# Patient Record
Sex: MALE | Race: OTHER | URBAN - METROPOLITAN AREA
[De-identification: names, ages, dates, MRNs, and addresses within clinical notes are randomized per-mention and may not be internally consistent; named-entity substitution may affect disease eponyms.]

---

## 2017-01-25 ENCOUNTER — OUTPATIENT (OUTPATIENT)
Dept: OUTPATIENT SERVICES | Facility: HOSPITAL | Age: 72
LOS: 1 days | End: 2017-01-25
Payer: MEDICARE

## 2017-01-25 VITALS
DIASTOLIC BLOOD PRESSURE: 63 MMHG | SYSTOLIC BLOOD PRESSURE: 104 MMHG | OXYGEN SATURATION: 98 % | RESPIRATION RATE: 18 BRPM | TEMPERATURE: 98 F | WEIGHT: 209.44 LBS | HEART RATE: 80 BPM

## 2017-01-25 VITALS
SYSTOLIC BLOOD PRESSURE: 104 MMHG | WEIGHT: 209.44 LBS | TEMPERATURE: 98 F | DIASTOLIC BLOOD PRESSURE: 63 MMHG | OXYGEN SATURATION: 97 % | HEIGHT: 65 IN | HEART RATE: 63 BPM | RESPIRATION RATE: 18 BRPM

## 2017-01-25 DIAGNOSIS — Z90.5 ACQUIRED ABSENCE OF KIDNEY: Chronic | ICD-10-CM

## 2017-01-25 DIAGNOSIS — Z01.810 ENCOUNTER FOR PREPROCEDURAL CARDIOVASCULAR EXAMINATION: ICD-10-CM

## 2017-01-25 LAB
ANION GAP SERPL CALC-SCNC: 18 MMOL/L — HIGH (ref 5–17)
APTT BLD: 38 SEC — HIGH (ref 27.5–37.4)
BASOPHILS # BLD AUTO: 0 K/UL — SIGNIFICANT CHANGE UP (ref 0–0.2)
BASOPHILS NFR BLD AUTO: 0.5 % — SIGNIFICANT CHANGE UP (ref 0–2)
BUN SERPL-MCNC: 29 MG/DL — HIGH (ref 8–20)
CALCIUM SERPL-MCNC: 8.1 MG/DL — LOW (ref 8.6–10.2)
CHLORIDE SERPL-SCNC: 97 MMOL/L — LOW (ref 98–107)
CO2 SERPL-SCNC: 28 MMOL/L — SIGNIFICANT CHANGE UP (ref 22–29)
CREAT SERPL-MCNC: 6.36 MG/DL — HIGH (ref 0.5–1.3)
EOSINOPHIL # BLD AUTO: 0.1 K/UL — SIGNIFICANT CHANGE UP (ref 0–0.5)
EOSINOPHIL NFR BLD AUTO: 2.8 % — SIGNIFICANT CHANGE UP (ref 0–5)
GLUCOSE SERPL-MCNC: 110 MG/DL — SIGNIFICANT CHANGE UP (ref 70–115)
HCT VFR BLD CALC: 32.4 % — LOW (ref 42–52)
HGB BLD-MCNC: 10.6 G/DL — LOW (ref 14–18)
INR BLD: 1.24 RATIO — HIGH (ref 0.88–1.16)
LYMPHOCYTES # BLD AUTO: 0.8 K/UL — LOW (ref 1–4.8)
LYMPHOCYTES # BLD AUTO: 19.3 % — LOW (ref 20–55)
MCHC RBC-ENTMCNC: 32.7 G/DL — SIGNIFICANT CHANGE UP (ref 32–36)
MCHC RBC-ENTMCNC: 35.7 PG — HIGH (ref 27–31)
MCV RBC AUTO: 109.1 FL — HIGH (ref 80–94)
MONOCYTES # BLD AUTO: 0.4 K/UL — SIGNIFICANT CHANGE UP (ref 0–0.8)
MONOCYTES NFR BLD AUTO: 9.9 % — SIGNIFICANT CHANGE UP (ref 3–10)
NEUTROPHILS # BLD AUTO: 2.9 K/UL — SIGNIFICANT CHANGE UP (ref 1.8–8)
NEUTROPHILS NFR BLD AUTO: 67.5 % — SIGNIFICANT CHANGE UP (ref 37–73)
PLATELET # BLD AUTO: 190 K/UL — SIGNIFICANT CHANGE UP (ref 150–400)
POTASSIUM SERPL-MCNC: 4.4 MMOL/L — SIGNIFICANT CHANGE UP (ref 3.5–5.3)
POTASSIUM SERPL-SCNC: 4.4 MMOL/L — SIGNIFICANT CHANGE UP (ref 3.5–5.3)
PROTHROM AB SERPL-ACNC: 13.7 SEC — HIGH (ref 10–13.1)
RBC # BLD: 2.97 M/UL — LOW (ref 4.6–6.2)
RBC # FLD: 15.3 % — SIGNIFICANT CHANGE UP (ref 11–15.6)
SODIUM SERPL-SCNC: 143 MMOL/L — SIGNIFICANT CHANGE UP (ref 135–145)
TYPE + AB SCN PNL BLD: SIGNIFICANT CHANGE UP
WBC # BLD: 4.35 K/UL — LOW (ref 4.8–10.8)
WBC # FLD AUTO: 4.35 K/UL — LOW (ref 4.8–10.8)

## 2017-01-25 PROCEDURE — G0463: CPT

## 2017-01-25 PROCEDURE — 86901 BLOOD TYPING SEROLOGIC RH(D): CPT

## 2017-01-25 PROCEDURE — 85730 THROMBOPLASTIN TIME PARTIAL: CPT

## 2017-01-25 PROCEDURE — 80048 BASIC METABOLIC PNL TOTAL CA: CPT

## 2017-01-25 PROCEDURE — 86900 BLOOD TYPING SEROLOGIC ABO: CPT

## 2017-01-25 PROCEDURE — T1013: CPT

## 2017-01-25 PROCEDURE — 93010 ELECTROCARDIOGRAM REPORT: CPT

## 2017-01-25 PROCEDURE — 86850 RBC ANTIBODY SCREEN: CPT

## 2017-01-25 PROCEDURE — 85027 COMPLETE CBC AUTOMATED: CPT

## 2017-01-25 PROCEDURE — 93005 ELECTROCARDIOGRAM TRACING: CPT

## 2017-01-25 PROCEDURE — 85610 PROTHROMBIN TIME: CPT

## 2017-01-25 RX ORDER — WARFARIN SODIUM 2.5 MG/1
1 TABLET ORAL
Qty: 30 | Refills: 1 | OUTPATIENT
Start: 2017-01-25 | End: 2017-03-25

## 2017-01-25 NOTE — H&P CARDIOLOGY - COMMENTS
70 year old male with persistent atrial fibrillation maintained on aspirin 325mg only secondary to spontaneous renal bleed complicated by renal hematoma while on coumadin resulting in left nephrectomy, ESRD on hemodialysis (right UE fistula). CHADS-Vasc =3 (DM, HTN, age). Now for Watchman device 1/30/17.     Plan:   CANDIDA guided YADIRA occlusion via watchman device 1/30/16- 1130AM arrival.   Pre-procedure instructions provided (verbal & written) as follows:   - Coumadin 3mg PO qhs to start this PM.    - INR check with Dr. Beltran 1/27/17 at 1330   - pt advised to stop aspirin 1/27 if INR >=2; otherwise last dose aspirin 1/29   - NPO after midnight prior except meds w/ sips of water.    - Hold glimepiride the morning of the procedure:   Discharge teaching initiated.

## 2017-01-25 NOTE — H&P CARDIOLOGY - HISTORY OF PRESENT ILLNESS
70 year old male history of NIDDM, HLD, persistent atrial fibrillation maintained on aspirin 325mg only secondary to spontaneous renal bleed complicated by renal hematoma while on coumadin resulting in left nephrectomy, ESRD on hemodialysis (right UE fistula). CHADS-Vasc =3 (DM, HTN, age). The patient presents in anticipation of elective YADIRA occlusion via Watchman device scheduled 1/30/17.     TTE 5/2016: normal LV systolic function, LVEF = 55-60%; mild concentric LVH; normal LA size; mild AR, mild MR, normal RV size/function  Regadenoson Stress Test 5/2016: LVEF 62%; no evidence of infarct or inducible ischemia.     PMD: Eddy Beltran MD ([phone])  Cardiologist: Lexie Jerome MD  Nephrologist: Luis Manuel Epstein MD & Dyan Jeffries MD ([phone])  Dialysis: [facility] ([phone])

## 2017-01-25 NOTE — ASU PATIENT PROFILE, ADULT - PMH
AF (atrial fibrillation)    Daytime sleepiness    Dizziness    DM (diabetes mellitus)    Dyslipidemia    ESRD (end stage renal disease)  on  hemodialysis  3  x  weekly.,  H/O  left  nephrectomy after  renal  bleed  Hypercholesteremia    Hypotension  treated  with Midodrine  Kidney problem  spontanious renal bleed while on coumadin  c/b  renal  hematome  with  LT  nephrectomy  Renal hematoma, left    Tiredness

## 2017-01-26 DIAGNOSIS — I25.10 ATHEROSCLEROTIC HEART DISEASE OF NATIVE CORONARY ARTERY WITHOUT ANGINA PECTORIS: ICD-10-CM

## 2017-01-26 DIAGNOSIS — Z01.810 ENCOUNTER FOR PREPROCEDURAL CARDIOVASCULAR EXAMINATION: ICD-10-CM

## 2017-01-30 ENCOUNTER — INPATIENT (INPATIENT)
Facility: HOSPITAL | Age: 72
LOS: 0 days | Discharge: HOME | DRG: 273 | End: 2017-01-31
Attending: STUDENT IN AN ORGANIZED HEALTH CARE EDUCATION/TRAINING PROGRAM | Admitting: STUDENT IN AN ORGANIZED HEALTH CARE EDUCATION/TRAINING PROGRAM
Payer: MEDICARE

## 2017-01-30 ENCOUNTER — TRANSCRIPTION ENCOUNTER (OUTPATIENT)
Age: 72
End: 2017-01-30

## 2017-01-30 VITALS
WEIGHT: 209.44 LBS | HEART RATE: 93 BPM | OXYGEN SATURATION: 99 % | HEIGHT: 65 IN | RESPIRATION RATE: 18 BRPM | DIASTOLIC BLOOD PRESSURE: 71 MMHG | SYSTOLIC BLOOD PRESSURE: 131 MMHG

## 2017-01-30 DIAGNOSIS — Z01.810 ENCOUNTER FOR PREPROCEDURAL CARDIOVASCULAR EXAMINATION: ICD-10-CM

## 2017-01-30 DIAGNOSIS — I48.91 UNSPECIFIED ATRIAL FIBRILLATION: ICD-10-CM

## 2017-01-30 DIAGNOSIS — Z90.5 ACQUIRED ABSENCE OF KIDNEY: Chronic | ICD-10-CM

## 2017-01-30 LAB
BLD GP AB SCN SERPL QL: SIGNIFICANT CHANGE UP
TYPE + AB SCN PNL BLD: SIGNIFICANT CHANGE UP

## 2017-01-30 PROCEDURE — 33340 PERQ CLSR TCAT L ATR APNDGE: CPT

## 2017-01-30 PROCEDURE — 99231 SBSQ HOSP IP/OBS SF/LOW 25: CPT

## 2017-01-30 PROCEDURE — 93355 ECHO TRANSESOPHAGEAL (TEE): CPT

## 2017-01-30 RX ORDER — MIDODRINE HYDROCHLORIDE 2.5 MG/1
10 TABLET ORAL THREE TIMES A DAY
Qty: 0 | Refills: 0 | Status: DISCONTINUED | OUTPATIENT
Start: 2017-01-30 | End: 2017-01-31

## 2017-01-30 RX ORDER — GEMFIBROZIL 600 MG
600 TABLET ORAL DAILY
Qty: 0 | Refills: 0 | Status: DISCONTINUED | OUTPATIENT
Start: 2017-01-30 | End: 2017-01-31

## 2017-01-30 RX ORDER — GLIMEPIRIDE 1 MG
1 TABLET ORAL
Qty: 0 | Refills: 0 | Status: DISCONTINUED | OUTPATIENT
Start: 2017-01-30 | End: 2017-01-31

## 2017-01-30 RX ORDER — URSODIOL 250 MG/1
300 TABLET, FILM COATED ORAL DAILY
Qty: 0 | Refills: 0 | Status: DISCONTINUED | OUTPATIENT
Start: 2017-01-30 | End: 2017-01-31

## 2017-01-30 RX ORDER — LORATADINE 10 MG/1
10 TABLET ORAL DAILY
Qty: 0 | Refills: 0 | Status: DISCONTINUED | OUTPATIENT
Start: 2017-01-30 | End: 2017-01-31

## 2017-01-30 RX ORDER — METOPROLOL TARTRATE 50 MG
12.5 TABLET ORAL
Qty: 0 | Refills: 0 | Status: DISCONTINUED | OUTPATIENT
Start: 2017-01-30 | End: 2017-01-31

## 2017-01-30 RX ORDER — WARFARIN SODIUM 2.5 MG/1
3 TABLET ORAL ONCE
Qty: 0 | Refills: 0 | Status: COMPLETED | OUTPATIENT
Start: 2017-01-30 | End: 2017-01-30

## 2017-01-30 RX ORDER — ASPIRIN/CALCIUM CARB/MAGNESIUM 324 MG
325 TABLET ORAL DAILY
Qty: 0 | Refills: 0 | Status: DISCONTINUED | OUTPATIENT
Start: 2017-01-30 | End: 2017-01-31

## 2017-01-30 RX ORDER — PANTOPRAZOLE SODIUM 20 MG/1
40 TABLET, DELAYED RELEASE ORAL
Qty: 0 | Refills: 0 | Status: DISCONTINUED | OUTPATIENT
Start: 2017-01-30 | End: 2017-01-31

## 2017-01-30 RX ORDER — ACETAMINOPHEN 500 MG
650 TABLET ORAL EVERY 6 HOURS
Qty: 0 | Refills: 0 | Status: DISCONTINUED | OUTPATIENT
Start: 2017-01-30 | End: 2017-01-31

## 2017-01-30 RX ORDER — ATORVASTATIN CALCIUM 80 MG/1
10 TABLET, FILM COATED ORAL AT BEDTIME
Qty: 0 | Refills: 0 | Status: DISCONTINUED | OUTPATIENT
Start: 2017-01-30 | End: 2017-01-31

## 2017-01-30 RX ORDER — CINACALCET 30 MG/1
30 TABLET, FILM COATED ORAL
Qty: 0 | Refills: 0 | Status: DISCONTINUED | OUTPATIENT
Start: 2017-01-30 | End: 2017-01-31

## 2017-01-30 RX ORDER — MAGNESIUM OXIDE 400 MG ORAL TABLET 241.3 MG
400 TABLET ORAL DAILY
Qty: 0 | Refills: 0 | Status: DISCONTINUED | OUTPATIENT
Start: 2017-01-30 | End: 2017-01-31

## 2017-01-30 RX ORDER — DOCUSATE SODIUM 100 MG
100 CAPSULE ORAL DAILY
Qty: 0 | Refills: 0 | Status: DISCONTINUED | OUTPATIENT
Start: 2017-01-30 | End: 2017-01-31

## 2017-01-30 RX ORDER — SEVELAMER CARBONATE 2400 MG/1
800 POWDER, FOR SUSPENSION ORAL
Qty: 0 | Refills: 0 | Status: DISCONTINUED | OUTPATIENT
Start: 2017-01-30 | End: 2017-01-31

## 2017-01-30 RX ADMIN — ATORVASTATIN CALCIUM 10 MILLIGRAM(S): 80 TABLET, FILM COATED ORAL at 21:23

## 2017-01-30 RX ADMIN — MIDODRINE HYDROCHLORIDE 10 MILLIGRAM(S): 2.5 TABLET ORAL at 21:25

## 2017-01-30 RX ADMIN — WARFARIN SODIUM 3 MILLIGRAM(S): 2.5 TABLET ORAL at 21:23

## 2017-01-30 RX ADMIN — Medication 12.5 MILLIGRAM(S): at 21:23

## 2017-01-30 RX ADMIN — CINACALCET 30 MILLIGRAM(S): 30 TABLET, FILM COATED ORAL at 21:38

## 2017-01-30 NOTE — DISCHARGE NOTE ADULT - HOSPITAL COURSE
71 year old male history of NIDDM, HLD, persistent atrial fibrillation maintained on aspirin 325mg only secondary to spontaneous renal bleed complicated by renal hematoma while on coumadin resulting in left nephrectomy, ESRD on hemodialysis (right UE fistula). CHADS-Vasc =3 (DM, HTN, age). The patient presents now s/p elective YADIRA occlusion via Watchman device.    TTE 5/2016: normal LV systolic function, LVEF = 55-60%; mild concentric LVH; normal LA size; mild AR, mild MR, normal RV size/function  Regadenoson Stress Test 5/2016: LVEF 62%; no evidence of infarct or inducible ischemia.     - Coumadin 3mg ordered tonight in addition to Aspirin  - Follow up AM labs  - Follow up with Dr. Rao in 2 weeks.   - CANDIDA to be arranged in 45 days 71 year old male history of NIDDM, HLD, persistent atrial fibrillation maintained on aspirin 325mg only secondary to spontaneous renal bleed complicated by renal hematoma while on coumadin resulting in left nephrectomy, ESRD on hemodialysis (right UE fistula). CHADS-Vasc =3 (DM, HTN, age). The patient presents now s/p elective YADIRA occlusion via Watchman device.    TTE 5/2016: normal LV systolic function, LVEF = 55-60%; mild concentric LVH; normal LA size; mild AR, mild MR, normal RV size/function  Regadenoson Stress Test 5/2016: LVEF 62%; no evidence of infarct or inducible ischemia.     - Coumadin 5mg ordered and sent to pharmacy  - Follow up with Dr. Rao in 2 weeks.   - CANDIDA to be arranged in 45 days 71 year old male history of NIDDM, HLD, persistent atrial fibrillation maintained on aspirin 325mg only secondary to spontaneous renal bleed complicated by renal hematoma while on coumadin resulting in left nephrectomy, ESRD on hemodialysis (right UE fistula). CHADS-Vasc =3 (DM, HTN, age). The patient presents now s/p elective YADIRA occlusion via Watchman device.    TTE 5/2016: normal LV systolic function, LVEF = 55-60%; mild concentric LVH; normal LA size; mild AR, mild MR, normal RV size/function  Regadenoson Stress Test 5/2016: LVEF 62%; no evidence of infarct or inducible ischemia.     - Coumadin 5mg ordered and sent to pharmacy  - Follow up with Dr. Vyas in 2 weeks.   - CANDIDA to be arranged in 45 days

## 2017-01-30 NOTE — DISCHARGE NOTE ADULT - CARE PLAN
Principal Discharge DX:	Abnormality of left atrial appendage  Goal:	s/p closure of left Atrial appendage with Watchman device.  Instructions for follow-up, activity and diet:	- Bruising at the groin, sometimes extending down the leg, and/or a small lump under the skin at the groin access site is normal and will resolve within 2 – 3 weeks.   - You make walk and take stairs at a regular pace.   - Do not perform any exercise more strenuous than walking for 1 week.   - Do not strain or lift heavy objects for 1 week.  - You may shower the day after the procedure.  - Do not soak in water (such as tub baths, hot tubs, swimming, etc.) for 1 week.   - You may resume all other activities the day after the procedure.  Call your doctor if:   - you notice bleeding, redness, drainage, swelling, increased tenderness or a hot sensation around the catheter insertion site.   - your temperature is greater than 100 degrees F for more than 24 hours.  - your rapid heart rhythm returns.  - you have any questions or concerns regarding the procedure.  If significant bleeding and/or a large lump (the size of a golf ball or bigger) occurs:  - Lie flat and apply continuous direct pressure just above the puncture site for at least 10 minutes  - If the issue resolves, notify your physician immediately.    - If the bleeding cannot be controlled, please seek immediate medical attention.  If you experience increased difficulty breathing or chest pain, or if you faint or have dizzy spells, please seek immediate medical attention.  - Check your INR by Friday with Dr. Eddy Beltran.  - Continue your dialysis as per you schedule.   - Follow up with Dr. Rao in 2 weeks time. Arrangements for 45 day CANDIDA will be made at that appointment.

## 2017-01-30 NOTE — DISCHARGE NOTE ADULT - PLAN OF CARE
- Bruising at the groin, sometimes extending down the leg, and/or a small lump under the skin at the groin access site is normal and will resolve within 2 – 3 weeks.   - You make walk and take stairs at a regular pace.   - Do not perform any exercise more strenuous than walking for 1 week.   - Do not strain or lift heavy objects for 1 week.  - You may shower the day after the procedure.  - Do not soak in water (such as tub baths, hot tubs, swimming, etc.) for 1 week.   - You may resume all other activities the day after the procedure.  Call your doctor if:   - you notice bleeding, redness, drainage, swelling, increased tenderness or a hot sensation around the catheter insertion site.   - your temperature is greater than 100 degrees F for more than 24 hours.  - your rapid heart rhythm returns.  - you have any questions or concerns regarding the procedure.  If significant bleeding and/or a large lump (the size of a golf ball or bigger) occurs:  - Lie flat and apply continuous direct pressure just above the puncture site for at least 10 minutes  - If the issue resolves, notify your physician immediately.    - If the bleeding cannot be controlled, please seek immediate medical attention.  If you experience increased difficulty breathing or chest pain, or if you faint or have dizzy spells, please seek immediate medical attention.  - Check your INR by Friday with Dr. Eddy Beltran.  - Continue your dialysis as per you schedule.   - Follow up with Dr. Rao in 2 weeks time. Arrangements for 45 day CANDIDA will be made at that appointment. s/p closure of left Atrial appendage with Watchman device.

## 2017-01-30 NOTE — DISCHARGE NOTE ADULT - NS AS DC VTE INSTRUCTION
Coumadin/Warfarin - Compliance.../Coumadin/Warfarin - Dietary Advice.../Coumadin/Warfarin - Follow-up monitoring.../Coumadin/Warfarin - Potential for adverse drug reactions and interactions Coumadin/Warfarin - Potential for adverse drug reactions and interactions/Coumadin/Warfarin - Dietary Advice.../Coumadin/Warfarin - Compliance.../Coumadin/Warfarin - Follow-up monitoring...

## 2017-01-30 NOTE — PROGRESS NOTE ADULT - SUBJECTIVE AND OBJECTIVE BOX
71 year old male history of NIDDM, HLD, persistent atrial fibrillation maintained on aspirin 325mg only secondary to spontaneous renal bleed complicated by renal hematoma while on coumadin resulting in left nephrectomy, ESRD on hemodialysis (right UE fistula). CHADS-Vasc =3 (DM, HTN, age). The patient presents now s/p elective YADIRA occlusion via Watchman device.    TTE 5/2016: normal LV systolic function, LVEF = 55-60%; mild concentric LVH; normal LA size; mild AR, mild MR, normal RV size/function  Regadenoson Stress Test 5/2016: LVEF 62%; no evidence of infarct or inducible ischemia.     EKG: Afib at 89 bpm; QRSD 76msec  TELE: Afib 75-85 bpm    MEDICATIONS  (STANDING):  warfarin 3milliGRAM(s) Oral once  gemfibrozil 600milliGRAM(s) Oral daily  ursodiol Capsule 300milliGRAM(s) Oral daily  atorvastatin 10milliGRAM(s) Oral at bedtime  metoprolol 12.5milliGRAM(s) Oral two times a day  pantoprazole    Tablet 40milliGRAM(s) Oral before breakfast  glimepiride 1milliGRAM(s) Oral with breakfast  sevelamer hydrochloride 800milliGRAM(s) Oral two times a day with meals  cinacalcet 30milliGRAM(s) Oral two times a day  midodrine 10milliGRAM(s) Oral three times a day  magnesium oxide 400milliGRAM(s) Oral daily  docusate sodium 100milliGRAM(s) Oral daily  loratadine 10milliGRAM(s) Oral daily  aspirin 325milliGRAM(s) Oral daily    MEDICATIONS  (PRN):  acetaminophen   Tablet. 650milliGRAM(s) Oral every 6 hours PRN Moderate Pain (4 - 6)      Allergies    No Known Allergies      PAST MEDICAL & SURGICAL HISTORY:  Kidney problem: spontaneous renal bleed while on coumadin  c/b  renal  hematoma  with  LT  nephrectomy  Hypotension: treated  with Midodrine  Tiredness  Renal hematoma, left  Hypercholesteremia  ESRD (end stage renal disease): on  hemodialysis  3  x  weekly.,  H/O  left  nephrectomy after  renal  bleed  Dyslipidemia  Dizziness  DM (diabetes mellitus)  Daytime sleepiness  AF (atrial fibrillation)  History of unilateral nephrectomy: left  nephrectomy    Vital Signs Last 24 Hrs  HR: 84 (78 - 93)  BP: 92/65 (92/65 - 131/71)  RR: 14 (14 - 18)  SpO2: 98% (97% - 99%)    Physical Exam:  Constitutional: NAD, AAOx3  Cardiovascular: +S1S2 IRIR. Afib on monitor  Pulmonary: CTA b/l, unlabored  GI: soft NTND +BS  Extremities:   Right Groin: Dressing intact. No hematoma. Figure 8 stitch in place. No pedal edema, +distal pulses b/l  Neuro: non focal, PANDYA x4    A/P  71 year old male history of NIDDM, HLD, persistent atrial fibrillation maintained on aspirin 325mg only secondary to spontaneous renal bleed complicated by renal hematoma while on coumadin resulting in left nephrectomy, ESRD on hemodialysis now s/p Watchman device implant  - admit overnight  - keep right left straight x 3 hours  - figure 8 stitch to be removed in AM  - Hemodialysis arranged for tomorrow AM   - Coumadin 3mg ordered tonight in addition to Aspirin  - Follow up AM labs  - Follow up with Dr. Rao in 2 weeks.   - CANDIDA to be arranged in 45 days  - discharge planning for tomorrow.               RADIOLOGY & ADDITIONAL TESTS:

## 2017-01-30 NOTE — DISCHARGE NOTE ADULT - PATIENT PORTAL LINK FT
“You can access the Follow[facility] Patient Portal, offered by [facility], by registering with the following website: [email or web address]”

## 2017-01-30 NOTE — PROGRESS NOTE ADULT - SUBJECTIVE AND OBJECTIVE BOX
Renal :    Discussed w, the PA - CTS,    Undergoing Watchman Procedure ,    Will Resume HD in AM ( [facility]  Dialysis unit - Chelsi Jeffries  & Lucian )

## 2017-01-30 NOTE — DISCHARGE NOTE ADULT - ADDITIONAL INSTRUCTIONS
You have a 2 week post WATCHMAN follow up on 2/15/17 at 3:30pm with Dr Vyas at [facility]

## 2017-01-30 NOTE — PROGRESS NOTE ADULT - SUBJECTIVE AND OBJECTIVE BOX
Renal :    Access : AVF - R,    Treatment Time : 4 Hours,    EDW : 96.5 Kg.,    Heparin 2000 units - Bolus, Qb 425 ml.,    On Mircera & Hectorol,

## 2017-01-30 NOTE — DISCHARGE NOTE ADULT - CARE PROVIDERS DIRECT ADDRESSES
,DirectAddress_Unknown,[email or web address] ,DirectAddress_Unknown,DirectAddress_Unknown

## 2017-01-30 NOTE — DISCHARGE NOTE ADULT - MEDICATION SUMMARY - MEDICATIONS TO STOP TAKING
I will STOP taking the medications listed below when I get home from the hospital:    Renvela 800 mg oral tablet  -- 1 tab(s) by mouth 2 times a day    gemfibrozil 600 mg oral tablet  -- 1 tab(s) by mouth once a day    NexIUM 40 mg oral delayed release capsule  -- 1 cap(s) by mouth once a day    Sensipar 30 mg oral tablet  -- 1 tab(s) by mouth 2 times a day    ursodiol 300 mg oral capsule  -- 1 cap(s) by mouth once a day    Colace 100 mg oral capsule  -- 1 cap(s) by mouth once a day    Lipitor 20 mg oral tablet  -- 1 tab(s) by mouth once a day    loratadine 10 mg oral capsule  -- 1 cap(s) by mouth once a day    midodrine 10 mg oral tablet  -- 1 tab(s) by mouth 3 times a day    glimepiride 1 mg oral tablet  -- 1 tab(s) by mouth once a day    metoprolol tartrate 25 mg oral tablet  -- 0.5 tab(s) by mouth 2 times a day    Mag-Ox 400  -- tab(s) by mouth once a day    aspirin 325 mg oral tablet  -- 1 tab(s) by mouth once a day I will STOP taking the medications listed below when I get home from the hospital:  None

## 2017-01-30 NOTE — DISCHARGE NOTE ADULT - MEDICATION SUMMARY - MEDICATIONS TO TAKE
I will START or STAY ON the medications listed below when I get home from the hospital:    aspirin 325 mg oral tablet  -- 1 tab(s) by mouth once a day  -- Indication: For Post Watchman    Coumadin 5 mg oral tablet  -- 1 tab(s) by mouth once a day (at bedtime)  -- Do not take this drug if you are pregnant.  It is very important that you take or use this exactly as directed.  Do not skip doses or discontinue unless directed by your doctor.  Obtain medical advice before taking any non-prescription drugs as some may affect the action of this medication.    -- Indication: For Post Watchman    glimepiride 1 mg oral tablet  -- 1 tab(s) by mouth once a day  -- Indication: For DM    loratadine 10 mg oral capsule  -- 1 cap(s) by mouth once a day  -- Indication: For Prophylaxsis    gemfibrozil 600 mg oral tablet  -- 1 tab(s) by mouth once a day  -- Indication: For HLD    Lipitor 20 mg oral tablet  -- 1 tab(s) by mouth once a day  -- Indication: For HLD    metoprolol tartrate 25 mg oral tablet  -- 0.5 tab(s) by mouth 2 times a day  -- Indication: For Atrial fibrillation    ursodiol 300 mg oral capsule  -- 1 cap(s) by mouth once a day  -- Indication: For Prophylaxsis    Colace 100 mg oral capsule  -- 1 cap(s) by mouth once a day  -- Indication: For Prophylaxsis    Mag-Ox 400  -- tab(s) by mouth once a day  -- Indication: For Hypomagnasemia    midodrine 10 mg oral tablet  -- 1 tab(s) by mouth 3 times a day  -- Indication: For Hypotension    Sensipar 30 mg oral tablet  -- 1 tab(s) by mouth 2 times a day  -- Indication: For Prophylaxsis    Renvela 800 mg oral tablet  -- 1 tab(s) by mouth 2 times a day  -- Indication: For ESRD    NexIUM 40 mg oral delayed release capsule  -- 1 cap(s) by mouth once a day  -- Indication: For GERD

## 2017-01-30 NOTE — DISCHARGE NOTE ADULT - MEDICATION SUMMARY - MEDICATIONS TO CHANGE
I will SWITCH the dose or number of times a day I take the medications listed below when I get home from the hospital:    Coumadin 3 mg oral tablet  -- 1 tab(s) by mouth once a day (at bedtime)  -- Do not take this drug if you are pregnant.  It is very important that you take or use this exactly as directed.  Do not skip doses or discontinue unless directed by your doctor.  Obtain medical advice before taking any non-prescription drugs as some may affect the action of this medication.

## 2017-01-30 NOTE — DISCHARGE NOTE ADULT - CARE PROVIDER_API CALL
Jong Rao  [address] [place] [address]  Phone: [phone]  Fax: (       - devan Vyas  [address] [place] [address]  Phone: [phone]  Fax: (   )    -

## 2017-01-30 NOTE — DISCHARGE NOTE ADULT - NS AS ACTIVITY OBS
Stairs allowed/Walking-Indoors allowed/Showering allowed/Walking-Outdoors allowed/No Heavy lifting/straining/Driving allowed

## 2017-01-30 NOTE — DISCHARGE NOTE ADULT - PROVIDER TOKENS
FREE:[LAST:[Jalen],FIRST:[Jong],PHONE:[phone]],FAX:[(   )    -],ADDRESS:[[address] [place] [address]]] FREE:[LAST:[Asael],FIRST:[devan],PHONE:[phone]],FAX:[(   )    -],ADDRESS:[[address] [place] [address]]]

## 2017-01-31 VITALS
RESPIRATION RATE: 18 BRPM | HEART RATE: 72 BPM | OXYGEN SATURATION: 100 % | DIASTOLIC BLOOD PRESSURE: 74 MMHG | SYSTOLIC BLOOD PRESSURE: 133 MMHG

## 2017-01-31 DIAGNOSIS — I48.91 UNSPECIFIED ATRIAL FIBRILLATION: ICD-10-CM

## 2017-01-31 PROBLEM — R42 DIZZINESS AND GIDDINESS: Chronic | Status: ACTIVE | Noted: 2017-01-25

## 2017-01-31 PROBLEM — N28.9 DISORDER OF KIDNEY AND URETER, UNSPECIFIED: Chronic | Status: ACTIVE | Noted: 2017-01-25

## 2017-01-31 PROBLEM — I95.9 HYPOTENSION, UNSPECIFIED: Chronic | Status: ACTIVE | Noted: 2017-01-25

## 2017-01-31 PROBLEM — R53.83 OTHER FATIGUE: Chronic | Status: ACTIVE | Noted: 2017-01-25

## 2017-01-31 PROBLEM — N18.6 END STAGE RENAL DISEASE: Chronic | Status: ACTIVE | Noted: 2017-01-25

## 2017-01-31 PROBLEM — E11.9 TYPE 2 DIABETES MELLITUS WITHOUT COMPLICATIONS: Chronic | Status: ACTIVE | Noted: 2017-01-25

## 2017-01-31 PROBLEM — E78.00 PURE HYPERCHOLESTEROLEMIA, UNSPECIFIED: Chronic | Status: ACTIVE | Noted: 2017-01-25

## 2017-01-31 PROBLEM — S37.012A: Chronic | Status: ACTIVE | Noted: 2017-01-25

## 2017-01-31 PROBLEM — R40.0 SOMNOLENCE: Chronic | Status: ACTIVE | Noted: 2017-01-25

## 2017-01-31 PROBLEM — E78.5 HYPERLIPIDEMIA, UNSPECIFIED: Chronic | Status: ACTIVE | Noted: 2017-01-25

## 2017-01-31 LAB
ANION GAP SERPL CALC-SCNC: 21 MMOL/L — HIGH (ref 5–17)
ANISOCYTOSIS BLD QL: SLIGHT — SIGNIFICANT CHANGE UP
BUN SERPL-MCNC: 63 MG/DL — HIGH (ref 8–20)
CALCIUM SERPL-MCNC: 7.7 MG/DL — LOW (ref 8.6–10.2)
CHLORIDE SERPL-SCNC: 96 MMOL/L — LOW (ref 98–107)
CO2 SERPL-SCNC: 22 MMOL/L — SIGNIFICANT CHANGE UP (ref 22–29)
CREAT SERPL-MCNC: 9.01 MG/DL — HIGH (ref 0.5–1.3)
ELLIPTOCYTES BLD QL SMEAR: SLIGHT — SIGNIFICANT CHANGE UP
EOSINOPHIL NFR BLD AUTO: 1 % — SIGNIFICANT CHANGE UP (ref 0–6)
GLUCOSE SERPL-MCNC: 110 MG/DL — SIGNIFICANT CHANGE UP (ref 70–115)
HCT VFR BLD CALC: 28.1 % — LOW (ref 42–52)
HGB BLD-MCNC: 9.4 G/DL — LOW (ref 14–18)
INR BLD: 1.6 RATIO — HIGH (ref 0.88–1.16)
LYMPHOCYTES # BLD AUTO: 14 % — LOW (ref 20–55)
MACROCYTES BLD QL: SLIGHT — SIGNIFICANT CHANGE UP
MAGNESIUM SERPL-MCNC: 1.9 MG/DL — SIGNIFICANT CHANGE UP (ref 1.8–2.5)
MCHC RBC-ENTMCNC: 33.5 G/DL — SIGNIFICANT CHANGE UP (ref 32–36)
MCHC RBC-ENTMCNC: 36 PG — HIGH (ref 27–31)
MCV RBC AUTO: 107.7 FL — HIGH (ref 80–94)
MONOCYTES NFR BLD AUTO: 2 % — LOW (ref 3–10)
NEUTROPHILS NFR BLD AUTO: 83 % — HIGH (ref 37–73)
OVALOCYTES BLD QL SMEAR: SLIGHT — SIGNIFICANT CHANGE UP
PLAT MORPH BLD: NORMAL — SIGNIFICANT CHANGE UP
PLATELET # BLD AUTO: 142 K/UL — LOW (ref 150–400)
POIKILOCYTOSIS BLD QL AUTO: SLIGHT — SIGNIFICANT CHANGE UP
POTASSIUM SERPL-MCNC: 5.8 MMOL/L — HIGH (ref 3.5–5.3)
POTASSIUM SERPL-SCNC: 5.8 MMOL/L — HIGH (ref 3.5–5.3)
PROTHROM AB SERPL-ACNC: 17.7 SEC — HIGH (ref 10–13.1)
RBC # BLD: 2.61 M/UL — LOW (ref 4.6–6.2)
RBC # FLD: 15.3 % — SIGNIFICANT CHANGE UP (ref 11–15.6)
RBC BLD AUTO: ABNORMAL
SODIUM SERPL-SCNC: 139 MMOL/L — SIGNIFICANT CHANGE UP (ref 135–145)
WBC # BLD: 5.05 K/UL — SIGNIFICANT CHANGE UP (ref 4.8–10.8)
WBC # FLD AUTO: 5.05 K/UL — SIGNIFICANT CHANGE UP (ref 4.8–10.8)

## 2017-01-31 PROCEDURE — 93454 CORONARY ARTERY ANGIO S&I: CPT

## 2017-01-31 PROCEDURE — C1889: CPT

## 2017-01-31 PROCEDURE — 85610 PROTHROMBIN TIME: CPT

## 2017-01-31 PROCEDURE — 36415 COLL VENOUS BLD VENIPUNCTURE: CPT

## 2017-01-31 PROCEDURE — 85027 COMPLETE CBC AUTOMATED: CPT

## 2017-01-31 PROCEDURE — T1013: CPT

## 2017-01-31 PROCEDURE — 86900 BLOOD TYPING SEROLOGIC ABO: CPT

## 2017-01-31 PROCEDURE — 99261: CPT

## 2017-01-31 PROCEDURE — 86901 BLOOD TYPING SEROLOGIC RH(D): CPT

## 2017-01-31 PROCEDURE — 93320 DOPPLER ECHO COMPLETE: CPT

## 2017-01-31 PROCEDURE — C1893: CPT

## 2017-01-31 PROCEDURE — 93010 ELECTROCARDIOGRAM REPORT: CPT

## 2017-01-31 PROCEDURE — 83735 ASSAY OF MAGNESIUM: CPT

## 2017-01-31 PROCEDURE — 86920 COMPATIBILITY TEST SPIN: CPT

## 2017-01-31 PROCEDURE — 93312 ECHO TRANSESOPHAGEAL: CPT

## 2017-01-31 PROCEDURE — C1894: CPT

## 2017-01-31 PROCEDURE — 93005 ELECTROCARDIOGRAM TRACING: CPT

## 2017-01-31 PROCEDURE — 93325 DOPPLER ECHO COLOR FLOW MAPG: CPT

## 2017-01-31 PROCEDURE — 80048 BASIC METABOLIC PNL TOTAL CA: CPT

## 2017-01-31 PROCEDURE — 90935 HEMODIALYSIS ONE EVALUATION: CPT

## 2017-01-31 PROCEDURE — 86850 RBC ANTIBODY SCREEN: CPT

## 2017-01-31 RX ORDER — SEVELAMER CARBONATE 2400 MG/1
1600 POWDER, FOR SUSPENSION ORAL
Qty: 0 | Refills: 0 | Status: DISCONTINUED | OUTPATIENT
Start: 2017-01-31 | End: 2017-01-31

## 2017-01-31 RX ORDER — WARFARIN SODIUM 2.5 MG/1
1 TABLET ORAL
Qty: 30 | Refills: 1 | OUTPATIENT
Start: 2017-01-31 | End: 2017-03-31

## 2017-01-31 RX ORDER — PARICALCITOL 2 UG/1
10 CAPSULE, GELATIN COATED ORAL ONCE
Qty: 0 | Refills: 0 | Status: COMPLETED | OUTPATIENT
Start: 2017-01-31 | End: 2017-01-31

## 2017-01-31 RX ORDER — CINACALCET 30 MG/1
60 TABLET, FILM COATED ORAL
Qty: 0 | Refills: 0 | Status: DISCONTINUED | OUTPATIENT
Start: 2017-01-31 | End: 2017-01-31

## 2017-01-31 RX ORDER — ERYTHROPOIETIN 10000 [IU]/ML
10000 INJECTION, SOLUTION INTRAVENOUS; SUBCUTANEOUS ONCE
Qty: 0 | Refills: 0 | Status: COMPLETED | OUTPATIENT
Start: 2017-01-31 | End: 2017-01-31

## 2017-01-31 RX ADMIN — URSODIOL 300 MILLIGRAM(S): 250 TABLET, FILM COATED ORAL at 14:31

## 2017-01-31 RX ADMIN — PANTOPRAZOLE SODIUM 40 MILLIGRAM(S): 20 TABLET, DELAYED RELEASE ORAL at 06:16

## 2017-01-31 RX ADMIN — SEVELAMER CARBONATE 800 MILLIGRAM(S): 2400 POWDER, FOR SUSPENSION ORAL at 08:19

## 2017-01-31 RX ADMIN — CINACALCET 30 MILLIGRAM(S): 30 TABLET, FILM COATED ORAL at 06:16

## 2017-01-31 RX ADMIN — PARICALCITOL 10 MICROGRAM(S): 2 CAPSULE, GELATIN COATED ORAL at 11:04

## 2017-01-31 RX ADMIN — ERYTHROPOIETIN 10000 UNIT(S): 10000 INJECTION, SOLUTION INTRAVENOUS; SUBCUTANEOUS at 11:04

## 2017-01-31 RX ADMIN — MIDODRINE HYDROCHLORIDE 10 MILLIGRAM(S): 2.5 TABLET ORAL at 06:16

## 2017-01-31 RX ADMIN — Medication 325 MILLIGRAM(S): at 14:15

## 2017-01-31 RX ADMIN — Medication 1 MILLIGRAM(S): at 08:19

## 2017-01-31 RX ADMIN — SEVELAMER CARBONATE 1600 MILLIGRAM(S): 2400 POWDER, FOR SUSPENSION ORAL at 14:31

## 2017-01-31 RX ADMIN — Medication 12.5 MILLIGRAM(S): at 06:16

## 2017-01-31 RX ADMIN — Medication 100 MILLIGRAM(S): at 14:15

## 2017-01-31 NOTE — PROGRESS NOTE ADULT - SUBJECTIVE AND OBJECTIVE BOX
Renal :    Patient was seen and evaluated on dialysis.     Co Morbidities :  1.DM -2  2.Kidney Transplant Rejection  3.SHPT  4.AF - S/P Watchman Procedure , Needs 6 week course of AC,  5.ESRD - HD ( HX ., GI Bleeding  in the past )  6. Renal Osteodystrophy,      139    |  96 |  63.0  ----------------------------<  110    Ca:7.7   5.8  |  22.0   |  9.01                                  9.4  5.05  )-----------( 142 K                28.1    M.9 mg/dL .,    Patient is tolerating the procedure well.   T(C): 36.8, Max: 36.9 ( @ 06:19)  HR: 66 (66 - 96)  BP: 113/60 (92/65 - 131/71)  Wt(kg): T W 96.5   Continue dialysis:   Dialyzer:210  Exeltra  Q B: 425 ml.,  QD: 500ml.,  Goal UF 2 - 2.5 L over 4 Hours ,    On 2 K+ Dialysate,

## 2017-01-31 NOTE — PROGRESS NOTE ADULT - PROBLEM SELECTOR PLAN 1
figure 8 stitch to be removed in AM  Hemodialysis arranged for tomorrow AM   Coumadin 3mg ordered tonight in addition to Aspirin

## 2017-01-31 NOTE — PROGRESS NOTE ADULT - ASSESSMENT
71y Male PMHx  NIDDM, HLD, persistent atrial fibrillation maintained on aspirin 325mg only secondary to spontaneous renal bleed complicated by renal hematoma while on coumadin resulting in left nephrectomy, ESRD on hemodialysis (right UE fistula). CHADS-Vasc =3 (DM, HTN, age). The patient presents now s/p elective YADIRA occlusion via Watchman device.

## 2017-01-31 NOTE — PROGRESS NOTE ADULT - SUBJECTIVE AND OBJECTIVE BOX
Critical Care PA - LOVE     71y Male PMHx  NIDDM, HLD, persistent atrial fibrillation maintained on aspirin 325mg only secondary to spontaneous renal bleed complicated by renal hematoma while on coumadin resulting in left nephrectomy, ESRD on hemodialysis (right UE fistula). CHADS-Vasc =3 (DM, HTN, age). The patient presents now s/p elective YADIRA occlusion via Watchman device.    --- PMHx.---    Kidney problem: spontanious renal bleed while on coumadin  c/b  renal  hematome  with  LT  nephrectomy  Hypotension: treated  with Midodrine  Tiredness  Renal hematoma, left  Hypercholesteremia  ESRD (end stage renal disease): on  hemodialysis  3  x  weekly.,  H/O  left  nephrectomy after  renal  bleed  Dyslipidemia  Dizziness  DM (diabetes mellitus)  Daytime sleepiness  AF (atrial fibrillation)         Review Of Systems: All reviewed syetems were negative.    Previous Medical Record reviewed and case has been discussed with EICU.    --- Medications ---    acetaminophen   Tablet. 650milliGRAM(s) PRN  gemfibrozil 600milliGRAM(s)  ursodiol Capsule 300milliGRAM(s)  atorvastatin 10milliGRAM(s)  metoprolol 12.5milliGRAM(s)  pantoprazole    Tablet 40milliGRAM(s)  glimepiride 1milliGRAM(s)  sevelamer hydrochloride 800milliGRAM(s)  cinacalcet 30milliGRAM(s)  midodrine 10milliGRAM(s)  magnesium oxide 400milliGRAM(s)  docusate sodium 100milliGRAM(s)  loratadine 10milliGRAM(s)  aspirin 325milliGRAM(s)      DVT Prophylaxis : Coumadin      Advanced Directives : Full Code     T(C): 36.8, Max: 36.8 (01-30 @ 19:05)  HR: 70  BP: 100/60  RR: 12  SpO2: 99%  Wt(kg): 95    --- Physical Exam ---    General: No acute distress.  Alert, oriented, interactive, nonfocal    HEENT: Pupils equal, reactive to light.  Symmetric.    PULM: Clear to auscultation bilaterally, no significant sputum production    CVS: Irr / Irr S1, S2     ABD: Soft, nondistended, nontender, normoactive bowel sounds, no masses    EXT: No edema, nontender,  R groin no edema, hematoma, dressing C/D/I ; L Groin woth No edema, nop hematoma Dressing with signs of blood no active bleeding noted.     SKIN: Warm and well perfused, no rashes noted.          (Reviewing data, imaging, discussing with multidisciplinary team, non inclusive of procedures, discussing goals of care with patient/family)

## 2017-01-31 NOTE — PROGRESS NOTE ADULT - SUBJECTIVE AND OBJECTIVE BOX
No acute complaints overnight. Right groin suture was removed in AM by myself without complication.    TELE: Afib with rates in the 80s. No other events recorded.     MEDICATIONS  (STANDING):  gemfibrozil 600milliGRAM(s) Oral daily  ursodiol Capsule 300milliGRAM(s) Oral daily  atorvastatin 10milliGRAM(s) Oral at bedtime  metoprolol 12.5milliGRAM(s) Oral two times a day  pantoprazole    Tablet 40milliGRAM(s) Oral before breakfast  glimepiride 1milliGRAM(s) Oral with breakfast  sevelamer hydrochloride 800milliGRAM(s) Oral two times a day with meals  cinacalcet 30milliGRAM(s) Oral two times a day  midodrine 10milliGRAM(s) Oral three times a day  magnesium oxide 400milliGRAM(s) Oral daily  docusate sodium 100milliGRAM(s) Oral daily  loratadine 10milliGRAM(s) Oral daily  aspirin 325milliGRAM(s) Oral daily    MEDICATIONS  (PRN):  acetaminophen   Tablet. 650milliGRAM(s) Oral every 6 hours PRN Moderate Pain (4 - 6)    Allergies    No Known Allergies    PAST MEDICAL & SURGICAL HISTORY:  Kidney problem: spontanious renal bleed while on coumadin  c/b  renal  hematome  with  LT  nephrectomy  Hypotension: treated  with Midodrine  Tiredness  Renal hematoma, left  Hypercholesteremia  ESRD (end stage renal disease): on  hemodialysis  3  x  weekly.,  H/O  left  nephrectomy after  renal  bleed  Dyslipidemia  Dizziness  DM (diabetes mellitus)  Daytime sleepiness  AF (atrial fibrillation)  History of unilateral nephrectomy: left  nephrectomy    Vital Signs Last 24 Hrs  T(C): 36.9, Max: 36.9 (01-31 @ 06:19)  T(F): 98.5, Max: 98.5 (01-31 @ 06:19)  HR: 76 (70 - 96)  BP: 116/64 (92/65 - 131/71)  RR: 13 (12 - 18)  SpO2: 100% (97% - 100%)    Physical Exam:  Constitutional: NAD, AAOx3  Cardiovascular: +S1S2 IRIR  Pulmonary: CTA b/l, unlabored  GI: soft NTND +BS  Extremities: no pedal edema, +distal pulses b/l  Neuro: non focal, PANDYA x4    LABS:                        9.4    5.05  )-----------( 142      ( 31 Jan 2017 06:48 )             28.1     31 Jan 2017 06:48    139    |  96     |  63.0   ----------------------------<  110    5.8     |  22.0   |  9.01     Ca    7.7        31 Jan 2017 06:48  Mg     1.9       31 Jan 2017 06:48      PT/INR - ( 31 Jan 2017 06:48 )   PT: 17.7 sec;   INR: 1.60 ratio      A/P  71 year old male history of NIDDM, HLD, persistent atrial fibrillation maintained on aspirin 325mg only secondary to spontaneous renal bleed complicated by renal hematoma while on coumadin resulting in left nephrectomy, ESRD on hemodialysis now s/p elective YADIRA occlusion via Watchman device.  - Coumadin 5mg ordered and sent to pharmacy  - HD today in house prior to discharge.  - Patient to check INR with Dr. Beltran by Friday this week.  - Follow up with Dr. Rao in 2 weeks.   - CANDIDA to be arranged in 45 days at two week follow up.

## 2017-02-15 ENCOUNTER — APPOINTMENT (OUTPATIENT)
Dept: ELECTROPHYSIOLOGY | Facility: CLINIC | Age: 72
End: 2017-02-15

## 2017-02-17 ENCOUNTER — APPOINTMENT (OUTPATIENT)
Dept: CARDIOLOGY | Facility: CLINIC | Age: 72
End: 2017-02-17

## 2017-02-24 ENCOUNTER — APPOINTMENT (OUTPATIENT)
Dept: CARDIOLOGY | Facility: CLINIC | Age: 72
End: 2017-02-24

## 2017-07-10 ENCOUNTER — CHART COPY (OUTPATIENT)
Age: 72
End: 2017-07-10

## 2017-07-17 ENCOUNTER — OUTPATIENT (OUTPATIENT)
Dept: OUTPATIENT SERVICES | Facility: HOSPITAL | Age: 72
LOS: 1 days | End: 2017-07-17
Payer: MEDICARE

## 2017-07-17 VITALS
HEART RATE: 76 BPM | HEIGHT: 65 IN | RESPIRATION RATE: 18 BRPM | SYSTOLIC BLOOD PRESSURE: 99 MMHG | TEMPERATURE: 98 F | WEIGHT: 220.02 LBS | OXYGEN SATURATION: 100 %

## 2017-07-17 DIAGNOSIS — Q20.8 OTHER CONGENITAL MALFORMATIONS OF CARDIAC CHAMBERS AND CONNECTIONS: Chronic | ICD-10-CM

## 2017-07-17 DIAGNOSIS — Z01.810 ENCOUNTER FOR PREPROCEDURAL CARDIOVASCULAR EXAMINATION: ICD-10-CM

## 2017-07-17 DIAGNOSIS — Z90.5 ACQUIRED ABSENCE OF KIDNEY: Chronic | ICD-10-CM

## 2017-07-17 LAB
ANION GAP SERPL CALC-SCNC: 19 MMOL/L — HIGH (ref 5–17)
APTT BLD: 27.3 SEC — LOW (ref 27.5–37.4)
BASOPHILS # BLD AUTO: 0 K/UL — SIGNIFICANT CHANGE UP (ref 0–0.2)
BASOPHILS NFR BLD AUTO: 0.5 % — SIGNIFICANT CHANGE UP (ref 0–2)
BUN SERPL-MCNC: 51 MG/DL — HIGH (ref 8–20)
CALCIUM SERPL-MCNC: 8.2 MG/DL — LOW (ref 8.6–10.2)
CHLORIDE SERPL-SCNC: 94 MMOL/L — LOW (ref 98–107)
CO2 SERPL-SCNC: 26 MMOL/L — SIGNIFICANT CHANGE UP (ref 22–29)
CREAT SERPL-MCNC: 8 MG/DL — HIGH (ref 0.5–1.3)
EOSINOPHIL # BLD AUTO: 0.2 K/UL — SIGNIFICANT CHANGE UP (ref 0–0.5)
EOSINOPHIL NFR BLD AUTO: 4.9 % — SIGNIFICANT CHANGE UP (ref 0–5)
GLUCOSE SERPL-MCNC: 81 MG/DL — SIGNIFICANT CHANGE UP (ref 70–115)
HCT VFR BLD CALC: 30.8 % — LOW (ref 42–52)
HGB BLD-MCNC: 10.2 G/DL — LOW (ref 14–18)
INR BLD: 1.05 RATIO — SIGNIFICANT CHANGE UP (ref 0.88–1.16)
LYMPHOCYTES # BLD AUTO: 1.1 K/UL — SIGNIFICANT CHANGE UP (ref 1–4.8)
LYMPHOCYTES # BLD AUTO: 30.4 % — SIGNIFICANT CHANGE UP (ref 20–55)
MCHC RBC-ENTMCNC: 33.1 G/DL — SIGNIFICANT CHANGE UP (ref 32–36)
MCHC RBC-ENTMCNC: 36.3 PG — HIGH (ref 27–31)
MCV RBC AUTO: 109.6 FL — HIGH (ref 80–94)
MONOCYTES # BLD AUTO: 0.4 K/UL — SIGNIFICANT CHANGE UP (ref 0–0.8)
MONOCYTES NFR BLD AUTO: 11.4 % — HIGH (ref 3–10)
NEUTROPHILS # BLD AUTO: 1.9 K/UL — SIGNIFICANT CHANGE UP (ref 1.8–8)
NEUTROPHILS NFR BLD AUTO: 52.5 % — SIGNIFICANT CHANGE UP (ref 37–73)
PLATELET # BLD AUTO: 199 K/UL — SIGNIFICANT CHANGE UP (ref 150–400)
POTASSIUM SERPL-MCNC: 5.1 MMOL/L — SIGNIFICANT CHANGE UP (ref 3.5–5.3)
POTASSIUM SERPL-SCNC: 5.1 MMOL/L — SIGNIFICANT CHANGE UP (ref 3.5–5.3)
PROTHROM AB SERPL-ACNC: 11.6 SEC — SIGNIFICANT CHANGE UP (ref 9.8–12.7)
RBC # BLD: 2.81 M/UL — LOW (ref 4.6–6.2)
RBC # FLD: 18.3 % — HIGH (ref 11–15.6)
SODIUM SERPL-SCNC: 139 MMOL/L — SIGNIFICANT CHANGE UP (ref 135–145)
WBC # BLD: 3.7 K/UL — LOW (ref 4.8–10.8)
WBC # FLD AUTO: 3.7 K/UL — LOW (ref 4.8–10.8)

## 2017-07-17 PROCEDURE — 85027 COMPLETE CBC AUTOMATED: CPT

## 2017-07-17 PROCEDURE — 85730 THROMBOPLASTIN TIME PARTIAL: CPT

## 2017-07-17 PROCEDURE — 85610 PROTHROMBIN TIME: CPT

## 2017-07-17 PROCEDURE — G0463: CPT

## 2017-07-17 PROCEDURE — T1013: CPT

## 2017-07-17 PROCEDURE — 80048 BASIC METABOLIC PNL TOTAL CA: CPT

## 2017-07-17 PROCEDURE — 36415 COLL VENOUS BLD VENIPUNCTURE: CPT

## 2017-07-17 PROCEDURE — 93005 ELECTROCARDIOGRAM TRACING: CPT

## 2017-07-17 PROCEDURE — 93010 ELECTROCARDIOGRAM REPORT: CPT

## 2017-07-17 NOTE — H&P PST ADULT - HISTORY OF PRESENT ILLNESS
70 year old male history of NIDDM, HLD, persistent atrial fibrillation maintained on aspirin 325mg only secondary to spontaneous renal bleed complicated by renal hematoma while on coumadin resulting in left nephrectomy, ESRD on hemodialysis (right UE fistula). CHADS-Vasc =3 (DM, HTN, age). The patient presents in anticipation of elective YADIRA occlusion via Watchman device scheduled 1/30/17.     TTE 5/2016: normal LV systolic function, LVEF = 55-60%; mild concentric LVH; normal LA size; mild AR, mild MR, normal RV size/function  Regadenoson Stress Test 5/2016: LVEF 62%; no evidence of infarct or inducible ischemia.     PMD: Eddy Beltran MD ([phone])  Cardiologist: Lexie Jerome MD  Nephrologist: Luis Manuel Epstein MD & Dyan Jeffries MD ([phone])  Dialysis: [facility] ([phone]) 71 year old male history of NIDDM, HLD, persistent atrial fibrillation maintained on aspirin 325mg only secondary to spontaneous renal bleed complicated by renal hematoma while on coumadin resulting in left nephrectomy, ESRD on hemodialysis (right UE fistula). CHADS-Vasc =3 (DM, HTN, age). Now S/P Watchman procedure in january who never followed up post procedure. States he stopped his coumadin. Presents for PST for CANDIDA.    TTE 5/2016: normal LV systolic function, LVEF = 55-60%; mild concentric LVH; normal LA size; mild AR, mild MR, normal RV size/function  Regadenoson Stress Test 5/2016: LVEF 62%; no evidence of infarct or inducible ischemia.     PMD: Eddy Beltran MD ([phone])  Cardiologist: Lexie Jerome MD  Nephrologist: Luis Manuel Epstein MD & Dyan Jeffries MD ([phone])  Dialysis: [facility] ([phone])

## 2017-07-17 NOTE — H&P PST ADULT - CARDIOVASCULAR SYMPTOMS
associated w/ AF w/ RVR/palpitations/dyspnea on exertion dyspnea on exertion/associated w/ AF w/ RVR

## 2017-07-17 NOTE — ASU PATIENT PROFILE, ADULT - FALL HARM RISK
bones(Osteoporosis,prev fx,steroid use,metastatic bone ca/educated home safety with good understanding

## 2017-07-17 NOTE — ASU PATIENT PROFILE, ADULT - PSH
Abnormality of left atrial appendage  WATCHMAN  LEFT ATRIAL APPENDAGE CLOSUIRE   Jan. 30 2017  History of unilateral nephrectomy  left  nephrectomy

## 2017-07-21 ENCOUNTER — TRANSCRIPTION ENCOUNTER (OUTPATIENT)
Age: 72
End: 2017-07-21

## 2017-07-21 ENCOUNTER — OUTPATIENT (OUTPATIENT)
Dept: OUTPATIENT SERVICES | Facility: HOSPITAL | Age: 72
LOS: 1 days | End: 2017-07-21
Payer: MEDICARE

## 2017-07-21 DIAGNOSIS — I48.91 UNSPECIFIED ATRIAL FIBRILLATION: ICD-10-CM

## 2017-07-21 DIAGNOSIS — Q20.8 OTHER CONGENITAL MALFORMATIONS OF CARDIAC CHAMBERS AND CONNECTIONS: Chronic | ICD-10-CM

## 2017-07-21 DIAGNOSIS — Z90.5 ACQUIRED ABSENCE OF KIDNEY: Chronic | ICD-10-CM

## 2017-07-21 LAB
ANION GAP SERPL CALC-SCNC: 18 MMOL/L — HIGH (ref 5–17)
BUN SERPL-MCNC: 32 MG/DL — HIGH (ref 8–20)
CALCIUM SERPL-MCNC: 9 MG/DL — SIGNIFICANT CHANGE UP (ref 8.6–10.2)
CHLORIDE SERPL-SCNC: 95 MMOL/L — LOW (ref 98–107)
CO2 SERPL-SCNC: 29 MMOL/L — SIGNIFICANT CHANGE UP (ref 22–29)
CREAT SERPL-MCNC: 5.85 MG/DL — HIGH (ref 0.5–1.3)
GLUCOSE SERPL-MCNC: 86 MG/DL — SIGNIFICANT CHANGE UP (ref 70–115)
POTASSIUM SERPL-MCNC: 5.1 MMOL/L — SIGNIFICANT CHANGE UP (ref 3.5–5.3)
POTASSIUM SERPL-SCNC: 5.1 MMOL/L — SIGNIFICANT CHANGE UP (ref 3.5–5.3)
SODIUM SERPL-SCNC: 142 MMOL/L — SIGNIFICANT CHANGE UP (ref 135–145)

## 2017-07-21 PROCEDURE — 36415 COLL VENOUS BLD VENIPUNCTURE: CPT

## 2017-07-21 PROCEDURE — 93325 DOPPLER ECHO COLOR FLOW MAPG: CPT | Mod: 26

## 2017-07-21 PROCEDURE — 93320 DOPPLER ECHO COMPLETE: CPT

## 2017-07-21 PROCEDURE — 76376 3D RENDER W/INTRP POSTPROCES: CPT | Mod: 26

## 2017-07-21 PROCEDURE — 93320 DOPPLER ECHO COMPLETE: CPT | Mod: 26

## 2017-07-21 PROCEDURE — 93312 ECHO TRANSESOPHAGEAL: CPT | Mod: 26

## 2017-07-21 PROCEDURE — 93312 ECHO TRANSESOPHAGEAL: CPT

## 2017-07-21 PROCEDURE — 93325 DOPPLER ECHO COLOR FLOW MAPG: CPT

## 2017-07-21 PROCEDURE — 80048 BASIC METABOLIC PNL TOTAL CA: CPT

## 2017-07-21 PROCEDURE — T1013: CPT

## 2017-07-21 RX ORDER — ASPIRIN/CALCIUM CARB/MAGNESIUM 324 MG
1 TABLET ORAL
Qty: 0 | Refills: 0 | COMMUNITY

## 2017-07-21 RX ORDER — ASPIRIN/CALCIUM CARB/MAGNESIUM 324 MG
1 TABLET ORAL
Qty: 0 | Refills: 0 | COMMUNITY
Start: 2017-07-21

## 2017-07-21 RX ORDER — CLOPIDOGREL BISULFATE 75 MG/1
1 TABLET, FILM COATED ORAL
Qty: 90 | Refills: 3 | OUTPATIENT
Start: 2017-07-21 | End: 2018-07-15

## 2017-07-21 NOTE — PROGRESS NOTE ADULT - SUBJECTIVE AND OBJECTIVE BOX
TRANSESOPHAGEAL ECHOCARDIOGRAM (Prelim Note)    After risks and benefits of procedures were explained informed consent was obtained and placed in chart.   CANDIDA was performed.  Anesthesia present to administer sedation.  Patient tolerated the procedure well without complication.      Findings:  No cardiac mass, vegetations, thrombus or shunts visualized.   LA =  Dilated. YADIRA: No thrombus.  No PFO. There is a well seated watchman device. Good compression. There is a 3.5 mm leak from the center of the device.   LV= Normal size and function.  LV ejection fraction: 65% Grade I Diastolic dysfunction   RA=Dlated.  RV: Normal size and function.   No significant valvular abnormality.    No pericardial effusion.   Atherosclerosis of aorta:   Mild atherosclerosis of arch and descending aorta.   Full report to follow.  Decrease ASA  to 81 mg , add plavix 75 mg daily .      Lexie Jerome MD, FACC, MLYES  [facility]  [facility]

## 2017-07-21 NOTE — DISCHARGE NOTE ADULT - PLAN OF CARE
No stroke Follow up in 2 weeks with Dr.   Continue with all prescribed medications.  Don't stop your antiplatelet medication (Plavix/Effient/Brilinta) without asking your cardiologist first.  Follow your prescribed diet.

## 2017-07-21 NOTE — DISCHARGE NOTE ADULT - CARE PROVIDER_API CALL
Jong Rao), Cardiology; Internal Medicine  [address] [place] [address]  Phone: [phone]  Fax: [phone]    Lexie Jerome), Cardiology; Internal Medicine  [address] [place] [address]  Phone: [phone]  Fax: [phone]

## 2017-07-21 NOTE — DISCHARGE NOTE ADULT - HOSPITAL COURSE
71 year old male history of NIDDM, HLD, persistent atrial fibrillation maintained on aspirin 325mg only secondary to spontaneous renal bleed complicated by renal hematoma while on coumadin resulting in left nephrectomy, ESRD on hemodialysis (right UE fistula). CHADS-Vasc =3 (DM, HTN, age). Now S/P Watchman procedure in January who never followed up post procedure. States he stopped his coumadin. He had a CANDIDA which showed:   ·	No cardiac mass, vegetations, thrombus or shunts visualized.   ·	LA =  Dilated. YADIRA: No thrombus.  No PFO. There is a well seated watchman device. Good compression. There is a 3.5 mm leak from the center of the device.   ·	LV= Normal size and function.  LV ejection fraction: 65% Grade I Diastolic dysfunction   ·	RA=Dlated.  RV: Normal size and function.   ·	No significant valvular abnormality.    ·	No pericardial effusion.   ·	Atherosclerosis of aorta:   Mild atherosclerosis of arch and descending aorta.     Plan:  ·	Plavix 75mg daily and aspirin 81mg daily  ·	Follow up with Dr. Rao/Justus

## 2017-07-21 NOTE — DISCHARGE NOTE ADULT - CARE PROVIDERS DIRECT ADDRESSES
[email or web address]

## 2017-07-21 NOTE — DISCHARGE NOTE ADULT - CARE PLAN
Principal Discharge DX:	Persistent atrial fibrillation  Goal:	No stroke  Instructions for follow-up, activity and diet:	Follow up in 2 weeks with Dr.   Continue with all prescribed medications.  Don't stop your antiplatelet medication (Plavix/Effient/Brilinta) without asking your cardiologist first.  Follow your prescribed diet.

## 2017-07-21 NOTE — DISCHARGE NOTE ADULT - PATIENT PORTAL LINK FT
“You can access the [facility] Patient Portal, offered by [facility], by registering with the following website: [email or web address]”

## 2017-07-21 NOTE — DISCHARGE NOTE ADULT - MEDICATION SUMMARY - MEDICATIONS TO TAKE
I will START or STAY ON the medications listed below when I get home from the hospital:    calcium carbonate  -- 600 milligram(s) by mouth once a day  -- Indication: For Stage 5 chronic kidney disease    aspirin 81 mg oral tablet  -- 1 tab(s) by mouth once a day  -- Indication: For Persistent atrial fibrillation    glimepiride 1 mg oral tablet  -- 1 tab(s) by mouth once a day  -- Indication: For Diabetes mellitus    loratadine 10 mg oral capsule  -- 1 cap(s) by mouth once a day  -- Indication: For Allergies    gemfibrozil 600 mg oral tablet  -- 1 tab(s) by mouth once a day  -- Indication: For Hyperlipidemia    Lipitor 20 mg oral tablet  -- 1 tab(s) by mouth once a day  -- Indication: For Hyperlipidemia    Lupron  --  intramuscular every 3 mos  -- Indication: For Prostate Cancer    clopidogrel 75 mg oral tablet  -- 1 tab(s) by mouth once a day  -- Do not take aspirin or aspirin containing products without knowledge and consent of your physician.    -- Indication: For Persistent atrial fibrillation    metoprolol tartrate 25 mg oral tablet  -- 0.5 tab(s) by mouth 2 times a day  -- Indication: For Hypertension    ursodiol 300 mg oral capsule  -- 1 cap(s) by mouth once a day  -- Indication: For Cholelithiasis    Colace 100 mg oral capsule  -- 1 cap(s) by mouth once a day  -- Indication: For Constipation    Mag-Ox 400  -- tab(s) by mouth once a day  -- Indication: For Supplement    midodrine 10 mg oral tablet  -- 1 tab(s) by mouth 3 times a day  -- Indication: For Hypotension    Sensipar 30 mg oral tablet  -- 1 tab(s) by mouth 2 times a day  -- Indication: For Stage 5 chronic kidney disease    Renvela 800 mg oral tablet  -- 1 tab(s) by mouth 2 times a day  -- Indication: For Stage 5 chronic kidney disease    calcium acetate 667 mg oral capsule  -- 3 cap(s) by mouth 3 times a day (with meals)  -- Indication: For Stage 5 chronic kidney disease    NexIUM 40 mg oral delayed release capsule  -- 1 cap(s) by mouth once a day  -- Indication: For GERD

## 2017-08-09 ENCOUNTER — OUTPATIENT (OUTPATIENT)
Dept: OUTPATIENT SERVICES | Facility: HOSPITAL | Age: 72
LOS: 1 days | End: 2017-08-09
Payer: MEDICARE

## 2017-08-09 DIAGNOSIS — Q20.8 OTHER CONGENITAL MALFORMATIONS OF CARDIAC CHAMBERS AND CONNECTIONS: Chronic | ICD-10-CM

## 2017-08-09 DIAGNOSIS — M54.16 RADICULOPATHY, LUMBAR REGION: ICD-10-CM

## 2017-08-09 DIAGNOSIS — Z90.5 ACQUIRED ABSENCE OF KIDNEY: Chronic | ICD-10-CM

## 2017-08-09 PROCEDURE — 77003 FLUOROGUIDE FOR SPINE INJECT: CPT

## 2017-08-09 PROCEDURE — T1013: CPT

## 2017-09-15 ENCOUNTER — NON-APPOINTMENT (OUTPATIENT)
Age: 72
End: 2017-09-15

## 2017-09-15 ENCOUNTER — APPOINTMENT (OUTPATIENT)
Dept: CARDIOLOGY | Facility: CLINIC | Age: 72
End: 2017-09-15
Payer: MEDICARE

## 2017-09-15 VITALS — SYSTOLIC BLOOD PRESSURE: 98 MMHG | OXYGEN SATURATION: 97 % | HEART RATE: 102 BPM | DIASTOLIC BLOOD PRESSURE: 74 MMHG

## 2017-09-15 PROCEDURE — 93000 ELECTROCARDIOGRAM COMPLETE: CPT

## 2017-09-15 PROCEDURE — 99215 OFFICE O/P EST HI 40 MIN: CPT

## 2017-09-15 RX ORDER — ASPIRIN 81 MG/1
81 TABLET ORAL DAILY
Qty: 30 | Refills: 3 | Status: ACTIVE | COMMUNITY
Start: 2017-09-15 | End: 1900-01-01

## 2017-10-11 ENCOUNTER — OUTPATIENT (OUTPATIENT)
Dept: OUTPATIENT SERVICES | Facility: HOSPITAL | Age: 72
LOS: 1 days | End: 2017-10-11
Payer: MEDICARE

## 2017-10-11 ENCOUNTER — TRANSCRIPTION ENCOUNTER (OUTPATIENT)
Age: 72
End: 2017-10-11

## 2017-10-11 DIAGNOSIS — M54.16 RADICULOPATHY, LUMBAR REGION: ICD-10-CM

## 2017-10-11 DIAGNOSIS — Q20.8 OTHER CONGENITAL MALFORMATIONS OF CARDIAC CHAMBERS AND CONNECTIONS: Chronic | ICD-10-CM

## 2017-10-11 DIAGNOSIS — Z90.5 ACQUIRED ABSENCE OF KIDNEY: Chronic | ICD-10-CM

## 2017-10-11 LAB — GLUCOSE BLDC GLUCOMTR-MCNC: 84 MG/DL — SIGNIFICANT CHANGE UP (ref 70–99)

## 2017-10-11 PROCEDURE — 82962 GLUCOSE BLOOD TEST: CPT

## 2017-10-11 PROCEDURE — T1013: CPT

## 2017-10-11 PROCEDURE — 62323 NJX INTERLAMINAR LMBR/SAC: CPT

## 2017-10-11 PROCEDURE — 77003 FLUOROGUIDE FOR SPINE INJECT: CPT

## 2017-11-08 ENCOUNTER — EMERGENCY (EMERGENCY)
Facility: HOSPITAL | Age: 72
LOS: 1 days | Discharge: HOME | End: 2017-11-08
Attending: EMERGENCY MEDICINE
Payer: MEDICARE

## 2017-11-08 DIAGNOSIS — Q20.8 OTHER CONGENITAL MALFORMATIONS OF CARDIAC CHAMBERS AND CONNECTIONS: Chronic | ICD-10-CM

## 2017-11-08 DIAGNOSIS — Z90.5 ACQUIRED ABSENCE OF KIDNEY: Chronic | ICD-10-CM

## 2017-11-08 DIAGNOSIS — Z94.0 KIDNEY TRANSPLANT STATUS: Chronic | ICD-10-CM

## 2017-11-08 LAB
ALBUMIN SERPL ELPH-MCNC: 4.6 G/DL — SIGNIFICANT CHANGE UP (ref 3.3–5.2)
ALP SERPL-CCNC: 134 U/L — HIGH (ref 40–120)
ALT FLD-CCNC: 12 U/L — SIGNIFICANT CHANGE UP
ANION GAP SERPL CALC-SCNC: 18 MMOL/L — HIGH (ref 5–17)
ANISOCYTOSIS BLD QL: SLIGHT — SIGNIFICANT CHANGE UP
APTT BLD: 35.1 SEC — SIGNIFICANT CHANGE UP (ref 27.5–37.4)
AST SERPL-CCNC: 24 U/L — SIGNIFICANT CHANGE UP
BASOPHILS # BLD AUTO: 0 K/UL — SIGNIFICANT CHANGE UP (ref 0–0.2)
BASOPHILS NFR BLD AUTO: 0.5 % — SIGNIFICANT CHANGE UP (ref 0–2)
BILIRUB SERPL-MCNC: 0.4 MG/DL — SIGNIFICANT CHANGE UP (ref 0.4–2)
BLD GP AB SCN SERPL QL: SIGNIFICANT CHANGE UP
BUN SERPL-MCNC: 49 MG/DL — HIGH (ref 8–20)
CALCIUM SERPL-MCNC: 9.4 MG/DL — SIGNIFICANT CHANGE UP (ref 8.6–10.2)
CHLORIDE SERPL-SCNC: 95 MMOL/L — LOW (ref 98–107)
CO2 SERPL-SCNC: 29 MMOL/L — SIGNIFICANT CHANGE UP (ref 22–29)
CREAT SERPL-MCNC: 6.58 MG/DL — HIGH (ref 0.5–1.3)
EOSINOPHIL # BLD AUTO: 0.2 K/UL — SIGNIFICANT CHANGE UP (ref 0–0.5)
EOSINOPHIL NFR BLD AUTO: 5.5 % — HIGH (ref 0–5)
GLUCOSE SERPL-MCNC: 140 MG/DL — HIGH (ref 70–115)
HCT VFR BLD CALC: 35.6 % — LOW (ref 42–52)
HGB BLD-MCNC: 11.7 G/DL — LOW (ref 14–18)
INR BLD: 1.11 RATIO — SIGNIFICANT CHANGE UP (ref 0.88–1.16)
LACTATE BLDV-MCNC: 1.1 MMOL/L — SIGNIFICANT CHANGE UP (ref 0.5–2)
LYMPHOCYTES # BLD AUTO: 0.7 K/UL — LOW (ref 1–4.8)
LYMPHOCYTES # BLD AUTO: 17.8 % — LOW (ref 20–55)
MACROCYTES BLD QL: SLIGHT — SIGNIFICANT CHANGE UP
MCHC RBC-ENTMCNC: 32.9 G/DL — SIGNIFICANT CHANGE UP (ref 32–36)
MCHC RBC-ENTMCNC: 36.2 PG — HIGH (ref 27–31)
MCV RBC AUTO: 110.2 FL — HIGH (ref 80–94)
MICROCYTES BLD QL: SLIGHT — SIGNIFICANT CHANGE UP
MONOCYTES # BLD AUTO: 0.4 K/UL — SIGNIFICANT CHANGE UP (ref 0–0.8)
MONOCYTES NFR BLD AUTO: 9.4 % — SIGNIFICANT CHANGE UP (ref 3–10)
NEUTROPHILS # BLD AUTO: 2.8 K/UL — SIGNIFICANT CHANGE UP (ref 1.8–8)
NEUTROPHILS NFR BLD AUTO: 66.6 % — SIGNIFICANT CHANGE UP (ref 37–73)
OVALOCYTES BLD QL SMEAR: SLIGHT — SIGNIFICANT CHANGE UP
PLAT MORPH BLD: NORMAL — SIGNIFICANT CHANGE UP
PLATELET # BLD AUTO: 147 K/UL — LOW (ref 150–400)
POIKILOCYTOSIS BLD QL AUTO: SLIGHT — SIGNIFICANT CHANGE UP
POTASSIUM SERPL-MCNC: 5 MMOL/L — SIGNIFICANT CHANGE UP (ref 3.5–5.3)
POTASSIUM SERPL-SCNC: 5 MMOL/L — SIGNIFICANT CHANGE UP (ref 3.5–5.3)
PROT SERPL-MCNC: 8.3 G/DL — SIGNIFICANT CHANGE UP (ref 6.6–8.7)
PROTHROM AB SERPL-ACNC: 12.2 SEC — SIGNIFICANT CHANGE UP (ref 9.8–12.7)
RBC # BLD: 3.23 M/UL — LOW (ref 4.6–6.2)
RBC # FLD: 15.9 % — HIGH (ref 11–15.6)
RBC BLD AUTO: ABNORMAL
SODIUM SERPL-SCNC: 142 MMOL/L — SIGNIFICANT CHANGE UP (ref 135–145)
TYPE + AB SCN PNL BLD: SIGNIFICANT CHANGE UP
WBC # BLD: 4.2 K/UL — LOW (ref 4.8–10.8)
WBC # FLD AUTO: 4.2 K/UL — LOW (ref 4.8–10.8)

## 2017-11-08 PROCEDURE — 85610 PROTHROMBIN TIME: CPT

## 2017-11-08 PROCEDURE — 83605 ASSAY OF LACTIC ACID: CPT

## 2017-11-08 PROCEDURE — 71010: CPT | Mod: 26

## 2017-11-08 PROCEDURE — 86900 BLOOD TYPING SEROLOGIC ABO: CPT

## 2017-11-08 PROCEDURE — 80053 COMPREHEN METABOLIC PANEL: CPT

## 2017-11-08 PROCEDURE — 72125 CT NECK SPINE W/O DYE: CPT

## 2017-11-08 PROCEDURE — 70450 CT HEAD/BRAIN W/O DYE: CPT

## 2017-11-08 PROCEDURE — 73562 X-RAY EXAM OF KNEE 3: CPT | Mod: 26,LT

## 2017-11-08 PROCEDURE — 70450 CT HEAD/BRAIN W/O DYE: CPT | Mod: 26

## 2017-11-08 PROCEDURE — 85027 COMPLETE CBC AUTOMATED: CPT

## 2017-11-08 PROCEDURE — 72125 CT NECK SPINE W/O DYE: CPT | Mod: 26

## 2017-11-08 PROCEDURE — 85730 THROMBOPLASTIN TIME PARTIAL: CPT

## 2017-11-08 PROCEDURE — 71250 CT THORAX DX C-: CPT | Mod: 26

## 2017-11-08 PROCEDURE — 71045 X-RAY EXAM CHEST 1 VIEW: CPT

## 2017-11-08 PROCEDURE — 86850 RBC ANTIBODY SCREEN: CPT

## 2017-11-08 PROCEDURE — 73562 X-RAY EXAM OF KNEE 3: CPT

## 2017-11-08 PROCEDURE — 36415 COLL VENOUS BLD VENIPUNCTURE: CPT

## 2017-11-08 PROCEDURE — 99284 EMERGENCY DEPT VISIT MOD MDM: CPT

## 2017-11-08 PROCEDURE — 86901 BLOOD TYPING SEROLOGIC RH(D): CPT

## 2017-11-08 PROCEDURE — 71250 CT THORAX DX C-: CPT

## 2017-11-08 PROCEDURE — 99284 EMERGENCY DEPT VISIT MOD MDM: CPT | Mod: 25

## 2017-11-08 RX ORDER — CALCIUM CARBONATE 500(1250)
600 TABLET ORAL
Qty: 0 | Refills: 0 | COMMUNITY

## 2017-11-08 NOTE — H&P ADULT - HISTORY OF PRESENT ILLNESS
70yo M BIBEMS  s/p fall with crutches. (-)LOC. Complaining of pain in his L knee.       PMHX: ESRD TTHS, DM, HLD, Afib  PSHX 70yo M BIBEMS  s/p fall with crutches. (-)LOC. Complaining of pain in his L knee. 72yo M BIBEMS  s/p fall with crutches. (-)LOC. Complaining of pain in his L knee. Patient refers that he fell on his R knee and [facility] head [facility] He [facility] HD TTHS. Denies f/c/n/v/, SOB, CP, lightheadedness. Trauma primary and secondary survey were performed.

## 2017-11-08 NOTE — H&P ADULT - NSHPPHYSICALEXAM_GEN_ALL_CORE
Constitutional: Well-developed well nourished [male] [female] in no acute distress  HEENT: Head is normocephalic and atraumatic, maxillofacial structures stable, no blood or discharge from nares or oral cavity, no alcantara sign / racoon eyes, EOMI b/l, pupils 3 mm round and reactive to light b/l, no active drainage or redness  Neck: cervical collar in place, trachea midline  Respiratory: Breath sounds CTA b/l respirations are unlabored, no accessory muscle use, no conversational dyspnea  Cardiovascular: Regular rate & rhythm, +S1, S2  Chest: Chest wall is non-tender to palpation, no subQ emphysema or crepitus palpated  Gastrointestinal: Abdomen soft, non-tender, non-distended, no rebound tenderness / guarding, no ecchymosis or external signs of abdominal trauma  Extremities: moving all extremities spontaneously, no point tenderness or deformity noted to upper or lower extremities b/l  Pelvis: stable  Vascular: 2+ radial, femoral, and DP pulses b/l  Neurological: GCS: 15 (4/5/6). A&O x 3; no gross sensory / motor / coordination deficits  Musculoskeletal: 5/5 strength of upper and lower extremities b/l  Back: no C/T/LS spine tenderness to palpation, no step-offs or signs of external trauma to the back Constitutional: Well-developed well nourished male in no acute distress  HEENT: Head is normocephalic and atraumatic, maxillofacial structures stable, no blood or discharge from nares or oral cavity, no alcantara sign / racoon eyes, EOMI b/l, pupils 3 mm round and reactive to light b/l, no active drainage or redness  Neck: cervical collar in place, trachea midline  Respiratory: Breath sounds CTA b/l respirations are unlabored, no accessory muscle use, no conversational dyspnea  Cardiovascular: Regular rate & rhythm, +S1, S2  Chest: Chest wall is non-tender to palpation, no subQ emphysema or crepitus palpated  Gastrointestinal: Abdomen soft, non-tender, non-distended, no rebound tenderness / guarding, no ecchymosis or external signs of abdominal trauma  Extremities: moving all extremities spontaneously, no point tenderness or deformity noted to upper or lower extremities b/l. Skin abrasion on LLE. AV fistula on RUE with palpable thrill/pulse  Pelvis: stable  Rectal: Normal rectal tone  Vascular: 2+ radial, femoral, and DP pulses b/l  Neurological: GCS: 15 (4/5/6). A&O x 3; no gross sensory / motor / coordination deficits  Musculoskeletal: 5/5 strength of upper and lower extremities b/l  Back: no C/T/LS spine tenderness to palpation, no step-offs or signs of external trauma to the back

## 2017-11-08 NOTE — H&P ADULT - NSHPLABSRESULTS_GEN_ALL_CORE
LABS:                        11.7   4.2   )-----------( 147      ( 08 Nov 2017 16:32 )             35.6     11-08    142  |  95<L>  |  49.0<H>  ----------------------------<  140<H>  5.0   |  29.0  |  6.58<H>    Ca    9.4      08 Nov 2017 16:32    TPro  8.3  /  Alb  4.6  /  TBili  0.4  /  DBili  x   /  AST  24  /  ALT  12  /  AlkPhos  134<H>  11-08    PT/INR - ( 08 Nov 2017 16:32 )   PT: 12.2 sec;   INR: 1.11 ratio         PTT - ( 08 Nov 2017 16:32 )  PTT:35.1 sec LABS:                        11.7   4.2   )-----------( 147      ( 08 Nov 2017 16:32 )             35.6     11-08    142  |  95<L>  |  49.0<H>  ----------------------------<  140<H>  5.0   |  29.0  |  6.58<H>    Ca    9.4      08 Nov 2017 16:32    TPro  8.3  /  Alb  4.6  /  TBili  0.4  /  DBili  x   /  AST  24  /  ALT  12  /  AlkPhos  134<H>  11-08    PT/INR - ( 08 Nov 2017 16:32 )   PT: 12.2 sec;   INR: 1.11 ratio         PTT - ( 08 Nov 2017 16:32 )  PTT:35.1 sec    CT head: Atrophy consistent with age.    CT spine: No fracture. Cervical spondylosis noted at C5-6 and C6-7. Disc narrowing notable at C6-7 with vacuum disc phenomena and secondary   bone changes in the endplates.    CT Chest: Cholelithiasis is noted. The left lung is clear. Evidence of prior surgery involving the right lung in the region of the posterior segment of the right upper lobe. No evidence of pulmonary contusion or pneumothorax. No evidence of acute rib fracture. Left lateral abdominal wall hernia.

## 2017-11-08 NOTE — ED PROVIDER NOTE - OBJECTIVE STATEMENT
pt c/o b/l knee pain s/p mechanical fall while in "[employer] parking lot achy non radiating no loc on blood thinners. denies fever. denies HA or neck pain. no chest pain or sob. no abd pain. no n/v/d. no urinary f/u/d. no back pain. no motor or sensory deficits. denies illicit drug use. . no rash. no other acute issues symptoms or concerns

## 2017-11-08 NOTE — H&P ADULT - ASSESSMENT
A/P 72 yo M s/p  fall with no acute injury. C-collar cleared prior to discharge. Hemodynamically stable  -Cleared by trauma service to be d/c home  -f/u with PMD  -return if any acute symptoms develop

## 2017-11-08 NOTE — ED PROVIDER NOTE - MEDICAL DECISION MAKING DETAILS
ambulating in nad reviewed ct chest radiology no rib fx return to ed for intractable HA, persistent vomiting, or new onset motor/sensory deficits

## 2017-11-08 NOTE — ED ADULT NURSE REASSESSMENT NOTE - NS ED NURSE REASSESS COMMENT FT1
as MD Daniels and trauma team, cleared medically to be discharged, pending final results and paperwork, pt hemodynamically stable, PIV removed, refer to flowsheet and chart, pt to be discharged, pt understood discharge instructions and plan of care as MD Daniels and trauma team, cleared medically to be discharged, pending final results and paperwork, pt hemodynamically stable, PIV removed, refer to flowsheet and chart, pt to be discharged, pt understood discharge instructions and plan of care, refer to trauma flowsheet for vitals

## 2017-11-08 NOTE — H&P ADULT - PSH
Abnormality of left atrial appendage  WATCHMAN  LEFT ATRIAL APPENDAGE CLOSUIRE   Jan. 30 2017  History of unilateral nephrectomy  left  nephrectomy Abnormality of left atrial appendage  WATCHMAN  LEFT ATRIAL APPENDAGE CLOSUIRE   Jan. 30 2017  H/O kidney transplant    History of unilateral nephrectomy  left  nephrectomy

## 2017-11-08 NOTE — ED PROVIDER NOTE - CONSTITUTIONAL, MLM
normal... awake, alert, oriented to person, place, time/situation and in no apparent distress. gcs 15

## 2017-11-08 NOTE — ED ADULT TRIAGE NOTE - CHIEF COMPLAINT QUOTE
pt biba from [employer] s/p fall, hitting left knee, left elbow, and elft side of head, pt on plavix and aspirin, code trauma b initiated

## 2017-11-29 ENCOUNTER — APPOINTMENT (OUTPATIENT)
Dept: CARDIOLOGY | Facility: CLINIC | Age: 72
End: 2017-11-29
Payer: MEDICARE

## 2017-11-29 ENCOUNTER — NON-APPOINTMENT (OUTPATIENT)
Age: 72
End: 2017-11-29

## 2017-11-29 VITALS
OXYGEN SATURATION: 98 % | SYSTOLIC BLOOD PRESSURE: 90 MMHG | HEIGHT: 66 IN | HEART RATE: 91 BPM | DIASTOLIC BLOOD PRESSURE: 61 MMHG

## 2017-11-29 DIAGNOSIS — Z00.00 ENCOUNTER FOR GENERAL ADULT MEDICAL EXAMINATION W/OUT ABNORMAL FINDINGS: ICD-10-CM

## 2017-11-29 DIAGNOSIS — N28.9 DISORDER OF KIDNEY AND URETER, UNSPECIFIED: ICD-10-CM

## 2017-11-29 PROCEDURE — 93000 ELECTROCARDIOGRAM COMPLETE: CPT

## 2017-11-29 PROCEDURE — 99215 OFFICE O/P EST HI 40 MIN: CPT

## 2017-12-13 ENCOUNTER — OUTPATIENT (OUTPATIENT)
Dept: OUTPATIENT SERVICES | Facility: HOSPITAL | Age: 72
LOS: 1 days | End: 2017-12-13
Payer: MEDICARE

## 2017-12-13 ENCOUNTER — TRANSCRIPTION ENCOUNTER (OUTPATIENT)
Age: 72
End: 2017-12-13

## 2017-12-13 ENCOUNTER — EMERGENCY (EMERGENCY)
Facility: HOSPITAL | Age: 72
LOS: 1 days | Discharge: HOME | End: 2017-12-13
Attending: EMERGENCY MEDICINE
Payer: MEDICARE

## 2017-12-13 VITALS
SYSTOLIC BLOOD PRESSURE: 120 MMHG | RESPIRATION RATE: 18 BRPM | OXYGEN SATURATION: 100 % | HEART RATE: 84 BPM | DIASTOLIC BLOOD PRESSURE: 82 MMHG | TEMPERATURE: 98 F

## 2017-12-13 VITALS
TEMPERATURE: 98 F | WEIGHT: 210.1 LBS | RESPIRATION RATE: 18 BRPM | HEART RATE: 94 BPM | OXYGEN SATURATION: 99 % | SYSTOLIC BLOOD PRESSURE: 129 MMHG | DIASTOLIC BLOOD PRESSURE: 84 MMHG | HEIGHT: 67 IN

## 2017-12-13 DIAGNOSIS — Q20.8 OTHER CONGENITAL MALFORMATIONS OF CARDIAC CHAMBERS AND CONNECTIONS: Chronic | ICD-10-CM

## 2017-12-13 DIAGNOSIS — Z94.0 KIDNEY TRANSPLANT STATUS: Chronic | ICD-10-CM

## 2017-12-13 DIAGNOSIS — Z90.5 ACQUIRED ABSENCE OF KIDNEY: Chronic | ICD-10-CM

## 2017-12-13 DIAGNOSIS — M54.16 RADICULOPATHY, LUMBAR REGION: ICD-10-CM

## 2017-12-13 LAB
ANION GAP SERPL CALC-SCNC: 17 MMOL/L — SIGNIFICANT CHANGE UP (ref 5–17)
BASOPHILS # BLD AUTO: 0 K/UL — SIGNIFICANT CHANGE UP (ref 0–0.2)
BASOPHILS NFR BLD AUTO: 0.2 % — SIGNIFICANT CHANGE UP (ref 0–2)
BUN SERPL-MCNC: 29 MG/DL — HIGH (ref 8–20)
CALCIUM SERPL-MCNC: 8.9 MG/DL — SIGNIFICANT CHANGE UP (ref 8.6–10.2)
CHLORIDE SERPL-SCNC: 93 MMOL/L — LOW (ref 98–107)
CO2 SERPL-SCNC: 31 MMOL/L — HIGH (ref 22–29)
CREAT SERPL-MCNC: 6.07 MG/DL — HIGH (ref 0.5–1.3)
EOSINOPHIL # BLD AUTO: 0 K/UL — SIGNIFICANT CHANGE UP (ref 0–0.5)
EOSINOPHIL NFR BLD AUTO: 1.1 % — SIGNIFICANT CHANGE UP (ref 0–5)
GLUCOSE BLDC GLUCOMTR-MCNC: 39 MG/DL — CRITICAL LOW (ref 70–99)
GLUCOSE BLDC GLUCOMTR-MCNC: 43 MG/DL — CRITICAL LOW (ref 70–99)
GLUCOSE BLDC GLUCOMTR-MCNC: 48 MG/DL — LOW (ref 70–99)
GLUCOSE BLDC GLUCOMTR-MCNC: 56 MG/DL — LOW (ref 70–99)
GLUCOSE BLDC GLUCOMTR-MCNC: 74 MG/DL — SIGNIFICANT CHANGE UP (ref 70–99)
GLUCOSE SERPL-MCNC: 55 MG/DL — LOW (ref 70–115)
HCT VFR BLD CALC: 31.8 % — LOW (ref 42–52)
HGB BLD-MCNC: 10.6 G/DL — LOW (ref 14–18)
LYMPHOCYTES # BLD AUTO: 0.8 K/UL — LOW (ref 1–4.8)
LYMPHOCYTES # BLD AUTO: 18.9 % — LOW (ref 20–55)
MAGNESIUM SERPL-MCNC: 1.8 MG/DL — SIGNIFICANT CHANGE UP (ref 1.6–2.6)
MCHC RBC-ENTMCNC: 33.3 G/DL — SIGNIFICANT CHANGE UP (ref 32–36)
MCHC RBC-ENTMCNC: 36.4 PG — HIGH (ref 27–31)
MCV RBC AUTO: 109.3 FL — HIGH (ref 80–94)
MONOCYTES # BLD AUTO: 0.4 K/UL — SIGNIFICANT CHANGE UP (ref 0–0.8)
MONOCYTES NFR BLD AUTO: 8 % — SIGNIFICANT CHANGE UP (ref 3–10)
NEUTROPHILS # BLD AUTO: 3.2 K/UL — SIGNIFICANT CHANGE UP (ref 1.8–8)
NEUTROPHILS NFR BLD AUTO: 71.4 % — SIGNIFICANT CHANGE UP (ref 37–73)
PHOSPHATE SERPL-MCNC: 2.7 MG/DL — SIGNIFICANT CHANGE UP (ref 2.4–4.7)
PLATELET # BLD AUTO: 161 K/UL — SIGNIFICANT CHANGE UP (ref 150–400)
POTASSIUM SERPL-MCNC: 4.4 MMOL/L — SIGNIFICANT CHANGE UP (ref 3.5–5.3)
POTASSIUM SERPL-SCNC: 4.4 MMOL/L — SIGNIFICANT CHANGE UP (ref 3.5–5.3)
RBC # BLD: 2.91 M/UL — LOW (ref 4.6–6.2)
RBC # FLD: 16.1 % — HIGH (ref 11–15.6)
SODIUM SERPL-SCNC: 141 MMOL/L — SIGNIFICANT CHANGE UP (ref 135–145)
WBC # BLD: 4.5 K/UL — LOW (ref 4.8–10.8)
WBC # FLD AUTO: 4.5 K/UL — LOW (ref 4.8–10.8)

## 2017-12-13 PROCEDURE — 85027 COMPLETE CBC AUTOMATED: CPT

## 2017-12-13 PROCEDURE — 83735 ASSAY OF MAGNESIUM: CPT

## 2017-12-13 PROCEDURE — 64484 NJX AA&/STRD TFRM EPI L/S EA: CPT | Mod: RT

## 2017-12-13 PROCEDURE — T1013: CPT

## 2017-12-13 PROCEDURE — 80048 BASIC METABOLIC PNL TOTAL CA: CPT

## 2017-12-13 PROCEDURE — 93010 ELECTROCARDIOGRAM REPORT: CPT

## 2017-12-13 PROCEDURE — 82962 GLUCOSE BLOOD TEST: CPT

## 2017-12-13 PROCEDURE — 93005 ELECTROCARDIOGRAM TRACING: CPT

## 2017-12-13 PROCEDURE — 36415 COLL VENOUS BLD VENIPUNCTURE: CPT

## 2017-12-13 PROCEDURE — 99284 EMERGENCY DEPT VISIT MOD MDM: CPT | Mod: 25

## 2017-12-13 PROCEDURE — 82947 ASSAY GLUCOSE BLOOD QUANT: CPT

## 2017-12-13 PROCEDURE — 64483 NJX AA&/STRD TFRM EPI L/S 1: CPT | Mod: RT

## 2017-12-13 PROCEDURE — 76000 FLUOROSCOPY <1 HR PHYS/QHP: CPT

## 2017-12-13 PROCEDURE — 96374 THER/PROPH/DIAG INJ IV PUSH: CPT

## 2017-12-13 PROCEDURE — 84100 ASSAY OF PHOSPHORUS: CPT

## 2017-12-13 PROCEDURE — 99284 EMERGENCY DEPT VISIT MOD MDM: CPT

## 2017-12-13 RX ORDER — DEXTROSE 50 % IN WATER 50 %
50 SYRINGE (ML) INTRAVENOUS ONCE
Qty: 0 | Refills: 0 | Status: COMPLETED | OUTPATIENT
Start: 2017-12-13 | End: 2017-12-13

## 2017-12-13 RX ADMIN — Medication 50 MILLILITER(S): at 11:30

## 2017-12-13 NOTE — ED ADULT NURSE NOTE - OBJECTIVE STATEMENT
Via , Assumed patient care at 1145.  Pt sent from pain management after lower back injection.  Pt was hypoglycemic, Pt reports given coffee and cookie in pain management.  Given sandwich and water. Pt has no complaints.  Cardiac monitor showing NSR and stable blood pressure. Via , Assumed patient care at 1145.  Pt sent from pain management after lower back injection.  Pt was hypoglycemic, Pt reports given coffee and cookie in pain management.  Given sandwich and water. Pt has no complaints.  Cardiac monitor showing AF and stable blood pressure.

## 2017-12-13 NOTE — ED ADULT NURSE NOTE - PSH
Abnormality of left atrial appendage  WATCHMAN  LEFT ATRIAL APPENDAGE CLOSUIRE   Jan. 30 2017  H/O kidney transplant    History of unilateral nephrectomy  left  nephrectomy

## 2017-12-13 NOTE — ED ADULT TRIAGE NOTE - CHIEF COMPLAINT QUOTE
pt alert and awake x3, BIBA from pain management with a couple episodes of low blood glucose, from pain management he was administered 1 amp of D 50, Dextrose fluid and food, blood glucose in 40's, denies any complaints.

## 2017-12-13 NOTE — ED PROVIDER NOTE - MEDICAL DECISION MAKING DETAILS
asymotatic pt familiar to me understand dispo blood sugar he is smiling laughing has family to watch him going to bakery to eat did nto eat this am before procedure suspect cause return for intractbale pain new onset motor or sensory deficits or any overall worsening

## 2017-12-13 NOTE — ED PROVIDER NOTE - PHYSICAL EXAMINATION
neuro: CN II - XII intact, EOMI, PERRL, no papilledema, 5/5 muscle strength x 4 extremities, no sensory deficits, 2+ dtr globally, negative babinski, no ataxic gait, normal JULIA and FNT, normal romberg

## 2017-12-13 NOTE — ED ADULT NURSE REASSESSMENT NOTE - NS ED NURSE REASSESS COMMENT FT1
Pt alert and oriented x4 with even and unlabored respirations. Dr. Daniels made aware of repeat FS and patient provided with sandwich and snack. As per Dr. Daniels okay to continue with discharge.

## 2017-12-13 NOTE — ED PROVIDER NOTE - OBJECTIVE STATEMENT
pt presents with hypoglycemia he denies any symptomd srtates did not take extra glyburide denies fever. denies HA or neck pain. no chest pain or sob. no abd pain. no n/v/d. no urinary f/u/d. no back pain. no motor or sensory deficits. denies illicit drug use. no recent travel. no rash. no other acute issues symptoms or concerns

## 2017-12-15 ENCOUNTER — APPOINTMENT (OUTPATIENT)
Dept: CARDIOLOGY | Facility: CLINIC | Age: 72
End: 2017-12-15

## 2018-01-31 ENCOUNTER — OUTPATIENT (OUTPATIENT)
Dept: OUTPATIENT SERVICES | Facility: HOSPITAL | Age: 73
LOS: 1 days | End: 2018-01-31
Payer: MEDICARE

## 2018-01-31 VITALS
SYSTOLIC BLOOD PRESSURE: 92 MMHG | HEART RATE: 76 BPM | HEIGHT: 65 IN | RESPIRATION RATE: 18 BRPM | WEIGHT: 216.05 LBS | OXYGEN SATURATION: 100 % | DIASTOLIC BLOOD PRESSURE: 51 MMHG | TEMPERATURE: 97 F

## 2018-01-31 DIAGNOSIS — Z01.810 ENCOUNTER FOR PREPROCEDURAL CARDIOVASCULAR EXAMINATION: ICD-10-CM

## 2018-01-31 DIAGNOSIS — Z94.0 KIDNEY TRANSPLANT STATUS: Chronic | ICD-10-CM

## 2018-01-31 DIAGNOSIS — I48.91 UNSPECIFIED ATRIAL FIBRILLATION: ICD-10-CM

## 2018-01-31 DIAGNOSIS — Q20.8 OTHER CONGENITAL MALFORMATIONS OF CARDIAC CHAMBERS AND CONNECTIONS: Chronic | ICD-10-CM

## 2018-01-31 DIAGNOSIS — Z90.5 ACQUIRED ABSENCE OF KIDNEY: Chronic | ICD-10-CM

## 2018-01-31 DIAGNOSIS — I77.0 ARTERIOVENOUS FISTULA, ACQUIRED: Chronic | ICD-10-CM

## 2018-01-31 LAB
ANION GAP SERPL CALC-SCNC: 16 MMOL/L — SIGNIFICANT CHANGE UP (ref 5–17)
ANISOCYTOSIS BLD QL: SLIGHT — SIGNIFICANT CHANGE UP
APTT BLD: 29.8 SEC — SIGNIFICANT CHANGE UP (ref 27.5–37.4)
BUN SERPL-MCNC: 28 MG/DL — HIGH (ref 8–20)
CALCIUM SERPL-MCNC: 8.3 MG/DL — LOW (ref 8.6–10.2)
CHLORIDE SERPL-SCNC: 97 MMOL/L — LOW (ref 98–107)
CO2 SERPL-SCNC: 26 MMOL/L — SIGNIFICANT CHANGE UP (ref 22–29)
CREAT SERPL-MCNC: 6.33 MG/DL — HIGH (ref 0.5–1.3)
EOSINOPHIL # BLD AUTO: 0.1 K/UL — SIGNIFICANT CHANGE UP (ref 0–0.5)
EOSINOPHIL NFR BLD AUTO: 4 % — SIGNIFICANT CHANGE UP (ref 0–6)
GLUCOSE SERPL-MCNC: 79 MG/DL — SIGNIFICANT CHANGE UP (ref 70–115)
HCT VFR BLD CALC: 28.8 % — LOW (ref 42–52)
HGB BLD-MCNC: 9.6 G/DL — LOW (ref 14–18)
INR BLD: 1.15 RATIO — SIGNIFICANT CHANGE UP (ref 0.88–1.16)
LYMPHOCYTES # BLD AUTO: 0.6 K/UL — LOW (ref 1–4.8)
LYMPHOCYTES # BLD AUTO: 19 % — LOW (ref 20–55)
MACROCYTES BLD QL: SLIGHT — SIGNIFICANT CHANGE UP
MCHC RBC-ENTMCNC: 33.3 G/DL — SIGNIFICANT CHANGE UP (ref 32–36)
MCHC RBC-ENTMCNC: 36.8 PG — HIGH (ref 27–31)
MCV RBC AUTO: 110.3 FL — HIGH (ref 80–94)
MONOCYTES # BLD AUTO: 0.4 K/UL — SIGNIFICANT CHANGE UP (ref 0–0.8)
MONOCYTES NFR BLD AUTO: 11 % — HIGH (ref 3–10)
NEUTROPHILS # BLD AUTO: 2.2 K/UL — SIGNIFICANT CHANGE UP (ref 1.8–8)
NEUTROPHILS NFR BLD AUTO: 66 % — SIGNIFICANT CHANGE UP (ref 37–73)
OVALOCYTES BLD QL SMEAR: SLIGHT — SIGNIFICANT CHANGE UP
PLAT MORPH BLD: NORMAL — SIGNIFICANT CHANGE UP
PLATELET # BLD AUTO: 167 K/UL — SIGNIFICANT CHANGE UP (ref 150–400)
POIKILOCYTOSIS BLD QL AUTO: SLIGHT — SIGNIFICANT CHANGE UP
POTASSIUM SERPL-MCNC: 4 MMOL/L — SIGNIFICANT CHANGE UP (ref 3.5–5.3)
POTASSIUM SERPL-SCNC: 4 MMOL/L — SIGNIFICANT CHANGE UP (ref 3.5–5.3)
PROTHROM AB SERPL-ACNC: 12.7 SEC — SIGNIFICANT CHANGE UP (ref 9.8–12.7)
RBC # BLD: 2.61 M/UL — LOW (ref 4.6–6.2)
RBC # FLD: 15.3 % — SIGNIFICANT CHANGE UP (ref 11–15.6)
RBC BLD AUTO: ABNORMAL
SODIUM SERPL-SCNC: 139 MMOL/L — SIGNIFICANT CHANGE UP (ref 135–145)
WBC # BLD: 3.4 K/UL — LOW (ref 4.8–10.8)
WBC # FLD AUTO: 3.4 K/UL — LOW (ref 4.8–10.8)

## 2018-01-31 PROCEDURE — 85730 THROMBOPLASTIN TIME PARTIAL: CPT

## 2018-01-31 PROCEDURE — 85027 COMPLETE CBC AUTOMATED: CPT

## 2018-01-31 PROCEDURE — 80048 BASIC METABOLIC PNL TOTAL CA: CPT

## 2018-01-31 PROCEDURE — 93010 ELECTROCARDIOGRAM REPORT: CPT

## 2018-01-31 PROCEDURE — 36415 COLL VENOUS BLD VENIPUNCTURE: CPT

## 2018-01-31 PROCEDURE — G0463: CPT

## 2018-01-31 PROCEDURE — 93005 ELECTROCARDIOGRAM TRACING: CPT

## 2018-01-31 PROCEDURE — 85610 PROTHROMBIN TIME: CPT

## 2018-01-31 RX ORDER — CALCIUM ACETATE 667 MG
3 TABLET ORAL
Qty: 0 | Refills: 0 | COMMUNITY

## 2018-01-31 NOTE — ASU PATIENT PROFILE, ADULT - PSH
Abnormality of left atrial appendage  WATCHMAN  LEFT ATRIAL APPENDAGE CLOSUIRE   Jan. 30 2017  History of unilateral nephrectomy  left  nephrectomy Abnormality of left atrial appendage  WATCHMAN  LEFT ATRIAL APPENDAGE CLOSUIRE   Jan. 30 2017  AV fistula  right lower arm  H/O kidney transplant    History of unilateral nephrectomy  left  nephrectomy

## 2018-01-31 NOTE — H&P PST ADULT - HISTORY OF PRESENT ILLNESS
71 yo Palestinian speaking male with history of DM, CAD, renal disease, arthritis, afib s/p watchman 1 year ago.  Patient has no complaints at this time.  Patient is here for PST for CANDIDA to assess atrial appendage 1 year post watchman.

## 2018-02-04 ENCOUNTER — FORM ENCOUNTER (OUTPATIENT)
Age: 73
End: 2018-02-04

## 2018-02-05 ENCOUNTER — TRANSCRIPTION ENCOUNTER (OUTPATIENT)
Age: 73
End: 2018-02-05

## 2018-02-05 ENCOUNTER — OUTPATIENT (OUTPATIENT)
Dept: OUTPATIENT SERVICES | Facility: HOSPITAL | Age: 73
LOS: 1 days | End: 2018-02-05
Payer: MEDICARE

## 2018-02-05 VITALS
HEART RATE: 75 BPM | RESPIRATION RATE: 18 BRPM | OXYGEN SATURATION: 96 % | DIASTOLIC BLOOD PRESSURE: 62 MMHG | SYSTOLIC BLOOD PRESSURE: 118 MMHG | TEMPERATURE: 98 F

## 2018-02-05 DIAGNOSIS — Z94.0 KIDNEY TRANSPLANT STATUS: Chronic | ICD-10-CM

## 2018-02-05 DIAGNOSIS — I48.91 UNSPECIFIED ATRIAL FIBRILLATION: ICD-10-CM

## 2018-02-05 DIAGNOSIS — Q20.8 OTHER CONGENITAL MALFORMATIONS OF CARDIAC CHAMBERS AND CONNECTIONS: Chronic | ICD-10-CM

## 2018-02-05 DIAGNOSIS — I77.0 ARTERIOVENOUS FISTULA, ACQUIRED: Chronic | ICD-10-CM

## 2018-02-05 DIAGNOSIS — Z90.5 ACQUIRED ABSENCE OF KIDNEY: Chronic | ICD-10-CM

## 2018-02-05 LAB
BUN SERPL-MCNC: 42 MG/DL — HIGH (ref 8–20)
CK SERPL-CCNC: 136 U/L — SIGNIFICANT CHANGE UP (ref 30–200)
CREAT SERPL-MCNC: 6.93 MG/DL — HIGH (ref 0.5–1.3)
GLUCOSE BLDC GLUCOMTR-MCNC: 78 MG/DL — SIGNIFICANT CHANGE UP (ref 70–99)
POTASSIUM SERPL-MCNC: 4.4 MMOL/L — SIGNIFICANT CHANGE UP (ref 3.5–5.3)
POTASSIUM SERPL-SCNC: 4.4 MMOL/L — SIGNIFICANT CHANGE UP (ref 3.5–5.3)

## 2018-02-05 PROCEDURE — 93312 ECHO TRANSESOPHAGEAL: CPT

## 2018-02-05 PROCEDURE — 84132 ASSAY OF SERUM POTASSIUM: CPT

## 2018-02-05 PROCEDURE — 84520 ASSAY OF UREA NITROGEN: CPT

## 2018-02-05 PROCEDURE — 76376 3D RENDER W/INTRP POSTPROCES: CPT | Mod: 26

## 2018-02-05 PROCEDURE — 36415 COLL VENOUS BLD VENIPUNCTURE: CPT

## 2018-02-05 PROCEDURE — 93325 DOPPLER ECHO COLOR FLOW MAPG: CPT

## 2018-02-05 PROCEDURE — 93325 DOPPLER ECHO COLOR FLOW MAPG: CPT | Mod: 26

## 2018-02-05 PROCEDURE — 82962 GLUCOSE BLOOD TEST: CPT

## 2018-02-05 PROCEDURE — 82565 ASSAY OF CREATININE: CPT

## 2018-02-05 PROCEDURE — 93320 DOPPLER ECHO COMPLETE: CPT | Mod: 26

## 2018-02-05 PROCEDURE — 82550 ASSAY OF CK (CPK): CPT

## 2018-02-05 PROCEDURE — 93312 ECHO TRANSESOPHAGEAL: CPT | Mod: 26

## 2018-02-05 PROCEDURE — 93320 DOPPLER ECHO COMPLETE: CPT

## 2018-02-05 RX ORDER — ASPIRIN/CALCIUM CARB/MAGNESIUM 324 MG
81 TABLET ORAL DAILY
Qty: 0 | Refills: 0 | Status: DISCONTINUED | OUTPATIENT
Start: 2018-02-05 | End: 2018-02-20

## 2018-02-05 NOTE — DISCHARGE NOTE ADULT - PLAN OF CARE
THI clemente 1 year ago now to reduce risk for emboli Patient to continue aspirin 81 mg daily.  No Plavix. Follow up with your Cardiologist as instructed.  Take all medications as instructed.  Speak to your doctor before stopping any medications.  Follow the specified diet. Maintain bun/creat Follow up with your Cardiologist as instructed.  Take all medications as instructed.  Speak to your doctor before stopping any medications.  Follow the specified diet.

## 2018-02-05 NOTE — DISCHARGE NOTE ADULT - CARE PLAN
Principal Discharge DX:	AF (atrial fibrillation)  Goal:	SP watchman 1 year ago now to reduce risk for emboli  Assessment and plan of treatment:	Patient to continue aspirin 81 mg daily.  No Plavix. Follow up with your Cardiologist as instructed.  Take all medications as instructed.  Speak to your doctor before stopping any medications.  Follow the specified diet.  Secondary Diagnosis:	ESRD (end stage renal disease)  Goal:	Maintain bun/creat  Assessment and plan of treatment:	Follow up with your Cardiologist as instructed.  Take all medications as instructed.  Speak to your doctor before stopping any medications.  Follow the specified diet.

## 2018-02-05 NOTE — DISCHARGE NOTE ADULT - NS AS ACTIVITY OBS
No Heavy lifting/straining/Walking-Indoors allowed/Do not make important decisions/Do not drive or operate machinery/Showering allowed

## 2018-02-05 NOTE — DISCHARGE NOTE ADULT - HOSPITAL COURSE
HPI: 73 yo Haitian speaking male with history of DM, CAD, renal disease, arthritis, afib s/p watchman 1 year ago.  Patient has no complaints at this time.  Patient is here for PST for CANDIDA to assess atrial appendage 1 year post watchman.      Patient now post CANDIDA showing good placement of watchman.

## 2018-02-05 NOTE — PROGRESS NOTE ADULT - SUBJECTIVE AND OBJECTIVE BOX
Patient is a 72y old  Male who presents for CANDIDA, one year post Watchman    HPI: 71 yo Upper sorbian speaking male with history of DM, CAD, renal disease, arthritis, afib s/p watchman 1 year ago.  Patient has no complaints at this time.  Patient is here for PST for CANDIDA to assess atrial appendage 1 year post watchman.          PAST MEDICAL & SURGICAL HISTORY:  Kidney problem: spontanious renal bleed while on coumadin  c/b  renal  hematome  with  LT  nephrectomy  Hypotension: treated  with Midodrine  Tiredness  Renal hematoma, left  Hypercholesteremia  ESRD (end stage renal disease): on  hemodialysis  3  x  weekly.,  H/O  left  nephrectomy after  renal  bleed  Dyslipidemia  Dizziness  DM (diabetes mellitus)  Daytime sleepiness  AF (atrial fibrillation)  AV fistula: right lower arm  H/O kidney transplant  Abnormality of left atrial appendage: WATCHMAN  LEFT ATRIAL APPENDAGE CLOSUIRE   Jan. 30 2017  History of unilateral nephrectomy: left  nephrectomy      PREVIOUS DIAGNOSTIC TESTING:        Allergies    No Known Allergies    I&O's Detail      PHYSICAL EXAM:  Appearance: Normal, well nourished	  HEENT:   Normal oral mucosa, PERRL	  Cardiovascular: Normal S1 S2, No JVD, No cardiac murmurs, No carotid bruits, No peripheral edema  Respiratory: Lungs clear to auscultation	  Psychiatry: A & O x 3, Mood & affect appropriate  Gastrointestinal:  Soft, Non-tender, + BS, no bruits	  Skin: No rashes, No ecchymoses, No cyanosis  Neurologic: Grossly non-focal with full strength in all four extremities  Extremities: Normal range of motion, No clubbing, cyanosis or edema        INTERPRETATION OF TELEMETRY:    ECG:    LABS: Patient had HD on 2/3/2018, BUN/Creat repeated      ASSESSMENT and PLAN    CAD with Afib  S/p watchman 1 year  Plan:  Repeat CANDIDA for watchman placement

## 2018-02-05 NOTE — DISCHARGE NOTE ADULT - PATIENT PORTAL LINK FT
You can access the [facility] Patient Portal, offered by [facility], by registering with the following website: [email or web address]

## 2018-02-05 NOTE — DISCHARGE NOTE ADULT - CARE PROVIDER_API CALL
Lexie Jerome), Cardiology; Internal Medicine  [address] [id number]  Phone: [phone]  Fax: [phone]

## 2018-02-05 NOTE — DISCHARGE NOTE ADULT - MEDICATION SUMMARY - MEDICATIONS TO TAKE
I will START or STAY ON the medications listed below when I get home from the hospital:    nephlex rx  -- 1 tab(s) by mouth once a day  -- Indication: For dialysis    aspirin 81 mg oral tablet  -- 1 tab(s) by mouth once a day  -- Indication: For cad/afib    glimepiride 1 mg oral tablet  -- 1 tab(s) by mouth once a day  -- Indication: For diabetes    loratadine 10 mg oral capsule  -- 1 cap(s) by mouth once a day  -- Indication: For Antihistamine    Lipitor 20 mg oral tablet  -- 1 tab(s) by mouth once a day  -- Indication: For cholesterol    gemfibrozil 600 mg oral tablet  -- 1 tab(s) by mouth once a day  -- Indication: For cholesterol    Lupron  --  intramuscular every 3 mos  -- Indication: For Antineoplastic    metoprolol tartrate 25 mg oral tablet  -- 0.5 tab(s) by mouth 2 times a day  -- Indication: For cad    Sensipar 30 mg oral tablet  -- 1 tab(s) by mouth 2 times a day  -- Indication: For dialysis    ursodiol 300 mg oral capsule  -- 1 cap(s) by mouth once a day  -- Indication: For gall stones    Colace 100 mg oral capsule  -- 1 cap(s) by mouth once a day  -- Indication: For stool softener    Mag-Ox 400  -- tab(s) by mouth once a day  -- Indication: For GERD    midodrine 10 mg oral tablet  -- 1 tab(s) by mouth 3 times a day  -- Indication: For blood pressure    Renvela 800 mg oral tablet  -- 1 tab(s) by mouth 2 times a day  -- Indication: For dialysis    NexIUM 40 mg oral delayed release capsule  -- 1 cap(s) by mouth once a day  -- Indication: For GERD

## 2018-03-02 ENCOUNTER — RESULT REVIEW (OUTPATIENT)
Age: 73
End: 2018-03-02

## 2018-03-07 ENCOUNTER — APPOINTMENT (OUTPATIENT)
Dept: CARDIOLOGY | Facility: CLINIC | Age: 73
End: 2018-03-07
Payer: MEDICARE

## 2018-03-07 ENCOUNTER — APPOINTMENT (OUTPATIENT)
Dept: CARDIOLOGY | Facility: CLINIC | Age: 73
End: 2018-03-07

## 2018-03-07 ENCOUNTER — NON-APPOINTMENT (OUTPATIENT)
Age: 73
End: 2018-03-07

## 2018-03-07 VITALS — SYSTOLIC BLOOD PRESSURE: 100 MMHG | DIASTOLIC BLOOD PRESSURE: 70 MMHG | WEIGHT: 220 LBS | BODY MASS INDEX: 35.51 KG/M2

## 2018-03-07 VITALS — OXYGEN SATURATION: 97 % | HEART RATE: 88 BPM

## 2018-03-07 PROCEDURE — 93000 ELECTROCARDIOGRAM COMPLETE: CPT

## 2018-03-07 PROCEDURE — 99215 OFFICE O/P EST HI 40 MIN: CPT

## 2018-03-15 NOTE — ED PROVIDER NOTE - CPE EDP CARDIAC NORM
Your Child's Health  Nine-Month-Old Visit      Ghassan Maxisch  March 15, 2018             There were no vitals taken for this visit.  Weight:     NUTRITION: Phase out the bottle.  Children should get off the bottle as soon as possible after reaching one year of age.  Bottle use past one year contributes to higher rates of tooth decay.  If you have a hard time stopping the bottle, you can either fill the bottle with water (which will not harm the teeth) or remove the bottle as soon as it is empty.  Encourage the use of a cup.  Introduce finger foods:  Table food can comprise a full diet as long as it is of a type that the baby can manage without choking.  For that reason, avoid nuts, popcorn, raw vegetables, and foods like grapes and large hot dog pieces that, if swallowed whole, could block the airway.    Because we have less sunlight in our part of the country, infants whose only source of milk is mother's milk should have Vitamin D supplements (available without prescription at the drug store) each day.        We no longer consider juice a health food.  When fruits were not available, it was a nutritional help, but whole fruits are much better nutritionally than juice. Amounts of juice over 4 oz per day are associated with an increased risk of obesity.    Avoid using food, especially sweets, to keep Ghassan from crying.        DEVELOPMENT/BEHAVIOR:  Most babies are becoming more mobile at 9 months.  They may roll, scoot, or crawl.  Some will try to pull up to a standing position.  Single consonant sounds (such as \"da\" or \"ba\") will lead to repetitive consonants (\"baba\" or \"anthony\").  You can encourage language development by pointing to objects, pictures, and body parts and saying the name.  At this age babies grab everything and it all goes into the mouth.  These movements become smoother and faster, so be careful.  Show your approval for desirable actions with smiles and kind words.  The more we use the  word \"no\" to children the more they will use it back to us a hundred times more.  Using the phrase please don't, let's not or let's do this instead is a better choice.  Using \"no\" in a tian voice is appropriate if the child's action may harm them.    Most babies at this age can sleep all night, often ten to twelve hours.  Most babies take at least one nap a day, but a few do not nap at all.    ACCIDENT PREVENTION:  1. Kitchen:  Hot liquids, hot foods, and electrical cords must be kept out of reach. Move cleaning products up from under sinks.  Use outlet protectors and hide extension cords.  2. Stairs:  Use rocha or block stairs off with furniture.  3. Windows:  Use locks or guards, especially on upstairs windows. Babies have fallen through screens by leaning on them.  4. Water Safety:  Empty any buckets of water.  Children have drowned in buckets, especially the five-gallon construction-material type.  Fence off permanent pools and drain wading pools when not in use.  Never leave Ghassan alone in the tub.  Even babies who have had swimming classes are not safe alone in the tub.  Keep toilet seat lids down.  5. Medications:  If prescribed medication needs to be refrigerated, please store it at the back of the refrigerator to prevent Kylouh from reaching it or spilling it.  6. Poisoning:  Remove the following items from reach or place them in a locked cabinet:     Cleaning products     Kerosene (lamp oil)     Paint     Pesticides     Purses (which sometimes contain medicines)     Plants      Medicines, including vitamins and birth control pills       Use safety caps on medicines. Household  and polishes must be kept out of reach. Store medicines and  out of sight. Don't transfer medicines to non-child-proofed or unlabeled containers. Dispose of unused medications. Be especially careful when visiting older persons or families without children in the house.  They may be in the habit of keeping their  medicines out on tables.    Syrup of Ipecac is no longer recommended to be kept in the home. Please dispose of any remaining supplies.    Call the Poison Center at 1-258.804.1203 for any known or suspected poisoning.    CAR SAFETY:  An approved rear facing car seat in the back seat of the car is required by [place] law until two years. A rear-facing car seat in the back seat is the safest place for him. This is especially true if your car is equipped with passenger-side air bags.      SMOKING:  Children exposed to tobacco smoke have more ear infections and pneumonia. Cold symptoms last longer. If you smoke, please quit.  If you cannot quit, smoke outside. Do not smoke near Ghassan, and do not let others smoke near him. Do not smoke in the car.     SHOES: Kennas feet will develop fine whether they have shoes or not.  He can run barefoot where safe. Remember there are many things that can hurt your child's feet (ex. small shoes, sharp objects or hot surfaces). Shoes provide safety, warmth, protection and, of course, decoration. Choose shoes that have a roomy fit and flat, flexible soles with nonskid bottoms.    SUN EXPOSURE:  If you plan to have Ghassan outside for more than 30 minutes, please use sunscreen.    TEETHING:  Teething children may have no symptoms at all, or they may experience drooling, rashes, crabbiness, or changes in diet. Do not assume that a fever is due to teething. Temperatures over 101 degrees are usually from some other cause. Once the teeth erupt, you should begin cleaning Cali teeth with a washcloth, gauze, or soft toothbrush. Toothpaste is not necessary yet.      LEAD EXPOSURE:  Ghassan will be more mobile in the next few months. If there is lead in the house, he may be vulnerable. Let us know if any of the following apply:  1. Your home was built before 1978 and has peeling or chipping or chalking paint. Homes built in older cities at the turn of the last century may have lead pipes.  Check to see if any of the above applies to Ghassan's sitter's home, , , or any other house where he spends time.  2. You have another child or housemate who is being followed or treated for an elevated lead level.  3. Ghassan lives with an adult whose job or hobby involves lead exposure (soldering, battery manufacture, recycling, casting, stained glass, etc.).  4. You live near an active lead smelter, a battery recycling plant, or a plant that processes hot metal.    TUBERCULOSIS:  There is such a low rate of tuberculosis in our area that frequent skin tests are no longer recommended.  However, certain situations do increase Ghassan's risk, including contact with AIDS patients, nursing home residents, prisoners, immigrants, or homeless persons.  Please let us know if Ghassan has contacts with such persons, or if he has contact with anyone known to have or suspected of having tuberculosis.    MEDICATION FOR FEVER OR PAIN:   Acetaminophen liquid (e.g., Tylenol or Tempra) may be given every four hours as needed for pain or fever.  Be sure to check which product CONCENTRATION you are using.    INFANT/CHILDREN’S Tylenol/Acetaminophen  (160 MG/5 mL)  Child’s Weight: Dose:  12 - 17 pounds:   80 mg (2.5 mL  (1/2 Teaspoon))  18 - 23 pounds:   120 mg (3.75 mL (3/4 Teaspoon))    INFANT Ibuprofen (50 mg/1.25 ml)  liquid (for example Advil or Motrin) may be given every 6 hours as needed for pain or fever.    Child’s Weight: Dose:  13 - 17 pounds:   60 - 80 mg (1.5 - 2.0 mL ( 1/3 - 2/5 Teaspoon))  18 - 23 pounds:   80 - 100 mg (2.0 - 2.5 mL (2/5 - 1/2 Teaspoon))      CHILDREN'S Ibuprofen (100 mg/5 mL) liquid (for example Advil or Motrin) may be given every 6 hours as needed for pain or fever.    Child’s Weight: Dose:  13 - 17 pounds:   60 - 80 mg (3.0 - 4.0 mL (3/5 - 4/5 Teaspoon))  18 - 23 pounds:   80 - 100 mg (4.0 - 5.0 mL (4/5 - 1 Teaspoon))    If Kylouh is outside these weight ranges, call your pediatrician's  office for advice.    Most Recent Immunizations   Administered Date(s) Administered   • DTaP/Hep B/IPV 2017   • HIB, Unspecified Formulation 2017   • Hep B, adolescent or pediatric 2017   • Pneumococcal Conjugate 13 valent 2017   • Rotavirus - monovalent 2017       If Ghassan develops any of the following reactions within 72 hours after an immunization, notify your pediatrician by calling the pediatric phone nurse:  1. A temperature of 105 degrees or above.  2. More than 3 hours of continuous crying.  3. A shrill, high-pitched cry.  4. A pale, limp spell.  5. A seizure or fainting spell.  In this case, you should call 911 or go immediately to the emergency room.    NEXT VISIT: ONE YEAR OF AGE    NOTE:  Ghassan should have the One-Year-Old Exam after his first birthday.  The immunizations given at that visit must take place after Ghassan’s birthday in order to meet [facility] requirements.    Thank you for entrusting your care to [facility].   normal...

## 2018-03-16 ENCOUNTER — APPOINTMENT (OUTPATIENT)
Dept: CARDIOLOGY | Facility: CLINIC | Age: 73
End: 2018-03-16

## 2018-04-24 ENCOUNTER — TRANSCRIPTION ENCOUNTER (OUTPATIENT)
Age: 73
End: 2018-04-24

## 2018-04-25 ENCOUNTER — OUTPATIENT (OUTPATIENT)
Dept: OUTPATIENT SERVICES | Facility: HOSPITAL | Age: 73
LOS: 1 days | End: 2018-04-25
Payer: MEDICARE

## 2018-04-25 DIAGNOSIS — Z94.0 KIDNEY TRANSPLANT STATUS: Chronic | ICD-10-CM

## 2018-04-25 DIAGNOSIS — I77.0 ARTERIOVENOUS FISTULA, ACQUIRED: Chronic | ICD-10-CM

## 2018-04-25 DIAGNOSIS — Z90.5 ACQUIRED ABSENCE OF KIDNEY: Chronic | ICD-10-CM

## 2018-04-25 DIAGNOSIS — M54.16 RADICULOPATHY, LUMBAR REGION: ICD-10-CM

## 2018-04-25 DIAGNOSIS — Q20.8 OTHER CONGENITAL MALFORMATIONS OF CARDIAC CHAMBERS AND CONNECTIONS: Chronic | ICD-10-CM

## 2018-04-25 LAB — GLUCOSE BLDC GLUCOMTR-MCNC: 81 MG/DL — SIGNIFICANT CHANGE UP (ref 70–99)

## 2018-04-25 PROCEDURE — 76000 FLUOROSCOPY <1 HR PHYS/QHP: CPT

## 2018-04-25 PROCEDURE — T1013: CPT

## 2018-04-25 PROCEDURE — 64484 NJX AA&/STRD TFRM EPI L/S EA: CPT | Mod: RT

## 2018-04-25 PROCEDURE — 82962 GLUCOSE BLOOD TEST: CPT

## 2018-04-25 PROCEDURE — 64483 NJX AA&/STRD TFRM EPI L/S 1: CPT | Mod: RT

## 2018-06-13 ENCOUNTER — NON-APPOINTMENT (OUTPATIENT)
Age: 73
End: 2018-06-13

## 2018-06-13 ENCOUNTER — APPOINTMENT (OUTPATIENT)
Dept: CARDIOLOGY | Facility: CLINIC | Age: 73
End: 2018-06-13
Payer: MEDICARE

## 2018-06-13 VITALS
OXYGEN SATURATION: 98 % | DIASTOLIC BLOOD PRESSURE: 68 MMHG | HEART RATE: 91 BPM | WEIGHT: 217 LBS | SYSTOLIC BLOOD PRESSURE: 90 MMHG | BODY MASS INDEX: 35.02 KG/M2

## 2018-06-13 PROCEDURE — 99215 OFFICE O/P EST HI 40 MIN: CPT

## 2018-06-13 PROCEDURE — 93000 ELECTROCARDIOGRAM COMPLETE: CPT

## 2018-06-15 ENCOUNTER — APPOINTMENT (OUTPATIENT)
Dept: CARDIOLOGY | Facility: CLINIC | Age: 73
End: 2018-06-15
Payer: MEDICARE

## 2018-06-29 ENCOUNTER — APPOINTMENT (OUTPATIENT)
Dept: CARDIOLOGY | Facility: CLINIC | Age: 73
End: 2018-06-29
Payer: MEDICARE

## 2018-06-29 ENCOUNTER — OTHER (OUTPATIENT)
Age: 73
End: 2018-06-29

## 2018-06-29 DIAGNOSIS — R93.8 ABNORMAL FINDINGS ON DIAGNOSTIC IMAGING OF OTHER SPECIFIED BODY STRUCTURES: ICD-10-CM

## 2018-06-29 PROCEDURE — 93922 UPR/L XTREMITY ART 2 LEVELS: CPT

## 2018-06-29 PROCEDURE — 93925 LOWER EXTREMITY STUDY: CPT

## 2018-06-29 RX ORDER — CLOPIDOGREL BISULFATE 75 MG/1
75 TABLET, FILM COATED ORAL DAILY
Qty: 90 | Refills: 1 | Status: ACTIVE | COMMUNITY
Start: 2018-06-29 | End: 1900-01-01

## 2018-07-05 ENCOUNTER — APPOINTMENT (OUTPATIENT)
Dept: CARDIOLOGY | Facility: CLINIC | Age: 73
End: 2018-07-05
Payer: MEDICARE

## 2018-07-05 PROCEDURE — 93015 CV STRESS TEST SUPVJ I&R: CPT

## 2018-07-05 PROCEDURE — A9500: CPT

## 2018-07-05 PROCEDURE — 78452 HT MUSCLE IMAGE SPECT MULT: CPT

## 2018-07-06 ENCOUNTER — OUTPATIENT (OUTPATIENT)
Dept: OUTPATIENT SERVICES | Facility: HOSPITAL | Age: 73
LOS: 1 days | End: 2018-07-06
Payer: MEDICARE

## 2018-07-06 VITALS
DIASTOLIC BLOOD PRESSURE: 61 MMHG | WEIGHT: 214.95 LBS | TEMPERATURE: 97 F | RESPIRATION RATE: 18 BRPM | HEART RATE: 87 BPM | SYSTOLIC BLOOD PRESSURE: 98 MMHG | HEIGHT: 64.96 IN | OXYGEN SATURATION: 98 %

## 2018-07-06 VITALS — WEIGHT: 216.93 LBS | HEIGHT: 64.96 IN

## 2018-07-06 DIAGNOSIS — Z94.0 KIDNEY TRANSPLANT STATUS: Chronic | ICD-10-CM

## 2018-07-06 DIAGNOSIS — I77.0 ARTERIOVENOUS FISTULA, ACQUIRED: Chronic | ICD-10-CM

## 2018-07-06 DIAGNOSIS — Q20.8 OTHER CONGENITAL MALFORMATIONS OF CARDIAC CHAMBERS AND CONNECTIONS: Chronic | ICD-10-CM

## 2018-07-06 DIAGNOSIS — Z01.810 ENCOUNTER FOR PREPROCEDURAL CARDIOVASCULAR EXAMINATION: ICD-10-CM

## 2018-07-06 DIAGNOSIS — R94.39 ABNORMAL RESULT OF OTHER CARDIOVASCULAR FUNCTION STUDY: ICD-10-CM

## 2018-07-06 DIAGNOSIS — Z90.5 ACQUIRED ABSENCE OF KIDNEY: Chronic | ICD-10-CM

## 2018-07-06 LAB
ANION GAP SERPL CALC-SCNC: 19 MMOL/L — HIGH (ref 5–17)
ANISOCYTOSIS BLD QL: SLIGHT — SIGNIFICANT CHANGE UP
APTT BLD: 37.8 SEC — HIGH (ref 27.5–37.4)
BLD GP AB SCN SERPL QL: SIGNIFICANT CHANGE UP
BUN SERPL-MCNC: 34 MG/DL — HIGH (ref 8–20)
CALCIUM SERPL-MCNC: 9.3 MG/DL — SIGNIFICANT CHANGE UP (ref 8.6–10.2)
CHLORIDE SERPL-SCNC: 96 MMOL/L — LOW (ref 98–107)
CO2 SERPL-SCNC: 27 MMOL/L — SIGNIFICANT CHANGE UP (ref 22–29)
CREAT SERPL-MCNC: 5.44 MG/DL — HIGH (ref 0.5–1.3)
ELLIPTOCYTES BLD QL SMEAR: SLIGHT — SIGNIFICANT CHANGE UP
EOSINOPHIL NFR BLD AUTO: 3 % — SIGNIFICANT CHANGE UP (ref 0–6)
GLUCOSE SERPL-MCNC: 90 MG/DL — SIGNIFICANT CHANGE UP (ref 70–115)
HCT VFR BLD CALC: 36.2 % — LOW (ref 42–52)
HGB BLD-MCNC: 11.5 G/DL — LOW (ref 14–18)
HYPOCHROMIA BLD QL: SLIGHT — SIGNIFICANT CHANGE UP
INR BLD: 1.14 RATIO — SIGNIFICANT CHANGE UP (ref 0.88–1.16)
LYMPHOCYTES # BLD AUTO: 14 % — LOW (ref 20–55)
MACROCYTES BLD QL: SLIGHT — SIGNIFICANT CHANGE UP
MAGNESIUM SERPL-MCNC: 2 MG/DL — SIGNIFICANT CHANGE UP (ref 1.6–2.6)
MCHC RBC-ENTMCNC: 31.8 G/DL — LOW (ref 32–36)
MCHC RBC-ENTMCNC: 34.7 PG — HIGH (ref 27–31)
MCV RBC AUTO: 109.4 FL — HIGH (ref 80–94)
MICROCYTES BLD QL: SLIGHT — SIGNIFICANT CHANGE UP
MONOCYTES NFR BLD AUTO: 3 % — SIGNIFICANT CHANGE UP (ref 3–10)
NEUTROPHILS NFR BLD AUTO: 80 % — HIGH (ref 37–73)
PLAT MORPH BLD: NORMAL — SIGNIFICANT CHANGE UP
PLATELET # BLD AUTO: 154 K/UL — SIGNIFICANT CHANGE UP (ref 150–400)
POIKILOCYTOSIS BLD QL AUTO: SLIGHT — SIGNIFICANT CHANGE UP
POTASSIUM SERPL-MCNC: 4.7 MMOL/L — SIGNIFICANT CHANGE UP (ref 3.5–5.3)
POTASSIUM SERPL-SCNC: 4.7 MMOL/L — SIGNIFICANT CHANGE UP (ref 3.5–5.3)
PROTHROM AB SERPL-ACNC: 12.6 SEC — SIGNIFICANT CHANGE UP (ref 9.8–12.7)
RBC # BLD: 3.31 M/UL — LOW (ref 4.6–6.2)
RBC # FLD: 16 % — HIGH (ref 11–15.6)
RBC BLD AUTO: ABNORMAL
SODIUM SERPL-SCNC: 142 MMOL/L — SIGNIFICANT CHANGE UP (ref 135–145)
WBC # BLD: 3.9 K/UL — LOW (ref 4.8–10.8)
WBC # FLD AUTO: 3.9 K/UL — LOW (ref 4.8–10.8)

## 2018-07-06 PROCEDURE — 93005 ELECTROCARDIOGRAM TRACING: CPT

## 2018-07-06 PROCEDURE — 86901 BLOOD TYPING SEROLOGIC RH(D): CPT

## 2018-07-06 PROCEDURE — 85730 THROMBOPLASTIN TIME PARTIAL: CPT

## 2018-07-06 PROCEDURE — 93010 ELECTROCARDIOGRAM REPORT: CPT

## 2018-07-06 PROCEDURE — 86850 RBC ANTIBODY SCREEN: CPT

## 2018-07-06 PROCEDURE — 86900 BLOOD TYPING SEROLOGIC ABO: CPT

## 2018-07-06 PROCEDURE — 83735 ASSAY OF MAGNESIUM: CPT

## 2018-07-06 PROCEDURE — G0463: CPT

## 2018-07-06 PROCEDURE — 36415 COLL VENOUS BLD VENIPUNCTURE: CPT

## 2018-07-06 PROCEDURE — 85027 COMPLETE CBC AUTOMATED: CPT

## 2018-07-06 PROCEDURE — 80048 BASIC METABOLIC PNL TOTAL CA: CPT

## 2018-07-06 PROCEDURE — 85610 PROTHROMBIN TIME: CPT

## 2018-07-06 PROCEDURE — T1013: CPT

## 2018-07-06 RX ORDER — METOPROLOL TARTRATE 50 MG
0.5 TABLET ORAL
Qty: 0 | Refills: 0 | COMMUNITY

## 2018-07-06 RX ORDER — CLOPIDOGREL BISULFATE 75 MG/1
600 TABLET, FILM COATED ORAL ONCE
Qty: 0 | Refills: 0 | Status: DISCONTINUED | OUTPATIENT
Start: 2018-07-10 | End: 2018-07-21

## 2018-07-06 NOTE — ASU PATIENT PROFILE, ADULT - FALL HARM RISK
educated home safety with good understanding/bones(Osteoporosis,prev fx,steroid use,metastatic bone ca

## 2018-07-06 NOTE — H&P PST ADULT - NEGATIVE NEUROLOGICAL SYMPTOMS
no focal seizures/no syncope/no tremors/no transient paralysis/no weakness/no vertigo/no loss of sensation

## 2018-07-06 NOTE — H&P PST ADULT - NEGATIVE OPHTHALMOLOGIC SYMPTOMS
no photophobia/no blurred vision R/no lacrimation L/no lacrimation R/no blurred vision L/no diplopia

## 2018-07-06 NOTE — H&P PST ADULT - REASON FOR ADMISSION
Peripheral angiogram for bilateral claudication with right leg>left leg pain Peripheral angiogram for bilateral claudication with right leg>left leg pain  D

## 2018-07-06 NOTE — H&P PST ADULT - NEUROLOGICAL DETAILS
cranial nerves intact/responds to pain/alert and oriented x 3/sensation intact/deep reflexes intact/responds to verbal commands

## 2018-07-06 NOTE — H&P PST ADULT - MUSCULOSKELETAL
details… detailed exam decreased ROM due to pain/no joint erythema/no joint warmth/no calf tenderness/decreased ROM/normal/ROM intact/no joint swelling

## 2018-07-06 NOTE — H&P PST ADULT - NEGATIVE CARDIOVASCULAR SYMPTOMS
no palpitations/no dyspnea on exertion/no orthopnea/no peripheral edema/no chest pain/no paroxysmal nocturnal dyspnea

## 2018-07-06 NOTE — H&P PST ADULT - HISTORY OF PRESENT ILLNESS
73 y/o morbidly obese  male, Mauritian speaking presents to PST to undergo PPA with possible PPI for bilateral claudication with c/o right leg > left leg with short distance walking and at rest.  Requires cane for balance   utilized for PST 71 y/o morbidly obese  male, Burkinan speaking presents to PST to undergo PPA with possible PPI for bilateral claudication with c/o right leg > left leg with short distance walking and at rest.  Requires cane for balance   utilized for PST  Patient states he does know about taking plavix and will recheck when he goes home and let us know day of procedure. 73 y/o morbidly obese  male, Georgian speaking presents to PST to undergo PPA with possible PPI for bilateral claudication with c/o right leg > left leg with short distance walking and at rest.  Requires cane for balance   utilized for PST. Denies CP/SOB  Right leg:  US arterial suggestive of proximal stenosis probably is common iliac or ostial external iliac  Left leg:  Left popliteal artery stenosis 50-75% and EDUIN stenosis  Patient states he did know about taking plavix and will recheck when he goes home and let us know day of procedure. 73 y/o morbidly obese  male, Mauritanian speaking presents to PST to undergo PPA with possible PPI for bilateral claudication with c/o right leg > left leg with short distance walking and at rest.  Requires cane for balance   utilized for PST. Denies CP/SOB  Right leg:  US arterial suggestive of proximal stenosis probably is common iliac or ostial external iliac SUSANA 0.8  Left leg:  Left popliteal artery stenosis 50-75% and EDUIN stenosis  Patient states he did know about taking plavix and will recheck when he goes home and let us know day of procedure.

## 2018-07-06 NOTE — H&P PST ADULT - PMH
AF (atrial fibrillation)  s/p Watchman procedure  Carotid artery disease  mild  Claudication in peripheral vascular disease  R>L  Right common iliac or ostial external iliac per June 2018 U/S  Left popliteal 50-75 and EDUIN stenosis  Daytime sleepiness    Dizziness    DM (diabetes mellitus)    Dyslipidemia    ESRD (end stage renal disease)  on  hemodialysis  3  x  weekly.,  H/O  left  nephrectomy after  renal  bleed  GERD (gastroesophageal reflux disease)    Hypercholesteremia    Hypotension  treated  with Midodrine  Imbalance  secondary to PVD Uses cane  Kidney problem  spontanious renal bleed while on coumadin  c/b  renal  hematome  with  LT  nephrectomy  Leg pain, bilateral  R>L at rest and with short distant ambulation  Low glucose level  Patient has h/o low blood sugars at times  Obesity (BMI 30.0-34.9)    Palpitations    Renal hematoma, left    Tiredness AF (atrial fibrillation)  s/p Watchman procedure  Carotid artery disease  mild  Claudication in peripheral vascular disease  R>L  Right common iliac or ostial external iliac per June 2018 U/S  Left popliteal 50-75 and EDUIN stenosis  SUSANA 0.8  Daytime sleepiness    Dizziness    DM (diabetes mellitus)    Dyslipidemia    ESRD (end stage renal disease)  on  hemodialysis  3  x  weekly.,  H/O  left  nephrectomy after  renal  bleed  GERD (gastroesophageal reflux disease)    Hypercholesteremia    Hypotension  treated  with Midodrine  Imbalance  secondary to PVD Uses cane  Kidney problem  spontanious renal bleed while on coumadin  c/b  renal  hematome  with  LT  nephrectomy  Leg pain, bilateral  R>L at rest and with short distant ambulation  Low glucose level  Patient has h/o low blood sugars at times  Obesity (BMI 30.0-34.9)    Palpitations    Renal hematoma, left    Tiredness AF (atrial fibrillation)  s/p Watchman procedure  CHADSVASc 3  Carotid artery disease  mild  Claudication in peripheral vascular disease  R>L  Right common iliac or ostial external iliac per June 2018 U/S  Left popliteal 50-75 and EDUIN stenosis  SUSANA 0.8  Daytime sleepiness    Dizziness    DM (diabetes mellitus)    Dyslipidemia    ESRD (end stage renal disease)  on  hemodialysis  3  x  weekly.,  H/O  left  nephrectomy after  renal  bleed  GERD (gastroesophageal reflux disease)    Hypercholesteremia    Hypotension  treated  with Midodrine  Imbalance  secondary to PVD Uses cane  Kidney problem  spontanious renal bleed while on coumadin  c/b  renal  hematome  with  LT  nephrectomy  Leg pain, bilateral  R>L at rest and with short distant ambulation  Low glucose level  Patient has h/o low blood sugars at times  Obesity (BMI 30.0-34.9)    Palpitations    Renal hematoma, left    Tiredness

## 2018-07-06 NOTE — H&P PST ADULT - NEGATIVE ENMT SYMPTOMS
no sinus symptoms/no nasal congestion/no tinnitus/no nasal discharge/no vertigo/no ear pain/no hearing difficulty

## 2018-07-06 NOTE — H&P PST ADULT - ATTENDING COMMENTS
71 y/o male with bilateral claudication right > left , CAD with abnormal stress test here for LHC and peripheral angiogram.

## 2018-07-06 NOTE — ASU PATIENT PROFILE, ADULT - PSH
Abnormality of left atrial appendage  WATCHMAN  LEFT ATRIAL APPENDAGE CLOSUIRE   Jan. 30 2017  AV fistula  right lower arm  H/O kidney transplant    History of unilateral nephrectomy  left  nephrectomy

## 2018-07-06 NOTE — H&P PST ADULT - PSH
Abnormality of left atrial appendage  WATCHMAN  LEFT ATRIAL APPENDAGE CLOSUIRE   Jan. 30 2017  AV fistula  right lower arm  History of unilateral nephrectomy  left  nephrectomy  Renal transplant recipient

## 2018-07-06 NOTE — H&P PST ADULT - ASSESSMENT
A- PPA w/possible PPI  Patient given native language instructions to hold glimepiride, escort for discharge and to bring updated plavix information   HD consult with nephrology to be obtained day of procedure Order pending.   Will check FBS on arrival to cath lab

## 2018-07-06 NOTE — H&P PST ADULT - RS GEN PE MLT RESP DETAILS PC
respirations non-labored/clear to auscultation bilaterally/normal/breath sounds equal/good air movement/airway patent

## 2018-07-10 ENCOUNTER — OUTPATIENT (OUTPATIENT)
Dept: OUTPATIENT SERVICES | Facility: HOSPITAL | Age: 73
LOS: 1 days | End: 2018-07-10
Payer: MEDICARE

## 2018-07-10 ENCOUNTER — TRANSCRIPTION ENCOUNTER (OUTPATIENT)
Age: 73
End: 2018-07-10

## 2018-07-10 VITALS
SYSTOLIC BLOOD PRESSURE: 139 MMHG | DIASTOLIC BLOOD PRESSURE: 89 MMHG | HEART RATE: 71 BPM | OXYGEN SATURATION: 98 % | RESPIRATION RATE: 14 BRPM

## 2018-07-10 VITALS
HEART RATE: 62 BPM | TEMPERATURE: 98 F | RESPIRATION RATE: 20 BRPM | DIASTOLIC BLOOD PRESSURE: 59 MMHG | SYSTOLIC BLOOD PRESSURE: 118 MMHG | OXYGEN SATURATION: 99 %

## 2018-07-10 DIAGNOSIS — Z01.810 ENCOUNTER FOR PREPROCEDURAL CARDIOVASCULAR EXAMINATION: ICD-10-CM

## 2018-07-10 DIAGNOSIS — I77.0 ARTERIOVENOUS FISTULA, ACQUIRED: Chronic | ICD-10-CM

## 2018-07-10 DIAGNOSIS — Z90.5 ACQUIRED ABSENCE OF KIDNEY: Chronic | ICD-10-CM

## 2018-07-10 DIAGNOSIS — I73.9 PERIPHERAL VASCULAR DISEASE, UNSPECIFIED: ICD-10-CM

## 2018-07-10 DIAGNOSIS — Q20.8 OTHER CONGENITAL MALFORMATIONS OF CARDIAC CHAMBERS AND CONNECTIONS: Chronic | ICD-10-CM

## 2018-07-10 DIAGNOSIS — Z94.0 KIDNEY TRANSPLANT STATUS: Chronic | ICD-10-CM

## 2018-07-10 LAB
ANION GAP SERPL CALC-SCNC: 23 MMOL/L — HIGH (ref 5–17)
BUN SERPL-MCNC: 77 MG/DL — HIGH (ref 8–20)
CALCIUM SERPL-MCNC: 8.6 MG/DL — SIGNIFICANT CHANGE UP (ref 8.6–10.2)
CHLORIDE SERPL-SCNC: 99 MMOL/L — SIGNIFICANT CHANGE UP (ref 98–107)
CO2 SERPL-SCNC: 22 MMOL/L — SIGNIFICANT CHANGE UP (ref 22–29)
CREAT SERPL-MCNC: 8.31 MG/DL — HIGH (ref 0.5–1.3)
GLUCOSE BLDC GLUCOMTR-MCNC: 77 MG/DL — SIGNIFICANT CHANGE UP (ref 70–99)
GLUCOSE BLDC GLUCOMTR-MCNC: 98 MG/DL — SIGNIFICANT CHANGE UP (ref 70–99)
GLUCOSE SERPL-MCNC: 76 MG/DL — SIGNIFICANT CHANGE UP (ref 70–115)
HCT VFR BLD CALC: 34.2 % — LOW (ref 42–52)
HGB BLD-MCNC: 11 G/DL — LOW (ref 14–18)
MCHC RBC-ENTMCNC: 32.2 G/DL — SIGNIFICANT CHANGE UP (ref 32–36)
MCHC RBC-ENTMCNC: 34.8 PG — HIGH (ref 27–31)
MCV RBC AUTO: 108.2 FL — HIGH (ref 80–94)
PLATELET # BLD AUTO: 140 K/UL — LOW (ref 150–400)
POTASSIUM SERPL-MCNC: 6.2 MMOL/L — CRITICAL HIGH (ref 3.5–5.3)
POTASSIUM SERPL-SCNC: 6.2 MMOL/L — CRITICAL HIGH (ref 3.5–5.3)
RBC # BLD: 3.16 M/UL — LOW (ref 4.6–6.2)
RBC # FLD: 15.7 % — HIGH (ref 11–15.6)
SODIUM SERPL-SCNC: 144 MMOL/L — SIGNIFICANT CHANGE UP (ref 135–145)
WBC # BLD: 5.8 K/UL — SIGNIFICANT CHANGE UP (ref 4.8–10.8)
WBC # FLD AUTO: 5.8 K/UL — SIGNIFICANT CHANGE UP (ref 4.8–10.8)

## 2018-07-10 PROCEDURE — 99261: CPT

## 2018-07-10 PROCEDURE — C1894: CPT

## 2018-07-10 PROCEDURE — 80048 BASIC METABOLIC PNL TOTAL CA: CPT

## 2018-07-10 PROCEDURE — 99152 MOD SED SAME PHYS/QHP 5/>YRS: CPT

## 2018-07-10 PROCEDURE — C1760: CPT

## 2018-07-10 PROCEDURE — 85027 COMPLETE CBC AUTOMATED: CPT

## 2018-07-10 PROCEDURE — C1769: CPT

## 2018-07-10 PROCEDURE — G0278: CPT

## 2018-07-10 PROCEDURE — 36415 COLL VENOUS BLD VENIPUNCTURE: CPT

## 2018-07-10 PROCEDURE — C1887: CPT

## 2018-07-10 PROCEDURE — 93458 L HRT ARTERY/VENTRICLE ANGIO: CPT

## 2018-07-10 PROCEDURE — 82962 GLUCOSE BLOOD TEST: CPT

## 2018-07-10 PROCEDURE — 75716 ARTERY X-RAYS ARMS/LEGS: CPT | Mod: 26,XU

## 2018-07-10 PROCEDURE — 99153 MOD SED SAME PHYS/QHP EA: CPT

## 2018-07-10 PROCEDURE — T1013: CPT

## 2018-07-10 PROCEDURE — 75716 ARTERY X-RAYS ARMS/LEGS: CPT

## 2018-07-10 RX ORDER — INSULIN LISPRO 100/ML
VIAL (ML) SUBCUTANEOUS
Qty: 0 | Refills: 0 | Status: DISCONTINUED | OUTPATIENT
Start: 2018-07-10 | End: 2018-07-25

## 2018-07-10 RX ORDER — GLUCAGON INJECTION, SOLUTION 0.5 MG/.1ML
1 INJECTION, SOLUTION SUBCUTANEOUS ONCE
Qty: 0 | Refills: 0 | Status: DISCONTINUED | OUTPATIENT
Start: 2018-07-10 | End: 2018-07-25

## 2018-07-10 RX ORDER — DEXTROSE 50 % IN WATER 50 %
25 SYRINGE (ML) INTRAVENOUS ONCE
Qty: 0 | Refills: 0 | Status: DISCONTINUED | OUTPATIENT
Start: 2018-07-10 | End: 2018-07-25

## 2018-07-10 RX ORDER — SODIUM CHLORIDE 9 MG/ML
1000 INJECTION, SOLUTION INTRAVENOUS
Qty: 0 | Refills: 0 | Status: DISCONTINUED | OUTPATIENT
Start: 2018-07-10 | End: 2018-07-25

## 2018-07-10 RX ORDER — CLOPIDOGREL BISULFATE 75 MG/1
75 TABLET, FILM COATED ORAL DAILY
Qty: 0 | Refills: 0 | Status: DISCONTINUED | OUTPATIENT
Start: 2018-07-10 | End: 2018-07-25

## 2018-07-10 RX ORDER — DEXTROSE 50 % IN WATER 50 %
12.5 SYRINGE (ML) INTRAVENOUS ONCE
Qty: 0 | Refills: 0 | Status: DISCONTINUED | OUTPATIENT
Start: 2018-07-10 | End: 2018-07-25

## 2018-07-10 RX ORDER — DEXTROSE 50 % IN WATER 50 %
15 SYRINGE (ML) INTRAVENOUS ONCE
Qty: 0 | Refills: 0 | Status: DISCONTINUED | OUTPATIENT
Start: 2018-07-10 | End: 2018-07-25

## 2018-07-10 RX ORDER — CLOPIDOGREL BISULFATE 75 MG/1
1 TABLET, FILM COATED ORAL
Qty: 90 | Refills: 3 | OUTPATIENT
Start: 2018-07-10 | End: 2019-07-04

## 2018-07-10 RX ADMIN — CLOPIDOGREL BISULFATE 75 MILLIGRAM(S): 75 TABLET, FILM COATED ORAL at 20:51

## 2018-07-10 NOTE — CONSULT NOTE ADULT - ASSESSMENT
A- PPA w/possible PPI    Patient given native language instructions to hold glimepiride, escort for discharge  after HD and to bring updated Plavix information   HD consent  to be obtained day of procedure Order pending.     Will check FBS,

## 2018-07-10 NOTE — DISCHARGE NOTE ADULT - PATIENT PORTAL LINK FT
You can access the [facility] Patient Portal, offered by [facility], by registering with the following website: [email or web address]

## 2018-07-10 NOTE — DISCHARGE NOTE ADULT - HOSPITAL COURSE
post cardiac cath -- non obstructive CAD medical management    Post peripheral angiogram Total occlusion of his Right Common Illiiac to be fixed at a later date post cardiac cath -- non obstructive CAD medical management    Post peripheral angiogram Total occlusion of his Right Common Illiiac to be fixed at a later date    2000 returned from HD.  Had loose BM earlier non further Ate well Void in BR ?urine          REVIEW OF SYSTEMS:  Denies SOB, CP, NV, HA, dizziness, palpitations, site pain    PHYSICAL EXAM: A&Ox3 NAD Skin warm and dry  NEURO: Speech intact +gag +swallow Tongue midline PANDYA  NECK: No JVD, trachea midline. Eupneic  HEART: Irreg/irreg AFib on tele  PULMONARY:  CTA scott  ABDOMEN: Soft nontender X4 +BS Vdg?/eating  EXTREMITIES: LFA site: No bleed, hematoma, pain, ecchymosis or swelling Rt DP/PT+  Left groin site DSD no hematoma, bleed or swelling DSD in place Left DP/PT+

## 2018-07-10 NOTE — PROGRESS NOTE ADULT - SUBJECTIVE AND OBJECTIVE BOX
Patient was seen and evaluated on dialysis.   No c/o CP SOB NV  no F/C  no swelling    T(C): 36.4 (07-10-18 @ 18:45), Max: 36.6 (07-10-18 @ 08:00)  HR: 71 (07-10-18 @ 20:30) (57 - 101)  BP: 139/89 (07-10-18 @ 20:30) (96/62 - 143/94)  Wt(kg): --  PE ;  NAD  lungs - CTA  CV gr 1 murmer,  No gallop or rub  Abd : soft, NT BS +, No masses  Ext- No edema  Neuro : Grossly intact, moving extremities                         11.0   5.8   )-----------( 140      ( 10 Jul 2018 07:55 )             34.2        07-10    144  |  99  |  77.0<H>  ----------------------------<  76  6.2<HH>   |  22.0  |  8.31<H>    Ca    8.6      10 Jul 2018 07:55        MEDICATIONS  (STANDING):  clopidogrel Tablet  dextrose 40% Gel PRN  dextrose 5%.  dextrose 50% Injectable  dextrose 50% Injectable  dextrose 50% Injectable  glucagon  Injectable PRN  insulin lispro (HumaLOG) corrective regimen sliding scale      Patient stable  Ten HD easily  Continue, Dialysate K + 2.0, UF 2.5 L,    OP HD - [facility] ( [facility] )

## 2018-07-10 NOTE — DISCHARGE NOTE ADULT - CARE PROVIDER_API CALL
Tian Menon), Cardiology; Cardiovascular Disease; Interventional Cardiology  [address] [id number]  Phone: [phone]  Fax: [phone]

## 2018-07-10 NOTE — DISCHARGE NOTE ADULT - PLAN OF CARE
optimize cardiac health No heavy lifting, driving, sex, tub baths, swimming, or any activity that submerges the lower half of the body in water for 48 hours.  Limited walking and stairs for 48 hours.    Change the bandaid after 24 hours and every 24 hours after that.  Keep the puncture site dry and covered with a bandaid until a scab forms.    Observe the site frequently.  If bleeding or a large lump (the size of a golf ball or bigger) occurs lie flat, apply continuous direct pressure just above the puncture site for at least 10 minutes, and notify your physician immediately.  If the bleeding cannot be controlled, call 911 immediately for assistance.  Notify your physician of pain, swelling or any drainage.    Notify your physician immediately if coldness, numbness, discoloration or pain in your foot occurs. No heavy lifting, driving, sex, tub baths, swimming, or any activity that submerges the lower half of the body in water for 48 hours.  Limited walking and stairs for 48 hours.    Change the bandaid after 24 hours and every 24 hours after that.  Keep the puncture site dry and covered with a bandaid until a scab forms.    Observe the site frequently.  If bleeding or a large lump (the size of a golf ball or bigger) occurs lie flat, apply continuous direct pressure just above the puncture site for at least 10 minutes, and notify your physician immediately.  If the bleeding cannot be controlled, call 911 immediately for assistance.  Notify your physician of pain, swelling or any drainage.    Notify your physician immediately if coldness, numbness, discoloration or pain in your foot occurs.  Remove left groin dressing within 24 hours. instructions for discharge via

## 2018-07-10 NOTE — DISCHARGE NOTE ADULT - CARE PLAN
Principal Discharge DX:	Claudication in peripheral vascular disease  Goal:	optimize cardiac health  Assessment and plan of treatment:	No heavy lifting, driving, sex, tub baths, swimming, or any activity that submerges the lower half of the body in water for 48 hours.  Limited walking and stairs for 48 hours.    Change the bandaid after 24 hours and every 24 hours after that.  Keep the puncture site dry and covered with a bandaid until a scab forms.    Observe the site frequently.  If bleeding or a large lump (the size of a golf ball or bigger) occurs lie flat, apply continuous direct pressure just above the puncture site for at least 10 minutes, and notify your physician immediately.  If the bleeding cannot be controlled, call 911 immediately for assistance.  Notify your physician of pain, swelling or any drainage.    Notify your physician immediately if coldness, numbness, discoloration or pain in your foot occurs. Principal Discharge DX:	Claudication in peripheral vascular disease  Goal:	optimize cardiac health  Assessment and plan of treatment:	No heavy lifting, driving, sex, tub baths, swimming, or any activity that submerges the lower half of the body in water for 48 hours.  Limited walking and stairs for 48 hours.    Change the bandaid after 24 hours and every 24 hours after that.  Keep the puncture site dry and covered with a bandaid until a scab forms.    Observe the site frequently.  If bleeding or a large lump (the size of a golf ball or bigger) occurs lie flat, apply continuous direct pressure just above the puncture site for at least 10 minutes, and notify your physician immediately.  If the bleeding cannot be controlled, call 911 immediately for assistance.  Notify your physician of pain, swelling or any drainage.    Notify your physician immediately if coldness, numbness, discoloration or pain in your foot occurs.  Remove left groin dressing within 24 hours.  Assessment and plan of treatment:	instructions for discharge via

## 2018-07-10 NOTE — CONSULT NOTE ADULT - SUBJECTIVE AND OBJECTIVE BOX
[facility] DIVISION OF KIDNEY DISEASES AND HYPERTENSION -- INITIAL CONSULT NOTE  --------------------------------------------------------------------------------  HPI:  S/P LHC,    ESRD - HD , No C/O    PAST HISTORY  --------------------------------------------------------------------------------  PAST MEDICAL & SURGICAL HISTORY:  Carotid artery disease: mild  GERD (gastroesophageal reflux disease)  Low glucose level: Patient has h/o low blood sugars at times  Palpitations  Leg pain, bilateral: R&gt;L at rest and with short distant ambulation  Imbalance: secondary to PVD Uses cane  Obesity (BMI 30.0-34.9)  Claudication in peripheral vascular disease: R&gt;L  Right common iliac or ostial external iliac per June 2018 U/S  Left popliteal 50-75 and EDUIN stenosis  SUSANA 0.8  Kidney problem: spontanious renal bleed while on coumadin  c/b  renal  hematome  with  LT  nephrectomy  Hypotension: treated  with Midodrine  Tiredness  Renal hematoma, left  Hypercholesteremia  ESRD (end stage renal disease): on  hemodialysis  3  x  weekly.,  H/O  left  nephrectomy after  renal  bleed  Dyslipidemia  Dizziness  DM (diabetes mellitus)  Daytime sleepiness  AF (atrial fibrillation): s/p Watchman procedure  CHADSVASc 3  Renal transplant recipient  AV fistula: right lower arm  Abnormality of left atrial appendage: WATCHMAN  LEFT ATRIAL APPENDAGE CLOSUIRE   Jan. 30 2017  History of unilateral nephrectomy: left  nephrectomy    FAMILY HISTORY:  No pertinent family history in first degree relatives    PAST SOCIAL HISTORY:    ALLERGIES & MEDICATIONS  --------------------------------------------------------------------------------  Allergies    No Known Allergies    Standing Inpatient Medications  clopidogrel Tablet 75 milliGRAM(s) Oral daily  dextrose 5%. 1000 milliLiter(s) IV Continuous <Continuous>  dextrose 50% Injectable 12.5 Gram(s) IV Push once  dextrose 50% Injectable 25 Gram(s) IV Push once  dextrose 50% Injectable 25 Gram(s) IV Push once  insulin lispro (HumaLOG) corrective regimen sliding scale   SubCutaneous three times a day before meals    PRN Inpatient Medications  dextrose 40% Gel 15 Gram(s) Oral once PRN  glucagon  Injectable 1 milliGRAM(s) IntraMuscular once PRN      REVIEW OF SYSTEMS  --------------------------------------------------------------------------------  Gen: No weight changes, fatigue, fevers/chills, weakness  Skin: No rashes  Head/Eyes/Ears/Mouth: No headache; Normal hearing; Normal vision w/o blurriness; No sinus pain/discomfort, sore throat  Respiratory: No dyspnea, cough, wheezing, hemoptysis  CV: No chest pain, PND, orthopnea  GI: No abdominal pain, diarrhea, constipation, nausea, vomiting, melena, hematochezia  : No increased frequency, dysuria, hematuria, nocturia  MSK: No joint pain/swelling; no back pain; no edema  Neuro: No dizziness/lightheadedness, weakness, seizures, numbness, tingling  Heme: No easy bruising or bleeding  Endo: No heat/cold intolerance  Psych: No significant nervousness, anxiety, stress, depression    All other systems were reviewed and are negative, except as noted.    VITALS/PHYSICAL EXAM  --------------------------------------------------------------------------------  T(C): 36.4 (07-10-18 @ 18:45), Max: 36.6 (07-10-18 @ 08:00)  HR: 71 (07-10-18 @ 20:30) (57 - 101)  BP: 139/89 (07-10-18 @ 20:30) (96/62 - 143/94)  RR: 14 (07-10-18 @ 20:30) (14 - 20)  SpO2: 98% (07-10-18 @ 20:30) (96% - 100%)  Wt(kg): --    Physical Exam:  	Gen: NAD, well-appearing  	HEENT: PERRL, supple neck, clear oropharynx  	Pulm: CTA B/L  	CV: RRR, S1S2; no rub  	Back: No spinal or CVA tenderness; no sacral edema  	Abd: +BS, soft, nontender/nondistended  	: No suprapubic tenderness  	UE: Warm, FROM, no clubbing, intact strength; no edema; no asterixis  	LE: Warm, FROM, no clubbing, intact strength; no edema  	Neuro: No focal deficits, intact gait  	Psych: Normal affect and mood  	Skin: Warm, without rashes  	Vascular access:    LABS/STUDIES  --------------------------------------------------------------------------------              11.0   5.8   >-----------<  140      [07-10-18 @ 07:55]              34.2     144  |  99  |  77.0  ----------------------------<  76      [07-10-18 @ 07:55]  6.2   |  22.0  |  8.31        Ca     8.6     [07-10-18 @ 07:55]    Creatinine Trend:  SCr 8.31 [07-10 @ 07:55]  SCr 5.44 [07-06 @ 09:26]    S/P LHC, Femoral sites : No Hematoma,  HD tonite, Access : R AVF,

## 2018-07-10 NOTE — DISCHARGE NOTE ADULT - MEDICATION SUMMARY - MEDICATIONS TO TAKE
I will START or STAY ON the medications listed below when I get home from the hospital:    nephlex rx  -- 1 tab(s) by mouth once a day  -- Indication: For renal    aspirin 81 mg oral tablet  -- 1 tab(s) by mouth once a day  -- Indication: For Cad/Pvd    glimepiride 1 mg oral tablet  -- 1 tab(s) by mouth once a day  -- Indication: For dm    loratadine 10 mg oral capsule  -- 1 cap(s) by mouth once a day  -- Indication: For allergies    Lipitor 20 mg oral tablet  -- 1 tab(s) by mouth once a day  -- Indication: For hld    gemfibrozil 600 mg oral tablet  -- 1 tab(s) by mouth once a day  -- Indication: For hld    Lupron  --  intramuscular every 3 mos,  due  for  dose  at  [facility]  oncologist  urology  july 21 2018  -- Indication: For oncology    Plavix 75 mg oral tablet  -- 1 tab(s) by mouth once a day   -- Do not take aspirin or aspirin containing products without knowledge and consent of your physician.    -- Indication: For Cad/pvd    metoprolol tartrate 25 mg oral tablet  -- 1 tab(s) by mouth 2 times a day  -- Indication: For bp    Sensipar 30 mg oral tablet  -- 1 tab(s) by mouth 2 times a day  -- Indication: For hd    ursodiol 300 mg oral capsule  -- 1 cap(s) by mouth once a day  -- Indication: For hd    Colace 100 mg oral capsule  -- 1 cap(s) by mouth once a day  -- Indication: For laxative    Mag-Ox 400  -- tab(s) by mouth once a day  -- Indication: For mineral    midodrine 10 mg oral tablet  -- 1 tab(s) by mouth 3 times a day  -- Indication: For bp support    Renvela 800 mg oral tablet  -- 1 tab(s) by mouth 2 times a day  -- Indication: For hd    NexIUM 40 mg oral delayed release capsule  -- 1 cap(s) by mouth once a day  -- Indication: For gerd

## 2018-07-17 PROBLEM — I73.9 PERIPHERAL VASCULAR DISEASE, UNSPECIFIED: Chronic | Status: ACTIVE | Noted: 2018-07-06

## 2018-07-19 PROBLEM — K21.9 GASTRO-ESOPHAGEAL REFLUX DISEASE WITHOUT ESOPHAGITIS: Chronic | Status: ACTIVE | Noted: 2018-07-06

## 2018-07-19 PROBLEM — I77.9 DISORDER OF ARTERIES AND ARTERIOLES, UNSPECIFIED: Chronic | Status: ACTIVE | Noted: 2018-07-06

## 2018-07-19 PROBLEM — E16.2 HYPOGLYCEMIA, UNSPECIFIED: Chronic | Status: ACTIVE | Noted: 2018-07-06

## 2018-07-19 PROBLEM — M79.604 PAIN IN RIGHT LEG: Chronic | Status: ACTIVE | Noted: 2018-07-06

## 2018-07-19 PROBLEM — R26.89 OTHER ABNORMALITIES OF GAIT AND MOBILITY: Chronic | Status: ACTIVE | Noted: 2018-07-06

## 2018-07-19 PROBLEM — E66.9 OBESITY, UNSPECIFIED: Chronic | Status: ACTIVE | Noted: 2018-07-06

## 2018-07-19 PROBLEM — R00.2 PALPITATIONS: Chronic | Status: ACTIVE | Noted: 2018-07-06

## 2018-07-20 ENCOUNTER — NON-APPOINTMENT (OUTPATIENT)
Age: 73
End: 2018-07-20

## 2018-07-20 ENCOUNTER — APPOINTMENT (OUTPATIENT)
Dept: CARDIOLOGY | Facility: CLINIC | Age: 73
End: 2018-07-20
Payer: MEDICARE

## 2018-07-20 VITALS
HEART RATE: 89 BPM | SYSTOLIC BLOOD PRESSURE: 90 MMHG | HEIGHT: 66 IN | BODY MASS INDEX: 35.36 KG/M2 | DIASTOLIC BLOOD PRESSURE: 63 MMHG | WEIGHT: 220 LBS | OXYGEN SATURATION: 99 %

## 2018-07-20 DIAGNOSIS — R07.0 PAIN IN THROAT: ICD-10-CM

## 2018-07-20 DIAGNOSIS — R05 COUGH: ICD-10-CM

## 2018-07-20 DIAGNOSIS — R13.11 DYSPHAGIA, ORAL PHASE: ICD-10-CM

## 2018-07-20 PROCEDURE — 99215 OFFICE O/P EST HI 40 MIN: CPT

## 2018-07-20 PROCEDURE — 93000 ELECTROCARDIOGRAM COMPLETE: CPT | Mod: 59

## 2018-08-03 ENCOUNTER — OUTPATIENT (OUTPATIENT)
Dept: OUTPATIENT SERVICES | Facility: HOSPITAL | Age: 73
LOS: 1 days | End: 2018-08-03
Payer: MEDICARE

## 2018-08-03 VITALS
HEIGHT: 65 IN | DIASTOLIC BLOOD PRESSURE: 61 MMHG | RESPIRATION RATE: 18 BRPM | OXYGEN SATURATION: 97 % | WEIGHT: 220.9 LBS | HEART RATE: 76 BPM | SYSTOLIC BLOOD PRESSURE: 93 MMHG | TEMPERATURE: 97 F

## 2018-08-03 DIAGNOSIS — E11.9 TYPE 2 DIABETES MELLITUS WITHOUT COMPLICATIONS: ICD-10-CM

## 2018-08-03 DIAGNOSIS — I74.5 EMBOLISM AND THROMBOSIS OF ILIAC ARTERY: ICD-10-CM

## 2018-08-03 DIAGNOSIS — Q20.8 OTHER CONGENITAL MALFORMATIONS OF CARDIAC CHAMBERS AND CONNECTIONS: Chronic | ICD-10-CM

## 2018-08-03 DIAGNOSIS — Z94.0 KIDNEY TRANSPLANT STATUS: Chronic | ICD-10-CM

## 2018-08-03 DIAGNOSIS — Z90.5 ACQUIRED ABSENCE OF KIDNEY: Chronic | ICD-10-CM

## 2018-08-03 DIAGNOSIS — I77.0 ARTERIOVENOUS FISTULA, ACQUIRED: Chronic | ICD-10-CM

## 2018-08-03 DIAGNOSIS — N18.5 CHRONIC KIDNEY DISEASE, STAGE 5: ICD-10-CM

## 2018-08-03 DIAGNOSIS — Z01.810 ENCOUNTER FOR PREPROCEDURAL CARDIOVASCULAR EXAMINATION: ICD-10-CM

## 2018-08-03 LAB
ANION GAP SERPL CALC-SCNC: 19 MMOL/L — HIGH (ref 5–17)
APTT BLD: 37.8 SEC — HIGH (ref 27.5–37.4)
BASOPHILS # BLD AUTO: 0 K/UL — SIGNIFICANT CHANGE UP (ref 0–0.2)
BASOPHILS NFR BLD AUTO: 0.5 % — SIGNIFICANT CHANGE UP (ref 0–2)
BUN SERPL-MCNC: 32 MG/DL — HIGH (ref 8–20)
CALCIUM SERPL-MCNC: 9.6 MG/DL — SIGNIFICANT CHANGE UP (ref 8.6–10.2)
CHLORIDE SERPL-SCNC: 93 MMOL/L — LOW (ref 98–107)
CO2 SERPL-SCNC: 26 MMOL/L — SIGNIFICANT CHANGE UP (ref 22–29)
CREAT SERPL-MCNC: 5.41 MG/DL — HIGH (ref 0.5–1.3)
EOSINOPHIL # BLD AUTO: 0.4 K/UL — SIGNIFICANT CHANGE UP (ref 0–0.5)
EOSINOPHIL NFR BLD AUTO: 9.6 % — HIGH (ref 0–5)
GLUCOSE SERPL-MCNC: 82 MG/DL — SIGNIFICANT CHANGE UP (ref 70–115)
HCT VFR BLD CALC: 35.5 % — LOW (ref 42–52)
HGB BLD-MCNC: 11.5 G/DL — LOW (ref 14–18)
INR BLD: 1.12 RATIO — SIGNIFICANT CHANGE UP (ref 0.88–1.16)
LYMPHOCYTES # BLD AUTO: 0.8 K/UL — LOW (ref 1–4.8)
LYMPHOCYTES # BLD AUTO: 19.4 % — LOW (ref 20–55)
MCHC RBC-ENTMCNC: 32.4 G/DL — SIGNIFICANT CHANGE UP (ref 32–36)
MCHC RBC-ENTMCNC: 35.6 PG — HIGH (ref 27–31)
MCV RBC AUTO: 109.9 FL — HIGH (ref 80–94)
MONOCYTES # BLD AUTO: 0.4 K/UL — SIGNIFICANT CHANGE UP (ref 0–0.8)
MONOCYTES NFR BLD AUTO: 9.6 % — SIGNIFICANT CHANGE UP (ref 3–10)
NEUTROPHILS # BLD AUTO: 2.4 K/UL — SIGNIFICANT CHANGE UP (ref 1.8–8)
NEUTROPHILS NFR BLD AUTO: 60.6 % — SIGNIFICANT CHANGE UP (ref 37–73)
PLATELET # BLD AUTO: 184 K/UL — SIGNIFICANT CHANGE UP (ref 150–400)
POTASSIUM SERPL-MCNC: 5.4 MMOL/L — HIGH (ref 3.5–5.3)
POTASSIUM SERPL-SCNC: 5.4 MMOL/L — HIGH (ref 3.5–5.3)
PROTHROM AB SERPL-ACNC: 12.4 SEC — SIGNIFICANT CHANGE UP (ref 9.8–12.7)
RBC # BLD: 3.23 M/UL — LOW (ref 4.6–6.2)
RBC # FLD: 16.3 % — HIGH (ref 11–15.6)
SODIUM SERPL-SCNC: 138 MMOL/L — SIGNIFICANT CHANGE UP (ref 135–145)
WBC # BLD: 3.9 K/UL — LOW (ref 4.8–10.8)
WBC # FLD AUTO: 3.9 K/UL — LOW (ref 4.8–10.8)

## 2018-08-03 PROCEDURE — 85027 COMPLETE CBC AUTOMATED: CPT

## 2018-08-03 PROCEDURE — 85730 THROMBOPLASTIN TIME PARTIAL: CPT

## 2018-08-03 PROCEDURE — 93010 ELECTROCARDIOGRAM REPORT: CPT

## 2018-08-03 PROCEDURE — 36415 COLL VENOUS BLD VENIPUNCTURE: CPT

## 2018-08-03 PROCEDURE — 93005 ELECTROCARDIOGRAM TRACING: CPT

## 2018-08-03 PROCEDURE — 85610 PROTHROMBIN TIME: CPT

## 2018-08-03 PROCEDURE — 80048 BASIC METABOLIC PNL TOTAL CA: CPT

## 2018-08-03 PROCEDURE — G0463: CPT

## 2018-08-03 NOTE — H&P PST ADULT - PMH
Carotid artery disease  mild  Claudication in peripheral vascular disease  R>L  Right common iliac or ostial external iliac per June 2018 U/S  Left popliteal 50-75 and EDUIN stenosis  SUSANA 0.8  Daytime sleepiness    Dizziness    DM (diabetes mellitus)    Dyslipidemia    ESRD (end stage renal disease)  on  hemodialysis  3  x  weekly.,  H/O  left  nephrectomy after  renal  bleed  GERD (gastroesophageal reflux disease)    Hypercholesteremia    Hypotension  treated  with Midodrine  Imbalance  secondary to PVD Uses cane  Kidney problem  spontanious renal bleed while on coumadin  c/b  renal  hematome  with  LT  nephrectomy  Leg pain, bilateral  R>L at rest and with short distant ambulation  Low glucose level  Patient has h/o low blood sugars at times  Obesity (BMI 30.0-34.9)    Palpitations    Persistent atrial fibrillation    Renal hematoma, left    Tiredness

## 2018-08-03 NOTE — H&P PST ADULT - HISTORY OF PRESENT ILLNESS
71y/o male former smoker with history of nonobstructive CAD, persistent AF, S/P Watchman, PAD, stage 5 CKD on HD TTS, S/P renal transplant X 2, right arm AV fistula, DM who has been c/o BLE claudication and had BLE angiography which showed occluded right external iliac and left posterior tibial arteries. Intervention on these lesions was deferred for another date.    [facility]/BLE Angiography: 7/10/2018  CORONARY VESSELS: The coronary circulation is right dominant.  LM:     --  LM: The vessel was large sized. Angiography showed minor luminal irregularities with no flow limiting lesions.  LAD:     --  Mid LAD: There was a diffuse 40 % stenosis.  --  Distal LAD: There was a diffuse 40 % stenosis.  CX:     --  Circumflex: The vessel was medium sized. Angiography showed mild atherosclerosis with no flow limiting lesions.  RI:     --  Ramus intermedius: The vessel was small sized.  RCA:     --  RCA: The vessel was large sized (dominant). Angiography showed mild atherosclerosis with no flow limiting lesions.  --  RPDA: The vessel was medium sized. Angiography showed mild atherosclerosis with no flow limiting lesions.  --  RPLS: There was a tubular 75 % stenosis.  LEFT LOWER EXTREMITY VESSELS:   ·	Left common iliac: Normal. The vessel was patent.   ·	Left internal iliac: The vessel was patent.   ·	Left external iliac: The vessel was patent.   ·	Left common femoral: The vessel was patent.   ·	Left superficial femoral: There was a 40 % stenosis.   ·	Left deep femoral: The vessel was patent.   ·	Left popliteal: There was a tubular 60 % stenosis.   ·	Left anterior tibial: The vessel was patent.   ·	Left tibio-peroneal: The vessel was patent.   ·	Left posterior tibial: The vessel was occluded.   ·	Left peroneal: The vessel was patent.  RIGHT LOWER EXTREMITY VESSELS:   ·	Right common iliac: The vessel was patent.  ·	Right internal iliac: The vessel was patent.   ·	Right external iliac: The vessel was occluded. Reconstitutes distally via collaterals from the distal aorta   ·	Right common femoral: The vessel was patent.   ·	Right superficial femoral: The vessel was patent.   ·	Right deep femoral: The vessel was patent.   ·	Right popliteal: The vessel was patent.   ·	Right anterior tibial: This vessel was poorly visualized.   ·	Right tibio-peroneal: This vessel was poorly visualized.   ·	Right posterior tibial: This vessel was poorly visualized.   ·	Right peroneal: This vessel was poorly visualized.    Echo: 2/5/2018  Left Ventricle: Left ventricular ejection fraction, by visual estimation, is 55 to 60%. Elevated left atrial and left ventricular end-diastolic pressures.  Right Ventricle: Normal right ventricular size and function.  Left Atrium: Severely enlarged left atrium. No left atrial appendage thrombus is seen. S/p WATCHMAN procedure. The device appears well-seated. No color Doppler flow with vena contracta approximately 0.1 cm is seen around the device. The maximum diameter of the compress device post placement is 19.0 mm at 0°, 20.0 mm at 45°, The device was visualized in the left atrial appendage with 3D imaging. Intra-atrial septal aneurysm. Color flow doppler and intravenous injection of agitated saline demonstrates the presence of an intact intra atrial septum.  Right Atrium: The right atrium is mildly dilated.  Pericardium: There is no evidence of pericardial effusion.  Mitral Valve: Mild mitral valve regurgitation is seen.  Tricuspid Valve: Moderate tricuspid regurgitation is visualized.  Aortic Valve: Mild to moderate aortic valve regurgitation is seen.  Aorta: Simple atheroma seen in the aortic arch and descending aorta. There is mild aortic root calcification.  Pulmonary Artery: The main pulmonary artery is normal in size. Normal pulmonary artery pressure.  Venous: A systolic blunting flow pattern is recorded from all four pulmonary veins.  Shunts: Agitated saline contrast was given intravenously to evaluate for intracardiac shunting. There is no evidence of a patent foramen ovale.

## 2018-08-03 NOTE — H&P PST ADULT - ASSESSMENT
71y/o male former smoker with history of nonobstructive CAD, persistent AF, S/P Watchman, PAD, stage 5 CKD on HD TTS, S/P renal transplant X 2, right arm AV fistula, DM who has been c/o BLE claudication and had BLE angiography which showed occluded right external iliac and left posterior tibial arteries. Intervention on these lesions was deferred for another date.

## 2018-08-03 NOTE — H&P PST ADULT - OTHER CARE PROVIDERS
Lexie Jerome ([facility], [address], [place] [address], [phone]), Luis Manuel Epstein ([facility], [address], [place] [address], [phone]), addison Grubbs ([facility], [place] [address], [phone])

## 2018-08-03 NOTE — H&P PST ADULT - ATTENDING COMMENTS
73 y/o male with CKD stage 5 , right external iliac occlusion here for peripheral angioplasty through antegrade and retrograde approach.

## 2018-08-04 PROBLEM — I48.91 UNSPECIFIED ATRIAL FIBRILLATION: Chronic | Status: INACTIVE | Noted: 2017-01-25 | Resolved: 2018-08-03

## 2018-08-07 ENCOUNTER — INPATIENT (INPATIENT)
Facility: HOSPITAL | Age: 73
LOS: 3 days | Discharge: HOME | DRG: 250 | End: 2018-08-11
Attending: HOSPITALIST | Admitting: INTERNAL MEDICINE
Payer: MEDICARE

## 2018-08-07 VITALS
OXYGEN SATURATION: 100 % | WEIGHT: 224.43 LBS | RESPIRATION RATE: 9 BRPM | HEART RATE: 82 BPM | SYSTOLIC BLOOD PRESSURE: 100 MMHG | HEIGHT: 65 IN | TEMPERATURE: 98 F | DIASTOLIC BLOOD PRESSURE: 63 MMHG

## 2018-08-07 DIAGNOSIS — Z90.5 ACQUIRED ABSENCE OF KIDNEY: Chronic | ICD-10-CM

## 2018-08-07 DIAGNOSIS — I77.0 ARTERIOVENOUS FISTULA, ACQUIRED: Chronic | ICD-10-CM

## 2018-08-07 DIAGNOSIS — T81.19XS: ICD-10-CM

## 2018-08-07 DIAGNOSIS — Q20.8 OTHER CONGENITAL MALFORMATIONS OF CARDIAC CHAMBERS AND CONNECTIONS: Chronic | ICD-10-CM

## 2018-08-07 DIAGNOSIS — N18.6 END STAGE RENAL DISEASE: ICD-10-CM

## 2018-08-07 DIAGNOSIS — I73.9 PERIPHERAL VASCULAR DISEASE, UNSPECIFIED: ICD-10-CM

## 2018-08-07 DIAGNOSIS — I10 ESSENTIAL (PRIMARY) HYPERTENSION: ICD-10-CM

## 2018-08-07 DIAGNOSIS — E66.9 OBESITY, UNSPECIFIED: ICD-10-CM

## 2018-08-07 DIAGNOSIS — Z94.0 KIDNEY TRANSPLANT STATUS: Chronic | ICD-10-CM

## 2018-08-07 DIAGNOSIS — E11.9 TYPE 2 DIABETES MELLITUS WITHOUT COMPLICATIONS: ICD-10-CM

## 2018-08-07 DIAGNOSIS — I48.1 PERSISTENT ATRIAL FIBRILLATION: ICD-10-CM

## 2018-08-07 LAB
ALBUMIN SERPL ELPH-MCNC: 3.7 G/DL — SIGNIFICANT CHANGE UP (ref 3.3–5.2)
ANION GAP SERPL CALC-SCNC: 20 MMOL/L — HIGH (ref 5–17)
ANION GAP SERPL CALC-SCNC: 21 MMOL/L — HIGH (ref 5–17)
ANISOCYTOSIS BLD QL: SLIGHT — SIGNIFICANT CHANGE UP
BASOPHILS # BLD AUTO: 0 K/UL — SIGNIFICANT CHANGE UP (ref 0–0.2)
BASOPHILS # BLD AUTO: 0 K/UL — SIGNIFICANT CHANGE UP (ref 0–0.2)
BASOPHILS NFR BLD AUTO: 0.3 % — SIGNIFICANT CHANGE UP (ref 0–2)
BASOPHILS NFR BLD AUTO: 0.5 % — SIGNIFICANT CHANGE UP (ref 0–2)
BLD GP AB SCN SERPL QL: SIGNIFICANT CHANGE UP
BUN SERPL-MCNC: 72 MG/DL — HIGH (ref 8–20)
BUN SERPL-MCNC: 77 MG/DL — HIGH (ref 8–20)
CALCIUM SERPL-MCNC: 7.5 MG/DL — LOW (ref 8.6–10.2)
CALCIUM SERPL-MCNC: 8.3 MG/DL — LOW (ref 8.6–10.2)
CHLORIDE SERPL-SCNC: 103 MMOL/L — SIGNIFICANT CHANGE UP (ref 98–107)
CHLORIDE SERPL-SCNC: 103 MMOL/L — SIGNIFICANT CHANGE UP (ref 98–107)
CO2 SERPL-SCNC: 19 MMOL/L — LOW (ref 22–29)
CO2 SERPL-SCNC: 19 MMOL/L — LOW (ref 22–29)
CREAT SERPL-MCNC: 9.11 MG/DL — HIGH (ref 0.5–1.3)
CREAT SERPL-MCNC: 9.64 MG/DL — HIGH (ref 0.5–1.3)
ELLIPTOCYTES BLD QL SMEAR: SLIGHT — SIGNIFICANT CHANGE UP
EOSINOPHIL # BLD AUTO: 0.2 K/UL — SIGNIFICANT CHANGE UP (ref 0–0.5)
EOSINOPHIL # BLD AUTO: 0.5 K/UL — SIGNIFICANT CHANGE UP (ref 0–0.5)
EOSINOPHIL NFR BLD AUTO: 1.5 % — SIGNIFICANT CHANGE UP (ref 0–5)
EOSINOPHIL NFR BLD AUTO: 8 % — HIGH (ref 0–5)
GLUCOSE BLDC GLUCOMTR-MCNC: 83 MG/DL — SIGNIFICANT CHANGE UP (ref 70–99)
GLUCOSE SERPL-MCNC: 58 MG/DL — LOW (ref 70–115)
GLUCOSE SERPL-MCNC: 87 MG/DL — SIGNIFICANT CHANGE UP (ref 70–115)
HBA1C BLD-MCNC: 5.1 % — SIGNIFICANT CHANGE UP (ref 4–5.6)
HCT VFR BLD CALC: 26.3 % — LOW (ref 42–52)
HCT VFR BLD CALC: 30.2 % — LOW (ref 42–52)
HCT VFR BLD CALC: 34 % — LOW (ref 42–52)
HGB BLD-MCNC: 11 G/DL — LOW (ref 14–18)
HGB BLD-MCNC: 8.8 G/DL — LOW (ref 14–18)
HGB BLD-MCNC: 9.5 G/DL — LOW (ref 14–18)
HYPOCHROMIA BLD QL: SLIGHT — SIGNIFICANT CHANGE UP
LYMPHOCYTES # BLD AUTO: 1.1 K/UL — SIGNIFICANT CHANGE UP (ref 1–4.8)
LYMPHOCYTES # BLD AUTO: 10.2 % — LOW (ref 20–55)
LYMPHOCYTES # BLD AUTO: 2.2 K/UL — SIGNIFICANT CHANGE UP (ref 1–4.8)
LYMPHOCYTES # BLD AUTO: 35.7 % — SIGNIFICANT CHANGE UP (ref 20–55)
MACROCYTES BLD QL: SLIGHT — SIGNIFICANT CHANGE UP
MCHC RBC-ENTMCNC: 31.5 G/DL — LOW (ref 32–36)
MCHC RBC-ENTMCNC: 32.4 G/DL — SIGNIFICANT CHANGE UP (ref 32–36)
MCHC RBC-ENTMCNC: 32.4 PG — HIGH (ref 27–31)
MCHC RBC-ENTMCNC: 33.5 G/DL — SIGNIFICANT CHANGE UP (ref 32–36)
MCHC RBC-ENTMCNC: 33.5 PG — HIGH (ref 27–31)
MCHC RBC-ENTMCNC: 33.6 PG — HIGH (ref 27–31)
MCV RBC AUTO: 100.3 FL — HIGH (ref 80–94)
MCV RBC AUTO: 100.4 FL — HIGH (ref 80–94)
MCV RBC AUTO: 106.3 FL — HIGH (ref 80–94)
MONOCYTES # BLD AUTO: 0.3 K/UL — SIGNIFICANT CHANGE UP (ref 0–0.8)
MONOCYTES # BLD AUTO: 0.9 K/UL — HIGH (ref 0–0.8)
MONOCYTES NFR BLD AUTO: 4.8 % — SIGNIFICANT CHANGE UP (ref 3–10)
MONOCYTES NFR BLD AUTO: 8.7 % — SIGNIFICANT CHANGE UP (ref 3–10)
NEUTROPHILS # BLD AUTO: 3.1 K/UL — SIGNIFICANT CHANGE UP (ref 1.8–8)
NEUTROPHILS # BLD AUTO: 8.5 K/UL — HIGH (ref 1.8–8)
NEUTROPHILS NFR BLD AUTO: 50.7 % — SIGNIFICANT CHANGE UP (ref 37–73)
NEUTROPHILS NFR BLD AUTO: 79 % — HIGH (ref 37–73)
PHOSPHATE SERPL-MCNC: 5.6 MG/DL — HIGH (ref 2.4–4.7)
PLAT MORPH BLD: NORMAL — SIGNIFICANT CHANGE UP
PLATELET # BLD AUTO: 173 K/UL — SIGNIFICANT CHANGE UP (ref 150–400)
PLATELET # BLD AUTO: 220 K/UL — SIGNIFICANT CHANGE UP (ref 150–400)
PLATELET # BLD AUTO: 221 K/UL — SIGNIFICANT CHANGE UP (ref 150–400)
POIKILOCYTOSIS BLD QL AUTO: SLIGHT — SIGNIFICANT CHANGE UP
POTASSIUM SERPL-MCNC: 5.7 MMOL/L — HIGH (ref 3.5–5.3)
POTASSIUM SERPL-MCNC: 6.8 MMOL/L — CRITICAL HIGH (ref 3.5–5.3)
POTASSIUM SERPL-SCNC: 5.7 MMOL/L — HIGH (ref 3.5–5.3)
POTASSIUM SERPL-SCNC: 6.8 MMOL/L — CRITICAL HIGH (ref 3.5–5.3)
RBC # BLD: 2.62 M/UL — LOW (ref 4.6–6.2)
RBC # BLD: 2.84 M/UL — LOW (ref 4.6–6.2)
RBC # BLD: 3.39 M/UL — LOW (ref 4.6–6.2)
RBC # FLD: 18.6 % — HIGH (ref 11–15.6)
RBC # FLD: 21.8 % — HIGH (ref 11–15.6)
RBC # FLD: 22.6 % — HIGH (ref 11–15.6)
RBC BLD AUTO: ABNORMAL
SODIUM SERPL-SCNC: 142 MMOL/L — SIGNIFICANT CHANGE UP (ref 135–145)
SODIUM SERPL-SCNC: 143 MMOL/L — SIGNIFICANT CHANGE UP (ref 135–145)
WBC # BLD: 10.7 K/UL — SIGNIFICANT CHANGE UP (ref 4.8–10.8)
WBC # BLD: 6.1 K/UL — SIGNIFICANT CHANGE UP (ref 4.8–10.8)
WBC # BLD: 6.5 K/UL — SIGNIFICANT CHANGE UP (ref 4.8–10.8)
WBC # FLD AUTO: 10.7 K/UL — SIGNIFICANT CHANGE UP (ref 4.8–10.8)
WBC # FLD AUTO: 6.1 K/UL — SIGNIFICANT CHANGE UP (ref 4.8–10.8)
WBC # FLD AUTO: 6.5 K/UL — SIGNIFICANT CHANGE UP (ref 4.8–10.8)

## 2018-08-07 PROCEDURE — 99222 1ST HOSP IP/OBS MODERATE 55: CPT

## 2018-08-07 PROCEDURE — 99223 1ST HOSP IP/OBS HIGH 75: CPT

## 2018-08-07 RX ORDER — INSULIN HUMAN 100 [IU]/ML
INJECTION, SOLUTION SUBCUTANEOUS EVERY 6 HOURS
Qty: 0 | Refills: 0 | Status: DISCONTINUED | OUTPATIENT
Start: 2018-08-07 | End: 2018-08-08

## 2018-08-07 RX ORDER — ALBUTEROL 90 UG/1
2.5 AEROSOL, METERED ORAL ONCE
Qty: 0 | Refills: 0 | Status: DISCONTINUED | OUTPATIENT
Start: 2018-08-07 | End: 2018-08-07

## 2018-08-07 RX ORDER — CLOPIDOGREL BISULFATE 75 MG/1
75 TABLET, FILM COATED ORAL DAILY
Qty: 0 | Refills: 0 | Status: DISCONTINUED | OUTPATIENT
Start: 2018-08-07 | End: 2018-08-11

## 2018-08-07 RX ORDER — SODIUM CHLORIDE 9 MG/ML
1000 INJECTION, SOLUTION INTRAVENOUS
Qty: 0 | Refills: 0 | Status: DISCONTINUED | OUTPATIENT
Start: 2018-08-07 | End: 2018-08-11

## 2018-08-07 RX ORDER — INSULIN HUMAN 100 [IU]/ML
10 INJECTION, SOLUTION SUBCUTANEOUS ONCE
Qty: 0 | Refills: 0 | Status: COMPLETED | OUTPATIENT
Start: 2018-08-07 | End: 2018-08-07

## 2018-08-07 RX ORDER — DEXTROSE 50 % IN WATER 50 %
25 SYRINGE (ML) INTRAVENOUS ONCE
Qty: 0 | Refills: 0 | Status: DISCONTINUED | OUTPATIENT
Start: 2018-08-07 | End: 2018-08-11

## 2018-08-07 RX ORDER — DEXTROSE 50 % IN WATER 50 %
50 SYRINGE (ML) INTRAVENOUS ONCE
Qty: 0 | Refills: 0 | Status: COMPLETED | OUTPATIENT
Start: 2018-08-07 | End: 2018-08-07

## 2018-08-07 RX ORDER — DEXTROSE 50 % IN WATER 50 %
12.5 SYRINGE (ML) INTRAVENOUS ONCE
Qty: 0 | Refills: 0 | Status: DISCONTINUED | OUTPATIENT
Start: 2018-08-07 | End: 2018-08-11

## 2018-08-07 RX ORDER — ACETAMINOPHEN 500 MG
1000 TABLET ORAL ONCE
Qty: 0 | Refills: 0 | Status: DISCONTINUED | OUTPATIENT
Start: 2018-08-07 | End: 2018-08-07

## 2018-08-07 RX ORDER — ASPIRIN/CALCIUM CARB/MAGNESIUM 324 MG
81 TABLET ORAL ONCE
Qty: 0 | Refills: 0 | Status: COMPLETED | OUTPATIENT
Start: 2018-08-07 | End: 2018-08-07

## 2018-08-07 RX ORDER — MIDODRINE HYDROCHLORIDE 2.5 MG/1
10 TABLET ORAL THREE TIMES A DAY
Qty: 0 | Refills: 0 | Status: DISCONTINUED | OUTPATIENT
Start: 2018-08-07 | End: 2018-08-09

## 2018-08-07 RX ORDER — ALBUTEROL 90 UG/1
2.5 AEROSOL, METERED ORAL ONCE
Qty: 0 | Refills: 0 | Status: COMPLETED | OUTPATIENT
Start: 2018-08-07 | End: 2018-08-07

## 2018-08-07 RX ORDER — PANTOPRAZOLE SODIUM 20 MG/1
40 TABLET, DELAYED RELEASE ORAL
Qty: 0 | Refills: 0 | Status: DISCONTINUED | OUTPATIENT
Start: 2018-08-07 | End: 2018-08-11

## 2018-08-07 RX ORDER — HYDROMORPHONE HYDROCHLORIDE 2 MG/ML
0.5 INJECTION INTRAMUSCULAR; INTRAVENOUS; SUBCUTANEOUS
Qty: 0 | Refills: 0 | Status: DISCONTINUED | OUTPATIENT
Start: 2018-08-07 | End: 2018-08-11

## 2018-08-07 RX ORDER — PHENYLEPHRINE HYDROCHLORIDE 10 MG/ML
0.5 INJECTION INTRAVENOUS
Qty: 40 | Refills: 0 | Status: DISCONTINUED | OUTPATIENT
Start: 2018-08-07 | End: 2018-08-08

## 2018-08-07 RX ORDER — SODIUM BICARBONATE 1 MEQ/ML
50 SYRINGE (ML) INTRAVENOUS ONCE
Qty: 0 | Refills: 0 | Status: COMPLETED | OUTPATIENT
Start: 2018-08-07 | End: 2018-08-07

## 2018-08-07 RX ORDER — SODIUM POLYSTYRENE SULFONATE 4.1 MEQ/G
30 POWDER, FOR SUSPENSION ORAL ONCE
Qty: 0 | Refills: 0 | Status: DISCONTINUED | OUTPATIENT
Start: 2018-08-07 | End: 2018-08-07

## 2018-08-07 RX ORDER — CALCIUM GLUCONATE 100 MG/ML
1 VIAL (ML) INTRAVENOUS ONCE
Qty: 0 | Refills: 0 | Status: COMPLETED | OUTPATIENT
Start: 2018-08-07 | End: 2018-08-07

## 2018-08-07 RX ORDER — GLUCAGON INJECTION, SOLUTION 0.5 MG/.1ML
1 INJECTION, SOLUTION SUBCUTANEOUS ONCE
Qty: 0 | Refills: 0 | Status: DISCONTINUED | OUTPATIENT
Start: 2018-08-07 | End: 2018-08-11

## 2018-08-07 RX ORDER — ASPIRIN/CALCIUM CARB/MAGNESIUM 324 MG
81 TABLET ORAL ONCE
Qty: 0 | Refills: 0 | Status: DISCONTINUED | OUTPATIENT
Start: 2018-08-07 | End: 2018-08-07

## 2018-08-07 RX ORDER — SODIUM CHLORIDE 9 MG/ML
500 INJECTION, SOLUTION INTRAVENOUS ONCE
Qty: 0 | Refills: 0 | Status: COMPLETED | OUTPATIENT
Start: 2018-08-07 | End: 2018-08-07

## 2018-08-07 RX ORDER — DEXTROSE 50 % IN WATER 50 %
15 SYRINGE (ML) INTRAVENOUS ONCE
Qty: 0 | Refills: 0 | Status: DISCONTINUED | OUTPATIENT
Start: 2018-08-07 | End: 2018-08-11

## 2018-08-07 RX ADMIN — MIDODRINE HYDROCHLORIDE 10 MILLIGRAM(S): 2.5 TABLET ORAL at 21:37

## 2018-08-07 RX ADMIN — ALBUTEROL 2.5 MILLIGRAM(S): 90 AEROSOL, METERED ORAL at 21:25

## 2018-08-07 RX ADMIN — PHENYLEPHRINE HYDROCHLORIDE 18.75 MICROGRAM(S)/KG/MIN: 10 INJECTION INTRAVENOUS at 14:49

## 2018-08-07 RX ADMIN — MIDODRINE HYDROCHLORIDE 10 MILLIGRAM(S): 2.5 TABLET ORAL at 15:18

## 2018-08-07 RX ADMIN — PHENYLEPHRINE HYDROCHLORIDE 18.75 MICROGRAM(S)/KG/MIN: 10 INJECTION INTRAVENOUS at 15:19

## 2018-08-07 RX ADMIN — Medication 50 MILLILITER(S): at 21:34

## 2018-08-07 RX ADMIN — HYDROMORPHONE HYDROCHLORIDE 0.5 MILLIGRAM(S): 2 INJECTION INTRAMUSCULAR; INTRAVENOUS; SUBCUTANEOUS at 14:50

## 2018-08-07 RX ADMIN — CLOPIDOGREL BISULFATE 75 MILLIGRAM(S): 75 TABLET, FILM COATED ORAL at 08:10

## 2018-08-07 RX ADMIN — Medication 50 MILLIEQUIVALENT(S): at 21:45

## 2018-08-07 RX ADMIN — HYDROMORPHONE HYDROCHLORIDE 0.5 MILLIGRAM(S): 2 INJECTION INTRAMUSCULAR; INTRAVENOUS; SUBCUTANEOUS at 14:21

## 2018-08-07 RX ADMIN — INSULIN HUMAN 10 UNIT(S): 100 INJECTION, SOLUTION SUBCUTANEOUS at 21:35

## 2018-08-07 RX ADMIN — SODIUM CHLORIDE 1000 MILLILITER(S): 9 INJECTION, SOLUTION INTRAVENOUS at 18:40

## 2018-08-07 RX ADMIN — Medication 200 GRAM(S): at 21:36

## 2018-08-07 RX ADMIN — Medication 81 MILLIGRAM(S): at 08:11

## 2018-08-07 NOTE — H&P ADULT - GASTROINTESTINAL COMMENTS
no rebound, no rigidity, ecchymosis extending over the R groin area, Soft, No consolidated hematoma.

## 2018-08-07 NOTE — H&P ADULT - PROBLEM SELECTOR PLAN 2
At present he clinically does not require HD. There is no evidence of volume overload following transfusion.  Last treatment was yesterday. Will check electrolytes no wand follow clinically. Notified Dr Garay, who will follow from Renal.

## 2018-08-07 NOTE — H&P ADULT - PROBLEM SELECTOR PLAN 4
S/P Watchman, NO anticoagulation required. Will hold on rate controlling agents/Beta blockers until off pressors.

## 2018-08-07 NOTE — CONSULT NOTE ADULT - ATTENDING COMMENTS
Pt seen and examined in MICU;   Gets HD TTS at [place] unit under Dr. Fernandez  Last HD Saturday per patient  Given bleed; low dose pressor; will hold off on HD for now; awaiting labs  Plan for HD in AM  d/w Dr Thompson

## 2018-08-07 NOTE — H&P ADULT - ATTENDING COMMENTS
73 yo male with ESRD on HD, DM, HTN, PVD, was undergoing endovascular revascularization of external iliac artery when he developed an arterial perforation.  Bleeding was controlled with endovascular balloon occlusion without requiring conversion to open procedure.  Patient now being admitted to MICU for post op monitoring.

## 2018-08-07 NOTE — PROGRESS NOTE ADULT - SUBJECTIVE AND OBJECTIVE BOX
Pt seen at bedside due to H/H resulting 8.8/26.3.  BP is on the soft side 90's/60s  Pt examined again at this time by me. I have examined him every few hours since the procedure  Dopplerable pulses 2+ Right and left DP  Both groins are nonswollen; no bleeding. Left sheaths in place, right sheath has been removed.  Pt lying flat  HD begun for hyperkalemia; They will aim to keep net negative    -I Discussed the above with Dr. Samaniego, who agrees with continuing to monitor conservatively overnight; Serial H/Hs, and transfusing PRBCs. Will continue to follow closely

## 2018-08-07 NOTE — PROGRESS NOTE ADULT - SUBJECTIVE AND OBJECTIVE BOX
Post cath note:     Angioplasty of the right external iliac artery attempted through antegrade through 6FR destination sheath using conventional wiring techniques (0.014, 0.035 ) as well as Ocelot (OCT guided atherectomy) catheter but failed  Retrograde attempt through right femoral approach attempted using conventional wiring ( 0.014 and 0.035 ) and attempt of using OCELOT as well which was complicated by right external iliac perforation with retroperitoneal bleeding   Balloon tamponade of antegrade flow by inflating a 10 x 20 mm balloon was performed in the right common iliac artery for 30 minutes  Balloon tamponade of collateral flow to distal right external iliac artery was done using an 8 x 40 mm balloon for 30 minutes  Heparin anticoagulation was reversed using protamine   Blood transfusion 2 Units was given since H/H on sat machine was 8.5 with ongoing bleeding   Hemodynamic support was achieved using fluid and pressures phenylephrine 2 mcg  Vascular surgery was consulted for further endovascular options or surgical intervention if continuing bleeding  Antegrade and retrograde contrast injection after 30-40 min of balloon tamponade proximally and distally showed no extravasation   Blood pressure was stable with same pressor dose   Decision was to leave 6F left destination sheath for need for balloon tamponade if needed emergently  Patient was transported to MICU   Discussed with Dr. Andrade if rebleeding evidence , decision was to take patient to OR for exploration

## 2018-08-07 NOTE — PROGRESS NOTE ADULT - SUBJECTIVE AND OBJECTIVE BOX
This is a 71 yo male with HTN HLD CAD On dialysis for right external illiac occlusive disease. Antegrade and retro grade approach attempted with use of Ocelot crossing device.  Perforation of the the illiac segment at proximal location noted on angiogram.      complaints of abdominal pain , hypotension noted.   no respiratory difficulty.  no chest pain or ekg changes noted.      Vascular surgery at bedside with attempters to emily extravagation of bleed.     Report To Israel Laureano in Icu     /52  70  o2 sat 98%        plan   infuse 2 units of the blood   on Denis at 2mcg   admit to ICU   possible revascularization with surgical intervention by Gloria.

## 2018-08-07 NOTE — PROVIDER CONTACT NOTE (EICU) - RECOMMENDATIONS
ALbuterol  neb x1 stat  D50 x1  10 units IVP insulin x1  [id number] gmPO x1  CAlcium gluconate 1 gm IV x 1 ALbuterol  neb x1 stat  D50 x1  10 units IVP insulin x1  [id number] gmPO x1  Calcium gluconate 1 gm IV x 1  NaHCO3 1 amp

## 2018-08-07 NOTE — CONSULT NOTE ADULT - SUBJECTIVE AND OBJECTIVE BOX
[facility] DIVISION OF KIDNEY DISEASES AND HYPERTENSION -- INITIAL CONSULT NOTE  --------------------------------------------------------------------------------  HPI:  Patient is a 73 yo male with HTN HLD CAD On dialysis for right external illiac occlusive disease. Antegrade and retro grade approach attempted with use of Ocelot crossing device.  Perforation of the the illiac segment at proximal location noted on angiogram. Complaints of abdominal pain, hypotension noted. No respiratory difficulty, chest pain or ekg changes noted.  Vascular surgery attempted to emily extravagation of bleed at bedside. Nephrology consult called for ESRD and HD management. Pt last HD was yesterday, 8/6/18 and is currently under the care of Dr. Epstein. Will require inpatient HD treatment while here.        PAST HISTORY  --------------------------------------------------------------------------------  PAST MEDICAL & SURGICAL HISTORY:  Persistent atrial fibrillation  Carotid artery disease: mild  GERD (gastroesophageal reflux disease)  Low glucose level: Patient has h/o low blood sugars at times  Palpitations  Leg pain, bilateral: R&gt;L at rest and with short distant ambulation  Imbalance: secondary to PVD Uses cane  Obesity (BMI 30.0-34.9)  Claudication in peripheral vascular disease: R&gt;L  Right common iliac or ostial external iliac per June 2018 U/S  Left popliteal 50-75 and EDUIN stenosis  SUSANA 0.8  Kidney problem: spontanious renal bleed while on coumadin  c/b  renal  hematome  with  LT  nephrectomy  Hypotension: treated  with Midodrine  Tiredness  Renal hematoma, left  Hypercholesteremia  ESRD (end stage renal disease): on  hemodialysis  3  x  weekly.,  H/O  left  nephrectomy after  renal  bleed  Dyslipidemia  Dizziness  DM (diabetes mellitus)  Daytime sleepiness  Renal transplant recipient  AV fistula: right lower arm  Abnormality of left atrial appendage: WATCHMAN  LEFT ATRIAL APPENDAGE CLOSUIRE   Jan. 30 2017  History of unilateral nephrectomy: left  nephrectomy    FAMILY HISTORY:  No pertinent family history in first degree relatives    PAST SOCIAL HISTORY:    ALLERGIES & MEDICATIONS  --------------------------------------------------------------------------------  Allergies    No Known Allergies    Intolerances      Standing Inpatient Medications  clopidogrel Tablet 75 milliGRAM(s) Oral daily  dextrose 5%. 1000 milliLiter(s) IV Continuous <Continuous>  dextrose 50% Injectable 12.5 Gram(s) IV Push once  dextrose 50% Injectable 25 Gram(s) IV Push once  dextrose 50% Injectable 25 Gram(s) IV Push once  insulin regular  human corrective regimen sliding scale   SubCutaneous every 6 hours  midodrine 10 milliGRAM(s) Oral three times a day  pantoprazole    Tablet 40 milliGRAM(s) Oral before breakfast  phenylephrine    Infusion 0.5 MICROgram(s)/kG/Min IV Continuous <Continuous>    PRN Inpatient Medications  dextrose 40% Gel 15 Gram(s) Oral once PRN  glucagon  Injectable 1 milliGRAM(s) IntraMuscular once PRN  HYDROmorphone  Injectable 0.5 milliGRAM(s) IV Push every 2 hours PRN      REVIEW OF SYSTEMS  --------------------------------------------------------------------------------  Gen: No weight changes, fatigue, fevers/chills, weakness  Skin: No rashes  Head/Eyes/Ears/Mouth: No headache; Normal hearing; Normal vision w/o blurriness; No sinus pain/discomfort, sore throat  Respiratory: No dyspnea, cough, wheezing, hemoptysis  CV: No chest pain, PND, orthopnea  GI: No abdominal pain, diarrhea, constipation, nausea, vomiting, melena, hematochezia  : No increased frequency, dysuria, hematuria, nocturia  MSK: No joint pain/swelling; no back pain; no edema  Neuro: No dizziness/lightheadedness, weakness, seizures, numbness, tingling  Heme: No easy bruising or bleeding  Endo: No heat/cold intolerance  Psych: No significant nervousness, anxiety, stress, depression    All other systems were reviewed and are negative, except as noted.    VITALS/PHYSICAL EXAM  --------------------------------------------------------------------------------  T(C): 36.5 (08-07-18 @ 13:45), Max: 36.5 (08-07-18 @ 13:45)  HR: 78 (08-07-18 @ 14:30) (77 - 88)  BP: 100/63 (08-07-18 @ 13:55) (100/63 - 100/63)  RR: 19 (08-07-18 @ 14:30) (9 - 20)  SpO2: 100% (08-07-18 @ 14:30) (91% - 100%)  Wt(kg): --        Physical Exam:  	Gen: NAD, obese  	HEENT: PERRL, supple neck, clear oropharynx  	Pulm: CTA B/L  	CV: RRR, S1S2; no rub  	Back: No spinal or CVA tenderness; no sacral edema  	Abd: +BS, soft, nondistended, + RLQ and Right flank tenderness  	: No suprapubic tenderness  	UE: Warm, FROM, no clubbing, intact strength; no edema; no asterixis  	LE: Warm, FROM, no clubbing, intact strength; no edema, ecchymosis to R groin, + Pedal pulses doppler  	Neuro: Loss of sensation reported  	Psych: Normal affect and mood  	Skin: Cool, chronic changes  	Vascular access: RLE AVF +    LABS/STUDIES  --------------------------------------------------------------------------------              9.5    6.1   >-----------<  221      [08-07-18 @ 12:23]              30.2                 Creatinine Trend:  SCr 5.41 [08-03 @ 09:47]  SCr 8.31 [07-10 @ 07:55]

## 2018-08-07 NOTE — PROVIDER CONTACT NOTE (EICU) - SITUATION
Pt with ESRD on HD, hyperkalemia K > 6.4 . Next HD planned for AM. Plan to medically manage over next few hours until HD can be done

## 2018-08-07 NOTE — H&P ADULT - PROBLEM SELECTOR PLAN 1
Related to acute blood loss anemia, following Right femoral Angioplasty.   Wean Neosynephrine as tolerated for MAP > 65  Serial H/H to determine if active bleeding persists.  Serial Vascular checks  Hold anticoagulants for now  Pain control

## 2018-08-07 NOTE — PATIENT PROFILE ADULT. - TEACHING/LEARNING LEARNING PREFERENCES
computer/internet/verbal instruction/video/pictorial/audio/individual instruction/skill demonstration

## 2018-08-07 NOTE — PROGRESS NOTE ADULT - SUBJECTIVE AND OBJECTIVE BOX
6 fr sheath pulled   no events   no hematoma no bleeding     plan for am sheath pull first check with Dr Menon prior to   pulling long sheath.

## 2018-08-07 NOTE — CONSULT NOTE ADULT - ASSESSMENT
1. ESRD on HD  2. HTN  3. DM  4. CAD    S/P 2 units PRBC's  Denis at 2mcg   Admit to MICU   Possible revascularization with surgical intervention by Gloria  HD consent obtained  Plan HD in am 8/8/18  Will follow 1. ESRD on HD  2. HTN  3. DM  4. CAD    S/P 2 units PRBC's  Denis at 2mcg   Admit to MICU   Possible revascularization with surgical intervention by Gloria ARANA consent needed  Plan HD in am 8/8/18  Will follow

## 2018-08-07 NOTE — H&P ADULT - HISTORY OF PRESENT ILLNESS
72M with ESRD on HD, DM severe PVD and HTN. 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 During the procedure , the patient developed a perforation of the external illiac. He bleed into the groin and retroperitoneal spaces. During which time, he dropped his BP an required Vasopressors. He was urgently transfused 2 PRBC. Hemostasis wa achieved with balloon tamponade. Vascular surgery was consulted, with the determination that not further intervention was required at the time and the original procedure was aborted. He was brought to the ICU for further monitoring and treatment.      Upon arrival he was A+O x 3, HIs Hr was in the 70s and BP 130s on Neosynephrine. The Left arterial (long) sheath remained in, as did the Right arterial (short) sheath. He complained of mild Lower Abd and Right flank pain.

## 2018-08-07 NOTE — H&P ADULT - ASSESSMENT
72M with ESRD on HD, DM severe PVD and HTN. 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 During the procedure , the patient developed a perforation of the external illiac.     Now in ICU on low dose Neosynephrine, with BP improving. NO signs of continued Bleeding at the moment

## 2018-08-07 NOTE — CHART NOTE - NSCHARTNOTEFT_GEN_A_CORE
Vascular surgery called emergently to Cath Lab 1 for emergent evaluation during peripheral angio    Pt presented for elective RLE angio/possible intervention  During procedure pt noted with extravasation from prox ext iliac and RP bleed. Procedure aborted after pt became unstable     Dr Toribio in attendance and case reviewed with Dr Cox  Bleeding controlled after 30 mins of prox ext iliac balloon inflation by Dr Cox  Hemodynamically stable following    Was to be transferred to MICU  No vascular surgical intervention was performed. Pt was left under the care of Dr Cox

## 2018-08-08 DIAGNOSIS — I95.9 HYPOTENSION, UNSPECIFIED: ICD-10-CM

## 2018-08-08 DIAGNOSIS — D62 ACUTE POSTHEMORRHAGIC ANEMIA: ICD-10-CM

## 2018-08-08 DIAGNOSIS — E87.5 HYPERKALEMIA: ICD-10-CM

## 2018-08-08 DIAGNOSIS — R57.8 OTHER SHOCK: ICD-10-CM

## 2018-08-08 LAB
ALBUMIN SERPL ELPH-MCNC: 3.9 G/DL — SIGNIFICANT CHANGE UP (ref 3.3–5.2)
ALP SERPL-CCNC: 105 U/L — SIGNIFICANT CHANGE UP (ref 40–120)
ALT FLD-CCNC: 11 U/L — SIGNIFICANT CHANGE UP
ANION GAP SERPL CALC-SCNC: 18 MMOL/L — HIGH (ref 5–17)
ANION GAP SERPL CALC-SCNC: 19 MMOL/L — HIGH (ref 5–17)
APTT BLD: 34.7 SEC — SIGNIFICANT CHANGE UP (ref 27.5–37.4)
AST SERPL-CCNC: 20 U/L — SIGNIFICANT CHANGE UP
BILIRUB SERPL-MCNC: 0.5 MG/DL — SIGNIFICANT CHANGE UP (ref 0.4–2)
BUN SERPL-MCNC: 32 MG/DL — HIGH (ref 8–20)
BUN SERPL-MCNC: 51 MG/DL — HIGH (ref 8–20)
CALCIUM SERPL-MCNC: 8.5 MG/DL — LOW (ref 8.6–10.2)
CALCIUM SERPL-MCNC: 8.6 MG/DL — SIGNIFICANT CHANGE UP (ref 8.6–10.2)
CHLORIDE SERPL-SCNC: 100 MMOL/L — SIGNIFICANT CHANGE UP (ref 98–107)
CHLORIDE SERPL-SCNC: 101 MMOL/L — SIGNIFICANT CHANGE UP (ref 98–107)
CO2 SERPL-SCNC: 23 MMOL/L — SIGNIFICANT CHANGE UP (ref 22–29)
CO2 SERPL-SCNC: 26 MMOL/L — SIGNIFICANT CHANGE UP (ref 22–29)
CREAT SERPL-MCNC: 4.42 MG/DL — HIGH (ref 0.5–1.3)
CREAT SERPL-MCNC: 7.26 MG/DL — HIGH (ref 0.5–1.3)
GLUCOSE BLDC GLUCOMTR-MCNC: 114 MG/DL — HIGH (ref 70–99)
GLUCOSE BLDC GLUCOMTR-MCNC: 124 MG/DL — HIGH (ref 70–99)
GLUCOSE BLDC GLUCOMTR-MCNC: 65 MG/DL — LOW (ref 70–99)
GLUCOSE BLDC GLUCOMTR-MCNC: 69 MG/DL — LOW (ref 70–99)
GLUCOSE BLDC GLUCOMTR-MCNC: 76 MG/DL — SIGNIFICANT CHANGE UP (ref 70–99)
GLUCOSE BLDC GLUCOMTR-MCNC: 77 MG/DL — SIGNIFICANT CHANGE UP (ref 70–99)
GLUCOSE BLDC GLUCOMTR-MCNC: 85 MG/DL — SIGNIFICANT CHANGE UP (ref 70–99)
GLUCOSE SERPL-MCNC: 78 MG/DL — SIGNIFICANT CHANGE UP (ref 70–115)
GLUCOSE SERPL-MCNC: 79 MG/DL — SIGNIFICANT CHANGE UP (ref 70–115)
HCT VFR BLD CALC: 27 % — LOW (ref 42–52)
HCT VFR BLD CALC: 27.8 % — LOW (ref 42–52)
HCT VFR BLD CALC: 30.9 % — LOW (ref 42–52)
HGB BLD-MCNC: 10.2 G/DL — LOW (ref 14–18)
HGB BLD-MCNC: 8.9 G/DL — LOW (ref 14–18)
HGB BLD-MCNC: 9.2 G/DL — LOW (ref 14–18)
INR BLD: 1.2 RATIO — HIGH (ref 0.88–1.16)
MAGNESIUM SERPL-MCNC: 1.8 MG/DL — SIGNIFICANT CHANGE UP (ref 1.8–2.6)
MAGNESIUM SERPL-MCNC: 1.9 MG/DL — SIGNIFICANT CHANGE UP (ref 1.6–2.6)
MCHC RBC-ENTMCNC: 32.1 PG — HIGH (ref 27–31)
MCHC RBC-ENTMCNC: 33 G/DL — SIGNIFICANT CHANGE UP (ref 32–36)
MCHC RBC-ENTMCNC: 33 G/DL — SIGNIFICANT CHANGE UP (ref 32–36)
MCHC RBC-ENTMCNC: 33.1 G/DL — SIGNIFICANT CHANGE UP (ref 32–36)
MCV RBC AUTO: 96.9 FL — HIGH (ref 80–94)
MCV RBC AUTO: 97.2 FL — HIGH (ref 80–94)
MCV RBC AUTO: 97.5 FL — HIGH (ref 80–94)
MRSA PCR RESULT.: SIGNIFICANT CHANGE UP
PHOSPHATE SERPL-MCNC: 2.7 MG/DL — SIGNIFICANT CHANGE UP (ref 2.4–4.7)
PHOSPHATE SERPL-MCNC: 5.4 MG/DL — HIGH (ref 2.4–4.7)
PLATELET # BLD AUTO: 125 K/UL — LOW (ref 150–400)
PLATELET # BLD AUTO: 130 K/UL — LOW (ref 150–400)
PLATELET # BLD AUTO: 169 K/UL — SIGNIFICANT CHANGE UP (ref 150–400)
POTASSIUM SERPL-MCNC: 3.6 MMOL/L — SIGNIFICANT CHANGE UP (ref 3.5–5.3)
POTASSIUM SERPL-MCNC: 5.4 MMOL/L — HIGH (ref 3.5–5.3)
POTASSIUM SERPL-SCNC: 3.6 MMOL/L — SIGNIFICANT CHANGE UP (ref 3.5–5.3)
POTASSIUM SERPL-SCNC: 5.4 MMOL/L — HIGH (ref 3.5–5.3)
PROT SERPL-MCNC: 7 G/DL — SIGNIFICANT CHANGE UP (ref 6.6–8.7)
PROTHROM AB SERPL-ACNC: 13.2 SEC — HIGH (ref 9.8–12.7)
RBC # BLD: 2.77 M/UL — LOW (ref 4.6–6.2)
RBC # BLD: 2.87 M/UL — LOW (ref 4.6–6.2)
RBC # BLD: 3.18 M/UL — LOW (ref 4.6–6.2)
RBC # FLD: 21.5 % — HIGH (ref 11–15.6)
RBC # FLD: 21.5 % — HIGH (ref 11–15.6)
RBC # FLD: 21.9 % — HIGH (ref 11–15.6)
S AUREUS DNA NOSE QL NAA+PROBE: SIGNIFICANT CHANGE UP
SODIUM SERPL-SCNC: 143 MMOL/L — SIGNIFICANT CHANGE UP (ref 135–145)
SODIUM SERPL-SCNC: 144 MMOL/L — SIGNIFICANT CHANGE UP (ref 135–145)
WBC # BLD: 4.1 K/UL — LOW (ref 4.8–10.8)
WBC # BLD: 4.8 K/UL — SIGNIFICANT CHANGE UP (ref 4.8–10.8)
WBC # BLD: 6.1 K/UL — SIGNIFICANT CHANGE UP (ref 4.8–10.8)
WBC # FLD AUTO: 4.1 K/UL — LOW (ref 4.8–10.8)
WBC # FLD AUTO: 4.8 K/UL — SIGNIFICANT CHANGE UP (ref 4.8–10.8)
WBC # FLD AUTO: 6.1 K/UL — SIGNIFICANT CHANGE UP (ref 4.8–10.8)

## 2018-08-08 PROCEDURE — 90937 HEMODIALYSIS REPEATED EVAL: CPT

## 2018-08-08 PROCEDURE — 99232 SBSQ HOSP IP/OBS MODERATE 35: CPT

## 2018-08-08 PROCEDURE — 99233 SBSQ HOSP IP/OBS HIGH 50: CPT

## 2018-08-08 RX ORDER — MIDODRINE HYDROCHLORIDE 2.5 MG/1
10 TABLET ORAL ONCE
Qty: 0 | Refills: 0 | Status: COMPLETED | OUTPATIENT
Start: 2018-08-08 | End: 2018-08-08

## 2018-08-08 RX ORDER — INSULIN LISPRO 100/ML
VIAL (ML) SUBCUTANEOUS
Qty: 0 | Refills: 0 | Status: DISCONTINUED | OUTPATIENT
Start: 2018-08-08 | End: 2018-08-11

## 2018-08-08 RX ORDER — ERYTHROPOIETIN 10000 [IU]/ML
10000 INJECTION, SOLUTION INTRAVENOUS; SUBCUTANEOUS
Qty: 0 | Refills: 0 | Status: DISCONTINUED | OUTPATIENT
Start: 2018-08-08 | End: 2018-08-11

## 2018-08-08 RX ORDER — ONDANSETRON 8 MG/1
4 TABLET, FILM COATED ORAL ONCE
Qty: 0 | Refills: 0 | Status: COMPLETED | OUTPATIENT
Start: 2018-08-08 | End: 2018-08-08

## 2018-08-08 RX ORDER — LOPERAMIDE HCL 2 MG
2 TABLET ORAL ONCE
Qty: 0 | Refills: 0 | Status: COMPLETED | OUTPATIENT
Start: 2018-08-08 | End: 2018-08-08

## 2018-08-08 RX ORDER — SEVELAMER CARBONATE 2400 MG/1
800 POWDER, FOR SUSPENSION ORAL THREE TIMES A DAY
Qty: 0 | Refills: 0 | Status: DISCONTINUED | OUTPATIENT
Start: 2018-08-08 | End: 2018-08-11

## 2018-08-08 RX ADMIN — PHENYLEPHRINE HYDROCHLORIDE 18.75 MICROGRAM(S)/KG/MIN: 10 INJECTION INTRAVENOUS at 02:23

## 2018-08-08 RX ADMIN — MIDODRINE HYDROCHLORIDE 10 MILLIGRAM(S): 2.5 TABLET ORAL at 20:43

## 2018-08-08 RX ADMIN — MIDODRINE HYDROCHLORIDE 10 MILLIGRAM(S): 2.5 TABLET ORAL at 06:04

## 2018-08-08 RX ADMIN — CLOPIDOGREL BISULFATE 75 MILLIGRAM(S): 75 TABLET, FILM COATED ORAL at 11:49

## 2018-08-08 RX ADMIN — PANTOPRAZOLE SODIUM 40 MILLIGRAM(S): 20 TABLET, DELAYED RELEASE ORAL at 06:04

## 2018-08-08 RX ADMIN — MIDODRINE HYDROCHLORIDE 10 MILLIGRAM(S): 2.5 TABLET ORAL at 13:28

## 2018-08-08 RX ADMIN — Medication 2 MILLIGRAM(S): at 11:49

## 2018-08-08 RX ADMIN — MIDODRINE HYDROCHLORIDE 10 MILLIGRAM(S): 2.5 TABLET ORAL at 22:28

## 2018-08-08 RX ADMIN — SEVELAMER CARBONATE 800 MILLIGRAM(S): 2400 POWDER, FOR SUSPENSION ORAL at 22:29

## 2018-08-08 RX ADMIN — SEVELAMER CARBONATE 800 MILLIGRAM(S): 2400 POWDER, FOR SUSPENSION ORAL at 13:28

## 2018-08-08 RX ADMIN — ONDANSETRON 4 MILLIGRAM(S): 8 TABLET, FILM COATED ORAL at 15:20

## 2018-08-08 NOTE — PROGRESS NOTE ADULT - SUBJECTIVE AND OBJECTIVE BOX
72y  Male  No Known Allergies    CC; Patient is a 72y old  Male who presented  with Procedural bleeding     HPI:   72M with ESRD on HD, DM severe PVD and HTN. Pt with chronic Right external iliac artery occlusive disease, admitted for percutaneous antegrade and retro grade angioplasty, with use of Ocelot crossing device. During the procedure  the patient developed a perforation of the external iliac He bleed into the groin and retroperitoneal spaces. During which time, he dropped his BP an required Vasopressors. He was urgently transfused 2 PRBC. Hemostasis was achieved with balloon tamponade. Vascular surgery was consulted, with the determination that not further intervention was required at that time and the original procedure was aborted. He was brought to the ICU for further monitoring and treatment.     Tonight a follow up basic metabolic panel found potassium level of 6.8 which was treated medically with meds initially and it was arranged for emergent dialysis tonight to treat the hyperkalemia which is currently in progress. Repeat CBC found hemoglobin drop to 8.8 from 11.0 with no signs of bleeding, heart rate is normal and blood pressure ( although he has been on Neosynephrine ) has not changed. Considering the perforated iliac artery and acute blood loss anemia today he is currently being transfused 1 unit of PRBC during dialysis.   His blood pressure remains stable with low dose Neosynephrine ( systolic holding in high 90's) no tachycardia he is awake and alert x3 .  The Left arterial sheath remained in and the Right arterial sheath has been discontinued      PAST MEDICAL & SURGICAL HISTORY:  Persistent atrial fibrillation  Carotid artery disease: mild  GERD (gastroesophageal reflux disease)  Low glucose level: Patient has h/o low blood sugars at times  Palpitations  Leg pain, bilateral: R&gt;L at rest and with short distant ambulation  Imbalance: secondary to PVD Uses cane  Obesity (BMI 30.0-34.9)  Claudication in peripheral vascular disease: R&gt;L  Right common iliac or ostial external iliac per June 2018 U/S  Left popliteal 50-75 and EDUIN stenosis  SUSANA 0.8  Kidney problem: spontanious renal bleed while on coumadin  c/b  renal  hematome  with  LT  nephrectomy  Hypotension: treated  with Midodrine  Tiredness  Renal hematoma, left  Hypercholesteremia  ESRD (end stage renal disease): on  hemodialysis  3  x  weekly.,  H/O  left  nephrectomy after  renal  bleed  Dyslipidemia  Dizziness  DM (diabetes mellitus)  Daytime sleepiness  Renal transplant recipient  AV fistula: right lower arm  Abnormality of left atrial appendage: WATCHMAN  LEFT ATRIAL APPENDAGE CLOSUIRE   Jan. 30 2017  History of unilateral nephrectomy: left  nephrectomy    FAMILY HISTORY:  No pertinent family history in first degree relatives      Vitals   Vital Signs Last 24 Hrs  T(C): 36.3 (08 Aug 2018 00:02), Max: 36.5 (07 Aug 2018 13:45)  T(F): 97.4 (08 Aug 2018 00:02), Max: 97.7 (07 Aug 2018 13:45)  HR: 80 (07 Aug 2018 23:00) (72 - 105)  BP: 99/56 (07 Aug 2018 23:00) (81/51 - 116/59)  BP(mean): 72 (07 Aug 2018 23:00) (62 - 85)  RR: 18 (07 Aug 2018 23:00) (9 - 22)  SpO2: 97% (07 Aug 2018 23:00) (89% - 100%)    I&O's Summary    07 Aug 2018 07:01  -  08 Aug 2018 00:11  --------------------------------------------------------  IN: 538 mL / OUT: 0 mL / NET: 538 mL        LABS  08-07    143  |  103  |  77.0<H>  ----------------------------<  58<L>  5.7<H>   |  19.0<L>  |  9.64<H>    Ca    8.3<L>      07 Aug 2018 23:14  Phos  5.6     08-07    TPro  x   /  Alb  3.7  /  TBili  x   /  DBili  x   /  AST  x   /  ALT  x   /  AlkPhos  x   08-07                          8.8    6.5   )-----------( 173      ( 07 Aug 2018 23:14 )             26.3           LIVER FUNCTIONS - ( 07 Aug 2018 18:43 )  Alb: 3.7 g/dL / Pro: x     / ALK PHOS: x     / ALT: x     / AST: x     / GGT: x           CAPILLARY BLOOD GLUCOSE      POCT Blood Glucose.: 83 mg/dL (07 Aug 2018 07:46)        VENT SETTINGS       Meds  MEDICATIONS  (STANDING):  clopidogrel Tablet 75 milliGRAM(s) Oral daily  dextrose 5%. 1000 milliLiter(s) (50 mL/Hr) IV Continuous <Continuous>  dextrose 50% Injectable 12.5 Gram(s) IV Push once  dextrose 50% Injectable 25 Gram(s) IV Push once  dextrose 50% Injectable 25 Gram(s) IV Push once  insulin regular  human corrective regimen sliding scale   SubCutaneous every 6 hours  midodrine 10 milliGRAM(s) Oral three times a day  pantoprazole    Tablet 40 milliGRAM(s) Oral before breakfast  phenylephrine    Infusion 0.5 MICROgram(s)/kG/Min (18.75 mL/Hr) IV Continuous <Continuous>      REVIEW OF SYSTEMS:    CONSTITUTIONAL: No fever, weight loss, or fatigue  EYES: No eye pain, visual disturbances, or discharge  ENMT:  No difficulty hearing, tinnitus, vertigo; No sinus or throat pain  NECK: No pain or stiffness  BREASTS: No pain, masses, or nipple discharge  RESPIRATORY: No cough, wheezing, chills or hemoptysis; No shortness of breath  CARDIOVASCULAR: No chest pain, palpitations, dizziness, or leg swelling  GASTROINTESTINAL: No abdominal or epigastric pain. No nausea, vomiting, or hematemesis; No diarrhea or constipation. No melena or hematochezia.  GENITOURINARY: No dysuria, frequency, hematuria, or incontinence  NEUROLOGICAL: No headaches, memory loss, loss of strength, numbness, or tremors  SKIN: No itching, burning, rashes, or lesions   LYMPH NODES: No enlarged glands  ENDOCRINE: No heat or cold intolerance; No hair loss  MUSCULOSKELETAL: No joint pain or swelling; No muscle, back, or extremity pain  PSYCHIATRIC: No depression, anxiety, mood swings, or difficulty sleeping  HEME/LYMPH: No easy bruising, or bleeding gums  ALLERY AND IMMUNOLOGIC: No hives or eczema      Physicial Exam:     Constitutional: NAD, well-groomed, well-developed  HEENT: PERRLA, EOMI, no drainage or redness  Neck: No bruits; no thyromegaly or nodules,  No JVD  Back: Normal spine flexure, No CVA tenderness, No deformity or limitation of movement  Respiratory: Breath Sounds equal & clear to percussion & auscultation, no accessory muscle use  Cardiovascular: Regular rate & rhythm, normal S1, S2; no murmurs, gallops or rubs; no S3, S4  Gastrointestinal: Soft, non-tender, non distended no hepatosplenomegaly, normal bowel sounds  Extremities: No peripheral edema, No cyanosis, clubbing   Vascular: Equal and normal pulses: 2+ peripheral pulses throughout  Neurological: GCS:    A&O x 3; no sensory, motor  deficits, normal reflexes  Psychiatric: Normal mood, normal affect  Musculoskeletal: No joint pain, swelling or deformity; no limitation of movement  Skin: No rashes 72y  Male  No Known Allergies    CC; Patient is a 72y old  Male who presented  with Procedural bleeding     HPI:   72M with ESRD on HD, DM severe PVD and HTN. Pt with chronic Right external iliac artery occlusive disease, admitted for percutaneous antegrade and retro grade angioplasty, with use of Ocelot crossing device. During the procedure  the patient developed a perforation of the external iliac He bleed into the groin and retroperitoneal spaces. During which time, he dropped his BP an required Vasopressors. He was urgently transfused 2 PRBC. Hemostasis was achieved with balloon tamponade. Vascular surgery was consulted, with the determination that not further intervention was required at that time and the original procedure was aborted. He was brought to the ICU for further monitoring and treatment.     Tonight a follow up basic metabolic panel found potassium level of 6.8 which was treated medically with meds initially and it was arranged for emergent dialysis tonight to treat the hyperkalemia which is currently in progress. Repeat CBC found hemoglobin drop to 8.8 from 11.0 with no signs of bleeding, heart rate is normal and blood pressure ( although he has been on Neosynephrine ) has not changed. Considering the perforated iliac artery and acute blood loss anemia today he is currently being transfused 1 unit of PRBC during dialysis.   His blood pressure remains stable with low dose Neosynephrine ( systolic holding in high 90's) no tachycardia he is awake and alert x3 .  The Left arterial sheath remained in and the Right arterial sheath has been discontinued.   Dopplerable pulses 2+ Right and left DP, Vascular surgery was notified and is seeing the patient.        PAST MEDICAL & SURGICAL HISTORY:  Persistent atrial fibrillation  Carotid artery disease: mild  GERD (gastroesophageal reflux disease)  Low glucose level: Patient has h/o low blood sugars at times  Palpitations  Leg pain, bilateral: R&gt;L at rest and with short distant ambulation  Imbalance: secondary to PVD Uses cane  Obesity (BMI 30.0-34.9)  Claudication in peripheral vascular disease: R&gt;L  Right common iliac or ostial external iliac per June 2018 U/S  Left popliteal 50-75 and EDUIN stenosis  SUSANA 0.8  Kidney problem: spontanious renal bleed while on coumadin  c/b  renal  hematome  with  LT  nephrectomy  Hypotension: treated  with Midodrine  Tiredness  Renal hematoma, left  Hypercholesteremia  ESRD (end stage renal disease): on  hemodialysis  3  x  weekly.,  H/O  left  nephrectomy after  renal  bleed  Dyslipidemia  Dizziness  DM (diabetes mellitus)  Daytime sleepiness  Renal transplant recipient  AV fistula: right lower arm  Abnormality of left atrial appendage: WATCHMAN  LEFT ATRIAL APPENDAGE CLOSUIRE   Jan. 30 2017  History of unilateral nephrectomy: left  nephrectomy    FAMILY HISTORY:  No pertinent family history in first degree relatives    Vitals   Vital Signs Last 24 Hrs  T(C): 36.3 (08 Aug 2018 00:02), Max: 36.5 (07 Aug 2018 13:45)  T(F): 97.4 (08 Aug 2018 00:02), Max: 97.7 (07 Aug 2018 13:45)  HR: 80 (07 Aug 2018 23:00) (72 - 105)  BP: 99/56 (07 Aug 2018 23:00) (81/51 - 116/59)  BP(mean): 72 (07 Aug 2018 23:00) (62 - 85)  RR: 18 (07 Aug 2018 23:00) (9 - 22)  SpO2: 97% (07 Aug 2018 23:00) (89% - 100%)    I&O's Summary    07 Aug 2018 07:01  -  08 Aug 2018 00:11  --------------------------------------------------------  IN: 538 mL / OUT: 0 mL / NET: 538 mL    LABS  08-07    143  |  103  |  77.0<H>  ----------------------------<  58<L>  5.7<H>   |  19.0<L>  |  9.64<H>    Ca    8.3<L>      07 Aug 2018 23:14  Phos  5.6     08-07    TPro  x   /  Alb  3.7  /  TBili  x   /  DBili  x   /  AST  x   /  ALT  x   /  AlkPhos  x   08-07                        8.8    6.5   )-----------( 173      ( 07 Aug 2018 23:14 )             26.3     LIVER FUNCTIONS - ( 07 Aug 2018 18:43 )  Alb: 3.7 g/dL / Pro: x     / ALK PHOS: x     / ALT: x     / AST: x     / GGT: x           CAPILLARY BLOOD GLUCOSE  POCT Blood Glucose.: 83 mg/dL (07 Aug 2018 07:46)        Meds  MEDICATIONS  (STANDING):  clopidogrel Tablet 75 milliGRAM(s) Oral daily  dextrose 5%. 1000 milliLiter(s) (50 mL/Hr) IV Continuous <Continuous>  dextrose 50% Injectable 12.5 Gram(s) IV Push once  dextrose 50% Injectable 25 Gram(s) IV Push once  dextrose 50% Injectable 25 Gram(s) IV Push once  insulin regular  human corrective regimen sliding scale   SubCutaneous every 6 hours  midodrine 10 milliGRAM(s) Oral three times a day  pantoprazole    Tablet 40 milliGRAM(s) Oral before breakfast  phenylephrine    Infusion 0.5 MICROgram(s)/kG/Min (18.75 mL/Hr) IV Continuous <Continuous>      REVIEW OF SYSTEMS:    CONSTITUTIONAL: No fever, weight loss, or fatigue  EYES: No eye pain, visual disturbances, or discharge  ENMT:  No difficulty hearing, tinnitus, vertigo; No sinus or throat pain  NECK: No pain or stiffness  BREASTS: No pain, masses, or nipple discharge  RESPIRATORY: No cough, wheezing, chills or hemoptysis; No shortness of breath  CARDIOVASCULAR: No chest pain, palpitations, dizziness, or leg swelling  GASTROINTESTINAL: No abdominal or epigastric pain. No nausea, vomiting, or hematemesis; No diarrhea or constipation. No melena or hematochezia.  GENITOURINARY: No dysuria, frequency, hematuria, or incontinence  NEUROLOGICAL: No headaches, memory loss, loss of strength, numbness, or tremors  SKIN: No itching, burning, rashes, or lesions   LYMPH NODES: No enlarged glands  ENDOCRINE: No heat or cold intolerance; No hair loss  MUSCULOSKELETAL: No joint pain or swelling; No muscle, back, or extremity pain  PSYCHIATRIC: No depression, anxiety, mood swings, or difficulty sleeping  HEME/LYMPH: No easy bruising, or bleeding gums  ALLERY AND IMMUNOLOGIC: No hives or eczema      Physicial Exam:     Constitutional: NAD, well-groomed, well-developed  HEENT: PERRLA, EOMI, no drainage or redness  Neck: No bruits; no thyromegaly or nodules,  No JVD  Back: Normal spine flexure, No CVA tenderness, No deformity or limitation of movement  Respiratory: Breath Sounds equal & clear to percussion & auscultation, no accessory muscle use  Cardiovascular: Regular rate & rhythm, normal S1, S2; no murmurs, gallops or rubs; no S3, S4  Gastrointestinal: Soft, non-tender, non distended no hepatosplenomegaly, normal bowel sounds  Extremities: No peripheral edema, No cyanosis, clubbing   Vascular: Equal and normal pulses: 2+ peripheral pulses throughout  Neurological: GCS:    A&O x 3; no sensory, motor  deficits, normal reflexes  Psychiatric: Normal mood, normal affect  Musculoskeletal: No joint pain, swelling or deformity; no limitation of movement  Skin: No rashes

## 2018-08-08 NOTE — PROGRESS NOTE ADULT - SUBJECTIVE AND OBJECTIVE BOX
Nephrology Progress Note    Pt dialyzed 2 hours overnight for K+  Becoming increasingly short of breath  Will HD today full treatment 3.5hours  1-1.5kg UF  dw HD unit and MICU RN

## 2018-08-08 NOTE — PROGRESS NOTE ADULT - ASSESSMENT
71 yo male with hypertension, hyperlipidemia, CAD, ESRD on HD, right external iliac disease was admitted for percutaneous antegrade and retro grade angioplasty, with use of Ocelot crossing device. During the procedure , the patient developed a perforation of the external iliac artery and RP bleed. He became hemodynamically unstable and urgent vascular surgery evaluation was requested. Hemostasis achieved with balloon tamponade. He was given 2 PRBC transfusions and procedure was aborted, he was transferred to MICU and started on vasopressors for hypotension and shock. Nephrology consulted. His potassium was 6.8, he underwent HD on 8/7 for hyperkalemia, for 2 hours. On 8/8 he was weaned off pressors but developed shortness of breath and fluid overload and he was hence HD was performed. His BP remained stable and he was transferred to medical floor. He received total 3 PRBC transfusions.

## 2018-08-08 NOTE — PROGRESS NOTE ADULT - ASSESSMENT
1) ESRD on HD  2) MBD of renal dx  3) Anemia of renal dx  4) Vol/HTN    Will start epo 10k units TIW  Binders with meals; please check phosphorus tomorrow AM labs  HD in AM  SBP chronically 90s

## 2018-08-08 NOTE — DIETITIAN INITIAL EVALUATION ADULT. - MD RECOMMEND
Recommend change diet to consistent carbohydrate, renal (no protein restriction) with evening snack. Pt with increased protein needs 2/2 HD. Declined nutritional supplement at this time as he reports he is eating well. Encouraged continued good po at meals. Monitor weights, labs, and other nutrition-related changes.

## 2018-08-08 NOTE — DIETITIAN INITIAL EVALUATION ADULT. - OTHER INFO
BMI 37.2.  used to obtain nutrition hx. Pt reports good appetite/po intake PTA. States he follows a regular diet and does not count CHO. Checks blood sugars 2x/day, once in the morning and once at night. UBW has been relatively stable at 220 lbs, with some fluctuations pre/post dialysis. Pt declined diet education.

## 2018-08-08 NOTE — PROGRESS NOTE ADULT - SUBJECTIVE AND OBJECTIVE BOX
INTERVAL HPI/OVERNIGHT EVENTS:    ICU ACCEPTANCE NOTE:    CC: s/p hemorrhagic shock, anemia, ESRD on HD, hypertension, PAD, hyperlipidemia    Chart reviewed. Patient seen in ICU, currently receiving HD. Denies complaints.    Vital Signs Last 24 Hrs  T(C): 37.6 (08 Aug 2018 17:00), Max: 37.6 (08 Aug 2018 16:12)  T(F): 99.6 (08 Aug 2018 17:00), Max: 99.6 (08 Aug 2018 16:12)  HR: 104 (08 Aug 2018 17:00) (76 - 105)  BP: 108/62 (08 Aug 2018 17:00) (80/48 - 152/62)  BP(mean): 80 (08 Aug 2018 17:00) (61 - 95)  RR: 16 (08 Aug 2018 17:00) (10 - 27)  SpO2: 99% (08 Aug 2018 17:00) (89% - 100%)    PHYSICAL EXAM:    GENERAL: Not in distress, alert, obese  CHEST/LUNG: b/l air entry, decreased in bases  HEART: Regular  ABDOMEN: Soft, BS+, non tender  EXTREMITIES:  no edema, tenderness.     MEDICATIONS  (STANDING):  clopidogrel Tablet 75 milliGRAM(s) Oral daily  dextrose 5%. 1000 milliLiter(s) (50 mL/Hr) IV Continuous <Continuous>  dextrose 50% Injectable 12.5 Gram(s) IV Push once  dextrose 50% Injectable 25 Gram(s) IV Push once  dextrose 50% Injectable 25 Gram(s) IV Push once  epoetin kalpesh Injectable 64936 Unit(s) IV Push <User Schedule>  insulin lispro (HumaLOG) corrective regimen sliding scale   SubCutaneous three times a day before meals  midodrine 10 milliGRAM(s) Oral three times a day  pantoprazole    Tablet 40 milliGRAM(s) Oral before breakfast  sevelamer hydrochloride 800 milliGRAM(s) Oral three times a day    MEDICATIONS  (PRN):  dextrose 40% Gel 15 Gram(s) Oral once PRN Blood Glucose LESS THAN 70 milliGRAM(s)/deciLiter  glucagon  Injectable 1 milliGRAM(s) IntraMuscular once PRN Glucose <70 milliGRAM(s)/deciLiter  HYDROmorphone  Injectable 0.5 milliGRAM(s) IV Push every 2 hours PRN Moderate Pain (4 - 6)      Allergies    No Known Allergies    Intolerances          LABS:                          9.2    4.8   )-----------( 125      ( 08 Aug 2018 08:21 )             27.8     08-08    143  |  101  |  51.0<H>  ----------------------------<  78  5.4<H>   |  23.0  |  7.26<H>    Ca    8.5<L>      08 Aug 2018 08:21  Phos  5.4     08-08  Mg     1.9     08-08    TPro  7.0  /  Alb  3.9  /  TBili  0.5  /  DBili  x   /  AST  20  /  ALT  11  /  AlkPhos  105  08-08    PT/INR - ( 08 Aug 2018 02:36 )   PT: 13.2 sec;   INR: 1.20 ratio         PTT - ( 08 Aug 2018 02:36 )  PTT:34.7 sec      RADIOLOGY & ADDITIONAL TESTS:

## 2018-08-08 NOTE — PROGRESS NOTE ADULT - ASSESSMENT
72 year old male with chronic right external Iliac artery occlusive disease now with hyperkalemia undergoing emergent hemodialysis and acute blood loss anemia after developing perforation of the external iliac artery this afternoon during percutaneous antrograde. retrograde angioplasty with ocelot device.     Critical Care time: 40 mins assessing presenting problems of acute illness that poses high probability of life threatening deterioration or end organ damage/dysfunction.  Medical decision making inculding Initiating plan of care, reviewing data, reviewing radiology,direct patient bedside evaluation and interpretation of vital signs, any necessary ventilator management , discusion with multidisciplinary team, discussing goals of care with patient/family, all non inclusive of procedures

## 2018-08-08 NOTE — PROGRESS NOTE ADULT - SUBJECTIVE AND OBJECTIVE BOX
Cardiology NP note;    -LFA destination sheath and LFV #5Fr sheath discontinued--Manual compression held x 20 minutes with hemostasis obtained-4x4 and tegaderm dressing applied; no bruit; + left PT pulse via doppler  -Right groin procedural site benign without hematoma/bleeding; no bruit; + right PT pulse via doppler  -VSS  -Bedrest x 5 hours post sheath removal then OOB as tolerated  -Frequent NV check with VS as ordered post sheath removal  -Additional plan as per ICU staff Cardiology NP note;    -LFA destination sheath and LFV #5Fr sheath discontinued--Manual compression held x 20 minutes with hemostasis obtained-4x4 and tegaderm dressing applied; no bruit; + left PT pulse via doppler  -Right groin procedural site benign without hematoma/bleeding; no bruit; + right PT pulse via doppler  -VSS  -Bedrest x 6 hours post sheath removal then OOB as tolerated; may sit up in 4 hours  -Frequent NV check with VS as ordered post sheath removal  -Additional plan as per ICU staff

## 2018-08-08 NOTE — PROGRESS NOTE ADULT - SUBJECTIVE AND OBJECTIVE BOX
[facility] DIVISION OF KIDNEY DISEASES AND HYPERTENSION -- FOLLOW UP NOTE  --------------------------------------------------------------------------------  Chief Complaint: ESRD HD    24 hour events/subjective:  Pt seen and examined  HD was done overnight; reevaluated due to hyperkalemia;   Doing well in MICU; BP borderline; chronically hypotensive on midodrine;      PAST HISTORY  --------------------------------------------------------------------------------  No significant changes to PMH, PSH, FHx, SHx, unless otherwise noted    ALLERGIES & MEDICATIONS  --------------------------------------------------------------------------------  Allergies    No Known Allergies    Intolerances      Standing Inpatient Medications  clopidogrel Tablet 75 milliGRAM(s) Oral daily  dextrose 5%. 1000 milliLiter(s) IV Continuous <Continuous>  dextrose 50% Injectable 12.5 Gram(s) IV Push once  dextrose 50% Injectable 25 Gram(s) IV Push once  dextrose 50% Injectable 25 Gram(s) IV Push once  insulin lispro (HumaLOG) corrective regimen sliding scale   SubCutaneous three times a day before meals  midodrine 10 milliGRAM(s) Oral three times a day  pantoprazole    Tablet 40 milliGRAM(s) Oral before breakfast    PRN Inpatient Medications  dextrose 40% Gel 15 Gram(s) Oral once PRN  glucagon  Injectable 1 milliGRAM(s) IntraMuscular once PRN  HYDROmorphone  Injectable 0.5 milliGRAM(s) IV Push every 2 hours PRN      REVIEW OF SYSTEMS  --------------------------------------------------------------------------------  Gen: No weight changes, fatigue, fevers/chills, weakness  Skin: No rashes  Head/Eyes/Ears/Mouth: No headache; Normal hearing; Normal vision w/o blurriness; No sinus pain/discomfort, sore throat  Respiratory: No dyspnea, cough, wheezing, hemoptysis  CV: No chest pain, PND, orthopnea  GI: No abdominal pain, diarrhea, constipation, nausea, vomiting, melena, hematochezia  : No increased frequency, dysuria, hematuria, nocturia  MSK: No joint pain/swelling; no back pain; no edema  Neuro: No dizziness/lightheadedness, weakness, seizures, numbness, tingling  Heme: No easy bruising or bleeding  Endo: No heat/cold intolerance  Psych: No significant nervousness, anxiety, stress, depression    All other systems were reviewed and are negative, except as noted.    VITALS/PHYSICAL EXAM  --------------------------------------------------------------------------------  T(C): 37 (08-08-18 @ 11:35), Max: 37 (08-08-18 @ 11:35)  HR: 89 (08-08-18 @ 11:00) (72 - 105)  BP: 114/69 (08-08-18 @ 11:00) (80/48 - 152/62)  RR: 20 (08-08-18 @ 11:00) (9 - 27)  SpO2: 99% (08-08-18 @ 11:00) (89% - 100%)  Wt(kg): --  Height (cm): 165.1 (08-07-18 @ 13:45)  Weight (kg): 101.8 (08-07-18 @ 13:45)  BMI (kg/m2): 37.3 (08-07-18 @ 13:45)  BSA (m2): 2.08 (08-07-18 @ 13:45)      08-07-18 @ 07:01  -  08-08-18 @ 07:00  --------------------------------------------------------  IN: 1254 mL / OUT: 0 mL / NET: 1254 mL    08-08-18 @ 07:01  -  08-08-18 @ 12:01  --------------------------------------------------------  IN: 50 mL / OUT: 0 mL / NET: 50 mL      Physical Exam:  	Gen: NAD, well-appearing  	HEENT: PERRL, supple neck, clear oropharynx  	Pulm: CTA B/L  	CV: RRR, S1S2; no rub  	Back: No spinal or CVA tenderness; no sacral edema  	Abd: +BS, soft, nontender/nondistended  	: No suprapubic tenderness  	UE: Warm, FROM, no clubbing, intact strength; no edema; no asterixis  	LE: Warm, FROM, no clubbing, intact strength; no edema  	Neuro: No focal deficits, intact gait  	Psych: Normal affect and mood  	Skin: Warm, without rashes  	Vascular access: R AVF+    LABS/STUDIES  --------------------------------------------------------------------------------              9.2    4.8   >-----------<  125      [08-08-18 @ 08:21]              27.8     143  |  101  |  51.0  ----------------------------<  78      [08-08-18 @ 08:21]  5.4   |  23.0  |  7.26        Ca     8.5     [08-08-18 @ 08:21]      Mg     1.9     [08-08-18 @ 08:21]      Phos  5.4     [08-08-18 @ 08:21]    TPro  7.0  /  Alb  3.9  /  TBili  0.5  /  DBili  x   /  AST  20  /  ALT  11  /  AlkPhos  105  [08-08-18 @ 01:50]    PT/INR: PT 13.2 , INR 1.20       [08-08-18 @ 02:36]  PTT: 34.7       [08-08-18 @ 02:36]      Creatinine Trend:  SCr 7.26 [08-08 @ 08:21]  SCr 4.42 [08-08 @ 01:50]  SCr 9.64 [08-07 @ 23:14]  SCr 9.11 [08-07 @ 18:43]  SCr 5.41 [08-03 @ 09:47]        HbA1c 5.1      [08-07-18 @ 15:58]

## 2018-08-09 LAB
ANION GAP SERPL CALC-SCNC: 20 MMOL/L — HIGH (ref 5–17)
BUN SERPL-MCNC: 38 MG/DL — HIGH (ref 8–20)
CALCIUM SERPL-MCNC: 8.8 MG/DL — SIGNIFICANT CHANGE UP (ref 8.6–10.2)
CHLORIDE SERPL-SCNC: 101 MMOL/L — SIGNIFICANT CHANGE UP (ref 98–107)
CO2 SERPL-SCNC: 24 MMOL/L — SIGNIFICANT CHANGE UP (ref 22–29)
CREAT SERPL-MCNC: 5.92 MG/DL — HIGH (ref 0.5–1.3)
GLUCOSE BLDC GLUCOMTR-MCNC: 103 MG/DL — HIGH (ref 70–99)
GLUCOSE BLDC GLUCOMTR-MCNC: 107 MG/DL — HIGH (ref 70–99)
GLUCOSE BLDC GLUCOMTR-MCNC: 143 MG/DL — HIGH (ref 70–99)
GLUCOSE BLDC GLUCOMTR-MCNC: 89 MG/DL — SIGNIFICANT CHANGE UP (ref 70–99)
GLUCOSE SERPL-MCNC: 103 MG/DL — SIGNIFICANT CHANGE UP (ref 70–115)
HCT VFR BLD CALC: 28.8 % — LOW (ref 42–52)
HGB BLD-MCNC: 9.2 G/DL — LOW (ref 14–18)
MAGNESIUM SERPL-MCNC: 2 MG/DL — SIGNIFICANT CHANGE UP (ref 1.8–2.6)
MCHC RBC-ENTMCNC: 31.6 PG — HIGH (ref 27–31)
MCHC RBC-ENTMCNC: 31.9 G/DL — LOW (ref 32–36)
MCV RBC AUTO: 99 FL — HIGH (ref 80–94)
PHOSPHATE SERPL-MCNC: 4.6 MG/DL — SIGNIFICANT CHANGE UP (ref 2.4–4.7)
PLATELET # BLD AUTO: 154 K/UL — SIGNIFICANT CHANGE UP (ref 150–400)
POTASSIUM SERPL-MCNC: 4.5 MMOL/L — SIGNIFICANT CHANGE UP (ref 3.5–5.3)
POTASSIUM SERPL-SCNC: 4.5 MMOL/L — SIGNIFICANT CHANGE UP (ref 3.5–5.3)
RBC # BLD: 2.91 M/UL — LOW (ref 4.6–6.2)
RBC # FLD: 20.9 % — HIGH (ref 11–15.6)
SODIUM SERPL-SCNC: 145 MMOL/L — SIGNIFICANT CHANGE UP (ref 135–145)
WBC # BLD: 5.8 K/UL — SIGNIFICANT CHANGE UP (ref 4.8–10.8)
WBC # FLD AUTO: 5.8 K/UL — SIGNIFICANT CHANGE UP (ref 4.8–10.8)

## 2018-08-09 PROCEDURE — 99233 SBSQ HOSP IP/OBS HIGH 50: CPT

## 2018-08-09 RX ORDER — PHENYLEPHRINE HYDROCHLORIDE 10 MG/ML
0.1 INJECTION INTRAVENOUS
Qty: 40 | Refills: 0 | Status: DISCONTINUED | OUTPATIENT
Start: 2018-08-09 | End: 2018-08-09

## 2018-08-09 RX ORDER — MIDODRINE HYDROCHLORIDE 2.5 MG/1
15 TABLET ORAL THREE TIMES A DAY
Qty: 0 | Refills: 0 | Status: DISCONTINUED | OUTPATIENT
Start: 2018-08-09 | End: 2018-08-10

## 2018-08-09 RX ADMIN — SEVELAMER CARBONATE 800 MILLIGRAM(S): 2400 POWDER, FOR SUSPENSION ORAL at 05:53

## 2018-08-09 RX ADMIN — SEVELAMER CARBONATE 800 MILLIGRAM(S): 2400 POWDER, FOR SUSPENSION ORAL at 22:48

## 2018-08-09 RX ADMIN — SEVELAMER CARBONATE 800 MILLIGRAM(S): 2400 POWDER, FOR SUSPENSION ORAL at 14:11

## 2018-08-09 RX ADMIN — PHENYLEPHRINE HYDROCHLORIDE 3.82 MICROGRAM(S)/KG/MIN: 10 INJECTION INTRAVENOUS at 00:39

## 2018-08-09 RX ADMIN — MIDODRINE HYDROCHLORIDE 15 MILLIGRAM(S): 2.5 TABLET ORAL at 22:48

## 2018-08-09 RX ADMIN — MIDODRINE HYDROCHLORIDE 15 MILLIGRAM(S): 2.5 TABLET ORAL at 14:15

## 2018-08-09 RX ADMIN — CLOPIDOGREL BISULFATE 75 MILLIGRAM(S): 75 TABLET, FILM COATED ORAL at 12:24

## 2018-08-09 RX ADMIN — MIDODRINE HYDROCHLORIDE 10 MILLIGRAM(S): 2.5 TABLET ORAL at 05:53

## 2018-08-09 RX ADMIN — PANTOPRAZOLE SODIUM 40 MILLIGRAM(S): 20 TABLET, DELAYED RELEASE ORAL at 06:55

## 2018-08-09 NOTE — PROGRESS NOTE ADULT - SUBJECTIVE AND OBJECTIVE BOX
Cardiology NP note:    -    MEDICATIONS  (STANDING):  clopidogrel Tablet 75 milliGRAM(s) Oral daily  dextrose 5%. 1000 milliLiter(s) (50 mL/Hr) IV Continuous <Continuous>  dextrose 50% Injectable 12.5 Gram(s) IV Push once  dextrose 50% Injectable 25 Gram(s) IV Push once  dextrose 50% Injectable 25 Gram(s) IV Push once  epoetin kalpesh Injectable 69145 Unit(s) IV Push <User Schedule>  insulin lispro (HumaLOG) corrective regimen sliding scale   SubCutaneous three times a day before meals  midodrine 15 milliGRAM(s) Oral three times a day  pantoprazole    Tablet 40 milliGRAM(s) Oral before breakfast  sevelamer hydrochloride 800 milliGRAM(s) Oral three times a day    MEDICATIONS  (PRN):  dextrose 40% Gel 15 Gram(s) Oral once PRN Blood Glucose LESS THAN 70 milliGRAM(s)/deciLiter  glucagon  Injectable 1 milliGRAM(s) IntraMuscular once PRN Glucose <70 milliGRAM(s)/deciLiter  HYDROmorphone  Injectable 0.5 milliGRAM(s) IV Push every 2 hours PRN Moderate Pain (4 - 6)      Allergies    No Known Allergies    Intolerances      PAST MEDICAL & SURGICAL HISTORY:  Persistent atrial fibrillation  Carotid artery disease: mild  GERD (gastroesophageal reflux disease)  Low glucose level: Patient has h/o low blood sugars at times  Palpitations  Leg pain, bilateral: R&gt;L at rest and with short distant ambulation  Imbalance: secondary to PVD Uses cane  Obesity (BMI 30.0-34.9)  Claudication in peripheral vascular disease: R&gt;L  Right common iliac or ostial external iliac per June 2018 U/S  Left popliteal 50-75 and EDUIN stenosis  SUSANA 0.8  Kidney problem: spontanious renal bleed while on coumadin  c/b  renal  hematome  with  LT  nephrectomy  Hypotension: treated  with Midodrine  Tiredness  Renal hematoma, left  Hypercholesteremia  ESRD (end stage renal disease): on  hemodialysis  3  x  weekly.,  H/O  left  nephrectomy after  renal  bleed  Dyslipidemia  Dizziness  DM (diabetes mellitus)  Daytime sleepiness  Renal transplant recipient  AV fistula: right lower arm  Abnormality of left atrial appendage: WATCHMAN  LEFT ATRIAL APPENDAGE CLOSUIRE   Jan. 30 2017  History of unilateral nephrectomy: left  nephrectomy      Vital Signs Last 24 Hrs  T(C): 37.3 (09 Aug 2018 11:45), Max: 37.6 (08 Aug 2018 16:12)  T(F): 99.2 (09 Aug 2018 11:45), Max: 99.7 (09 Aug 2018 03:00)  HR: 93 (09 Aug 2018 12:00) (80 - 120)  BP: 84/54 (09 Aug 2018 12:00) (61/42 - 136/70)  BP(mean): 65 (09 Aug 2018 12:00) (45 - 95)  RR: 16 (09 Aug 2018 12:00) (12 - 125)  SpO2: 95% (09 Aug 2018 12:00) (90% - 100%)    Physical Exam:  Constitutional: NAD, AAOx3  Cardiovascular: +S1S2 RRR  Pulmonary: CTA b/l, unlabored  GI: soft NTND +BS  Extremities: no pedal edema, +distal pulses b/l  Neuro: non focal, PANDYA x4    LABS:                        9.2    5.8   )-----------( 154      ( 09 Aug 2018 07:00 )             28.8     08-09    145  |  101  |  38.0<H>  ----------------------------<  103  4.5   |  24.0  |  5.92<H>    Ca    8.8      09 Aug 2018 07:00  Phos  4.6     08-09  Mg     2.0     08-09    TPro  7.0  /  Alb  3.9  /  TBili  0.5  /  DBili  x   /  AST  20  /  ALT  11  /  AlkPhos  105  08-08    PT/INR - ( 08 Aug 2018 02:36 )   PT: 13.2 sec;   INR: 1.20 ratio         PTT - ( 08 Aug 2018 02:36 )  PTT:34.7 sec      RADIOLOGY & ADDITIONAL TESTS: Cardiology NP note:    -72 year old male with 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  IV pressors discontinued at 9am today and MAP maintained at 65 mmhg off pressors.      MEDICATIONS  (STANDING):  clopidogrel Tablet 75 milliGRAM(s) Oral daily  dextrose 5%. 1000 milliLiter(s) (50 mL/Hr) IV Continuous <Continuous>  dextrose 50% Injectable 12.5 Gram(s) IV Push once  dextrose 50% Injectable 25 Gram(s) IV Push once  dextrose 50% Injectable 25 Gram(s) IV Push once  epoetin kalpesh Injectable 66466 Unit(s) IV Push <User Schedule>  insulin lispro (HumaLOG) corrective regimen sliding scale   SubCutaneous three times a day before meals  midodrine 15 milliGRAM(s) Oral three times a day  pantoprazole    Tablet 40 milliGRAM(s) Oral before breakfast  sevelamer hydrochloride 800 milliGRAM(s) Oral three times a day    MEDICATIONS  (PRN):  dextrose 40% Gel 15 Gram(s) Oral once PRN Blood Glucose LESS THAN 70 milliGRAM(s)/deciLiter  glucagon  Injectable 1 milliGRAM(s) IntraMuscular once PRN Glucose <70 milliGRAM(s)/deciLiter  HYDROmorphone  Injectable 0.5 milliGRAM(s) IV Push every 2 hours PRN Moderate Pain (4 - 6)      Allergies    No Known Allergies      PAST MEDICAL & SURGICAL HISTORY:  Persistent atrial fibrillation  Carotid artery disease: mild  GERD (gastroesophageal reflux disease)  Low glucose level: Patient has h/o low blood sugars at times  Palpitations  Leg pain, bilateral: R&gt;L at rest and with short distant ambulation  Imbalance: secondary to PVD Uses cane  Obesity (BMI 30.0-34.9)  Claudication in peripheral vascular disease: R&gt;L  Right common iliac or ostial external iliac per June 2018 U/S  Left popliteal 50-75 and EDUIN stenosis  SUSANA 0.8  Kidney problem: spontanious renal bleed while on coumadin  c/b  renal  hematome  with  LT  nephrectomy  Hypotension: treated  with Midodrine  Tiredness  Renal hematoma, left  Hypercholesteremia  ESRD (end stage renal disease): on  hemodialysis  3  x  weekly.,  H/O  left  nephrectomy after  renal  bleed  Dyslipidemia  Dizziness  DM (diabetes mellitus)  Daytime sleepiness  Renal transplant recipient  AV fistula: right lower arm  Abnormality of left atrial appendage: WATCHMAN  LEFT ATRIAL APPENDAGE CLOSUIRE   Jan. 30 2017  History of unilateral nephrectomy: left  nephrectomy      Vital Signs Last 24 Hrs  T(C): 37.3 (09 Aug 2018 11:45), Max: 37.6 (08 Aug 2018 16:12)  T(F): 99.2 (09 Aug 2018 11:45), Max: 99.7 (09 Aug 2018 03:00)  HR: 93 (09 Aug 2018 12:00) (80 - 120)  BP: 84/54 (09 Aug 2018 12:00) (61/42 - 136/70)  BP(mean): 65 (09 Aug 2018 12:00) (45 - 95)  RR: 16 (09 Aug 2018 12:00) (12 - 125)  SpO2: 95% (09 Aug 2018 12:00) (90% - 100%)    Physical Exam:  Constitutional: NAD, AAOx3; Found patient sitting in the chair.  Cardiovascular: +S1S2 RRR  Pulmonary: CTA b/l, unlabored  GI: soft NTND +BS; denies abdominal/back pain  Extremities: bilateral groin/procedure sites benign without hematoma/bleeding; no bruits;  no pedal edema, +distal faint pedal pulses via doppler  Neuro: non focal, PANDYA x4    LABS:                        9.2    5.8   )-----------( 154      ( 09 Aug 2018 07:00 )             28.8     08-09    145  |  101  |  38.0<H>  ----------------------------<  103  4.5   |  24.0  |  5.92<H>    Ca    8.8      09 Aug 2018 07:00  Phos  4.6     08-09  Mg     2.0     08-09    TPro  7.0  /  Alb  3.9  /  TBili  0.5  /  DBili  x   /  AST  20  /  ALT  11  /  AlkPhos  105  08-08    PT/INR - ( 08 Aug 2018 02:36 )   PT: 13.2 sec;   INR: 1.20 ratio         PTT - ( 08 Aug 2018 02:36 )  PTT:34.7 sec      RADIOLOGY & ADDITIONAL TESTS:

## 2018-08-09 NOTE — PROGRESS NOTE ADULT - SUBJECTIVE AND OBJECTIVE BOX
INTERVAL HPI/OVERNIGHT EVENTS:    CC: s/p hemorrhagic shock, anemia, ESRD on HD, diabetes mellitus, PVD    Denies complaints, had episode of hypotension last night requiring pressors, which has now been discontinued.    Vital Signs Last 24 Hrs  T(C): 37.3 (09 Aug 2018 11:45), Max: 37.6 (08 Aug 2018 16:12)  T(F): 99.2 (09 Aug 2018 11:45), Max: 99.7 (09 Aug 2018 03:00)  HR: 93 (09 Aug 2018 12:00) (80 - 120)  BP: 84/54 (09 Aug 2018 12:00) (61/42 - 136/70)  BP(mean): 65 (09 Aug 2018 12:00) (45 - 95)  RR: 16 (09 Aug 2018 12:00) (14 - 125)  SpO2: 95% (09 Aug 2018 12:00) (90% - 100%)    PHYSICAL EXAM:    GENERAL: Not in distress, alert, sitting in chair  CHEST/LUNG: b/l air entry, clear  HEART: Regular  ABDOMEN: Soft, BS+  EXTREMITIES:  No edema, tenderness.     MEDICATIONS  (STANDING):  clopidogrel Tablet 75 milliGRAM(s) Oral daily  dextrose 5%. 1000 milliLiter(s) (50 mL/Hr) IV Continuous <Continuous>  dextrose 50% Injectable 12.5 Gram(s) IV Push once  dextrose 50% Injectable 25 Gram(s) IV Push once  dextrose 50% Injectable 25 Gram(s) IV Push once  epoetin kalpesh Injectable 70699 Unit(s) IV Push <User Schedule>  insulin lispro (HumaLOG) corrective regimen sliding scale   SubCutaneous three times a day before meals  midodrine 15 milliGRAM(s) Oral three times a day  pantoprazole    Tablet 40 milliGRAM(s) Oral before breakfast  sevelamer hydrochloride 800 milliGRAM(s) Oral three times a day    MEDICATIONS  (PRN):  dextrose 40% Gel 15 Gram(s) Oral once PRN Blood Glucose LESS THAN 70 milliGRAM(s)/deciLiter  glucagon  Injectable 1 milliGRAM(s) IntraMuscular once PRN Glucose <70 milliGRAM(s)/deciLiter  HYDROmorphone  Injectable 0.5 milliGRAM(s) IV Push every 2 hours PRN Moderate Pain (4 - 6)      Allergies    No Known Allergies    Intolerances          LABS:                          9.2    5.8   )-----------( 154      ( 09 Aug 2018 07:00 )             28.8     08-09    145  |  101  |  38.0<H>  ----------------------------<  103  4.5   |  24.0  |  5.92<H>    Ca    8.8      09 Aug 2018 07:00  Phos  4.6     08-09  Mg     2.0     08-09    TPro  7.0  /  Alb  3.9  /  TBili  0.5  /  DBili  x   /  AST  20  /  ALT  11  /  AlkPhos  105  08-08    PT/INR - ( 08 Aug 2018 02:36 )   PT: 13.2 sec;   INR: 1.20 ratio         PTT - ( 08 Aug 2018 02:36 )  PTT:34.7 sec      RADIOLOGY & ADDITIONAL TESTS:

## 2018-08-09 NOTE — PROGRESS NOTE ADULT - ASSESSMENT
71 y/o male s/p elective percutaneous antrograde/retrograde angioplasty c/b hemorrhagic shock now stable off pressors.  -monitor H/H   -stable for transfer out of ICU to tele floor  -Additional plan as per Attending MD and ICU staff  -Discussed with Dr. Menon

## 2018-08-09 NOTE — PROGRESS NOTE ADULT - ASSESSMENT
73 yo male with hypertension, hyperlipidemia, CAD, ESRD on HD, right external iliac disease was admitted for percutaneous antegrade and retro grade angioplasty, with use of Ocelot crossing device. During the procedure , the patient developed a perforation of the external iliac artery and RP bleed. He became hemodynamically unstable and urgent vascular surgery evaluation was requested. Hemostasis achieved with balloon tamponade. He was given 2 PRBC transfusions and procedure was aborted, he was transferred to MICU and started on vasopressors for hypotension and shock. Nephrology consulted. His potassium was 6.8, he underwent HD on 8/7 for hyperkalemia, for 2 hours. On 8/8 he was weaned off pressors but developed shortness of breath and fluid overload and he was hence HD was performed. His BP remained stable and he was transferred to medicine service. He received total 3 PRBC transfusions. Patient developed hypotension early hours of 8/9 requiring pressors, it was later disconitnued.

## 2018-08-09 NOTE — PROGRESS NOTE ADULT - SUBJECTIVE AND OBJECTIVE BOX
72y  Male  No Known Allergies    CC: Patient is a 72y old  Male who presented  with Procedural bleeding    HPI:  72M with ESRD on HD, DM severe PVD and HTN. Pt with chronic Right external iliac artery occlusive disease, admitted for percutaneous antegrade and retro grade angioplasty, with use of Ocelot crossing device. During the procedure  the patient developed a perforation of the external iliac He bleed into the groin and retroperitoneal spaces. During which time, he dropped his BP an required Vasopressors. He was urgently transfused 2 PRBC. Hemostasis was achieved with balloon tamponade. Vascular surgery was consulted, with the determination that not further intervention was required at that time and the original procedure was aborted. He was brought to the ICU for further monitoring and treatment.     Two nights ago while in ICU  basic metabolic panel found potassium level of 6.8 he was treated medically with meds initially and it was arranged for emergent dialysis to treat the hyperkalemia which is currently in progress. Repeat CBC found hemoglobin drop to 8.8 from 11.0 with no signs of bleeding but considering the perforated iliac artery and acute blood loss anemia today he is currently being transfused 1 unit of PRBC during dialysis. Dopplerable pulses 2+ Right and left DP, Vascular surgery was notified and is seeing the patient. The [facility], the Neosynephrine was discontinued and patient was started on midodrine 10mg TID    Tonight he began to drop his systolic blood pressure to low 70's with maps in the hig 50's. I Discussed with Dr Cook and at her request gave one time extra dose of midodrine 10 mg and monitor. The hypotension has continued though with systolic blood pressure droping into the low 60's and maps the high 40's which prompted me to start Neosynephrine at low dose via peripaherl IV. Dr Cook is aware of the addition of the IV pressor         PAST MEDICAL & SURGICAL HISTORY:  Persistent atrial fibrillation  Carotid artery disease: mild  GERD (gastroesophageal reflux disease)  Low glucose level: Patient has h/o low blood sugars at times  Palpitations  Leg pain, bilateral: R&gt;L at rest and with short distant ambulation  Imbalance: secondary to PVD Uses cane  Obesity (BMI 30.0-34.9)  Claudication in peripheral vascular disease: R&gt;L  Right common iliac or ostial external iliac per June 2018 U/S  Left popliteal 50-75 and EDUIN stenosis  SUSANA 0.8  Kidney problem: spontanious renal bleed while on coumadin  c/b  renal  hematome  with  LT  nephrectomy  Hypotension: treated  with Midodrine  Tiredness  Renal hematoma, left  Hypercholesteremia  ESRD (end stage renal disease): on  hemodialysis  3  x  weekly.,  H/O  left  nephrectomy after  renal  bleed  Dyslipidemia  Dizziness  DM (diabetes mellitus)  Daytime sleepiness  Renal transplant recipient  AV fistula: right lower arm  Abnormality of left atrial appendage: WATCHMAN  LEFT ATRIAL APPENDAGE CLOSUIRE   Jan. 30 2017  History of unilateral nephrectomy: left  nephrectomy    FAMILY HISTORY:  No pertinent family history in first degree relatives      Vitals   Vital Signs Last 24 Hrs  T(C): 37.6 (09 Aug 2018 03:00), Max: 37.6 (08 Aug 2018 16:12)  T(F): 99.7 (09 Aug 2018 03:00), Max: 99.7 (09 Aug 2018 03:00)  HR: 95 (09 Aug 2018 04:30) (86 - 111)  BP: 100/56 (09 Aug 2018 04:30) (63/37 - 152/62)  BP(mean): 73 (09 Aug 2018 04:30) (45 - 95)  RR: 20 (09 Aug 2018 04:30) (10 - 27)  SpO2: 95% (09 Aug 2018 04:30) (90% - 100%)    I&O's Summary    07 Aug 2018 07:01  -  08 Aug 2018 07:00  --------------------------------------------------------  IN: 1254 mL / OUT: 0 mL / NET: 1254 mL    08 Aug 2018 07:01  -  09 Aug 2018 06:01  --------------------------------------------------------  IN: 646.6 mL / OUT: 1000 mL / NET: -353.4 mL        LABS  08-08    143  |  101  |  51.0<H>  ----------------------------<  78  5.4<H>   |  23.0  |  7.26<H>    Ca    8.5<L>      08 Aug 2018 08:21  Phos  5.4     08-08  Mg     1.9     08-08    TPro  7.0  /  Alb  3.9  /  TBili  0.5  /  DBili  x   /  AST  20  /  ALT  11  /  AlkPhos  105  08-08                          8.9    4.1   )-----------( 130      ( 08 Aug 2018 19:51 )             27.0     PT/INR - ( 08 Aug 2018 02:36 )   PT: 13.2 sec;   INR: 1.20 ratio         PTT - ( 08 Aug 2018 02:36 )  PTT:34.7 sec      LIVER FUNCTIONS - ( 08 Aug 2018 01:50 )  Alb: 3.9 g/dL / Pro: 7.0 g/dL / ALK PHOS: 105 U/L / ALT: 11 U/L / AST: 20 U/L / GGT: x           CAPILLARY BLOOD GLUCOSE  POCT Blood Glucose.: 124 mg/dL (08 Aug 2018 22:30)      Meds  MEDICATIONS  (STANDING):  clopidogrel Tablet 75 milliGRAM(s) Oral daily  dextrose 5%. 1000 milliLiter(s) (50 mL/Hr) IV Continuous <Continuous>  dextrose 50% Injectable 12.5 Gram(s) IV Push once  dextrose 50% Injectable 25 Gram(s) IV Push once  dextrose 50% Injectable 25 Gram(s) IV Push once  epoetin kalpesh Injectable 15316 Unit(s) IV Push <User Schedule>  insulin lispro (HumaLOG) corrective regimen sliding scale   SubCutaneous three times a day before meals  midodrine 10 milliGRAM(s) Oral three times a day  pantoprazole    Tablet 40 milliGRAM(s) Oral before breakfast  phenylephrine    Infusion 0.1 MICROgram(s)/kG/Min (3.817 mL/Hr) IV Continuous <Continuous>  sevelamer hydrochloride 800 milliGRAM(s) Oral three times a day      REVIEW OF SYSTEMS:    CONSTITUTIONAL: No fever, weight loss, or fatigue  EYES: No eye pain, visual disturbances, or discharge  ENMT:  No difficulty hearing, tinnitus, vertigo; No sinus or throat pain  NECK: No pain or stiffness  BREASTS: No pain, masses, or nipple discharge  RESPIRATORY: No cough, wheezing, chills or hemoptysis; No shortness of breath  CARDIOVASCULAR: No chest pain, palpitations, dizziness, or leg swelling  GASTROINTESTINAL: No abdominal or epigastric pain. No nausea, vomiting, or hematemesis; No diarrhea or constipation. No melena or hematochezia.  GENITOURINARY: No dysuria, frequency, hematuria, or incontinence  NEUROLOGICAL: No headaches, memory loss, loss of strength, numbness, or tremors  SKIN: No itching, burning, rashes, or lesions   LYMPH NODES: No enlarged glands  ENDOCRINE: No heat or cold intolerance; No hair loss  MUSCULOSKELETAL: No joint pain or swelling; No muscle, back, or extremity pain  PSYCHIATRIC: No depression, anxiety, mood swings, or difficulty sleeping  HEME/LYMPH: No easy bruising, or bleeding gums  ALLERY AND IMMUNOLOGIC: No hives or eczema      Physicial Exam:     Constitutional: NAD, well-groomed, well-developed  HEENT: PERRLA, EOMI, no drainage or redness  Neck: No bruits; no thyromegaly or nodules,  No JVD  Back: Normal spine flexure, No CVA tenderness, No deformity or limitation of movement  Respiratory: Breath Sounds equal & clear to percussion & auscultation, no accessory muscle use  Cardiovascular: Regular rate & rhythm, normal S1, S2; no murmurs, gallops or rubs; no S3, S4  Gastrointestinal: Soft, non-tender, non distended no hepatosplenomegaly, normal bowel sounds  Extremities: No peripheral edema, No cyanosis, clubbing   Vascular: Equal and normal pulses: 2+ peripheral pulses throughout  Neurological: GCS:    A&O x 3; no sensory, motor  deficits, normal reflexes  Psychiatric: Normal mood, normal affect  Musculoskeletal: No joint pain, swelling or deformity; no limitation of movement  Skin: No rashes

## 2018-08-09 NOTE — PROGRESS NOTE ADULT - ASSESSMENT
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  Medical decision making inculding plan of daily care, reviewing data, reviewing radiology, discussing with multidisciplinary team, non inclusive of procedures, discussing goals of care with patient/family

## 2018-08-10 ENCOUNTER — TRANSCRIPTION ENCOUNTER (OUTPATIENT)
Age: 73
End: 2018-08-10

## 2018-08-10 LAB
BLD GP AB SCN SERPL QL: SIGNIFICANT CHANGE UP
GLUCOSE BLDC GLUCOMTR-MCNC: 106 MG/DL — HIGH (ref 70–99)
GLUCOSE BLDC GLUCOMTR-MCNC: 83 MG/DL — SIGNIFICANT CHANGE UP (ref 70–99)
HBV SURFACE AG SER-ACNC: SIGNIFICANT CHANGE UP
HCT VFR BLD CALC: 24.5 % — LOW (ref 42–52)
HCV AB S/CO SERPL IA: 0.44 S/CO — SIGNIFICANT CHANGE UP
HCV AB SERPL-IMP: SIGNIFICANT CHANGE UP
HGB BLD-MCNC: 8.2 G/DL — LOW (ref 14–18)
MCHC RBC-ENTMCNC: 33.1 PG — HIGH (ref 27–31)
MCHC RBC-ENTMCNC: 33.5 G/DL — SIGNIFICANT CHANGE UP (ref 32–36)
MCV RBC AUTO: 98.8 FL — HIGH (ref 80–94)
PLATELET # BLD AUTO: 116 K/UL — LOW (ref 150–400)
RBC # BLD: 2.48 M/UL — LOW (ref 4.6–6.2)
RBC # FLD: 19.3 % — HIGH (ref 11–15.6)
TYPE + AB SCN PNL BLD: SIGNIFICANT CHANGE UP
WBC # BLD: 3.2 K/UL — LOW (ref 4.8–10.8)
WBC # FLD AUTO: 3.2 K/UL — LOW (ref 4.8–10.8)

## 2018-08-10 PROCEDURE — 90937 HEMODIALYSIS REPEATED EVAL: CPT

## 2018-08-10 PROCEDURE — 99233 SBSQ HOSP IP/OBS HIGH 50: CPT

## 2018-08-10 PROCEDURE — 74174 CTA ABD&PLVS W/CONTRAST: CPT | Mod: 26

## 2018-08-10 RX ORDER — MIDODRINE HYDROCHLORIDE 2.5 MG/1
10 TABLET ORAL THREE TIMES A DAY
Qty: 0 | Refills: 0 | Status: DISCONTINUED | OUTPATIENT
Start: 2018-08-10 | End: 2018-08-11

## 2018-08-10 RX ORDER — DOCUSATE SODIUM 100 MG
100 CAPSULE ORAL
Qty: 0 | Refills: 0 | Status: DISCONTINUED | OUTPATIENT
Start: 2018-08-10 | End: 2018-08-11

## 2018-08-10 RX ORDER — SENNA PLUS 8.6 MG/1
2 TABLET ORAL AT BEDTIME
Qty: 0 | Refills: 0 | Status: DISCONTINUED | OUTPATIENT
Start: 2018-08-10 | End: 2018-08-11

## 2018-08-10 RX ADMIN — ERYTHROPOIETIN 10000 UNIT(S): 10000 INJECTION, SOLUTION INTRAVENOUS; SUBCUTANEOUS at 09:48

## 2018-08-10 RX ADMIN — SEVELAMER CARBONATE 800 MILLIGRAM(S): 2400 POWDER, FOR SUSPENSION ORAL at 06:10

## 2018-08-10 RX ADMIN — SEVELAMER CARBONATE 800 MILLIGRAM(S): 2400 POWDER, FOR SUSPENSION ORAL at 14:50

## 2018-08-10 RX ADMIN — MIDODRINE HYDROCHLORIDE 10 MILLIGRAM(S): 2.5 TABLET ORAL at 23:11

## 2018-08-10 RX ADMIN — MIDODRINE HYDROCHLORIDE 15 MILLIGRAM(S): 2.5 TABLET ORAL at 06:10

## 2018-08-10 RX ADMIN — MIDODRINE HYDROCHLORIDE 15 MILLIGRAM(S): 2.5 TABLET ORAL at 14:50

## 2018-08-10 RX ADMIN — Medication 100 MILLIGRAM(S): at 23:11

## 2018-08-10 RX ADMIN — CLOPIDOGREL BISULFATE 75 MILLIGRAM(S): 75 TABLET, FILM COATED ORAL at 14:50

## 2018-08-10 RX ADMIN — PANTOPRAZOLE SODIUM 40 MILLIGRAM(S): 20 TABLET, DELAYED RELEASE ORAL at 06:10

## 2018-08-10 RX ADMIN — SENNA PLUS 2 TABLET(S): 8.6 TABLET ORAL at 23:11

## 2018-08-10 RX ADMIN — SEVELAMER CARBONATE 800 MILLIGRAM(S): 2400 POWDER, FOR SUSPENSION ORAL at 23:11

## 2018-08-10 NOTE — PROGRESS NOTE ADULT - PROBLEM SELECTOR PLAN 5
Resolved, off pressors.
Resolved, off pressors. Monitor BP, patient states has baseline low BP, denies dizziness.
Resolved.
Hold nephrotoxic meds  emergent hemodialysis tonight for hyperkalemia  plan for complete dialysis in the morning   Continue aggressive hydration  Follow up BUN/Creatinine  follow up electrolytes

## 2018-08-10 NOTE — DISCHARGE NOTE ADULT - PATIENT PORTAL LINK FT
You can access the [facility] Patient Portal, offered by [facility], by registering with the following website: [email or web address]

## 2018-08-10 NOTE — DISCHARGE NOTE ADULT - HOSPITAL COURSE
73 yo male with hypertension, hyperlipidemia, CAD, ESRD on HD, right external iliac disease was admitted for percutaneous antegrade and retro grade angioplasty, with use of Ocelot crossing device. During the procedure , the patient developed a perforation of the external iliac artery and RP bleed. He became hemodynamically unstable and urgent vascular surgery evaluation was requested. Hemostasis achieved with balloon tamponade. He was given 2 PRBC transfusions and procedure was aborted, he was transferred to MICU and started on vasopressors for hypotension and shock. Nephrology consulted. His potassium was 6.8, he underwent HD on 8/7 for hyperkalemia, for 2 hours. On 8/8 he was weaned off pressors but developed shortness of breath and fluid overload and he was hence HD was performed. His BP remained stable and he was transferred to medicine service. He received total 3 PRBC transfusions. Patient developed hypotension early hours of 8/9 requiring pressors, it was later discontinued. His Hb and BP remained stable. 71 yo male with hypertension, hyperlipidemia, CAD, ESRD on HD, right external iliac disease was admitted for percutaneous antegrade and retro grade angioplasty, with use of Ocelot crossing device. During the procedure , the patient developed a perforation of the external iliac artery and RP bleed. He became hemodynamically unstable and urgent vascular surgery evaluation was requested. Hemostasis achieved with balloon tamponade. He was given 2 PRBC transfusions and procedure was aborted, he was transferred to MICU and started on vasopressors for hypotension and shock. Nephrology consulted. His potassium was 6.8, he underwent HD on 8/7 for hyperkalemia, for 2 hours. On 8/8 he was weaned off pressors but developed shortness of breath and fluid overload and he was hence HD was performed. His BP remained stable and he was transferred to medicine service. He received total 3 PRBC transfusions. Patient developed hypotension early hours of 8/9 requiring pressors, it was later discontinued. His Hb and BP remained stable. Discussed with cardiology, advised CT abdo with contrast for ongoing bleed. CT A/P showed stable RP bleed. STable to d/c home as per renal & cardiology. Continue to hold BB & ASA for now. ASA to be added as outpatient by Dr. Menon when appropriate as discussed with him.   VSS. Medically stable for d/c home after HD today    PHYSICAL EXAM:    GENERAL: Not in distress, alert  CHEST/LUNG: b/l air entry, clear  HEART: Regular, no MRG  ABDOMEN: Soft, NT, obese, BS+  EXTREMITIES:  No edema, tenderness, right AVF with thrill

## 2018-08-10 NOTE — PROGRESS NOTE ADULT - ATTENDING COMMENTS
Spoke with patient at bedside.
Spoke with patient.
Per nephrology, plan for HD in am. If CBC and BP stable, anticipate discharge home tomorrow.
73 yo male with ESRD on HD, DM, HTN, PVD, was undergoing endovascular revascularization of external iliac artery when he developed an arterial perforation.  Bleeding was controlled with endovascular balloon occlusion without requiring conversion to open procedure.  Patient now admitted to MICU for post op monitoring.   Now weaned off phenylephrine, MAP still 65-70.

## 2018-08-10 NOTE — PROGRESS NOTE ADULT - PROBLEM SELECTOR PLAN 3
Monitor FS, sliding scale coverage.
Monitor FS, sliding scale coverage.
Hold oral hypoglycmeic meds  Regular Insulin Slide Scale  Finger sticks Q 6 hours  Low Carb 1800 Ramesh Diet when can tolorate  Hemoglobin A1c.
Monitor FS, sliding scale coverage.
no acitve signs of bleeding  currently maintaining stable systolic blood pressure on pressor  continue low dose collin synephrine   monitor BP via arterial line and cuff

## 2018-08-10 NOTE — PROGRESS NOTE ADULT - PROBLEM SELECTOR PROBLEM 2
ESRD (end stage renal disease)
Hyperkalemia

## 2018-08-10 NOTE — PROGRESS NOTE ADULT - SUBJECTIVE AND OBJECTIVE BOX
stable over the past few days   No abdominal pain   Stable BP , normally borderline low as an outpatient   H/H 8.2 today drop from 9.2  Will be discharged tomorrow after dialysis   Recommend CT aortic and bilateral LE with contrast . Likely will show RP bleed but to check for any on going contrast leak

## 2018-08-10 NOTE — PROGRESS NOTE ADULT - PROBLEM SELECTOR PROBLEM 3
DM (diabetes mellitus)
Hypotension, unspecified hypotension type

## 2018-08-10 NOTE — PROGRESS NOTE ADULT - ASSESSMENT
1) ESRD on HD  2) MBD of renal dx  3) Anemia of renal dx  4) Vol/HTN    Will start epo 10k units TIW  Binders with meals;   saw on HD

## 2018-08-10 NOTE — DISCHARGE NOTE ADULT - CARE PROVIDERS DIRECT ADDRESSES
,DirectAddress_Unknown ,DirectAddress_Unknown,[email or web address]

## 2018-08-10 NOTE — DISCHARGE NOTE ADULT - MEDICATION SUMMARY - MEDICATIONS TO TAKE
I will START or STAY ON the medications listed below when I get home from the hospital:    nephlex rx  -- 1 tab(s) by mouth once a day  -- Indication: For ESRD (end stage renal disease)    glimepiride 1 mg oral tablet  -- 1 tab(s) by mouth once a day  -- Indication: For DM (diabetes mellitus)    loratadine 10 mg oral capsule  -- 1 cap(s) by mouth once a day  -- Indication: For Allergy    Lipitor 20 mg oral tablet  -- 1 tab(s) by mouth once a day  -- Indication: For Hld    Lupron  --  intramuscular every 3 mos,  due  for  dose  at  [facility]  oncologist  urology  july 21 2018  -- Indication: For Antineoplastic    Plavix 75 mg oral tablet  -- 1 tab(s) by mouth once a day   -- Do not take aspirin or aspirin containing products without knowledge and consent of your physician.    -- Indication: For PVD    Sensipar 30 mg oral tablet  -- 1 tab(s) by mouth 2 times a day  -- Indication: For ESRD (end stage renal disease)    ursodiol 300 mg oral capsule  -- 1 cap(s) by mouth once a day  -- Indication: For gallstone    Colace 100 mg oral capsule  -- 1 cap(s) by mouth once a day  -- Indication: For constipation    senna oral tablet  -- 2 tab(s) by mouth once a day (at bedtime)  -- Indication: For constipation    Mag-Ox 400  -- tab(s) by mouth once a day  -- Indication: For nutrition    midodrine 10 mg oral tablet  -- 1 tab(s) by mouth 3 times a day  -- Indication: For Hypotension, unspecified hypotension type    Renvela 800 mg oral tablet  -- 1 tab(s) by mouth 2 times a day  -- Indication: For ESRD (end stage renal disease)    NexIUM 40 mg oral delayed release capsule  -- 1 cap(s) by mouth once a day  -- Indication: For gerd

## 2018-08-10 NOTE — PROGRESS NOTE ADULT - SUBJECTIVE AND OBJECTIVE BOX
[facility] DIVISION OF KIDNEY DISEASES AND HYPERTENSION -- FOLLOW UP NOTE  --------------------------------------------------------------------------------  Chief Complaint: ESRD HD    24 hour events/subjective:  Pt seen and evaluated on dialysis  No complaints;  Wishing to go home      PAST HISTORY  --------------------------------------------------------------------------------  No significant changes to PMH, PSH, FHx, SHx, unless otherwise noted    ALLERGIES & MEDICATIONS  --------------------------------------------------------------------------------  Allergies    No Known Allergies    Intolerances      Standing Inpatient Medications  clopidogrel Tablet 75 milliGRAM(s) Oral daily  dextrose 5%. 1000 milliLiter(s) IV Continuous <Continuous>  dextrose 50% Injectable 12.5 Gram(s) IV Push once  dextrose 50% Injectable 25 Gram(s) IV Push once  dextrose 50% Injectable 25 Gram(s) IV Push once  epoetin kalpesh Injectable 50392 Unit(s) IV Push <User Schedule>  insulin lispro (HumaLOG) corrective regimen sliding scale   SubCutaneous three times a day before meals  midodrine 15 milliGRAM(s) Oral three times a day  pantoprazole    Tablet 40 milliGRAM(s) Oral before breakfast  sevelamer hydrochloride 800 milliGRAM(s) Oral three times a day    PRN Inpatient Medications  dextrose 40% Gel 15 Gram(s) Oral once PRN  glucagon  Injectable 1 milliGRAM(s) IntraMuscular once PRN  HYDROmorphone  Injectable 0.5 milliGRAM(s) IV Push every 2 hours PRN      REVIEW OF SYSTEMS  --------------------------------------------------------------------------------  Gen: No weight changes, fatigue, fevers/chills, weakness  Skin: No rashes  Head/Eyes/Ears/Mouth: No headache; Normal hearing; Normal vision w/o blurriness; No sinus pain/discomfort, sore throat  Respiratory: No dyspnea, cough, wheezing, hemoptysis  CV: No chest pain, PND, orthopnea  GI: No abdominal pain, diarrhea, constipation, nausea, vomiting, melena, hematochezia  : No increased frequency, dysuria, hematuria, nocturia  MSK: No joint pain/swelling; no back pain; no edema  Neuro: No dizziness/lightheadedness, weakness, seizures, numbness, tingling  Heme: No easy bruising or bleeding  Endo: No heat/cold intolerance  Psych: No significant nervousness, anxiety, stress, depression    All other systems were reviewed and are negative, except as noted.    VITALS/PHYSICAL EXAM  --------------------------------------------------------------------------------  T(C): 36.6 (08-10-18 @ 11:30), Max: 36.8 (08-10-18 @ 04:02)  HR: 81 (08-10-18 @ 11:30) (80 - 99)  BP: 105/71 (08-10-18 @ 11:30) (92/57 - 105/71)  RR: 18 (08-10-18 @ 11:30) (17 - 33)  SpO2: 99% (08-10-18 @ 11:30) (91% - 100%)  Wt(kg): --        08-09-18 @ 07:01  -  08-10-18 @ 07:00  --------------------------------------------------------  IN: 3.8 mL / OUT: 0 mL / NET: 3.8 mL    08-10-18 @ 07:01  -  08-10-18 @ 13:36  --------------------------------------------------------  IN: 0 mL / OUT: 1000 mL / NET: -1000 mL      Physical Exam:  	Gen: NAD, well-appearing  	HEENT: PERRL, supple neck, clear oropharynx  	Pulm: CTA B/L  	CV: RRR, S1S2; no rub  	Back: No spinal or CVA tenderness; no sacral edema  	Abd: +BS, soft, nontender/nondistended  	: No suprapubic tenderness  	UE: Warm, FROM, no clubbing, intact strength; no edema; no asterixis  	LE: Warm, FROM, no clubbing, intact strength; no edema  	Neuro: No focal deficits, intact gait  	Psych: Normal affect and mood  	Skin: Warm, without rashes  	Vascular access: AVF    LABS/STUDIES  --------------------------------------------------------------------------------              8.2    3.2   >-----------<  116      [08-10-18 @ 10:00]              24.5     145  |  101  |  38.0  ----------------------------<  103      [08-09-18 @ 07:00]  4.5   |  24.0  |  5.92        Ca     8.8     [08-09-18 @ 07:00]      Mg     2.0     [08-09-18 @ 07:00]      Phos  4.6     [08-09-18 @ 07:00]            Creatinine Trend:  SCr 5.92 [08-09 @ 07:00]  SCr 7.26 [08-08 @ 08:21]  SCr 4.42 [08-08 @ 01:50]  SCr 9.64 [08-07 @ 23:14]  SCr 9.11 [08-07 @ 18:43]        HbA1c 5.1      [08-07-18 @ 15:58]    HBsAg Nonreact      [08-09-18 @ 18:29]  HCV 0.44, Nonreact      [08-09-18 @ 18:29]

## 2018-08-10 NOTE — PROGRESS NOTE ADULT - PROBLEM SELECTOR PROBLEM 1
Acute blood loss anemia
Hypotension, unspecified hypotension type
Acute blood loss anemia

## 2018-08-10 NOTE — PROGRESS NOTE ADULT - PROBLEM SELECTOR PLAN 1
S/p PRBC transfusions, Hb stable.
S/p PRBC transfusions, monitor CBC.
Plan: no acitve signs of bleeding  currently maintaining stable systolic blood pressure on pressor  continue low dose collin synephrine   monitor BP via arterial line and cuff.
S/p PRBC transfusions, Hb stable. Monitor CBC.
currently receiving one unit of PRBC   will repeat cbc and trend hemoglobin   continue to monitor for signs of bleeding

## 2018-08-10 NOTE — DISCHARGE NOTE ADULT - SECONDARY DIAGNOSIS.
Dyslipidemia ESRD (end stage renal disease) HTN (hypertension) DM (diabetes mellitus) Claudication in peripheral vascular disease Hemorrhagic shock

## 2018-08-10 NOTE — PROGRESS NOTE ADULT - ASSESSMENT
73 yo male with hypertension, hyperlipidemia, CAD, ESRD on HD, right external iliac disease was admitted for percutaneous antegrade and retro grade angioplasty, with use of Ocelot crossing device. During the procedure , the patient developed a perforation of the external iliac artery and RP bleed. He became hemodynamically unstable and urgent vascular surgery evaluation was requested. Hemostasis achieved with balloon tamponade. He was given 2 PRBC transfusions and procedure was aborted, he was transferred to MICU and started on vasopressors for hypotension and shock. Nephrology consulted. His potassium was 6.8, he underwent HD on 8/7 for hyperkalemia, for 2 hours. On 8/8 he was weaned off pressors but developed shortness of breath and fluid overload and he was hence HD was performed. His BP remained stable and he was transferred to medicine service. He received total 3 PRBC transfusions. Patient developed hypotension early hours of 8/9 requiring pressors, it was later discontinued. His Hb and BP remained stable. 73 yo male with hypertension, hyperlipidemia, CAD, ESRD on HD, right external iliac disease was admitted for percutaneous antegrade and retro grade angioplasty, with use of Ocelot crossing device. During the procedure , the patient developed a perforation of the external iliac artery and RP bleed. He became hemodynamically unstable and urgent vascular surgery evaluation was requested. Hemostasis achieved with balloon tamponade. He was given 2 PRBC transfusions and procedure was aborted, he was transferred to MICU and started on vasopressors for hypotension and shock. Nephrology consulted. His potassium was 6.8, he underwent HD on 8/7 for hyperkalemia, for 2 hours. On 8/8 he was weaned off pressors but developed shortness of breath and fluid overload and he was hence HD was performed. His BP remained stable and he was transferred to medicine service. He received total 3 PRBC transfusions. Patient developed hypotension early hours of 8/9 requiring pressors, it was later discontinued. His Hb and BP remained stable. Discussed with cardiology, advised CT abdo with contrast for ongoing bleed.

## 2018-08-10 NOTE — PROGRESS NOTE ADULT - SUBJECTIVE AND OBJECTIVE BOX
INTERVAL HPI/OVERNIGHT EVENTS:    CC: anemia, s/p hemorrhagic shock, PVD, ESRD on HD    No overnight events, denies complaints. Seen in HD    Vital Signs Last 24 Hrs  T(C): 36.6 (10 Aug 2018 11:30), Max: 36.8 (10 Aug 2018 04:02)  T(F): 97.9 (10 Aug 2018 11:30), Max: 98.2 (10 Aug 2018 04:02)  HR: 81 (10 Aug 2018 11:30) (80 - 99)  BP: 105/71 (10 Aug 2018 11:30) (92/57 - 105/71)  BP(mean): 74 (09 Aug 2018 17:00) (68 - 74)  RR: 18 (10 Aug 2018 11:30) (17 - 54)  SpO2: 99% (10 Aug 2018 11:30) (91% - 100%)    PHYSICAL EXAM:    GENERAL: Not in distress, alert  CHEST/LUNG: b/l air entry, clear  HEART: Regular  ABDOMEN: Soft, BS+  EXTREMITIES:  No edema, tenderness.    MEDICATIONS  (STANDING):  clopidogrel Tablet 75 milliGRAM(s) Oral daily  dextrose 5%. 1000 milliLiter(s) (50 mL/Hr) IV Continuous <Continuous>  dextrose 50% Injectable 12.5 Gram(s) IV Push once  dextrose 50% Injectable 25 Gram(s) IV Push once  dextrose 50% Injectable 25 Gram(s) IV Push once  epoetin kalpesh Injectable 11706 Unit(s) IV Push <User Schedule>  insulin lispro (HumaLOG) corrective regimen sliding scale   SubCutaneous three times a day before meals  midodrine 15 milliGRAM(s) Oral three times a day  pantoprazole    Tablet 40 milliGRAM(s) Oral before breakfast  sevelamer hydrochloride 800 milliGRAM(s) Oral three times a day    MEDICATIONS  (PRN):  dextrose 40% Gel 15 Gram(s) Oral once PRN Blood Glucose LESS THAN 70 milliGRAM(s)/deciLiter  glucagon  Injectable 1 milliGRAM(s) IntraMuscular once PRN Glucose <70 milliGRAM(s)/deciLiter  HYDROmorphone  Injectable 0.5 milliGRAM(s) IV Push every 2 hours PRN Moderate Pain (4 - 6)      Allergies    No Known Allergies    Intolerances          LABS:                          8.2    3.2   )-----------( 116      ( 10 Aug 2018 10:00 )             24.5     08-09    145  |  101  |  38.0<H>  ----------------------------<  103  4.5   |  24.0  |  5.92<H>    Ca    8.8      09 Aug 2018 07:00  Phos  4.6     08-09  Mg     2.0     08-09            RADIOLOGY & ADDITIONAL TESTS:

## 2018-08-10 NOTE — DISCHARGE NOTE ADULT - CARE PLAN
Principal Discharge DX:	Acute blood loss anemia  Goal:	Maintain normal Hb  Assessment and plan of treatment:	Follow up with PMD, monitor CBC.  Secondary Diagnosis:	DM (diabetes mellitus)  Assessment and plan of treatment:	Monitor blood sugars.  Secondary Diagnosis:	Dyslipidemia  Assessment and plan of treatment:	Continue medications.  Secondary Diagnosis:	ESRD (end stage renal disease)  Assessment and plan of treatment:	Continue HD at your HD center.  Secondary Diagnosis:	HTN (hypertension)  Assessment and plan of treatment:	Monitor BP. Principal Discharge DX:	Acute blood loss anemia  Goal:	Maintain normal Hb  Assessment and plan of treatment:	Follow up with PMD, monitor CBC. Aspirin on hold but plavix continued. Follow up with your cardiologist within 1 week of discharge and add aspirin gradually when appropriate  Secondary Diagnosis:	DM (diabetes mellitus)  Assessment and plan of treatment:	Monitor blood sugars. Follow up with your primary doctor within 1 week of discharge  Secondary Diagnosis:	Dyslipidemia  Assessment and plan of treatment:	Continue medications.  Secondary Diagnosis:	ESRD (end stage renal disease)  Assessment and plan of treatment:	Continue HD at your HD center.  Secondary Diagnosis:	HTN (hypertension)  Assessment and plan of treatment:	BP normal. Stop using metoprolol. Continue midodrine as directed. Follow up with your primary doctor within 1 week of discharge  Secondary Diagnosis:	Claudication in peripheral vascular disease  Assessment and plan of treatment:	Aspirin on hold but plavix continued. Follow up with your cardiologist within 1 week of discharge and add aspirin gradually when appropriate  Secondary Diagnosis:	Hemorrhagic shock  Assessment and plan of treatment:	Aspirin on hold but plavix continued. Follow up with your cardiologist within 1 week of discharge and add aspirin gradually when appropriate. Stop using metoprolol. Continue midodrine as directed. Follow up with your primary doctor within 1 week of discharge

## 2018-08-10 NOTE — PROGRESS NOTE ADULT - PROBLEM SELECTOR PLAN 2
HD as per schedule.
Hold nephrotoxic meds  continue hemodialysis per nephrology   Continue aggressive hydration  Follow up BUN/Creatinine  follow up electrolytes.
medical management with insulin, D50, bicarb and nebulizers  currently undergoing emregnet hemodialysis to treat elevated potassium   will repeat BMP post dialysis and treat if needed

## 2018-08-10 NOTE — DISCHARGE NOTE ADULT - CARE PROVIDER_API CALL
Tian Menon), Cardiology; Cardiovascular Disease; Interventional Cardiology  [address] [id number]  Phone: [phone]  Fax: [phone] Tian Menon), Cardiology; Cardiovascular Disease; Interventional Cardiology  [address] [facility] [address]  [place], [place] [id number]  Phone: [phone]  Fax: [phone]    Tianna Lopez), Internal Medicine; Nephrology  [address] [place] [address]  Phone: [phone]  Fax: [phone]

## 2018-08-10 NOTE — DISCHARGE NOTE ADULT - PLAN OF CARE
Monitor blood sugars. Continue medications. Continue HD at your HD center. Monitor BP. Maintain normal Hb Follow up with PMD, monitor CBC. Follow up with PMD, monitor CBC. Aspirin on hold but plavix continued. Follow up with your cardiologist within 1 week of discharge and add aspirin gradually when appropriate Monitor blood sugars. Follow up with your primary doctor within 1 week of discharge BP normal. Stop using metoprolol. Continue midodrine as directed. Follow up with your primary doctor within 1 week of discharge Aspirin on hold but plavix continued. Follow up with your cardiologist within 1 week of discharge and add aspirin gradually when appropriate Aspirin on hold but plavix continued. Follow up with your cardiologist within 1 week of discharge and add aspirin gradually when appropriate. Stop using metoprolol. Continue midodrine as directed. Follow up with your primary doctor within 1 week of discharge

## 2018-08-10 NOTE — DISCHARGE NOTE ADULT - MEDICATION SUMMARY - MEDICATIONS TO STOP TAKING
I will STOP taking the medications listed below when I get home from the hospital:    gemfibrozil 600 mg oral tablet  -- 1 tab(s) by mouth once a day    aspirin 81 mg oral tablet  -- 1 tab(s) by mouth once a day    metoprolol tartrate 25 mg oral tablet  -- 1 tab(s) by mouth 2 times a day    calcium acetate 667 mg oral tablet  -- 1 tab(s) by mouth once a day

## 2018-08-10 NOTE — PROGRESS NOTE ADULT - PROBLEM SELECTOR PLAN 4
Hold antihypertensives given recovering from hemorrhagic shock, on Midodrine.
Hold antihypertensives given recovering from hemorrhagic shock, on Midodrine.
Hold antihypertensives given recovering from hemorrhagic shock, on Midodrine, will taper dose and monitor BP.
Hold oral hypoglycmeic meds  Regular Insulin Slide Scale  Finger sticks Q 6 hours  Low Carb 1800 Ramesh Diet when can tolorate  Hemoglobin A1c

## 2018-08-11 VITALS
HEART RATE: 89 BPM | WEIGHT: 215.39 LBS | SYSTOLIC BLOOD PRESSURE: 115 MMHG | TEMPERATURE: 99 F | DIASTOLIC BLOOD PRESSURE: 60 MMHG | RESPIRATION RATE: 18 BRPM | OXYGEN SATURATION: 100 %

## 2018-08-11 LAB
GLUCOSE BLDC GLUCOMTR-MCNC: 102 MG/DL — HIGH (ref 70–99)
GLUCOSE BLDC GLUCOMTR-MCNC: 108 MG/DL — HIGH (ref 70–99)
GLUCOSE BLDC GLUCOMTR-MCNC: 93 MG/DL — SIGNIFICANT CHANGE UP (ref 70–99)
HCT VFR BLD CALC: 28.3 % — LOW (ref 42–52)
HGB BLD-MCNC: 9 G/DL — LOW (ref 14–18)
MCHC RBC-ENTMCNC: 31.8 G/DL — LOW (ref 32–36)
MCHC RBC-ENTMCNC: 32.4 PG — HIGH (ref 27–31)
MCV RBC AUTO: 101.8 FL — HIGH (ref 80–94)
PLATELET # BLD AUTO: 152 K/UL — SIGNIFICANT CHANGE UP (ref 150–400)
RBC # BLD: 2.78 M/UL — LOW (ref 4.6–6.2)
RBC # FLD: 18.8 % — HIGH (ref 11–15.6)
WBC # BLD: 3.9 K/UL — LOW (ref 4.8–10.8)
WBC # FLD AUTO: 3.9 K/UL — LOW (ref 4.8–10.8)

## 2018-08-11 PROCEDURE — 86923 COMPATIBILITY TEST ELECTRIC: CPT

## 2018-08-11 PROCEDURE — T1013: CPT

## 2018-08-11 PROCEDURE — 83036 HEMOGLOBIN GLYCOSYLATED A1C: CPT

## 2018-08-11 PROCEDURE — 90937 HEMODIALYSIS REPEATED EVAL: CPT

## 2018-08-11 PROCEDURE — 86901 BLOOD TYPING SEROLOGIC RH(D): CPT

## 2018-08-11 PROCEDURE — 99261: CPT

## 2018-08-11 PROCEDURE — 37220: CPT

## 2018-08-11 PROCEDURE — 80048 BASIC METABOLIC PNL TOTAL CA: CPT

## 2018-08-11 PROCEDURE — 87641 MR-STAPH DNA AMP PROBE: CPT

## 2018-08-11 PROCEDURE — 87340 HEPATITIS B SURFACE AG IA: CPT

## 2018-08-11 PROCEDURE — C1894: CPT

## 2018-08-11 PROCEDURE — 86803 HEPATITIS C AB TEST: CPT

## 2018-08-11 PROCEDURE — C1769: CPT

## 2018-08-11 PROCEDURE — 99152 MOD SED SAME PHYS/QHP 5/>YRS: CPT

## 2018-08-11 PROCEDURE — 36415 COLL VENOUS BLD VENIPUNCTURE: CPT

## 2018-08-11 PROCEDURE — P9016: CPT

## 2018-08-11 PROCEDURE — 74174 CTA ABD&PLVS W/CONTRAST: CPT

## 2018-08-11 PROCEDURE — 87640 STAPH A DNA AMP PROBE: CPT

## 2018-08-11 PROCEDURE — 85027 COMPLETE CBC AUTOMATED: CPT

## 2018-08-11 PROCEDURE — 80069 RENAL FUNCTION PANEL: CPT

## 2018-08-11 PROCEDURE — 94640 AIRWAY INHALATION TREATMENT: CPT

## 2018-08-11 PROCEDURE — 85730 THROMBOPLASTIN TIME PARTIAL: CPT

## 2018-08-11 PROCEDURE — 86850 RBC ANTIBODY SCREEN: CPT

## 2018-08-11 PROCEDURE — 84100 ASSAY OF PHOSPHORUS: CPT

## 2018-08-11 PROCEDURE — 99239 HOSP IP/OBS DSCHRG MGMT >30: CPT

## 2018-08-11 PROCEDURE — 86900 BLOOD TYPING SEROLOGIC ABO: CPT

## 2018-08-11 PROCEDURE — 82962 GLUCOSE BLOOD TEST: CPT

## 2018-08-11 PROCEDURE — C1725: CPT

## 2018-08-11 PROCEDURE — 80053 COMPREHEN METABOLIC PANEL: CPT

## 2018-08-11 PROCEDURE — 85610 PROTHROMBIN TIME: CPT

## 2018-08-11 PROCEDURE — C1887: CPT

## 2018-08-11 PROCEDURE — 36430 TRANSFUSION BLD/BLD COMPNT: CPT

## 2018-08-11 PROCEDURE — 83735 ASSAY OF MAGNESIUM: CPT

## 2018-08-11 PROCEDURE — 99153 MOD SED SAME PHYS/QHP EA: CPT

## 2018-08-11 RX ORDER — GEMFIBROZIL 600 MG
1 TABLET ORAL
Qty: 0 | Refills: 0 | COMMUNITY

## 2018-08-11 RX ORDER — SENNA PLUS 8.6 MG/1
2 TABLET ORAL
Qty: 0 | Refills: 0 | DISCHARGE
Start: 2018-08-11

## 2018-08-11 RX ORDER — ATORVASTATIN CALCIUM 80 MG/1
20 TABLET, FILM COATED ORAL AT BEDTIME
Qty: 0 | Refills: 0 | Status: DISCONTINUED | OUTPATIENT
Start: 2018-08-11 | End: 2018-08-11

## 2018-08-11 RX ORDER — CALCIUM ACETATE 667 MG
1 TABLET ORAL
Qty: 0 | Refills: 0 | COMMUNITY

## 2018-08-11 RX ORDER — METOPROLOL TARTRATE 50 MG
1 TABLET ORAL
Qty: 0 | Refills: 0 | COMMUNITY

## 2018-08-11 RX ADMIN — ERYTHROPOIETIN 10000 UNIT(S): 10000 INJECTION, SOLUTION INTRAVENOUS; SUBCUTANEOUS at 17:06

## 2018-08-11 RX ADMIN — CLOPIDOGREL BISULFATE 75 MILLIGRAM(S): 75 TABLET, FILM COATED ORAL at 12:07

## 2018-08-11 RX ADMIN — SEVELAMER CARBONATE 800 MILLIGRAM(S): 2400 POWDER, FOR SUSPENSION ORAL at 13:24

## 2018-08-11 RX ADMIN — PANTOPRAZOLE SODIUM 40 MILLIGRAM(S): 20 TABLET, DELAYED RELEASE ORAL at 06:15

## 2018-08-11 RX ADMIN — MIDODRINE HYDROCHLORIDE 10 MILLIGRAM(S): 2.5 TABLET ORAL at 13:24

## 2018-08-11 RX ADMIN — MIDODRINE HYDROCHLORIDE 10 MILLIGRAM(S): 2.5 TABLET ORAL at 06:15

## 2018-08-11 RX ADMIN — Medication 100 MILLIGRAM(S): at 06:15

## 2018-08-11 RX ADMIN — SEVELAMER CARBONATE 800 MILLIGRAM(S): 2400 POWDER, FOR SUSPENSION ORAL at 06:15

## 2018-08-11 NOTE — PROGRESS NOTE ADULT - SUBJECTIVE AND OBJECTIVE BOX
Tolerated  HD  x  3 hrs. w  / 0.5-1 kgs. fld. removal  w/out complications.     Will maintain Fld. and electrolyte balance.     Will maintain V/S w/in acceptable range to pt.     Will feel safe and comfortable during treatment,

## 2018-08-11 NOTE — PROGRESS NOTE ADULT - SUBJECTIVE AND OBJECTIVE BOX
Will tolerated HD tx. x 3 hrs. w/ 0.5-1 kgs. fld.removal w/out complications.     Will maintain Fld. and electrolyte balance.     Will maintain V/S w/in acceptable range to pt.     Will feel safe and comfortable during tx..

## 2018-08-11 NOTE — PROGRESS NOTE ADULT - SUBJECTIVE AND OBJECTIVE BOX
Vital Signs Last 24 Hrs,    T(C): 36.9 (11 Aug 2018 10:46), Max: 36.9 (10 Aug 2018 15:54)  T(F): 98.5 (11 Aug 2018 10:46), Max: 98.5 (10 Aug 2018 15:54)  HR: 98 (11 Aug 2018 10:46) (81 - 106)  BP: 96/67 (11 Aug 2018 10:46) (86/55 - 105/71)  BP(mean): --  RR: 18 (11 Aug 2018 10:46) (18 - 20)  SpO2: 96% (11 Aug 2018 10:46) (96% - 99%)                          9.0<L>  3.9<L> )-----------( 152      ( 11 Aug 2018 07:34 )              28.3<L>    Phos: 4.6 mg/dL M.0 mg/dL PTH:-- Uric acid:-- Serum Osm:--  Ferritin:-- Iron:-- TIBC:-- Tsat:--  B12:-- TSH:-- (0809 @ 07:00)    Patient was seen and evaluated on dialysis.   Patient is tolerating the procedure well.   T(C): 36.9 (18 @ 10:46), Max: 36.9 (08-10-18 @ 15:54)  HR: 98 (18 @ 10:46) (81 - 106)  BP: 96/67 (18 @ 10:46) (86/55 - 105/71)  Continue dialysis:   Dialyzer:  Revaclear 300        QB:  450 ml.,      QD: 500ml.,  Goal UF  1-1.5 L  over  3 Hours,      Cleared for discharge,

## 2018-08-11 NOTE — PROGRESS NOTE ADULT - PROVIDER SPECIALTY LIST ADULT
Cardiology
Hospitalist
Intervent Cardiology
Intervent Cardiology
MICU
Nephrology
Vascular Surgery
MICU
Nephrology
Nephrology
Hospitalist
Hospitalist

## 2018-08-12 NOTE — CHART NOTE - NSCHARTNOTEFT_GEN_A_CORE
EXCUSAL NOTE FOR TRAVEL    Mr. Foster was hospitalized at [facility] with acute illness from August 7-11 2018 and due to medical reasons it is strongly not advised that he travel at this time. Please excuse Mr. Foster from any travel expenses.     Thank you,     Dr. Griselda Naylor  Department of Medicine Hospitalist  [address] [address]  [phone]

## 2018-08-29 ENCOUNTER — APPOINTMENT (OUTPATIENT)
Dept: OTOLARYNGOLOGY | Facility: CLINIC | Age: 73
End: 2018-08-29

## 2018-09-28 ENCOUNTER — APPOINTMENT (OUTPATIENT)
Dept: NEUROLOGY | Facility: CLINIC | Age: 73
End: 2018-09-28
Payer: MEDICARE

## 2018-09-28 VITALS
DIASTOLIC BLOOD PRESSURE: 80 MMHG | WEIGHT: 220 LBS | HEIGHT: 66 IN | BODY MASS INDEX: 35.36 KG/M2 | SYSTOLIC BLOOD PRESSURE: 108 MMHG

## 2018-09-28 PROCEDURE — 99204 OFFICE O/P NEW MOD 45 MIN: CPT

## 2018-10-01 ENCOUNTER — NON-APPOINTMENT (OUTPATIENT)
Age: 73
End: 2018-10-01

## 2018-10-01 ENCOUNTER — APPOINTMENT (OUTPATIENT)
Dept: CARDIOLOGY | Facility: CLINIC | Age: 73
End: 2018-10-01
Payer: MEDICARE

## 2018-10-01 VITALS
SYSTOLIC BLOOD PRESSURE: 90 MMHG | DIASTOLIC BLOOD PRESSURE: 50 MMHG | HEART RATE: 81 BPM | WEIGHT: 222 LBS | BODY MASS INDEX: 35.83 KG/M2 | OXYGEN SATURATION: 98 %

## 2018-10-01 VITALS — DIASTOLIC BLOOD PRESSURE: 60 MMHG | SYSTOLIC BLOOD PRESSURE: 100 MMHG

## 2018-10-01 DIAGNOSIS — I95.1 ORTHOSTATIC HYPOTENSION: ICD-10-CM

## 2018-10-01 DIAGNOSIS — M54.16 RADICULOPATHY, LUMBAR REGION: ICD-10-CM

## 2018-10-01 PROCEDURE — 99214 OFFICE O/P EST MOD 30 MIN: CPT

## 2018-10-01 PROCEDURE — 93000 ELECTROCARDIOGRAM COMPLETE: CPT

## 2018-10-04 ENCOUNTER — TRANSCRIPTION ENCOUNTER (OUTPATIENT)
Age: 73
End: 2018-10-04

## 2018-10-05 ENCOUNTER — OUTPATIENT (OUTPATIENT)
Dept: OUTPATIENT SERVICES | Facility: HOSPITAL | Age: 73
LOS: 1 days | End: 2018-10-05
Payer: MEDICARE

## 2018-10-05 DIAGNOSIS — M46.1 SACROILIITIS, NOT ELSEWHERE CLASSIFIED: ICD-10-CM

## 2018-10-05 DIAGNOSIS — Q20.8 OTHER CONGENITAL MALFORMATIONS OF CARDIAC CHAMBERS AND CONNECTIONS: Chronic | ICD-10-CM

## 2018-10-05 DIAGNOSIS — Z90.5 ACQUIRED ABSENCE OF KIDNEY: Chronic | ICD-10-CM

## 2018-10-05 DIAGNOSIS — I77.0 ARTERIOVENOUS FISTULA, ACQUIRED: Chronic | ICD-10-CM

## 2018-10-05 DIAGNOSIS — Z94.0 KIDNEY TRANSPLANT STATUS: Chronic | ICD-10-CM

## 2018-10-05 LAB — GLUCOSE BLDC GLUCOMTR-MCNC: 72 MG/DL — SIGNIFICANT CHANGE UP (ref 70–99)

## 2018-10-05 PROCEDURE — 82962 GLUCOSE BLOOD TEST: CPT

## 2018-10-05 PROCEDURE — 76000 FLUOROSCOPY <1 HR PHYS/QHP: CPT

## 2018-10-05 PROCEDURE — G0260: CPT | Mod: RT

## 2018-10-31 ENCOUNTER — APPOINTMENT (OUTPATIENT)
Dept: CARDIOLOGY | Facility: CLINIC | Age: 73
End: 2018-10-31
Payer: MEDICARE

## 2018-10-31 ENCOUNTER — NON-APPOINTMENT (OUTPATIENT)
Age: 73
End: 2018-10-31

## 2018-10-31 VITALS — OXYGEN SATURATION: 97 % | HEART RATE: 89 BPM | DIASTOLIC BLOOD PRESSURE: 70 MMHG | SYSTOLIC BLOOD PRESSURE: 106 MMHG

## 2018-10-31 PROCEDURE — 99215 OFFICE O/P EST HI 40 MIN: CPT

## 2018-10-31 PROCEDURE — 93000 ELECTROCARDIOGRAM COMPLETE: CPT

## 2018-11-07 ENCOUNTER — APPOINTMENT (OUTPATIENT)
Dept: CARDIOLOGY | Facility: CLINIC | Age: 73
End: 2018-11-07
Payer: MEDICARE

## 2018-11-07 PROCEDURE — 93922 UPR/L XTREMITY ART 2 LEVELS: CPT

## 2018-11-07 PROCEDURE — 93306 TTE W/DOPPLER COMPLETE: CPT

## 2018-11-15 NOTE — H&P PST ADULT - NSSUBSTANCEUSE_GEN_ALL_CORE_SD
caffeine

## 2019-02-06 ENCOUNTER — NON-APPOINTMENT (OUTPATIENT)
Age: 74
End: 2019-02-06

## 2019-02-06 ENCOUNTER — APPOINTMENT (OUTPATIENT)
Dept: CARDIOLOGY | Facility: CLINIC | Age: 74
End: 2019-02-06
Payer: MEDICARE

## 2019-02-06 VITALS
WEIGHT: 213 LBS | OXYGEN SATURATION: 97 % | SYSTOLIC BLOOD PRESSURE: 135 MMHG | DIASTOLIC BLOOD PRESSURE: 80 MMHG | RESPIRATION RATE: 18 BRPM | HEIGHT: 66 IN | HEART RATE: 67 BPM | BODY MASS INDEX: 34.23 KG/M2

## 2019-02-06 DIAGNOSIS — I95.9 HYPOTENSION, UNSPECIFIED: ICD-10-CM

## 2019-02-06 DIAGNOSIS — R68.89 OTHER GENERAL SYMPTOMS AND SIGNS: ICD-10-CM

## 2019-02-06 DIAGNOSIS — K21.9 GASTRO-ESOPHAGEAL REFLUX DISEASE W/OUT ESOPHAGITIS: ICD-10-CM

## 2019-02-06 DIAGNOSIS — T78.40XA ALLERGY, UNSPECIFIED, INITIAL ENCOUNTER: ICD-10-CM

## 2019-02-06 PROCEDURE — 99215 OFFICE O/P EST HI 40 MIN: CPT

## 2019-02-06 PROCEDURE — 93000 ELECTROCARDIOGRAM COMPLETE: CPT

## 2019-02-06 RX ORDER — MIDODRINE HYDROCHLORIDE 10 MG/1
10 TABLET ORAL 3 TIMES DAILY
Refills: 0 | Status: DISCONTINUED | COMMUNITY
End: 2019-02-06

## 2019-02-06 RX ORDER — GEMFIBROZIL 600 MG/1
600 TABLET, FILM COATED ORAL DAILY
Refills: 0 | Status: ACTIVE | COMMUNITY

## 2019-02-06 RX ORDER — LORATADINE 10 MG/1
10 TABLET ORAL
Refills: 0 | Status: DISCONTINUED | COMMUNITY
End: 2019-02-06

## 2019-02-06 NOTE — CARDIOLOGY SUMMARY
[___] : [unfilled] [LVEF ___%] : LVEF [unfilled]% [Moderate] : moderate pulmonary hypertension [Enlarged] : enlarged LA size [None] : no mitral regurgitation

## 2019-02-06 NOTE — DISCUSSION/SUMMARY
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  non cardiac cause.  holter monitor to evaluate for bradycardia./ 72 hr HOLTEr \par 2) chest pain: coronary artery disease stable angina.  2 D echo.   Cannot give her Antianginal duye to low BP and being on midodrine for low BP.  ct ranexa 500 mg Q12.  aspirin. plavix. \par 3) Leg pain: claudication. stable. symptoms. Peripheral vascular disease./  Failed Angioplasty of right common iliac . \par 3) Atrial fibrillation: left renal hematoma and bleeding. s/p watchman.  1 year CANDIDA- no leak.   asa 81  . ct metoprolol 25mg Q12.   rate controlled. \par 4) CAD prevention: Ct statins. lipitor 20 mg daily. : stress test. \par 5) Hypotension: On midodrine. Orthostatic.  incrase midodrine to 15 mg Q8. Compression stiockings once we fix the arteries. \par 5 ) Mild carotid atherosclerosis.

## 2019-02-06 NOTE — HISTORY OF PRESENT ILLNESS
[FreeTextEntry1] : chest tightness, dyspnea, palpitations, atrial fibrillation\par \par feels tired. fatigue and tired. \par getting hemodialyses regularly.  dizziness. Lightheadedness. \par \par \par old noteL: patient complain of cehst tightness. Also complains bilateral thigh pain and numbness and leg pain. states the right side is worse. \par patient also went to back pain med. he got injection in the back by pain management. . feels good for 2-3 day with back pain. Does not take away the numbness and pain in the legs.\par he has iliac stenosis and tried intervention but failed twice with complication.\par \par \par \par old note: was diagnosed with LE stenosis. was diagnose with right ilac stenosis and left popliteal disease\par No intervention done.\par c/o cough and throat pain when he eats food. \par \par old note: s/p spinal injections. no surgery done. \par NO headaches. No chest pain. + dyspnea. decrease exercise capacity due to leg pain.\par no dizziness. \par \par old note/l :  c./c: here for pain management cleerance.\par here for Pre-operative cardiovascular risk evaluation and management for epidurakl injection for pain management, \par No chest stephania. no dyspnea. no headaches. No syncope. No paltpiations. \par \par old note: \par No chest pain. no dyspnea. 0.5 block activity.  no dyspnea. no dizzines. no syncope. \par \par old note: no chest pain. no dyspnea. no lightheadness. \par here for epidual injection  want to know if ok to hold aspirin and plavix. \par no chest pain. s/p watchman. 45 day post wathcmn :3.5 mm leak. 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  Anuric, AV graft/fistula right arm.\par Here for preoperative cardiovascular risk evaluation and management.

## 2019-02-06 NOTE — PHYSICAL EXAM
[General Appearance - Well Developed] : well developed [Normal Appearance] : normal appearance [Well Groomed] : well groomed [General Appearance - Well Nourished] : well nourished [No Deformities] : no deformities [General Appearance - In No Acute Distress] : no acute distress [Normal Conjunctiva] : the conjunctiva exhibited no abnormalities [Eyelids - No Xanthelasma] : the eyelids demonstrated no xanthelasmas [Normal Oral Mucosa] : normal oral mucosa [No Oral Pallor] : no oral pallor [No Oral Cyanosis] : no oral cyanosis [Normal Jugular Venous A Waves Present] : normal jugular venous A waves present [Normal Jugular Venous V Waves Present] : normal jugular venous V waves present [No Jugular Venous Campos A Waves] : no jugular venous campos A waves [Respiration, Rhythm And Depth] : normal respiratory rhythm and effort [Exaggerated Use Of Accessory Muscles For Inspiration] : no accessory muscle use [Auscultation Breath Sounds / Voice Sounds] : lungs were clear to auscultation bilaterally [Heart Rate And Rhythm] : heart rate and rhythm were normal [Heart Sounds] : normal S1 and S2 [Murmurs] : no murmurs present [Abdomen Soft] : soft [Abdomen Tenderness] : non-tender [Abdomen Mass (___ Cm)] : no abdominal mass palpated [Abnormal Walk] : normal gait [Gait - Sufficient For Exercise Testing] : the gait was sufficient for exercise testing [Nail Clubbing] : no clubbing of the fingernails [Cyanosis, Localized] : no localized cyanosis [Petechial Hemorrhages (___cm)] : no petechial hemorrhages [FreeTextEntry1] : bilateral 2 cm groin hematoma. left groin tenderness to touch.  [Skin Color & Pigmentation] : normal skin color and pigmentation [] : no rash [No Venous Stasis] : no venous stasis [Skin Lesions] : no skin lesions [No Skin Ulcers] : no skin ulcer [No Xanthoma] : no  xanthoma was observed [Oriented To Time, Place, And Person] : oriented to person, place, and time [Affect] : the affect was normal [Mood] : the mood was normal [No Anxiety] : not feeling anxious

## 2019-02-06 NOTE — REASON FOR VISIT
[Follow-Up - Clinic] : a clinic follow-up of [FreeTextEntry2] : chest tightness, dyspnea, palpitations, atrial fibrillation [FreeTextEntry1] : chest tightness, dyspnea, palpitations, atrial fibrillation

## 2019-02-11 ENCOUNTER — INPATIENT (INPATIENT)
Facility: HOSPITAL | Age: 74
LOS: 2 days | Discharge: HOME | DRG: 82 | End: 2019-02-14
Attending: STUDENT IN AN ORGANIZED HEALTH CARE EDUCATION/TRAINING PROGRAM | Admitting: STUDENT IN AN ORGANIZED HEALTH CARE EDUCATION/TRAINING PROGRAM
Payer: MEDICARE

## 2019-02-11 VITALS
HEIGHT: 69 IN | SYSTOLIC BLOOD PRESSURE: 130 MMHG | DIASTOLIC BLOOD PRESSURE: 74 MMHG | WEIGHT: 225.09 LBS | OXYGEN SATURATION: 97 % | RESPIRATION RATE: 18 BRPM | HEART RATE: 76 BPM | TEMPERATURE: 99 F

## 2019-02-11 DIAGNOSIS — S06.6X9A TRAUMATIC SUBARACHNOID HEMORRHAGE WITH LOSS OF CONSCIOUSNESS OF UNSPECIFIED DURATION, INITIAL ENCOUNTER: ICD-10-CM

## 2019-02-11 DIAGNOSIS — Z94.0 KIDNEY TRANSPLANT STATUS: Chronic | ICD-10-CM

## 2019-02-11 DIAGNOSIS — Q20.8 OTHER CONGENITAL MALFORMATIONS OF CARDIAC CHAMBERS AND CONNECTIONS: Chronic | ICD-10-CM

## 2019-02-11 DIAGNOSIS — I77.0 ARTERIOVENOUS FISTULA, ACQUIRED: Chronic | ICD-10-CM

## 2019-02-11 DIAGNOSIS — Z90.5 ACQUIRED ABSENCE OF KIDNEY: Chronic | ICD-10-CM

## 2019-02-11 LAB
ALBUMIN SERPL ELPH-MCNC: 4.1 G/DL — SIGNIFICANT CHANGE UP (ref 3.3–5.2)
ALP SERPL-CCNC: 135 U/L — HIGH (ref 40–120)
ALT FLD-CCNC: 10 U/L — SIGNIFICANT CHANGE UP
ANION GAP SERPL CALC-SCNC: 20 MMOL/L — HIGH (ref 5–17)
ANISOCYTOSIS BLD QL: SLIGHT — SIGNIFICANT CHANGE UP
APTT BLD: 36.9 SEC — HIGH (ref 27.5–36.3)
AST SERPL-CCNC: 22 U/L — SIGNIFICANT CHANGE UP
BASOPHILS # BLD AUTO: 0 K/UL — SIGNIFICANT CHANGE UP (ref 0–0.2)
BASOPHILS NFR BLD AUTO: 0.7 % — SIGNIFICANT CHANGE UP (ref 0–2)
BILIRUB SERPL-MCNC: 0.5 MG/DL — SIGNIFICANT CHANGE UP (ref 0.4–2)
BLD GP AB SCN SERPL QL: SIGNIFICANT CHANGE UP
BUN SERPL-MCNC: 47 MG/DL — HIGH (ref 8–20)
CALCIUM SERPL-MCNC: 7.9 MG/DL — LOW (ref 8.6–10.2)
CHLORIDE SERPL-SCNC: 96 MMOL/L — LOW (ref 98–107)
CO2 SERPL-SCNC: 25 MMOL/L — SIGNIFICANT CHANGE UP (ref 22–29)
CREAT SERPL-MCNC: 6.89 MG/DL — HIGH (ref 0.5–1.3)
EOSINOPHIL # BLD AUTO: 0.2 K/UL — SIGNIFICANT CHANGE UP (ref 0–0.5)
EOSINOPHIL NFR BLD AUTO: 4.5 % — SIGNIFICANT CHANGE UP (ref 0–5)
GLUCOSE SERPL-MCNC: 157 MG/DL — HIGH (ref 70–115)
HCT VFR BLD CALC: 25.8 % — LOW (ref 42–52)
HGB BLD-MCNC: 8.3 G/DL — LOW (ref 14–18)
INR BLD: 1.27 RATIO — HIGH (ref 0.88–1.16)
LG PLATELETS BLD QL AUTO: SLIGHT — SIGNIFICANT CHANGE UP
LYMPHOCYTES # BLD AUTO: 0.8 K/UL — LOW (ref 1–4.8)
LYMPHOCYTES # BLD AUTO: 17.2 % — LOW (ref 20–55)
MACROCYTES BLD QL: SIGNIFICANT CHANGE UP
MANUAL DIF COMMENT BLD-IMP: SIGNIFICANT CHANGE UP
MCHC RBC-ENTMCNC: 32.2 G/DL — SIGNIFICANT CHANGE UP (ref 32–36)
MCHC RBC-ENTMCNC: 35.3 PG — HIGH (ref 27–31)
MCV RBC AUTO: 109.8 FL — HIGH (ref 80–94)
MONOCYTES # BLD AUTO: 0.4 K/UL — SIGNIFICANT CHANGE UP (ref 0–0.8)
MONOCYTES NFR BLD AUTO: 9.1 % — SIGNIFICANT CHANGE UP (ref 3–10)
NEUTROPHILS # BLD AUTO: 3 K/UL — SIGNIFICANT CHANGE UP (ref 1.8–8)
NEUTROPHILS NFR BLD AUTO: 68 % — SIGNIFICANT CHANGE UP (ref 37–73)
PA ADP PRP-ACNC: 295 PRU — SIGNIFICANT CHANGE UP (ref 180–376)
PLAT MORPH BLD: NORMAL — SIGNIFICANT CHANGE UP
PLATELET # BLD AUTO: 191 K/UL — SIGNIFICANT CHANGE UP (ref 150–400)
POLYCHROMASIA BLD QL SMEAR: SLIGHT — SIGNIFICANT CHANGE UP
POTASSIUM SERPL-MCNC: 5.1 MMOL/L — SIGNIFICANT CHANGE UP (ref 3.5–5.3)
POTASSIUM SERPL-SCNC: 5.1 MMOL/L — SIGNIFICANT CHANGE UP (ref 3.5–5.3)
PROT SERPL-MCNC: 7.8 G/DL — SIGNIFICANT CHANGE UP (ref 6.6–8.7)
PROTHROM AB SERPL-ACNC: 14.7 SEC — HIGH (ref 10–12.9)
RBC # BLD: 2.35 M/UL — LOW (ref 4.6–6.2)
RBC # FLD: 19.5 % — HIGH (ref 11–15.6)
RBC BLD AUTO: SIGNIFICANT CHANGE UP
SODIUM SERPL-SCNC: 141 MMOL/L — SIGNIFICANT CHANGE UP (ref 135–145)
TYPE + AB SCN PNL BLD: SIGNIFICANT CHANGE UP
WBC # BLD: 4.4 K/UL — LOW (ref 4.8–10.8)
WBC # FLD AUTO: 4.4 K/UL — LOW (ref 4.8–10.8)

## 2019-02-11 PROCEDURE — 70450 CT HEAD/BRAIN W/O DYE: CPT | Mod: 26

## 2019-02-11 PROCEDURE — 71045 X-RAY EXAM CHEST 1 VIEW: CPT | Mod: 26

## 2019-02-11 PROCEDURE — 99221 1ST HOSP IP/OBS SF/LOW 40: CPT

## 2019-02-11 PROCEDURE — 93010 ELECTROCARDIOGRAM REPORT: CPT

## 2019-02-11 PROCEDURE — 99291 CRITICAL CARE FIRST HOUR: CPT

## 2019-02-11 RX ORDER — DESMOPRESSIN ACETATE 0.1 MG/1
300 TABLET ORAL ONCE
Qty: 0 | Refills: 0 | Status: DISCONTINUED | OUTPATIENT
Start: 2019-02-11 | End: 2019-02-11

## 2019-02-11 RX ORDER — PROTHROMBIN COMPLEX CONCENTRATE (HUMAN) 25.5; 16.5; 24; 22; 22; 26 [IU]/ML; [IU]/ML; [IU]/ML; [IU]/ML; [IU]/ML; [IU]/ML
1500 POWDER, FOR SOLUTION INTRAVENOUS ONCE
Qty: 0 | Refills: 0 | Status: DISCONTINUED | OUTPATIENT
Start: 2019-02-11 | End: 2019-02-11

## 2019-02-11 RX ORDER — DESMOPRESSIN ACETATE 0.1 MG/1
30 TABLET ORAL ONCE
Qty: 0 | Refills: 0 | Status: COMPLETED | OUTPATIENT
Start: 2019-02-11 | End: 2019-02-11

## 2019-02-11 RX ADMIN — DESMOPRESSIN ACETATE 30 MICROGRAM(S): 0.1 TABLET ORAL at 23:11

## 2019-02-11 RX ADMIN — DESMOPRESSIN ACETATE 230 MICROGRAM(S): 0.1 TABLET ORAL at 22:48

## 2019-02-11 NOTE — ED PROVIDER NOTE - CRITICAL CARE PROVIDED
consultation with other physicians/consult w/ pt's family directly relating to pts condition/documentation/direct patient care (not related to procedure)/interpretation of diagnostic studies/additional history taking

## 2019-02-11 NOTE — CONSULT NOTE ADULT - ASSESSMENT
Trina s/p fall 2/10  + LOC  + plavix/ comadin/ ASA   	  case d/w Dr. Vegas and images reviewed   recommend to hold all AP/AC meds at this time   no immediate NSx intervention indicated at this time  neuro-checks q1 HR and ICU admit  repeat CTB w/o contrast in AM, sooner if AMS noted   recommend neurology consult   HOB > 30 degrees  hold all AC/ AP ASA  SBP < 150mmHg Trina s/p fall 2/10  + LOC  + plavix/ comadin/ ASA   	  case d/w Dr. Vegas and images reviewed   recommend to hold all AP/AC meds at this time   if need to reverse AC agents, consider Vitamin K.  no immediate NSx intervention indicated at this time  neuro-checks q1 HR and ICU admit  repeat CTB w/o contrast in AM, sooner if AMS noted   recommend neurology consult   HOB > 30 degrees  hold all AC/ AP ASA  SBP < 150mmHg      case and plan d/w Dr Mann at 22:30 s/p fall 2/10 w Acute multifocal subarachnoid bleed is noted in the right temporal occipital and left frontal regions. No local mass effect or midline shift is evident.  + LOC  + plavix/ comadin/ ASA   	  case d/w Dr. Vegas and images reviewed   recommend to hold all AP/AC meds at this time   if need to reverse AC agents, consider Vitamin K.  no immediate NSx intervention indicated at this time  neuro-checks q1 HR and ICU admit  repeat CTB w/o contrast in AM, sooner if AMS noted   recommend neurology consult   HOB > 30 degrees  hold all AC/ AP ASA  SBP < 150mmHg      case and plan d/w Dr Mann at 22:30 s/p fall 2/10 w Acute multifocal subarachnoid bleed is noted in the right temporal occipital and left frontal regions. No local mass effect or midline shift is evident.  + LOC  + plavix/ comadin/ ASA   + moderate expressive aphasia  	  case d/w Dr. Vegas and images reviewed   recommend to hold all AP/AC meds at this time   if need to reverse AC agents, consider Vitamin K.  no immediate NSx intervention indicated at this time  neuro-checks q1 HR and ICU admit  repeat CTB w/o contrast in AM, sooner if AMS noted   recommend neurology consult   HOB > 30 degrees  hold all AC/ AP ASA  SBP < 150mmHg      case and plan d/w Dr Mann at 22:30

## 2019-02-11 NOTE — ED ADULT TRIAGE NOTE - CHIEF COMPLAINT QUOTE
patient with progressive slurring of speech for the last 5 days, today patient unable to get words out. as per daughter patient fell 2 nights ago with LOC. MD Mann called to bedside and sent over to CT Scan as a Priority CT.

## 2019-02-11 NOTE — ED PROVIDER NOTE - PHYSICAL EXAMINATION
Const: Awake, alert and oriented. In no acute distress. Well appearing.  HEENT: NC/AT. Moist mucous membranes.  Eyes: No scleral icterus. EOMI.  Neck:. Soft and supple. Full ROM without pain.  Cardiac: Irregular rate and rhythm. +S1/S2. No murmurs. Peripheral pulses 2+ and symmetric. +right upper ext fistula with palpable thrill; +pedal edema b/l.  Resp: Speaking in full sentences. No evidence of respiratory distress. No wheezes, rales or rhonchi.  Abd: Soft, non-tender, non-distended. Normal bowel sounds in all 4 quadrants. No guarding or rebound. stigmata of prior abd surgeries  Back: Spine midline and non-tender. No CVAT.  Neuro: Awake and alert; EOMI; PERRLA; normal facial symmetry; +expressive aphasia; no dysarthria; normal strength and sensation x 4 ext; +normal heal to shin and finger to nose b/l  Skin: No rashes, abrasions or lacerations.  Lymph: No cervical lymphadenopathy.

## 2019-02-11 NOTE — ED PROVIDER NOTE - OBJECTIVE STATEMENT
72 yo male hx of ESRD T, Th, Sa dialysis; and peripheral vascular disease; on coumadin and plavix; p/w 5 days of progressive difficulty with speech; lives alone, had been slurring words periodically; but declined to come to hospital; yesterday admits that he fell in his bathroom, +likely struck head, endorses LOC for unknown duration, has no recollection of events for several hours; family checked on him last night but had even worse speech today where he is stuttering and unable to get some words out; patient comprehends everything; denies headache, has no other focal complaints

## 2019-02-11 NOTE — ED PROVIDER NOTE - MEDICAL DECISION MAKING DETAILS
isolated deficit of aphasia; otherwise unremarkable exam; trauma and neurosurg contacted; recommendations appreciated; will admit to SICU; will need routine dialysis while inpatient

## 2019-02-11 NOTE — CONSULT NOTE ADULT - SUBJECTIVE AND OBJECTIVE BOX
full consult to follow Patient is a 73y old  Male who presents with a chief complaint of headache.    HPI: pt is a right hand dominant male, seen in ED s/p fall yesterday, LOC in the BR and fall, pt states his headache didn't improve and pt developed expressive aphasia at which point family convinced pt to seek medical attention.   pt seen in ED in bed, Congolese native speaking, AAOx3 and cooperative.  + LOC   +  trauma    pt c/o +headache  7/10 on the pain scale, mostly in the frontal lobes    - Nausea / - Vomiting   Right hand dominant   denies new/ worsening weakness  denies new/ worsening paresthesias numbness   denies  new/ worsening visual changes  denies C- Spine pain, - collar noted at the time of the exam   denies T/LS  Spine pain  denies Bowel/ Bladder dysfunction     GCS: 15  Eye response (E)4  Verbal response (V)5  Motor response (M)6    traumatic atraumatic SAH - Hunt & Valera: 1  1 (I) - Asymptomatic, mild headache, slight nuchal rigidity        PAST MEDICAL & SURGICAL HISTORY:  Persistent atrial fibrillation  Carotid artery disease: mild  GERD (gastroesophageal reflux disease)  Low glucose level: Patient has h/o low blood sugars at times  Palpitations  Leg pain, bilateral: R&gt;L at rest and with short distant ambulation  Imbalance: secondary to PVD Uses cane  Obesity (BMI 30.0-34.9)  Claudication in peripheral vascular disease: R&gt;L  Right common iliac or ostial external iliac per June 2018 U/S  Left popliteal 50-75 and EDUIN stenosis  SUSANA 0.8  Kidney problem: spontanious renal bleed while on coumadin  c/b  renal  hematome  with  LT  nephrectomy  Hypotension: treated  with Midodrine  Tiredness  Renal hematoma, left  Hypercholesteremia  ESRD (end stage renal disease): on  hemodialysis  3  x  weekly.,  H/O  left  nephrectomy after  renal  bleed  Dyslipidemia  Dizziness  DM (diabetes mellitus)  Daytime sleepiness  Renal transplant recipient  AV fistula: right lower arm  Abnormality of left atrial appendage: WATCHMAN  LEFT ATRIAL APPENDAGE CLOSUIRE   Jan. 30 2017  History of unilateral nephrectomy: left  nephrectomy      SOCIAL HISTORY: + EtOH, - drugs    FAMILY HISTORY:  No pertinent family history in first degree relatives      ROS:  CONSTITUTIONAL: No fever, weight loss  EYES: No eye pain, visual disturbances, or discharge  ENMT:  No difficulty hearing, tinnitus, vertigo; No sinus or throat pain  NECK: No pain or stiffness  RESPIRATORY: No cough, wheezing, chills or hemoptysis; No shortness of breath  CARDIOVASCULAR: No chest pain, palpitations, dizziness, or leg swelling  GASTROINTESTINAL: No abdominal or epigastric pain. No nausea, vomiting, or hematemesis; No diarrhea or constipation.  GENITOURINARY: No dysuria, frequency, hematuria, or incontinence  NEUROLOGICAL: No memory loss, loss of strength, numbness, or tremors  SKIN: No itching, burning, rashes, or lesions, + posterior occipital tenderness/ no hematoma   MUSCULOSKELETAL: No joint pain or swelling; No muscle, back, or extremity pain        MEDICATIONS  (STANDING):    MEDICATIONS  (PRN):  Allergies    No Known Allergies  Intolerances      Vital Signs Last 24 Hrs  T(C): 37.1 (11 Feb 2019 21:22), Max: 37.1 (11 Feb 2019 21:22)  T(F): 98.7 (11 Feb 2019 21:22), Max: 98.7 (11 Feb 2019 21:22)  HR: 74 (11 Feb 2019 22:26) (74 - 76)  BP: 121/69 (11 Feb 2019 22:26) (121/69 - 140/74)  BP(mean): --  RR: 20 (11 Feb 2019 22:26) (18 - 20)  SpO2: 97% (11 Feb 2019 22:26) (97% - 97%)    BMI: 33.2    PHYSICAL EXAM:  GENERAL: NAD, well-groomed, well-developed, AAOx3 and very cooperative  HEAD:  Atraumatic, Normocephalic, no palpable step-off appreciated on palpation  EYES: b/l EOMI, surgical PERRL, conjunctiva and sclera clear, s/p IOLRx b/l   NECK: Supple, nontender to palpation  + FROM w no muscular soreness reported,   TS/LS: nontender to palpation midline or paraspinal muscles b/l,   NERVOUS SYSTEM:  Alert & Oriented X3, speech is clear and fluent, no dysarthria appreciated. Good concentration & cooperative; Motor Strength 5/5 B/L upper and lower extremities, sensory is at baseline and symmetric b/l; No pronators or ankle clonus appreciated b/l, cerebellar signs grossly intact b/l. CN II-XII grossly intact b/l and symmetric, ? mild left sided tongue deviation; EXTREMITIES: No clubbing, cyanosis, or edema,  CHEST/LUNG: No rales, rhonchi, wheezing,  HEART: Regular rate and rhythm  ABDOMEN: Soft, Nontender, Nondistended;   SKIN: No rashes or lesions                          8.3    4.4   )-----------( 191      ( 11 Feb 2019 21:45 )             25.8     02-11    141  |  96<L>  |  47.0<H>  ----------------------------<  157<H>  5.1   |  25.0  |  6.89<H>    Ca    7.9<L>      11 Feb 2019 21:45    TPro  7.8  /  Alb  4.1  /  TBili  0.5  /  DBili  x   /  AST  22  /  ALT  10  /  AlkPhos  135<H>  02-11    PT/INR - ( 11 Feb 2019 21:45 )   PT: 14.7 sec;   INR: 1.27 ratio    PTT - ( 11 Feb 2019 21:45 )  PTT:36.9 sec    LIVER FUNCTIONS - ( 11 Feb 2019 21:45 )  Alb: 4.1 g/dL / Pro: 7.8 g/dL / ALK PHOS: 135 U/L / ALT: 10 U/L / AST: 22 U/L / GGT: x             RADIOLOGY & ADDITIONAL STUDIES:  < from: CT Head No Cont (02.11.19 @ 21:28) >  EXAM:  CT BRAIN                          PROCEDURE DATE:  02/11/2019    INTERPRETATION:  HISTORY: Expressive aphasia.  COMPARISON: CT head 11/8/2017  TECHNIQUE: Axial noncontrast CT images from the skull base to the vertex were obtained and submitted for interpretation. Coronal and sagittal reformatted images were performed. Bone and soft tissue windows were evaluated.    FINDINGS:    Acute multifocal subarachnoid bleed is noted in the right temporal occipital and left frontal regions. No local mass effect or midline shift is evident.  Mild chronic microvascular ischemic changes are noted. Atheromatous vascular calcifications along the carotid siphons.  Ventricles, sulci, and cisterns are normal in size for the patient's age without hydrocephalus. Basal cisterns are patent.   Small left maxillary sinus fluid level is partially imaged. Remaining sinuses are clear. Small left mastoid effusion is noted. Right mastoid is clear. Calvarium is intact. Bilateral lens surgery.    IMPRESSION:   Acute multifocal subarachnoid bleed without local mass effect or shift.  Findings were discussed with Dr. Mann at 9:40 PM on 2/11/2019 who indicated that the communication was understood.    KILEY RUBALCAVA M.D., ATTENDING RADIOLOGIST  This document has been electronically signed. Feb 11 2019  9:51PM  < end of copied text >        Time Spent with patient/ education: 30 mins Patient is a 73y old  Male who presents with a chief complaint of headache.    HPI: pt is a right hand dominant male, seen in ED s/p fall yesterday, LOC in the BR and fall, pt states his headache didn't improve and pt developed expressive aphasia at which point family convinced pt to seek medical attention.   pt seen in ED in bed, [place]inean native speaking, AAOx3 and cooperative.  + LOC   +  trauma    pt c/o +headache  7/10 on the pain scale, mostly in the frontal lobes   + moderate expressive aphasia    - Nausea / - Vomiting   Right hand dominant   denies new/ worsening weakness  denies new/ worsening paresthesias numbness   denies  new/ worsening visual changes  denies C- Spine pain, - collar noted at the time of the exam   denies T/LS  Spine pain  denies Bowel/ Bladder dysfunction     GCS: 15  Eye response (E)4  Verbal response (V)5  Motor response (M)6    traumatic atraumatic SAH - Hunt & Valera: 1  1 (I) - Asymptomatic, mild headache, slight nuchal rigidity        PAST MEDICAL & SURGICAL HISTORY:  Persistent atrial fibrillation  Carotid artery disease: mild  GERD (gastroesophageal reflux disease)  Low glucose level: Patient has h/o low blood sugars at times  Palpitations  Leg pain, bilateral: R&gt;L at rest and with short distant ambulation  Imbalance: secondary to PVD Uses cane  Obesity (BMI 30.0-34.9)  Claudication in peripheral vascular disease: R&gt;L  Right common iliac or ostial external iliac per June 2018 U/S  Left popliteal 50-75 and EDUIN stenosis  SUSANA 0.8  Kidney problem: spontanious renal bleed while on coumadin  c/b  renal  hematome  with  LT  nephrectomy  Hypotension: treated  with Midodrine  Tiredness  Renal hematoma, left  Hypercholesteremia  ESRD (end stage renal disease): on  hemodialysis  3  x  weekly.,  H/O  left  nephrectomy after  renal  bleed  Dyslipidemia  Dizziness  DM (diabetes mellitus)  Daytime sleepiness  Renal transplant recipient  AV fistula: right lower arm  Abnormality of left atrial appendage: WATCHMAN  LEFT ATRIAL APPENDAGE CLOSUIRE   Jan. 30 2017  History of unilateral nephrectomy: left  nephrectomy      SOCIAL HISTORY: + EtOH, - drugs    FAMILY HISTORY:  No pertinent family history in first degree relatives      ROS:  CONSTITUTIONAL: No fever, weight loss  EYES: No eye pain, visual disturbances, or discharge  ENMT:  No difficulty hearing, tinnitus, vertigo; No sinus or throat pain  NECK: No pain or stiffness  RESPIRATORY: No cough, wheezing, chills or hemoptysis; No shortness of breath  CARDIOVASCULAR: No chest pain, palpitations, dizziness, or leg swelling  GASTROINTESTINAL: No abdominal or epigastric pain. No nausea, vomiting, or hematemesis; No diarrhea or constipation.  GENITOURINARY: No dysuria, frequency, hematuria, or incontinence  NEUROLOGICAL: No memory loss, loss of strength, numbness, or tremors  SKIN: No itching, burning, rashes, or lesions, + posterior occipital tenderness/ no hematoma   MUSCULOSKELETAL: No joint pain or swelling; No muscle, back, or extremity pain        MEDICATIONS  (STANDING):    MEDICATIONS  (PRN):  Allergies    No Known Allergies  Intolerances      Vital Signs Last 24 Hrs  T(C): 37.1 (11 Feb 2019 21:22), Max: 37.1 (11 Feb 2019 21:22)  T(F): 98.7 (11 Feb 2019 21:22), Max: 98.7 (11 Feb 2019 21:22)  HR: 74 (11 Feb 2019 22:26) (74 - 76)  BP: 121/69 (11 Feb 2019 22:26) (121/69 - 140/74)  BP(mean): --  RR: 20 (11 Feb 2019 22:26) (18 - 20)  SpO2: 97% (11 Feb 2019 22:26) (97% - 97%)    BMI: 33.2    PHYSICAL EXAM:  GENERAL: NAD, well-groomed, well-developed, AAOx3 and very cooperative  HEAD:  Atraumatic, Normocephalic, no palpable step-off appreciated on palpation  EYES: b/l EOMI, surgical PERRL, conjunctiva and sclera clear, s/p IOLRx b/l   NECK: Supple, nontender to palpation  + FROM w no muscular soreness reported,   TS/LS: nontender to palpation midline or paraspinal muscles b/l,   NERVOUS SYSTEM:  Alert & Oriented X3, + moderate expressive aphasia. Good concentration & cooperative; Motor Strength 5/5 B/L upper and lower extremities, sensory is at baseline and symmetric b/l; No pronators or ankle clonus appreciated b/l, cerebellar signs grossly intact b/l. CN II-XII grossly intact b/l and symmetric, ? mild left sided tongue deviation; EXTREMITIES: No clubbing, cyanosis, or edema,  CHEST/LUNG: No rales, rhonchi, wheezing,  HEART: Regular rate and rhythm  ABDOMEN: Soft, Nontender, Nondistended;   SKIN: No rashes or lesions                          8.3    4.4   )-----------( 191      ( 11 Feb 2019 21:45 )             25.8     02-11    141  |  96<L>  |  47.0<H>  ----------------------------<  157<H>  5.1   |  25.0  |  6.89<H>    Ca    7.9<L>      11 Feb 2019 21:45    TPro  7.8  /  Alb  4.1  /  TBili  0.5  /  DBili  x   /  AST  22  /  ALT  10  /  AlkPhos  135<H>  02-11    PT/INR - ( 11 Feb 2019 21:45 )   PT: 14.7 sec;   INR: 1.27 ratio    PTT - ( 11 Feb 2019 21:45 )  PTT:36.9 sec    LIVER FUNCTIONS - ( 11 Feb 2019 21:45 )  Alb: 4.1 g/dL / Pro: 7.8 g/dL / ALK PHOS: 135 U/L / ALT: 10 U/L / AST: 22 U/L / GGT: x             RADIOLOGY & ADDITIONAL STUDIES:  < from: CT Head No Cont (02.11.19 @ 21:28) >  EXAM:  CT BRAIN                          PROCEDURE DATE:  02/11/2019    INTERPRETATION:  HISTORY: Expressive aphasia.  COMPARISON: CT head 11/8/2017  TECHNIQUE: Axial noncontrast CT images from the skull base to the vertex were obtained and submitted for interpretation. Coronal and sagittal reformatted images were performed. Bone and soft tissue windows were evaluated.    FINDINGS:    Acute multifocal subarachnoid bleed is noted in the right temporal occipital and left frontal regions. No local mass effect or midline shift is evident.  Mild chronic microvascular ischemic changes are noted. Atheromatous vascular calcifications along the carotid siphons.  Ventricles, sulci, and cisterns are normal in size for the patient's age without hydrocephalus. Basal cisterns are patent.   Small left maxillary sinus fluid level is partially imaged. Remaining sinuses are clear. Small left mastoid effusion is noted. Right mastoid is clear. Calvarium is intact. Bilateral lens surgery.    IMPRESSION:   Acute multifocal subarachnoid bleed without local mass effect or shift.  Findings were discussed with Dr. Mann at 9:40 PM on 2/11/2019 who indicated that the communication was understood.    KILEY RUBALCAVA M.D., ATTENDING RADIOLOGIST  This document has been electronically signed. Feb 11 2019  9:51PM  < end of copied text >        Time Spent with patient/ education: 30 mins

## 2019-02-12 DIAGNOSIS — I77.9 DISORDER OF ARTERIES AND ARTERIOLES, UNSPECIFIED: ICD-10-CM

## 2019-02-12 DIAGNOSIS — N18.6 END STAGE RENAL DISEASE: ICD-10-CM

## 2019-02-12 DIAGNOSIS — I48.1 PERSISTENT ATRIAL FIBRILLATION: ICD-10-CM

## 2019-02-12 DIAGNOSIS — E66.9 OBESITY, UNSPECIFIED: ICD-10-CM

## 2019-02-12 DIAGNOSIS — I73.9 PERIPHERAL VASCULAR DISEASE, UNSPECIFIED: ICD-10-CM

## 2019-02-12 LAB
ALBUMIN SERPL ELPH-MCNC: 4.3 G/DL — SIGNIFICANT CHANGE UP (ref 3.3–5.2)
ANION GAP SERPL CALC-SCNC: 20 MMOL/L — HIGH (ref 5–17)
ANION GAP SERPL CALC-SCNC: 20 MMOL/L — HIGH (ref 5–17)
BASOPHILS # BLD AUTO: 0 K/UL — SIGNIFICANT CHANGE UP (ref 0–0.2)
BASOPHILS NFR BLD AUTO: 0.7 % — SIGNIFICANT CHANGE UP (ref 0–2)
BUN SERPL-MCNC: 52 MG/DL — HIGH (ref 8–20)
BUN SERPL-MCNC: 55 MG/DL — HIGH (ref 8–20)
CALCIUM SERPL-MCNC: 7.9 MG/DL — LOW (ref 8.6–10.2)
CALCIUM SERPL-MCNC: 8 MG/DL — LOW (ref 8.6–10.2)
CHLORIDE SERPL-SCNC: 94 MMOL/L — LOW (ref 98–107)
CHLORIDE SERPL-SCNC: 97 MMOL/L — LOW (ref 98–107)
CO2 SERPL-SCNC: 23 MMOL/L — SIGNIFICANT CHANGE UP (ref 22–29)
CO2 SERPL-SCNC: 24 MMOL/L — SIGNIFICANT CHANGE UP (ref 22–29)
CREAT SERPL-MCNC: 7.17 MG/DL — HIGH (ref 0.5–1.3)
CREAT SERPL-MCNC: 7.57 MG/DL — HIGH (ref 0.5–1.3)
EOSINOPHIL # BLD AUTO: 0.2 K/UL — SIGNIFICANT CHANGE UP (ref 0–0.5)
EOSINOPHIL NFR BLD AUTO: 6.1 % — HIGH (ref 0–5)
GLUCOSE BLDC GLUCOMTR-MCNC: 116 MG/DL — HIGH (ref 70–99)
GLUCOSE BLDC GLUCOMTR-MCNC: 126 MG/DL — HIGH (ref 70–99)
GLUCOSE BLDC GLUCOMTR-MCNC: 83 MG/DL — SIGNIFICANT CHANGE UP (ref 70–99)
GLUCOSE BLDC GLUCOMTR-MCNC: 89 MG/DL — SIGNIFICANT CHANGE UP (ref 70–99)
GLUCOSE BLDC GLUCOMTR-MCNC: 93 MG/DL — SIGNIFICANT CHANGE UP (ref 70–99)
GLUCOSE SERPL-MCNC: 120 MG/DL — HIGH (ref 70–115)
GLUCOSE SERPL-MCNC: 93 MG/DL — SIGNIFICANT CHANGE UP (ref 70–115)
HCT VFR BLD CALC: 24.4 % — LOW (ref 42–52)
HGB BLD-MCNC: 8 G/DL — LOW (ref 14–18)
INR BLD: 1.31 RATIO — HIGH (ref 0.88–1.16)
LYMPHOCYTES # BLD AUTO: 0.6 K/UL — LOW (ref 1–4.8)
LYMPHOCYTES # BLD AUTO: 15.7 % — LOW (ref 20–55)
MAGNESIUM SERPL-MCNC: 1.9 MG/DL — SIGNIFICANT CHANGE UP (ref 1.6–2.6)
MCHC RBC-ENTMCNC: 32.8 G/DL — SIGNIFICANT CHANGE UP (ref 32–36)
MCHC RBC-ENTMCNC: 35.9 PG — HIGH (ref 27–31)
MCV RBC AUTO: 109.4 FL — HIGH (ref 80–94)
MONOCYTES # BLD AUTO: 0.4 K/UL — SIGNIFICANT CHANGE UP (ref 0–0.8)
MONOCYTES NFR BLD AUTO: 9 % — SIGNIFICANT CHANGE UP (ref 3–10)
NEUTROPHILS # BLD AUTO: 2.8 K/UL — SIGNIFICANT CHANGE UP (ref 1.8–8)
NEUTROPHILS NFR BLD AUTO: 68 % — SIGNIFICANT CHANGE UP (ref 37–73)
PHOSPHATE SERPL-MCNC: 3.4 MG/DL — SIGNIFICANT CHANGE UP (ref 2.4–4.7)
PHOSPHATE SERPL-MCNC: 3.6 MG/DL — SIGNIFICANT CHANGE UP (ref 2.4–4.7)
PLATELET # BLD AUTO: 168 K/UL — SIGNIFICANT CHANGE UP (ref 150–400)
POTASSIUM SERPL-MCNC: 5 MMOL/L — SIGNIFICANT CHANGE UP (ref 3.5–5.3)
POTASSIUM SERPL-MCNC: 5.1 MMOL/L — SIGNIFICANT CHANGE UP (ref 3.5–5.3)
POTASSIUM SERPL-SCNC: 5 MMOL/L — SIGNIFICANT CHANGE UP (ref 3.5–5.3)
POTASSIUM SERPL-SCNC: 5.1 MMOL/L — SIGNIFICANT CHANGE UP (ref 3.5–5.3)
PROTHROM AB SERPL-ACNC: 15.2 SEC — HIGH (ref 10–12.9)
RBC # BLD: 2.23 M/UL — LOW (ref 4.6–6.2)
RBC # FLD: 19.7 % — HIGH (ref 11–15.6)
SODIUM SERPL-SCNC: 137 MMOL/L — SIGNIFICANT CHANGE UP (ref 135–145)
SODIUM SERPL-SCNC: 141 MMOL/L — SIGNIFICANT CHANGE UP (ref 135–145)
TROPONIN T SERPL-MCNC: 0.07 NG/ML — HIGH (ref 0–0.06)
WBC # BLD: 4.1 K/UL — LOW (ref 4.8–10.8)
WBC # FLD AUTO: 4.1 K/UL — LOW (ref 4.8–10.8)

## 2019-02-12 PROCEDURE — 93880 EXTRACRANIAL BILAT STUDY: CPT | Mod: 26

## 2019-02-12 PROCEDURE — 99232 SBSQ HOSP IP/OBS MODERATE 35: CPT

## 2019-02-12 PROCEDURE — 90937 HEMODIALYSIS REPEATED EVAL: CPT

## 2019-02-12 PROCEDURE — 95819 EEG AWAKE AND ASLEEP: CPT | Mod: 26

## 2019-02-12 PROCEDURE — 99223 1ST HOSP IP/OBS HIGH 75: CPT

## 2019-02-12 PROCEDURE — 70450 CT HEAD/BRAIN W/O DYE: CPT | Mod: 26

## 2019-02-12 PROCEDURE — 99223 1ST HOSP IP/OBS HIGH 75: CPT | Mod: 25

## 2019-02-12 PROCEDURE — 72125 CT NECK SPINE W/O DYE: CPT | Mod: 26

## 2019-02-12 RX ORDER — CHLORHEXIDINE GLUCONATE 213 G/1000ML
1 SOLUTION TOPICAL DAILY
Qty: 0 | Refills: 0 | Status: DISCONTINUED | OUTPATIENT
Start: 2019-02-12 | End: 2019-02-14

## 2019-02-12 RX ORDER — INSULIN LISPRO 100/ML
VIAL (ML) SUBCUTANEOUS EVERY 6 HOURS
Qty: 0 | Refills: 0 | Status: DISCONTINUED | OUTPATIENT
Start: 2019-02-12 | End: 2019-02-13

## 2019-02-12 RX ORDER — CINACALCET 30 MG/1
30 TABLET, FILM COATED ORAL
Qty: 0 | Refills: 0 | Status: DISCONTINUED | OUTPATIENT
Start: 2019-02-12 | End: 2019-02-12

## 2019-02-12 RX ORDER — DOCUSATE SODIUM 100 MG
100 CAPSULE ORAL DAILY
Qty: 0 | Refills: 0 | Status: DISCONTINUED | OUTPATIENT
Start: 2019-02-12 | End: 2019-02-14

## 2019-02-12 RX ORDER — DEXTROSE 50 % IN WATER 50 %
15 SYRINGE (ML) INTRAVENOUS ONCE
Qty: 0 | Refills: 0 | Status: DISCONTINUED | OUTPATIENT
Start: 2019-02-12 | End: 2019-02-13

## 2019-02-12 RX ORDER — DEXTROSE 50 % IN WATER 50 %
12.5 SYRINGE (ML) INTRAVENOUS ONCE
Qty: 0 | Refills: 0 | Status: DISCONTINUED | OUTPATIENT
Start: 2019-02-12 | End: 2019-02-13

## 2019-02-12 RX ORDER — SENNA PLUS 8.6 MG/1
2 TABLET ORAL AT BEDTIME
Qty: 0 | Refills: 0 | Status: DISCONTINUED | OUTPATIENT
Start: 2019-02-12 | End: 2019-02-14

## 2019-02-12 RX ORDER — LEVETIRACETAM 250 MG/1
500 TABLET, FILM COATED ORAL EVERY 24 HOURS
Qty: 0 | Refills: 0 | Status: DISCONTINUED | OUTPATIENT
Start: 2019-02-12 | End: 2019-02-14

## 2019-02-12 RX ORDER — LEVETIRACETAM 250 MG/1
500 TABLET, FILM COATED ORAL EVERY 12 HOURS
Qty: 0 | Refills: 0 | Status: DISCONTINUED | OUTPATIENT
Start: 2019-02-12 | End: 2019-02-12

## 2019-02-12 RX ORDER — PANTOPRAZOLE SODIUM 20 MG/1
40 TABLET, DELAYED RELEASE ORAL
Qty: 0 | Refills: 0 | Status: DISCONTINUED | OUTPATIENT
Start: 2019-02-12 | End: 2019-02-14

## 2019-02-12 RX ORDER — DEXTROSE 50 % IN WATER 50 %
25 SYRINGE (ML) INTRAVENOUS ONCE
Qty: 0 | Refills: 0 | Status: DISCONTINUED | OUTPATIENT
Start: 2019-02-12 | End: 2019-02-13

## 2019-02-12 RX ORDER — URSODIOL 250 MG/1
300 TABLET, FILM COATED ORAL
Qty: 0 | Refills: 0 | Status: DISCONTINUED | OUTPATIENT
Start: 2019-02-12 | End: 2019-02-14

## 2019-02-12 RX ORDER — SODIUM CHLORIDE 9 MG/ML
1000 INJECTION, SOLUTION INTRAVENOUS
Qty: 0 | Refills: 0 | Status: DISCONTINUED | OUTPATIENT
Start: 2019-02-12 | End: 2019-02-12

## 2019-02-12 RX ORDER — SODIUM CHLORIDE 9 MG/ML
1000 INJECTION INTRAMUSCULAR; INTRAVENOUS; SUBCUTANEOUS
Qty: 0 | Refills: 0 | Status: DISCONTINUED | OUTPATIENT
Start: 2019-02-12 | End: 2019-02-13

## 2019-02-12 RX ORDER — GLUCAGON INJECTION, SOLUTION 0.5 MG/.1ML
1 INJECTION, SOLUTION SUBCUTANEOUS ONCE
Qty: 0 | Refills: 0 | Status: DISCONTINUED | OUTPATIENT
Start: 2019-02-12 | End: 2019-02-13

## 2019-02-12 RX ORDER — LEVETIRACETAM 250 MG/1
1000 TABLET, FILM COATED ORAL ONCE
Qty: 0 | Refills: 0 | Status: COMPLETED | OUTPATIENT
Start: 2019-02-12 | End: 2019-02-12

## 2019-02-12 RX ADMIN — CHLORHEXIDINE GLUCONATE 1 APPLICATION(S): 213 SOLUTION TOPICAL at 12:01

## 2019-02-12 RX ADMIN — URSODIOL 300 MILLIGRAM(S): 250 TABLET, FILM COATED ORAL at 08:51

## 2019-02-12 RX ADMIN — Medication 100 MILLIGRAM(S): at 12:00

## 2019-02-12 RX ADMIN — CINACALCET 30 MILLIGRAM(S): 30 TABLET, FILM COATED ORAL at 05:53

## 2019-02-12 RX ADMIN — SODIUM CHLORIDE 50 MILLILITER(S): 9 INJECTION INTRAMUSCULAR; INTRAVENOUS; SUBCUTANEOUS at 15:27

## 2019-02-12 RX ADMIN — PANTOPRAZOLE SODIUM 40 MILLIGRAM(S): 20 TABLET, DELAYED RELEASE ORAL at 08:34

## 2019-02-12 RX ADMIN — LEVETIRACETAM 400 MILLIGRAM(S): 250 TABLET, FILM COATED ORAL at 08:51

## 2019-02-12 NOTE — CONSULT NOTE ADULT - SUBJECTIVE AND OBJECTIVE BOX
[facility]                                        [facility] [place]                                  Kina Curry, & Ole                                      [address]                                           [place], [place], [address]                                                [phone]        CC: subarachnoid hemorrhage and seizure     HISTORY:  The patient is a 73y Male who presented after a fall the day prior to arrival. He reports that he tripped and fell in the bathroom. He hit his head. There was brief loss of consciousness.  He had some headache afterward and some speech difficulty. This has improved.   He was noted to have some facial twitching and some movements of the arms during which he was awake and aware.   This reportedly has been occurring frequently prior to the fall.     PAST MEDICAL & SURGICAL HISTORY:  Persistent atrial fibrillation  Carotid artery disease: mild  GERD (gastroesophageal reflux disease)  Low glucose level: Patient has h/o low blood sugars at times  Palpitations  Leg pain, bilateral: R&gt;L at rest and with short distant ambulation  Imbalance: secondary to PVD Uses cane  Obesity (BMI 30.0-34.9)  Claudication in peripheral vascular disease: R&gt;L  Right common iliac or ostial external iliac per June 2018 U/S  Left popliteal 50-75 and EDUIN stenosis  SUSANA 0.8  Kidney problem: spontanious renal bleed while on coumadin  c/b  renal  hematome  with  LT  nephrectomy  Hypotension: treated  with Midodrine  Tiredness  Renal hematoma, left  Hypercholesteremia  ESRD (end stage renal disease): on  hemodialysis  3  x  weekly.,  H/O  left  nephrectomy after  renal  bleed  Dyslipidemia  Dizziness  DM (diabetes mellitus)  Daytime sleepiness  Renal transplant recipient  AV fistula: right lower arm  Abnormality of left atrial appendage: WATCHMAN  LEFT ATRIAL APPENDAGE CLOSUIRE   Jan. 30 2017  History of unilateral nephrectomy: left  nephrectomy    MEDICATIONS  (STANDING):  chlorhexidine 2% Cloths 1 Application(s) Topical daily  cinacalcet 30 milliGRAM(s) Oral two times a day  dextrose 5%. 1000 milliLiter(s) (50 mL/Hr) IV Continuous <Continuous>  docusate sodium 100 milliGRAM(s) Oral daily  insulin lispro (HumaLOG) corrective regimen sliding scale   SubCutaneous every 6 hours  levETIRAcetam  IVPB 500 milliGRAM(s) IV Intermittent every 12 hours  pantoprazole    Tablet 40 milliGRAM(s) Oral before breakfast  senna 2 Tablet(s) Oral at bedtime  ursodiol Capsule 300 milliGRAM(s) Oral <User Schedule>    MEDICATIONS  (PRN):  dextrose 40% Gel 15 Gram(s) Oral once PRN Blood Glucose LESS THAN 70 milliGRAM(s)/deciliter  glucagon  Injectable 1 milliGRAM(s) IntraMuscular once PRN Glucose LESS THAN 70 milligrams/deciliter    Allergies  No Known Allergies    SOCIAL HISTORY:  Non smoker.  Denies alcohol use.     FAMILY HISTORY:  Mother: no history of stroke or seizure.  Father: no history of stroke or seizure.  Sibling: no history of stroke or seizure.    ROS:  Constitutional: The patient denies fevers or weight changes.  Neuro: As per HPI.  Eyes: Denies blurry vision.  Ears/nose/throat: Denies Tinnitus.   Cardiac: Denies chest pain. Denies palpitations.  Respiratory: Denies shortness of breath.  GI: Denies abdominal pain, nausea, or vomiting.  : Denies change in urinary pattern.  Integumentary: Denies rash.  Psych: Denies recent mood changes.  Heme: denies easy bleeding/bruising.    Exam:  Vital Signs Last 24 Hrs  T(C): 36.7 (12 Feb 2019 12:00), Max: 37.4 (12 Feb 2019 01:05)  T(F): 98.1 (12 Feb 2019 12:00), Max: 99.3 (12 Feb 2019 01:05)  HR: 69 (12 Feb 2019 12:00) (69 - 83)  BP: 143/79 (12 Feb 2019 12:00) (121/69 - 155/75)  BP(mean): 103 (12 Feb 2019 12:00) (92 - 117)  RR: 19 (12 Feb 2019 12:00) (15 - 42)  SpO2: 96% (12 Feb 2019 12:00) (95% - 99%)  General: NAD.   Carotid bruits absent.     Mental status: The patient is awake, alert, and fully oriented. There is no aphasia.    Cranial nerves: There is no papilledema. Pupils react symmetrically to light. There is no visual field deficit to confrontation. Extraocular motion is full with no nystagmus. There is no ptosis. Facial sensation is intact. Facial musculature is symmetric. Palate elevates symmetrically. Tongue is midline.    Motor: There is normal bulk and tone.  Strength is 5/5 in the right arm and leg.   Strength is 5/5 in the left arm and leg.    Sensation: Intact to light touch and pin.    Reflexes: 2+ throughout and plantar responses are flexor.    Cerebellar: There is no dysmetria on finger to nose testing.    LABS:                         8.0    4.1   )-----------( 168      ( 12 Feb 2019 06:17 )             24.4       02-12    141  |  97<L>  |  52.0<H>  ----------------------------<  120<H>  5.1   |  24.0  |  7.17<H>    Ca    8.0<L>      12 Feb 2019 06:17  Phos  3.4     02-12  Mg     1.9     02-12    TPro  7.8  /  Alb  4.1  /  TBili  0.5  /  DBili  x   /  AST  22  /  ALT  10  /  AlkPhos  135<H>  02-11      PT/INR - ( 12 Feb 2019 06:18 )   PT: 15.2 sec;   INR: 1.31 ratio    PTT - ( 11 Feb 2019 21:45 )  PTT:36.9 sec    RADIOLOGY   CT head reviewed: There is subarachnoid hemorrhage in the right temporal-occipital and left frontal regions.

## 2019-02-12 NOTE — CONSULT NOTE ADULT - ASSESSMENT
The patient is a 73y Male with subarachnoid hemorrhage after a fall.     Traumatic subarachnoid hemorrhage.   Seen by neurosurgery.   Mobilize with physical therapy.     Fall.  Agree with checking echocardiogram and carotid duplex.     ? seizure   EEG done.   Will pursue video monitoring in attempt to capture typical episodes.   No objection to Keppra for now.   Would hold once EEG connected.     Case discussed with SICU PA. (Dr Dowling attending).

## 2019-02-12 NOTE — H&P ADULT - NSHPSOURCEINFORD_GEN_ALL_CORE
Chart(s)/Patient

## 2019-02-12 NOTE — H&P ADULT - NSHPPHYSICALEXAM_GEN_ALL_CORE
Vital Signs Last 24 Hrs  T(C): 37.4 (12 Feb 2019 01:05), Max: 37.4 (12 Feb 2019 01:05)  T(F): 99.3 (12 Feb 2019 01:05), Max: 99.3 (12 Feb 2019 01:05)  HR: 77 (12 Feb 2019 01:05) (74 - 77)  BP: 133/68 (12 Feb 2019 01:05) (121/69 - 140/74)  BP(mean): 92 (12 Feb 2019 01:05) (92 - 92)  RR: 33 (12 Feb 2019 01:05) (18 - 33)  SpO2: 98% (12 Feb 2019 01:05) (97% - 98%)    NAD, AAOX3  Head NCAT, EOMI  Neck supple, no JVD, No cervical TTP, FROM  Chest atraumatic, expansion symmetric  Lungs CTAB  RRR, S1S2nl  Abd soft, ND, NTTP  Pelvis stable, NTTP  Back NTTP, no stepoff or deformity  Ext: FROM, SILT, Strength 5/5  Skin C/d/i

## 2019-02-12 NOTE — H&P ADULT - ASSESSMENT
73M Acute multifocal subarachnoid bleed is noted in the right temporal occipital and left frontal, neurologically intact  neuro-checks q1 HR and ICU admit  repeat CTB w/o contrast in AM, sooner if AMS noted   recommend neurology consult   HOB > 30 degrees  hold all AC/ AP ASA  SBP < 150mmHg  Will F/u NSg after interval CTH for timing of AC/AP resumption  PT/OT/PMR

## 2019-02-12 NOTE — PROGRESS NOTE ADULT - ASSESSMENT
73y Male PMH ESRD on HD T/Th/Sa, PVD on Coumadin/Plavix, admitted w/ difficulty speech/dysarthria x 1 week and fall yesterday 2/11 with + LOC, found with SAH  - Repeat CT head today 2/12 stable  - S/p EEG and start of keppra today after noted to have gaze preference and jerking of extremities    PLAN:  - Will d/w attending  - Continue Q1 neuro checks, HOB 30 degrees  - Normotensive BP goals  - Neurology consult  - Continue keppra for now, f/u neurology recommendations/EEG results  - Hold ACT/APT for now  - No heparin bolus with HD  - SCDs for DVT ppx  - Supportive care/further medical management per primary team

## 2019-02-12 NOTE — CONSULT NOTE ADULT - ASSESSMENT
SAH    Seizure    ESRD - HD    P : HD w. No Heparin,    Transfuse 1 unit - PRBC,    Monitor Keppra Levels closely ( TDM )    CrCl >80 mL/minute/1.73 m2: 500 to 1,500 mg every 12 hours  CrCl 50 to 80 mL/minute/1.73 m2: 500 to 1,000 mg every 12 hours  CrCl 30 to 50 mL/minute/1.73 m2: 250 to 750 mg every 12 hours  CrCl <30 mL/minute/1.73 m2: 250 to 500 mg every 12 hours,    End-stage renal disease patients using dialysis: Dialyzable (50%); 500 to 1,000 mg every 24 hours; a supplemental dose of 250 to 500 mg following dialysis is recommended

## 2019-02-12 NOTE — H&P ADULT - ATTENDING COMMENTS
fall from standing.  primary intact. gcs 15  secondary intact  labs / imaging reviewed  plan   admit to sicu  consult NS for SAH  repeat ct in 6 hour  q1 hour neuro checks.

## 2019-02-12 NOTE — PROVIDER CONTACT NOTE (OTHER) - ASSESSMENT
jerky movement throughout the body, does not withdraw to pain, fixed left-downward deviated pupils at 4mm, neice at bedside.

## 2019-02-12 NOTE — H&P ADULT - HISTORY OF PRESENT ILLNESS
73M of ESRD T, Th, Sa dialysis; and peripheral vascular disease; on coumadin and plavix; p/w 5 days of progressive difficulty with speech; lives alone, had been slurring words periodically; but declined to come to hospital; yesterday admits that he fell in his bathroom, +likely struck head, endorses LOC for unknown duration, has no recollection of events for several hours; family checked on him last night but had even worse speech today where he is stuttering and unable to get some words out; patient comprehends everything; denies headache, has no other focal complaints    Airway clear  Breathsounds present b/l  Central andperipheral pulses 2+  GCS 15, nonfocal, pupils 2mm PERRLA  No gross deformity or lacerations appreciated

## 2019-02-12 NOTE — CONSULT NOTE ADULT - SUBJECTIVE AND OBJECTIVE BOX
Patient is a 73y old  Male who presents with a chief complaint of SAH b/l, fall (12 Feb 2019 12:50)      HPI:  73M of ESRD T, Th, Sa dialysis; and peripheral vascular disease; on coumadin and plavix; p/w 5 days of progressive difficulty with speech; lives alone, had been slurring words periodically; but declined to come to hospital; yesterday admits that he fell in his bathroom, +likely struck head, endorses LOC for unknown duration, has no recollection of events for several hours; family checked on him last night but had even worse speech today where he is stuttering and unable to get some words out; patient comprehends everything; denies headache, has no other focal complaints    Airway clear  Breathsounds present b/l  Central andperipheral pulses 2+  GCS 15, nonfocal, pupils 2mm PERRLA  No gross deformity or lacerations appreciated (12 Feb 2019 00:26)      PAST MEDICAL & SURGICAL HISTORY:  Persistent atrial fibrillation  Carotid artery disease: mild  GERD (gastroesophageal reflux disease)  Low glucose level: Patient has h/o low blood sugars at times  Palpitations  Leg pain, bilateral: R&gt;L at rest and with short distant ambulation  Imbalance: secondary to PVD Uses cane  Obesity (BMI 30.0-34.9)  Claudication in peripheral vascular disease: R&gt;L  Right common iliac or ostial external iliac per June 2018 U/S  Left popliteal 50-75 and EDUIN stenosis  SUSANA 0.8  Kidney problem: spontanious renal bleed while on coumadin  c/b  renal  hematome  with  LT  nephrectomy  Hypotension: treated  with Midodrine  Tiredness  Renal hematoma, left  Hypercholesteremia  ESRD (end stage renal disease): on  hemodialysis  3  x  weekly.,  H/O  left  nephrectomy after  renal  bleed  Dyslipidemia  Dizziness  DM (diabetes mellitus)  Daytime sleepiness  Renal transplant recipient  AV fistula: right lower arm  Abnormality of left atrial appendage: WATCHMAN  LEFT ATRIAL APPENDAGE CLOSUIRE   Jan. 30 2017  History of unilateral nephrectomy: left  nephrectomy      FAMILY HISTORY:  No pertinent family history in first degree relatives      Social History:    MEDICATIONS  (STANDING):  chlorhexidine 2% Cloths 1 Application(s) Topical daily  cinacalcet 30 milliGRAM(s) Oral two times a day  dextrose 50% Injectable 12.5 Gram(s) IV Push once  dextrose 50% Injectable 25 Gram(s) IV Push once  dextrose 50% Injectable 25 Gram(s) IV Push once  docusate sodium 100 milliGRAM(s) Oral daily  insulin lispro (HumaLOG) corrective regimen sliding scale   SubCutaneous every 6 hours  levETIRAcetam  IVPB 500 milliGRAM(s) IV Intermittent every 12 hours  pantoprazole    Tablet 40 milliGRAM(s) Oral before breakfast  senna 2 Tablet(s) Oral at bedtime  sodium chloride 0.9%. 1000 milliLiter(s) (50 mL/Hr) IV Continuous <Continuous>  ursodiol Capsule 300 milliGRAM(s) Oral <User Schedule>    MEDICATIONS  (PRN):  dextrose 40% Gel 15 Gram(s) Oral once PRN Blood Glucose LESS THAN 70 milliGRAM(s)/deciliter  glucagon  Injectable 1 milliGRAM(s) IntraMuscular once PRN Glucose LESS THAN 70 milligrams/deciliter      Allergies    No Known Allergies    REVIEW OF SYSTEMS:    CONSTITUTIONAL: No fever, weight loss, or fatigue  EYES: No eye pain, visual disturbances, or discharge  ENMT:  No difficulty hearing, tinnitus, vertigo; No sinus or throat pain  NECK: No pain or stiffness  RESPIRATORY: No cough, wheezing, chills or hemoptysis; No shortness of breath  CARDIOVASCULAR: No chest pain, palpitations, dizziness, or leg swelling  GASTROINTESTINAL: No abdominal or epigastric pain. No nausea, vomiting, or hematemesis; No diarrhea or constipation. No melena or hematochezia.  GENITOURINARY: No dysuria, frequency, hematuria, or incontinence  NEUROLOGICAL: No headaches, memory loss, loss of strength, numbness, or tremors  SKIN: No itching, burning, rashes, or lesions   LYMPH NODES: No enlarged glands  ENDOCRINE: No heat or cold intolerance; No hair loss  MUSCULOSKELETAL: No joint pain or swelling; No muscle, back, or extremity pain  PSYCHIATRIC: No depression, anxiety, mood swings, or difficulty sleeping  HEME/LYMPH: No easy bruising, or bleeding gums  ALLERY AND IMMUNOLOGIC: No hives or eczema      Vital Signs Last 24 Hrs  T(C): 36.7 (12 Feb 2019 12:00), Max: 37.4 (12 Feb 2019 01:05)  T(F): 98.1 (12 Feb 2019 12:00), Max: 99.3 (12 Feb 2019 01:05)  HR: 75 (12 Feb 2019 14:00) (69 - 83)  BP: 136/70 (12 Feb 2019 14:00) (121/69 - 155/75)  BP(mean): 98 (12 Feb 2019 14:00) (92 - 117)  RR: 13 (12 Feb 2019 14:00) (13 - 42)  SpO2: 96% (12 Feb 2019 14:00) (95% - 99%)    PHYSICAL EXAM:    GENERAL: NAD, Pale, well-developed  HEAD:  Normocephalic  EYES: EOMI, PERRLA, conjunctiva and sclera clear  ENMT: Moist mucous membranes,   NECK: Supple, No JVD,   NERVOUS SYSTEM:  Alert & Oriented X 3,   CHEST/LUNG: Clear to percussion bilaterally; No rales, rhonchi, wheezing, or rubs  HEART: Irregular rate and rhythm; No murmurs, rubs, or gallops  ABDOMEN: Soft, Nontender, Nondistended; Bowel sounds present  EXTREMITIES:  2+ Peripheral Pulses, No clubbing, cyanosis, or edema  LYMPH: No lymphadenopathy noted  SKIN: No rashes or lesions      LABS:    Ventricular Rate 73 BPM    Atrial Rate 74 BPM    QRS Duration 82 ms    Q-T Interval 444 ms    QTC Calculation(Bezet) 489 ms    R Axis 55 degrees    T Axis 112 degrees    Diagnosis Line Atrial fibrillation  Nonspecific T wave abnormality  Prolonged QT  Abnormal ECG    Confirmed by MARYSE GARCIA ([id number]) on 2/12/2019 10:00:33 AM                        8.0    4.1   )-----------( 168      ( 12 Feb 2019 06:17 )             24.4     02-12    141  |  97<L>  |  52.0<H>  ----------------------------<  120<H>  5.1   |  24.0  |  7.17<H>    Ca    8.0<L>      12 Feb 2019 06:17  Phos  3.4     02-12  Mg     1.9     02-12    TPro  7.8  /  Alb  4.1  /  TBili  0.5  /  DBili  x   /  AST  22  /  ALT  10  /  AlkPhos  135<H>  02-11    PT/INR - ( 12 Feb 2019 06:18 )   PT: 15.2 sec;   INR: 1.31 ratio         PTT - ( 11 Feb 2019 21:45 )  PTT:36.9 sec    Magnesium, Serum: 1.9 mg/dL (02-12 @ 06:17)  Phosphorus Level, Serum: 3.4 mg/dL (02-12 @ 06:17)    RADIOLOGY & ADDITIONAL TESTS:    CT Head Without Contrast     EXAM DATE/TIME:      2/12/2019 6:42 AM     CLINICAL HISTORY:            73 years old, male; Signs and symptoms; Altered mental status/memory   loss     TECHNIQUE:    Axial computed tomography images of the head/brain without contrast.     COMPARISON:    CT HEAD 2/11/2019 9:22 PM     FINDINGS:    Brain: Again noted, there is an acute multifocal subarachnoid bleed is   noted in the right temporal ,   occipital and bilateral frontal lobes, left greater than right. There may   be small parenchymal hemorrhagic contusion in the left frontal lobe near   the vertex unchanged No local mass   effect or midline shift is evident. Mild chronic microvascular ischemic   changes   are noted.    Ventricles:  Ventricles, sulci, and cisterns are normal in size without   hydrocephalus. Basal cisterns are patent.    Bones/joints:  Calvarium is intact. Bilateral lens surgery.    Sinuses:  Small left maxillary sinus fluid level is partially imaged.    Mastoid air cells:  Small left mastoid effusion is noted. Right mastoid   is   clear.    Soft tissues: Unremarkable.     IMPRESSION:   Persistent hemorrhage particularly in the subarachnoid space unchanged as   compared to previous exam, no hydrocephalus, midline shift or herniation.    Preliminary report provided by SANDRA Zurita M.D.  [facility] RADIOLOGIST    GERARDO COBURN M.D., ATTENDING RADIOLOGIST  This document hasbeen electronically signed. Feb 12 2019  8:54AM

## 2019-02-13 ENCOUNTER — APPOINTMENT (OUTPATIENT)
Dept: CARDIOLOGY | Facility: CLINIC | Age: 74
End: 2019-02-13

## 2019-02-13 DIAGNOSIS — S06.6X5A: ICD-10-CM

## 2019-02-13 DIAGNOSIS — I95.3 HYPOTENSION OF HEMODIALYSIS: ICD-10-CM

## 2019-02-13 LAB
ANION GAP SERPL CALC-SCNC: 16 MMOL/L — SIGNIFICANT CHANGE UP (ref 5–17)
ANISOCYTOSIS BLD QL: SIGNIFICANT CHANGE UP
BUN SERPL-MCNC: 21 MG/DL — HIGH (ref 8–20)
CALCIUM SERPL-MCNC: 8.4 MG/DL — LOW (ref 8.6–10.2)
CHLORIDE SERPL-SCNC: 99 MMOL/L — SIGNIFICANT CHANGE UP (ref 98–107)
CO2 SERPL-SCNC: 28 MMOL/L — SIGNIFICANT CHANGE UP (ref 22–29)
CREAT SERPL-MCNC: 4.23 MG/DL — HIGH (ref 0.5–1.3)
EOSINOPHIL NFR BLD AUTO: 4 % — SIGNIFICANT CHANGE UP (ref 0–6)
GLUCOSE BLDC GLUCOMTR-MCNC: 88 MG/DL — SIGNIFICANT CHANGE UP (ref 70–99)
GLUCOSE SERPL-MCNC: 83 MG/DL — SIGNIFICANT CHANGE UP (ref 70–115)
HBA1C BLD-MCNC: 4.6 % — SIGNIFICANT CHANGE UP (ref 4–5.6)
HCT VFR BLD CALC: 27.7 % — LOW (ref 42–52)
HGB BLD-MCNC: 8.9 G/DL — LOW (ref 14–18)
HYPOCHROMIA BLD QL: SLIGHT — SIGNIFICANT CHANGE UP
LYMPHOCYTES # BLD AUTO: 7 % — LOW (ref 20–55)
MACROCYTES BLD QL: SIGNIFICANT CHANGE UP
MAGNESIUM SERPL-MCNC: 1.9 MG/DL — SIGNIFICANT CHANGE UP (ref 1.6–2.6)
MCHC RBC-ENTMCNC: 32.1 G/DL — SIGNIFICANT CHANGE UP (ref 32–36)
MCHC RBC-ENTMCNC: 34.1 PG — HIGH (ref 27–31)
MCV RBC AUTO: 106.1 FL — HIGH (ref 80–94)
MONOCYTES NFR BLD AUTO: 6 % — SIGNIFICANT CHANGE UP (ref 3–10)
NEUTROPHILS NFR BLD AUTO: 82 % — HIGH (ref 37–73)
NEUTS BAND # BLD: 1 % — SIGNIFICANT CHANGE UP (ref 0–8)
PHOSPHATE SERPL-MCNC: 2.9 MG/DL — SIGNIFICANT CHANGE UP (ref 2.4–4.7)
PLAT MORPH BLD: NORMAL — SIGNIFICANT CHANGE UP
PLATELET # BLD AUTO: 152 K/UL — SIGNIFICANT CHANGE UP (ref 150–400)
PLATELET COUNT - ESTIMATE: ADEQUATE — SIGNIFICANT CHANGE UP
POTASSIUM SERPL-MCNC: 4.4 MMOL/L — SIGNIFICANT CHANGE UP (ref 3.5–5.3)
POTASSIUM SERPL-SCNC: 4.4 MMOL/L — SIGNIFICANT CHANGE UP (ref 3.5–5.3)
RBC # BLD: 2.61 M/UL — LOW (ref 4.6–6.2)
RBC # FLD: 20.6 % — HIGH (ref 11–15.6)
RBC BLD AUTO: ABNORMAL
SODIUM SERPL-SCNC: 143 MMOL/L — SIGNIFICANT CHANGE UP (ref 135–145)
WBC # BLD: 3.1 K/UL — LOW (ref 4.8–10.8)
WBC # FLD AUTO: 3.1 K/UL — LOW (ref 4.8–10.8)

## 2019-02-13 PROCEDURE — 99233 SBSQ HOSP IP/OBS HIGH 50: CPT

## 2019-02-13 PROCEDURE — 99222 1ST HOSP IP/OBS MODERATE 55: CPT

## 2019-02-13 PROCEDURE — 99232 SBSQ HOSP IP/OBS MODERATE 35: CPT

## 2019-02-13 RX ORDER — LEVETIRACETAM 250 MG/1
500 TABLET, FILM COATED ORAL DAILY
Qty: 0 | Refills: 0 | Status: DISCONTINUED | OUTPATIENT
Start: 2019-02-13 | End: 2019-02-14

## 2019-02-13 RX ORDER — ACETAMINOPHEN 500 MG
1000 TABLET ORAL ONCE
Qty: 0 | Refills: 0 | Status: COMPLETED | OUTPATIENT
Start: 2019-02-13 | End: 2019-02-13

## 2019-02-13 RX ORDER — ACETAMINOPHEN 500 MG
650 TABLET ORAL EVERY 4 HOURS
Qty: 0 | Refills: 0 | Status: DISCONTINUED | OUTPATIENT
Start: 2019-02-13 | End: 2019-02-14

## 2019-02-13 RX ORDER — HEPARIN SODIUM 5000 [USP'U]/ML
5000 INJECTION INTRAVENOUS; SUBCUTANEOUS EVERY 8 HOURS
Qty: 0 | Refills: 0 | Status: DISCONTINUED | OUTPATIENT
Start: 2019-02-13 | End: 2019-02-14

## 2019-02-13 RX ORDER — MAGNESIUM SULFATE 500 MG/ML
1 VIAL (ML) INJECTION ONCE
Qty: 0 | Refills: 0 | Status: COMPLETED | OUTPATIENT
Start: 2019-02-13 | End: 2019-02-13

## 2019-02-13 RX ADMIN — SENNA PLUS 2 TABLET(S): 8.6 TABLET ORAL at 21:19

## 2019-02-13 RX ADMIN — Medication 650 MILLIGRAM(S): at 19:31

## 2019-02-13 RX ADMIN — CHLORHEXIDINE GLUCONATE 1 APPLICATION(S): 213 SOLUTION TOPICAL at 13:13

## 2019-02-13 RX ADMIN — Medication 100 MILLIGRAM(S): at 13:13

## 2019-02-13 RX ADMIN — URSODIOL 300 MILLIGRAM(S): 250 TABLET, FILM COATED ORAL at 08:42

## 2019-02-13 RX ADMIN — Medication 650 MILLIGRAM(S): at 18:45

## 2019-02-13 RX ADMIN — LEVETIRACETAM 420 MILLIGRAM(S): 250 TABLET, FILM COATED ORAL at 08:42

## 2019-02-13 RX ADMIN — HEPARIN SODIUM 5000 UNIT(S): 5000 INJECTION INTRAVENOUS; SUBCUTANEOUS at 21:19

## 2019-02-13 RX ADMIN — HEPARIN SODIUM 5000 UNIT(S): 5000 INJECTION INTRAVENOUS; SUBCUTANEOUS at 13:13

## 2019-02-13 RX ADMIN — Medication 1000 MILLIGRAM(S): at 07:17

## 2019-02-13 RX ADMIN — Medication 400 MILLIGRAM(S): at 06:46

## 2019-02-13 RX ADMIN — PANTOPRAZOLE SODIUM 40 MILLIGRAM(S): 20 TABLET, DELAYED RELEASE ORAL at 06:05

## 2019-02-13 RX ADMIN — Medication 100 GRAM(S): at 05:14

## 2019-02-13 NOTE — OCCUPATIONAL THERAPY INITIAL EVALUATION ADULT - LIVES WITH, PROFILE
rents a room in a house; reports that he has a step daughter that may be able to assist as needed upon discharge/alone

## 2019-02-13 NOTE — PHYSICAL THERAPY INITIAL EVALUATION ADULT - ADDITIONAL COMMENTS
Pt reports he rents a room in a house with 10 JANA (+) R rail, was independent with all functional activities PTA with the usage of bilateral crutches (pt states he has worsening OA in R knee). Daughter available to assist pt as needed 24/7 upon D/C home.

## 2019-02-13 NOTE — PROGRESS NOTE ADULT - ASSESSMENT
73y Male PMH ESRD on HD T/Th/Sa, PVD/PAD on Coumadin/Plavix, afib s/p Watchman on ASA 81, admitted w/ difficulty speech/dysarthria x 1 week and fall 2/11 with + LOC, found with SAH  - Repeat CT head 2/12 stable  - EEG without epileptiform correlations with episodes of "chewing," "facial twitching" and "arm jerking"    PLAN:  - D/w Dr. Perdue  - Ok for Q4 neuro checks, ok to downgrade out of ICU  - Neurology following  - Antiepileptics per neurology- currently on Keppra, must monitor levels closely per nephrology  - Ok for therapeutic anticoagulation for DVT ppx  - Hold remaining ACT/APT for now. Recommend cardiology and/or vascular consult to weigh in on urgency/time period to restart ACT/APT given afib and PVD/PAD and recent ICH. D/w ACS- will consult  - No heparin bolus with HD x 2 weeks  - Supportive care/further medical management per primary team 73y Male PMH ESRD on HD T/Th/Sa, PVD/PAD on Coumadin/Plavix, afib s/p Watchman on ASA 81, admitted w/ difficulty speech/dysarthria x 1 week and fall 2/11 with + LOC, found with SAH  - Repeat CT head 2/12 stable  - EEG without epileptiform correlations with episodes of "chewing," "facial twitching" and "arm jerking"    PLAN:  - D/w Dr. Perdue  - Ok for Q4 neuro checks, ok to downgrade out of ICU  - Neurology following  - Antiepileptics per neurology- currently on Keppra, must monitor levels closely per nephrology  - Ok for for DVT ppx  - Hold remaining ACT/APT for now. Recommend cardiology and/or vascular consult to weigh in on urgency/time period to restart ACT/APT given afib and PVD/PAD and recent ICH. D/w ACS- will consult  - No heparin bolus with HD x 2 weeks  - Supportive care/further medical management per primary team No

## 2019-02-13 NOTE — PHYSICAL THERAPY INITIAL EVALUATION ADULT - GAIT DEVIATIONS NOTED, PT EVAL
decreased stride length/decreased velocity of limb motion/decreased stance time on Right LE, antalgic gait pattern, pt reports pain in Right LE due to worsening OA in knee/decreased step length/decreased fabby

## 2019-02-13 NOTE — DIETITIAN INITIAL EVALUATION ADULT. - PERTINENT LABORATORY DATA
02-13 Na143 mmol/L Glu 83 mg/dL K+ 4.4 mmol/L Cr  4.23 mg/dL<H> BUN 21.0 mg/dL<H> Phos 2.9 mg/dL Alb n/a   PAB n/a

## 2019-02-13 NOTE — PROGRESS NOTE ADULT - ASSESSMENT
The patient is a 73y Male with subarachnoid hemorrhage after a fall.     Traumatic subarachnoid hemorrhage.   - avoid anti platelet medications  - avoid anti coagulant medications  - control BP, avoid SBP>160  Seen by neurosurgery.   Mobilize with physical therapy.     Fall.  Echocardiogram and carotid duplex as above.     ? seizure   EEG as above  continue keppra as EEG had signs of epileptiform potential.     will follow with you    Evert Castanon MD PhD   [phone]

## 2019-02-13 NOTE — PROGRESS NOTE ADULT - ASSESSMENT
Patient stable. EEG + Seizure activity,  Ten HD easily, 2 K + Dialysate Recd., 1 unit - PRBC,  Continue HD - No Heparin,  Plan for HD in AM    D/W SICU team

## 2019-02-13 NOTE — PROGRESS NOTE ADULT - ASSESSMENT
Assessment and Plan:    Neuro: Continue Keppra 500 mg q24h with additional dosing post dialysis.  GCS 15. Monitor for delirium.  Continue to optimize pain control. Serial Neurologic assessments    HEENT: no issues    CV: Continue hemodynamic monitoring.  Echo with EF 60-65%    Pulm: Pulmonary toilet.  Continue incentive spirometer.  Chest PT.  Encourage OOB to chair and ambulation     GI/Nutrition: Bowel Regimen    /Renal:  Dialysis Monday / Wednesday / Friday.      HEME- DVT prophylaxis, SCDs    ID:  No issues    Lines/Tubes:     Endo: ISS.  Monitor blood glucose    Skin:  Intact    Dispo:  Floor transfer.

## 2019-02-13 NOTE — PHYSICAL THERAPY INITIAL EVALUATION ADULT - CRITERIA FOR SKILLED THERAPEUTIC INTERVENTIONS
risk reduction/prevention/impairments found/rehab potential/therapy frequency/anticipated discharge recommendation/predicted duration of therapy intervention/anticipated equipment needs at discharge/functional limitations in following categories

## 2019-02-13 NOTE — CONSULT NOTE ADULT - PROBLEM SELECTOR PROBLEM 1
Subarachnoid hemorrhage following injury, prolonged (more than 24 hours) loss of consciousness and return to pre-existing conscious level, initial encounter
Subarachnoid hemorrhage following injury, with loss of consciousness, initial encounter

## 2019-02-13 NOTE — DIETITIAN INITIAL EVALUATION ADULT. - OTHER INFO
Pt admitted s/p fall, +B/L SAH, + seizure, Pt reports eating well at home,  no recent wt changes, Reported #. Follows a Renal diet, see a RD at HD center, declined further education at this time.

## 2019-02-13 NOTE — CONSULT NOTE ADULT - SUBJECTIVE AND OBJECTIVE BOX
73yM was admitted on 02-11 after a fall hitting his head, as per patient with +LOC. In ED, GCS=15.     Imaging showed:  HEAD CT - Acute multifocal subarachnoid bleed without local mass effect or shift.  C SPINE CT - DJD    Patient noted some facial twitching. EEG showed Abnormal record characterized by generalized slowing and disorganization. It is indicative of diffuse cerebral dysfunction. There were left central spike-wave complexes suggestive of a potential focus of cortical irritability. The clinical episodes of jerking movements had no epileptiform correlate.    Course complicated by anemia s/p PRBC. Repeat HCT on 2/12 showed Persistent hemorrhage particularly in the subarachnoid space unchanged as compared to previous exam, no hydrocephalus, midline shift or herniation.    Patient now being worked up for syncope. Patient reports a mild headache, no dizziness. No nausea. Feels overall improved.     REVIEW OF SYSTEMS  Constitutional - No fever, No weight loss, +fatigue  HEENT - No eye pain, No visual disturbances, No difficulty hearing, No tinnitus, No vertigo, No neck pain  Respiratory - No cough, No wheezing, No shortness of breath  Cardiovascular - No chest pain, No palpitations  Gastrointestinal - No abdominal pain, No nausea, No vomiting, No diarrhea, No constipation  Genitourinary - No dysuria, No frequency, No hematuria, No incontinence  Neurological - +headaches, No memory loss, No loss of strength, No numbness, No tremors  Skin - No itching, No rashes, No lesions   Endocrine - No temperature intolerance  Musculoskeletal - +joint pain, No joint swelling, No muscle pain  Psychiatric - No depression, No anxiety    VITALS  T(C): 37 (02-13-19 @ 07:00), Max: 37 (02-13-19 @ 07:00)  HR: 85 (02-13-19 @ 11:00) (69 - 92)  BP: 139/72 (02-13-19 @ 11:00) (133/72 - 174/74)  RR: 16 (02-13-19 @ 11:00) (13 - 28)  SpO2: 96% (02-13-19 @ 11:00) (90% - 100%)  Wt(kg): --    PAST MEDICAL & SURGICAL HISTORY  Persistent atrial fibrillation  Carotid artery disease  GERD (gastroesophageal reflux disease)  Low glucose level  Palpitations  Leg pain, bilateral  Imbalance  Obesity (BMI 30.0-34.9)  Claudication in peripheral vascular disease  Kidney problem  Hypotension  Tiredness  Renal hematoma, left  Hypercholesteremia  ESRD (end stage renal disease)  Dyslipidemia  Dizziness  DM (diabetes mellitus)  Daytime sleepiness  AF (atrial fibrillation)  Abnormality of left atrial appendage  Renal transplant recipient  AV fistula  H/O kidney transplant  Abnormality of left atrial appendage  History of unilateral nephrectomy      SOCIAL HISTORY  Smoking - Denied  EtOH - Denied   Drugs - Denied    FUNCTIONAL HISTORY  Lives alone in apartment, 10 JANA  Independent with crutches for knee OA    CURRENT FUNCTIONAL STATUS  Min A in bed    FAMILY HISTORY   No pertinent family history in first degree relatives      RECENT LABS/IMAGING  CBC Full  -  ( 13 Feb 2019 04:22 )  WBC Count : 3.1 K/uL  Hemoglobin : 8.9 g/dL  Hematocrit : 27.7 %  Platelet Count - Automated : 152 K/uL  Mean Cell Volume : 106.1 fl  Mean Cell Hemoglobin : 34.1 pg  Mean Cell Hemoglobin Concentration : 32.1 g/dL  Auto Neutrophil # : x  Auto Lymphocyte # : x  Auto Monocyte # : x  Auto Eosinophil # : x  Auto Basophil # : x  Auto Neutrophil % : 82.0 %  Auto Lymphocyte % : 7.0 %  Auto Monocyte % : 6.0 %  Auto Eosinophil % : 4.0 %  Auto Basophil % : x    02-13    143  |  99  |  21.0<H>  ----------------------------<  83  4.4   |  28.0  |  4.23<H>    Ca    8.4<L>      13 Feb 2019 04:22  Phos  2.9     02-13  Mg     1.9     02-13    TPro  x   /  Alb  4.3  /  TBili  x   /  DBili  x   /  AST  x   /  ALT  x   /  AlkPhos  x   02-12        ALLERGIES  No Known Allergies      MEDICATIONS   chlorhexidine 2% Cloths 1 Application(s) Topical daily  docusate sodium 100 milliGRAM(s) Oral daily  heparin  Injectable 5000 Unit(s) SubCutaneous every 8 hours  levETIRAcetam  IVPB 500 milliGRAM(s) IV Intermittent every 24 hours  pantoprazole    Tablet 40 milliGRAM(s) Oral before breakfast  senna 2 Tablet(s) Oral at bedtime  ursodiol Capsule 300 milliGRAM(s) Oral <User Schedule>      ----------------------------------------------------------------------------------------  PHYSICAL EXAM  Constitutional - NAD, Comfortable  HEENT - NCAT, EOMI  Neck - Supple, No limited ROM  Chest - Breathing comfortably, No wheezing  Cardiovascular - S1S2   Abdomen - Soft, Obese  Extremities - No C/C/E, No calf tenderness   Neurologic Exam -                    Cognitive - Awake, Alert, AAO to self, place, date, year, situation     Communication - Fluent, No dysarthria     Cranial Nerves - CN 2-12 intact     Motor - No focal deficits                    LEFT    UE - ShAB 5/5, EF 5/5, EE 5/5, WE 5/5,  5/5                    RIGHT UE - ShAB 5/5, EF 5/5, EE 5/5, WE 5/5,  5/5                    LEFT    LE - HF 5/5, KE 5/5, DF 5/5, PF 5/5                    RIGHT LE - HF 5/5, KE 5/5, DF 5/5, PF 5/5        Sensory - Intact to LT      OculoVestibular - No saccades, No nystagmus      Balance - WNL Static  Psychiatric - Mood stable, Affect WNL	  ----------------------------------------------------------------------------------------  ASSESSMENT/PLAN  73yMale with functional deficits after a fall sustaining a SAH  Pain - Suggest Tylenol for HA  DVT PPX - SCDs, Heparin  ?Post-traumatic Seizures - \[facility]\""ra  Rehab - Will continue to follow for ongoing rehab needs and recommendations. Therapy evals pending.  Expect patient to achieve functional goals for DC HOME with HOME CARE

## 2019-02-13 NOTE — CONSULT NOTE ADULT - ASSESSMENT
73M of ESRD T, Th, Sa dialysis; and peripheral vascular disease; on coumadin and plavix; p/w 5 days of progressive difficulty with speech; found to have  sub-arachnoid haemorrhage

## 2019-02-13 NOTE — CONSULT NOTE ADULT - PROBLEM SELECTOR RECOMMENDATION 9
neurologically stable. Speaks Japanese. Understands and speaks some english.  Not on anticoagulation .   Hold Antiplatelets.
as above

## 2019-02-13 NOTE — CONSULT NOTE ADULT - SUBJECTIVE AND OBJECTIVE BOX
[facility]                                                        Office: [address] [address]                                                       Telephone: [phone]. Fax:[phone]                                                              CARDIOLOGY CONSULTATION NOTE                                                                                             Consult requested by: SICU team     Reason for Consultation: SAH orthostatic hypotension, Afib s/p watchman     History obtained by: Patient and medical record     obtained: No    Chief complaint:    Patient is a 73y old  Male who presents with a chief complaint of SAH b/l, fall (13 Feb 2019 05:38)      HPI:  73M of ESRD T, Th, Sa dialysis; and peripheral vascular disease; on coumadin and plavix; p/w 5 days of progressive difficulty with speech; lives alone, had been slurring words periodically; but declined to come to hospital; yesterday admits that he fell in his bathroom, +likely struck head, endorses LOC for unknown duration, has no recollection of events for several hours; family checked on him last night but had even worse speech today where he is stuttering and unable to get some words out; patient comprehends everything; denies headache, has no other focal complaints    Above history reviewed adn agreed.  This is a 73 year old male with history of nephrectomy, bleeding, ESRD on hemodialysis,  atrial fibrillation not on anticoagulation s/p watchman procedure, here with fall.  Patient states that he has gait instability and uses a crutch. he was fell. He told me he was outside his house, But in prior Notes its mentioned he was in his bathroom,. loss conciousness. Did not know what was going on.   family checked on him and found him stuttering and brought him to the hospital  He is complianing of headaches and confusion.  he feels better today.   he recognises me.  I saw him recently in my office. he was doing ok. Blood pressure has been fine. he has been treated with midodrine.     REVIEW OF SYMPTOMS: Cardiovascular:  See HPI. No chest pain,  No dyspnea,  + syncope,  No palpitations, No dizziness, No Orthopnea,      No Paroxsymal nocturnal dyspnea;  Respiratory:  No Dyspnea, No cough,     Genitourinary:  No dysuria, no hematuria; Gastrointestinal:  No nausea, no vomiting. No diarrhea.  No abdominal pain. No dark color stool, no melena ; Neurological: No headache, no dizziness, no slurred speech;  Psychiatric: No agitation, no anxiety.  ALL OTHER REVIEW OF SYSTEMS ARE NEGATIVE.    ALLERGIES: Allergies    No Known Allergies    Intolerances          CURRENT MEDICATIONS:     levETIRAcetam  IVPB  docusate sodium  pantoprazole    Tablet  senna  ursodiol Capsule  chlorhexidine 2% Cloths  heparin  Injectable      HOME MEDICATIONS:    PAST MEDICAL HISTORY  Persistent atrial fibrillation  Carotid artery disease  GERD (gastroesophageal reflux disease)  Low glucose level  Palpitations  Leg pain, bilateral  Imbalance  Obesity (BMI 30.0-34.9)  Claudication in peripheral vascular disease  Kidney problem  Hypotension  Tiredness  Renal hematoma, left  Hypercholesteremia  ESRD (end stage renal disease)  Dyslipidemia  Dizziness  DM (diabetes mellitus)  Daytime sleepiness  AF (atrial fibrillation)      PAST SURGICAL HISTORY  Abnormality of left atrial appendage  Renal transplant recipient  AV fistula  H/O kidney transplant  Abnormality of left atrial appendage  History of unilateral nephrectomy      FAMILY HISTORY:  No pertinent family history in first degree relatives      SOCIAL HISTORY:  Denies smoking/alcohol/drugs      Vital Signs Last 24 Hrs  T(C): 37 (13 Feb 2019 07:00), Max: 37 (13 Feb 2019 07:00)  T(F): 98.6 (13 Feb 2019 07:00), Max: 98.6 (13 Feb 2019 07:00)  HR: 85 (13 Feb 2019 11:00) (69 - 92)  BP: 139/72 (13 Feb 2019 11:00) (133/72 - 174/74)  BP(mean): 98 (13 Feb 2019 11:00) (94 - 115)  RR: 16 (13 Feb 2019 11:00) (13 - 28)  SpO2: 96% (13 Feb 2019 11:00) (90% - 100%)      PHYSICAL EXAM:  Constitutional: Comfortable . No acute distress.   HEENT: Atraumatic and normcephalic , neck is supple . no JVD. No carotid bruit. PEERL   CNS: A&Ox3. No focal deficits. EOMI. Cranial nerves II-IX are intact.   Lymph Nodes: Cervical : Not palpable.  Respiratory: CTAB  Cardiovascular: S1S2 RRR. No murmur/rubs or gallop.  he has AV fistula  in the arm  Gastrointestinal: Soft non-tender and non distended . +Bowel sounds. negative Maya's sign.  Extremities: No edema.   Psychiatric: Calm . no agitation.  Skin: No skin rash/ulcers visualized to face, hands or feet.      Intake and output:   02-12 @ 07:01 - 02-13 @ 07:00  --------------------------------------------------------  IN: 2703 mL / OUT: 2900 mL / NET: -197 mL    02-13 @ 07:01 - 02-13 @ 11:36  --------------------------------------------------------  IN: 340 mL / OUT: 0 mL / NET: 340 mL        LABS:                        8.9    3.1   )-----------( 152      ( 13 Feb 2019 04:22 )             27.7     02-13    143  |  99  |  21.0<H>  ----------------------------<  83  4.4   |  28.0  |  4.23<H>    Ca    8.4<L>      13 Feb 2019 04:22  Phos  2.9     02-13  Mg     1.9     02-13    TPro  x   /  Alb  4.3  /  TBili  x   /  DBili  x   /  AST  x   /  ALT  x   /  AlkPhos  x   02-12    CARDIAC MARKERS ( 12 Feb 2019 11:40 )  x     / 0.07 ng/mL / x     / x     / x      CARDIAC MARKERS ( 11 Feb 2019 21:45 )  x     / x     / 143 U/L / x     / x        ;p-BNP=  PT/INR - ( 12 Feb 2019 06:18 )   PT: 15.2 sec;   INR: 1.31 ratio         PTT - ( 11 Feb 2019 21:45 )  PTT:36.9 sec      INTERPRETATION OF TELEMETRY: Reviewed by me.   ECG: Reviewed by me. < from: 12 Lead ECG (02.11.19 @ 21:35) >    Diagnosis Line Atrial fibrillation  Nonspecific T wave abnormality  Prolonged QT  Abnormal ECG    < end of copied text >      RADIOLOGY & ADDITIONAL STUDIES:    X-ray:  reviewed by me.   CT scan:   MRI:   ECHO FINDINGS: Date: 2/12/2019    normal EF. Grade I Diastolic dysfunction No significant valvular abnormality.

## 2019-02-13 NOTE — OCCUPATIONAL THERAPY INITIAL EVALUATION ADULT - PLANNED THERAPY INTERVENTIONS, OT EVAL
ADL retraining/bed mobility training/strengthening/transfer training/cognitive, visual perceptual/balance training/motor coordination training

## 2019-02-14 ENCOUNTER — TRANSCRIPTION ENCOUNTER (OUTPATIENT)
Age: 74
End: 2019-02-14

## 2019-02-14 VITALS
RESPIRATION RATE: 16 BRPM | DIASTOLIC BLOOD PRESSURE: 76 MMHG | OXYGEN SATURATION: 99 % | SYSTOLIC BLOOD PRESSURE: 165 MMHG | HEART RATE: 89 BPM | TEMPERATURE: 98 F

## 2019-02-14 LAB
ANION GAP SERPL CALC-SCNC: 20 MMOL/L — HIGH (ref 5–17)
BASOPHILS # BLD AUTO: 0 K/UL — SIGNIFICANT CHANGE UP (ref 0–0.2)
BASOPHILS NFR BLD AUTO: 0.3 % — SIGNIFICANT CHANGE UP (ref 0–2)
BUN SERPL-MCNC: 43 MG/DL — HIGH (ref 8–20)
CALCIUM SERPL-MCNC: 8.6 MG/DL — SIGNIFICANT CHANGE UP (ref 8.6–10.2)
CHLORIDE SERPL-SCNC: 91 MMOL/L — LOW (ref 98–107)
CO2 SERPL-SCNC: 22 MMOL/L — SIGNIFICANT CHANGE UP (ref 22–29)
CREAT SERPL-MCNC: 6.42 MG/DL — HIGH (ref 0.5–1.3)
EOSINOPHIL # BLD AUTO: 0.1 K/UL — SIGNIFICANT CHANGE UP (ref 0–0.5)
EOSINOPHIL NFR BLD AUTO: 3.9 % — SIGNIFICANT CHANGE UP (ref 0–5)
GLUCOSE SERPL-MCNC: 112 MG/DL — SIGNIFICANT CHANGE UP (ref 70–115)
HCT VFR BLD CALC: 32.2 % — LOW (ref 42–52)
HGB BLD-MCNC: 10.7 G/DL — LOW (ref 14–18)
LYMPHOCYTES # BLD AUTO: 0.4 K/UL — LOW (ref 1–4.8)
LYMPHOCYTES # BLD AUTO: 14 % — LOW (ref 20–55)
MAGNESIUM SERPL-MCNC: 2 MG/DL — SIGNIFICANT CHANGE UP (ref 1.6–2.6)
MCHC RBC-ENTMCNC: 33.2 G/DL — SIGNIFICANT CHANGE UP (ref 32–36)
MCHC RBC-ENTMCNC: 34.7 PG — HIGH (ref 27–31)
MCV RBC AUTO: 104.5 FL — HIGH (ref 80–94)
MONOCYTES # BLD AUTO: 0.2 K/UL — SIGNIFICANT CHANGE UP (ref 0–0.8)
MONOCYTES NFR BLD AUTO: 8.1 % — SIGNIFICANT CHANGE UP (ref 3–10)
MRSA PCR RESULT.: SIGNIFICANT CHANGE UP
NEUTROPHILS # BLD AUTO: 2.2 K/UL — SIGNIFICANT CHANGE UP (ref 1.8–8)
NEUTROPHILS NFR BLD AUTO: 72.7 % — SIGNIFICANT CHANGE UP (ref 37–73)
PHOSPHATE SERPL-MCNC: 3.2 MG/DL — SIGNIFICANT CHANGE UP (ref 2.4–4.7)
PLATELET # BLD AUTO: 139 K/UL — LOW (ref 150–400)
POTASSIUM SERPL-MCNC: 4.9 MMOL/L — SIGNIFICANT CHANGE UP (ref 3.5–5.3)
POTASSIUM SERPL-SCNC: 4.9 MMOL/L — SIGNIFICANT CHANGE UP (ref 3.5–5.3)
RBC # BLD: 3.08 M/UL — LOW (ref 4.6–6.2)
RBC # FLD: 19.9 % — HIGH (ref 11–15.6)
S AUREUS DNA NOSE QL NAA+PROBE: SIGNIFICANT CHANGE UP
SODIUM SERPL-SCNC: 133 MMOL/L — LOW (ref 135–145)
WBC # BLD: 3.1 K/UL — LOW (ref 4.8–10.8)
WBC # FLD AUTO: 3.1 K/UL — LOW (ref 4.8–10.8)

## 2019-02-14 PROCEDURE — 93005 ELECTROCARDIOGRAM TRACING: CPT

## 2019-02-14 PROCEDURE — 90937 HEMODIALYSIS REPEATED EVAL: CPT

## 2019-02-14 PROCEDURE — 93880 EXTRACRANIAL BILAT STUDY: CPT

## 2019-02-14 PROCEDURE — 85730 THROMBOPLASTIN TIME PARTIAL: CPT

## 2019-02-14 PROCEDURE — 96365 THER/PROPH/DIAG IV INF INIT: CPT

## 2019-02-14 PROCEDURE — P9016: CPT

## 2019-02-14 PROCEDURE — 99232 SBSQ HOSP IP/OBS MODERATE 35: CPT

## 2019-02-14 PROCEDURE — 86900 BLOOD TYPING SEROLOGIC ABO: CPT

## 2019-02-14 PROCEDURE — 99285 EMERGENCY DEPT VISIT HI MDM: CPT | Mod: 25

## 2019-02-14 PROCEDURE — 95819 EEG AWAKE AND ASLEEP: CPT

## 2019-02-14 PROCEDURE — 80048 BASIC METABOLIC PNL TOTAL CA: CPT

## 2019-02-14 PROCEDURE — 85576 BLOOD PLATELET AGGREGATION: CPT

## 2019-02-14 PROCEDURE — 87640 STAPH A DNA AMP PROBE: CPT

## 2019-02-14 PROCEDURE — 83735 ASSAY OF MAGNESIUM: CPT

## 2019-02-14 PROCEDURE — 85610 PROTHROMBIN TIME: CPT

## 2019-02-14 PROCEDURE — 80177 DRUG SCRN QUAN LEVETIRACETAM: CPT

## 2019-02-14 PROCEDURE — 70450 CT HEAD/BRAIN W/O DYE: CPT

## 2019-02-14 PROCEDURE — 86850 RBC ANTIBODY SCREEN: CPT

## 2019-02-14 PROCEDURE — 82962 GLUCOSE BLOOD TEST: CPT

## 2019-02-14 PROCEDURE — 97163 PT EVAL HIGH COMPLEX 45 MIN: CPT

## 2019-02-14 PROCEDURE — 99261: CPT

## 2019-02-14 PROCEDURE — T1013: CPT

## 2019-02-14 PROCEDURE — 80053 COMPREHEN METABOLIC PANEL: CPT

## 2019-02-14 PROCEDURE — 83036 HEMOGLOBIN GLYCOSYLATED A1C: CPT

## 2019-02-14 PROCEDURE — 71045 X-RAY EXAM CHEST 1 VIEW: CPT

## 2019-02-14 PROCEDURE — 93306 TTE W/DOPPLER COMPLETE: CPT

## 2019-02-14 PROCEDURE — 72125 CT NECK SPINE W/O DYE: CPT

## 2019-02-14 PROCEDURE — 84100 ASSAY OF PHOSPHORUS: CPT

## 2019-02-14 PROCEDURE — 86923 COMPATIBILITY TEST ELECTRIC: CPT

## 2019-02-14 PROCEDURE — 82550 ASSAY OF CK (CPK): CPT

## 2019-02-14 PROCEDURE — 85027 COMPLETE CBC AUTOMATED: CPT

## 2019-02-14 PROCEDURE — 86901 BLOOD TYPING SEROLOGIC RH(D): CPT

## 2019-02-14 PROCEDURE — 97167 OT EVAL HIGH COMPLEX 60 MIN: CPT

## 2019-02-14 PROCEDURE — 36430 TRANSFUSION BLD/BLD COMPNT: CPT

## 2019-02-14 PROCEDURE — 84484 ASSAY OF TROPONIN QUANT: CPT

## 2019-02-14 PROCEDURE — 80069 RENAL FUNCTION PANEL: CPT

## 2019-02-14 PROCEDURE — 36415 COLL VENOUS BLD VENIPUNCTURE: CPT

## 2019-02-14 RX ORDER — WARFARIN SODIUM 2.5 MG/1
0 TABLET ORAL
Qty: 0 | Refills: 0 | COMMUNITY

## 2019-02-14 RX ORDER — LEVETIRACETAM 250 MG/1
1 TABLET, FILM COATED ORAL
Qty: 30 | Refills: 0
Start: 2019-02-14 | End: 2019-03-15

## 2019-02-14 RX ORDER — ACETAMINOPHEN 500 MG
2 TABLET ORAL
Qty: 0 | Refills: 0 | DISCHARGE
Start: 2019-02-14

## 2019-02-14 RX ORDER — CARVEDILOL PHOSPHATE 80 MG/1
25 CAPSULE, EXTENDED RELEASE ORAL EVERY 12 HOURS
Qty: 0 | Refills: 0 | Status: DISCONTINUED | OUTPATIENT
Start: 2019-02-14 | End: 2019-02-14

## 2019-02-14 RX ADMIN — Medication 650 MILLIGRAM(S): at 17:23

## 2019-02-14 RX ADMIN — Medication 650 MILLIGRAM(S): at 11:56

## 2019-02-14 RX ADMIN — CHLORHEXIDINE GLUCONATE 1 APPLICATION(S): 213 SOLUTION TOPICAL at 11:53

## 2019-02-14 RX ADMIN — HEPARIN SODIUM 5000 UNIT(S): 5000 INJECTION INTRAVENOUS; SUBCUTANEOUS at 05:42

## 2019-02-14 RX ADMIN — URSODIOL 300 MILLIGRAM(S): 250 TABLET, FILM COATED ORAL at 09:19

## 2019-02-14 RX ADMIN — LEVETIRACETAM 420 MILLIGRAM(S): 250 TABLET, FILM COATED ORAL at 09:19

## 2019-02-14 RX ADMIN — Medication 650 MILLIGRAM(S): at 12:30

## 2019-02-14 RX ADMIN — Medication 100 MILLIGRAM(S): at 11:53

## 2019-02-14 RX ADMIN — Medication 650 MILLIGRAM(S): at 16:45

## 2019-02-14 RX ADMIN — CARVEDILOL PHOSPHATE 25 MILLIGRAM(S): 80 CAPSULE, EXTENDED RELEASE ORAL at 16:32

## 2019-02-14 RX ADMIN — PANTOPRAZOLE SODIUM 40 MILLIGRAM(S): 20 TABLET, DELAYED RELEASE ORAL at 05:42

## 2019-02-14 NOTE — PROGRESS NOTE ADULT - REASON FOR ADMISSION
SAH b/l, fall

## 2019-02-14 NOTE — PROGRESS NOTE ADULT - ASSESSMENT
The patient is a 73y Male with subarachnoid hemorrhage after a fall.     Traumatic subarachnoid hemorrhage.   - avoid anti platelet medications  - avoid anti coagulant medications  - control BP, avoid SBP>160  Seen by neurosurgery.   Mobilize with physical therapy.     Fall  Echocardiogram and carotid duplex as above.     possible  seizure   EEG as above  continue keppra as EEG had signs of epileptiform potential.     will follow with you    Evert Castanon MD PhD   [id number]

## 2019-02-14 NOTE — DISCHARGE NOTE ADULT - HOSPITAL COURSE
HPI:  73M of ESRD T, Th, Sa dialysis; and peripheral vascular disease; on coumadin and plavix; p/w 5 days of progressive difficulty with speech; lives alone, had been slurring words periodically; but declined to come to hospital; yesterday admits that he fell in his bathroom, +likely struck head, endorses LOC for unknown duration, has no recollection of events for several hours; family checked on him last night but had even worse speech today where he is stuttering and unable to get some words out; patient comprehends everything; denies headache, has no other focal complaints    Airway clear  Breathsounds present b/l  Central andperipheral pulses 2+  GCS 15, nonfocal, pupils 2mm PERRLA  No gross deformity or lacerations appreciated (12 Feb 2019 00:26)      Hospital Course: HPI:  73M of ESRD T, Th, Sa dialysis; and peripheral vascular disease; on coumadin and plavix; p/w 5 days of progressive difficulty with speech; lives alone, had been slurring words periodically; but declined to come to hospital; yesterday admits that he fell in his bathroom, +likely struck head, endorses LOC for unknown duration, has no recollection of events for several hours; family checked on him last night but had even worse speech today where he is stuttering and unable to get some words out; patient comprehends everything; denies headache, has no other focal complaints    Airway clear  Breathsounds present b/l  Central andperipheral pulses 2+  GCS 15, nonfocal, pupils 2mm PERRLA  No gross deformity or lacerations appreciated (12 Feb 2019 00:26)      Hospital Course:  Patient was found on CT imaging to have bilateral Subarachnoid bleeds in the frontal, occipital and temporal regions. Patient was admitted to the hospital and placed in the SICU for neurological monitoring. His Plavix and Coumadin was held. Neurosurgery and Nephrology were consulted and monitored patient throughout hospitalization. Patient was also seen and evaluated by PT/OT/Rehab services and deemed safe for discharge to home. Patient while in SICU had a seizure. He was maintained on Keppra. EEG showed likely seizure activity. 24 hr EEG was not performed but no further signs of seizures noted. He was transferred to the floor and is now stable for discharge to home with follow up Neuro, Neurosurgery, Acute Care Surgery and Nephrology. His blood thinning medications will be held pending approval from Neurosurgery as an outpatient to resume. Keppra levels will need to be followed along with Neurology as an outpatient for his seizure activity while hospitalized.

## 2019-02-14 NOTE — DISCHARGE NOTE ADULT - PLAN OF CARE
alleviation of pain and symptoms Please follow up with Neurology, Neurosurgery, Acute Care Surgery and Nephrology as outpatient. Return to ED if worsening headaches, seizure like activity, inability to tolerate diet, increased pain or inability to carry out usual activities of daily living. Please hold Aspirin, Plavix, Coumadin until cleared by Neurosurgery to resume. Please follow up with Neurology, Neurosurgery, Acute Care Surgery and Nephrology as outpatient. Return to ED if worsening headaches, seizure like activity, inability to tolerate diet, increased pain or inability to carry out usual activities of daily living. Please hold Aspirin, Plavix, Coumadin until cleared by Neurosurgery to resume.    Please do not drive until cleared by neuro/neurosx at outpatient ofowup

## 2019-02-14 NOTE — PROGRESS NOTE ADULT - PROVIDER SPECIALTY LIST ADULT
Nephrology
Nephrology
Neurology
Neurosurgery
Neurosurgery
Rehab Medicine
SICU
Trauma Surgery
Nephrology

## 2019-02-14 NOTE — DISCHARGE NOTE ADULT - PATIENT PORTAL LINK FT
You can access the [facility] Patient Portal, offered by [facility], by registering with the following website: [email or web address]

## 2019-02-14 NOTE — DISCHARGE NOTE ADULT - MEDICATION SUMMARY - MEDICATIONS TO TAKE
I will START or STAY ON the medications listed below when I get home from the hospital:    nephlex rx  -- 1 tab(s) by mouth once a day  -- Indication: For Home med    acetaminophen 325 mg oral tablet  -- 2 tab(s) by mouth every 4 hours, As needed, Temp greater or equal to 38C (100.4F), Mild Pain (1 - 3)  -- Indication: For Pain    Keppra 500 mg oral tablet  -- 1 tab(s) by mouth once a day   -- Check with your doctor before becoming pregnant.  It is very important that you take or use this exactly as directed.  Do not skip doses or discontinue unless directed by your doctor.  May cause drowsiness or dizziness.  Obtain medical advice before taking any non-prescription drugs as some may affect the action of this medication.  Swallow whole.  Do not crush.  This drug may impair the ability to drive or operate machinery.  Use care until you become familiar with its effects.    -- Indication: For Seizure after head injury    glimepiride 1 mg oral tablet  -- 1 tab(s) by mouth once a day  -- Indication: For DM (diabetes mellitus)    loratadine 10 mg oral capsule  -- 1 cap(s) by mouth once a day  -- Indication: For Home med    Lipitor 20 mg oral tablet  -- 1 tab(s) by mouth once a day  -- Indication: For Hld    Lupron  --  intramuscular every 3 mos,  due  for  dose  at  [facility]  oncologist  urology  july 21 2018  -- Indication: For Home med    Sensipar 30 mg oral tablet  -- 1 tab(s) by mouth 2 times a day  -- Indication: For Home med    ursodiol 300 mg oral capsule  -- 1 cap(s) by mouth once a day  -- Indication: For Home med    Colace 100 mg oral capsule  -- 1 cap(s) by mouth once a day  -- Indication: For Constipation    senna oral tablet  -- 2 tab(s) by mouth once a day (at bedtime)  -- Indication: For Constipation    Mag-Ox 400  -- tab(s) by mouth once a day  -- Indication: For Home med    midodrine 10 mg oral tablet  -- 1 tab(s) by mouth 3 times a day  -- Indication: For Home med    Renvela 800 mg oral tablet  -- 1 tab(s) by mouth 2 times a day  -- Indication: For Home med    NexIUM 40 mg oral delayed release capsule  -- 1 cap(s) by mouth once a day  -- Indication: For Home med

## 2019-02-14 NOTE — PROGRESS NOTE ADULT - SUBJECTIVE AND OBJECTIVE BOX
[facility] DIVISION OF KIDNEY DISEASES AND HYPERTENSION -- FOLLOW UP NOTE  --------------------------------------------------------------------------------  Chief Complaint:  ESRD on HD    24 hour events/subjective:  Pt seen and examined  Lying in bed  NAD  HD today      PAST HISTORY  --------------------------------------------------------------------------------  No significant changes to PMH, PSH, FHx, SHx, unless otherwise noted    ALLERGIES & MEDICATIONS  --------------------------------------------------------------------------------  Allergies    No Known Allergies    Intolerances      Standing Inpatient Medications  chlorhexidine 2% Cloths 1 Application(s) Topical daily  docusate sodium 100 milliGRAM(s) Oral daily  heparin  Injectable 5000 Unit(s) SubCutaneous every 8 hours  levETIRAcetam  IVPB 500 milliGRAM(s) IV Intermittent every 24 hours  pantoprazole    Tablet 40 milliGRAM(s) Oral before breakfast  senna 2 Tablet(s) Oral at bedtime  ursodiol Capsule 300 milliGRAM(s) Oral <User Schedule>    PRN Inpatient Medications  acetaminophen   Tablet .. 650 milliGRAM(s) Oral every 4 hours PRN  levETIRAcetam 500 milliGRAM(s) Oral daily PRN      REVIEW OF SYSTEMS  --------------------------------------------------------------------------------  Gen: No weight changes, fatigue, fevers/chills, weakness  Skin: No rashes  Head/Eyes/Ears/Mouth: No headache; Normal hearing; Normal vision w/o blurriness; No sinus pain/discomfort, sore throat  Respiratory: No dyspnea, cough, wheezing, hemoptysis  CV: No chest pain, PND, orthopnea  GI: No abdominal pain, diarrhea, constipation, nausea, vomiting, melena, hematochezia  : No increased frequency, dysuria, hematuria, nocturia  MSK: No joint pain/swelling; no back pain; no edema  Neuro: No dizziness/lightheadedness, weakness, seizures, numbness, tingling  Heme: No easy bruising or bleeding  Endo: No heat/cold intolerance  Psych: No significant nervousness, anxiety, stress, depression    All other systems were reviewed and are negative, except as noted.    VITALS/PHYSICAL EXAM  --------------------------------------------------------------------------------  T(C): 36.8 (02-14-19 @ 13:00), Max: 36.9 (02-13-19 @ 15:53)  HR: 90 (02-14-19 @ 13:00) (67 - 92)  BP: 137/89 (02-14-19 @ 13:00) (137/85 - 148/74)  RR: 18 (02-14-19 @ 13:00) (16 - 20)  SpO2: 96% (02-14-19 @ 13:00) (95% - 96%)  Wt(kg): --        02-13-19 @ 07:01  -  02-14-19 @ 07:00  --------------------------------------------------------  IN: 340 mL / OUT: 0 mL / NET: 340 mL      Physical Exam:  	Gen: NAD, well-appearing  	HEENT: PERRL, supple neck, clear oropharynx  	Pulm: CTA B/L  	CV: RRR, S1S2; no rub  	Back: No spinal or CVA tenderness; no sacral edema  	Abd: +BS, soft, nontender/nondistended  	: No suprapubic tenderness  	UE: Warm, FROM, no clubbing, intact strength; no edema; no asterixis  	LE: Warm, FROM, no clubbing, intact strength; no edema  	Neuro: No focal deficits, intact gait  	Psych: Normal affect and mood  	Skin: Warm, without rashes  	Vascular access: Right AVF ++    LABS/STUDIES  --------------------------------------------------------------------------------              10.7   3.1   >-----------<  139      [02-14-19 @ 11:15]              32.2     133  |  91  |  43.0  ----------------------------<  112      [02-14-19 @ 11:15]  4.9   |  22.0  |  6.42        Ca     8.6     [02-14-19 @ 11:15]      Mg     2.0     [02-14-19 @ 11:15]      Phos  3.2     [02-14-19 @ 11:15]    TPro  x   /  Alb  4.3  /  TBili  x   /  DBili  x   /  AST  x   /  ALT  x   /  AlkPhos  x   [02-12-19 @ 18:12]          Creatinine Trend:  SCr 6.42 [02-14 @ 11:15]  SCr 4.23 [02-13 @ 04:22]  SCr 7.57 [02-12 @ 18:12]  SCr 7.17 [02-12 @ 06:17]  SCr 6.89 [02-11 @ 21:45]        HbA1c 4.6      [02-13-19 @ 04:22]
Acute Care Surgery/Trauma Surgery Progress Note:  Downgraded from SICU yesterday. Patient afebrile, VSS. Pain well controlled. Tolerating diet. Passing bowel movements. Per neurology, pt to remain on keppra. Denies n/v/f/c/cp/sob.     Diet, Renal Restrictions:   For patients receiving Renal Replacement - No Protein Restr, No Conc K, No Conc Phos, Low Sodium  Consistent Carbohydrate No Snacks (02-13-19 @ 02:30)      Scheduled Medications:   chlorhexidine 2% Cloths 1 Application(s) Topical daily  docusate sodium 100 milliGRAM(s) Oral daily  heparin  Injectable 5000 Unit(s) SubCutaneous every 8 hours  levETIRAcetam  IVPB 500 milliGRAM(s) IV Intermittent every 24 hours  pantoprazole    Tablet 40 milliGRAM(s) Oral before breakfast  senna 2 Tablet(s) Oral at bedtime  ursodiol Capsule 300 milliGRAM(s) Oral <User Schedule>    PRN Medications:  acetaminophen   Tablet .. 650 milliGRAM(s) Oral every 4 hours PRN Temp greater or equal to 38C (100.4F), Mild Pain (1 - 3)  levETIRAcetam 500 milliGRAM(s) Oral daily PRN after dialysis      Objective:   T(F): 98.2 (02-14 @ 05:00), Max: 98.4 (02-13 @ 12:00)  HR: 67 (02-14 @ 05:00) (67 - 92)  BP: 142/86 (02-14 @ 05:00) (134/72 - 156/86)  BP(mean): 101 (02-13 @ 12:00) (95 - 113)  ABP: --  ABP(mean): --  RR: 18 (02-14 @ 05:00) (15 - 31)  SpO2: 95% (02-14 @ 05:00) (95% - 96%)      Physical Exam:   GEN: patient resting comfortably in bed, in no acute distress  RESP: respirations are unlabored, no accessory muscle use, no conversational dyspnea  CVS: RRR  GI: Abdomen soft, non-tender, non-distended, no rebound tenderness / guarding    I&O's    02-13 @ 07:01  -  02-14 @ 07:00  --------------------------------------------------------  IN:    IV PiggyBack: 100 mL    Oral Fluid: 240 mL  Total IN: 340 mL    OUT:  Total OUT: 0 mL    Total NET: 340 mL          LABS:                        8.9    3.1   )-----------( 152      ( 13 Feb 2019 04:22 )             27.7     02-13    143  |  99  |  21.0<H>  ----------------------------<  83  4.4   |  28.0  |  4.23<H>    Ca    8.4<L>      13 Feb 2019 04:22  Phos  2.9     02-13  Mg     1.9     02-13    TPro  x   /  Alb  4.3  /  TBili  x   /  DBili  x   /  AST  x   /  ALT  x   /  AlkPhos  x   02-12          MICROBIOLOGY:       PATHOLOGY:
[facility]                                        Neurology at [place]                                  Kina Curry, & Ole                                      [address]                                           [place], [place], [address]                                                [phone]        CC: subarachnoid hemorrhage and seizure     HISTORY: The patient is a 73y Male who presented after a fall the day prior to arrival. He reports that he tripped and fell in the bathroom. He hit his head. There was brief loss of consciousness.  He had some headache afterward and some speech difficulty. This has improved.  He was noted to have some facial twitching and some movements of the arms during which he was awake and aware.   This reportedly has been occurring frequently prior to the fall.  (JW 2/12)    Interval history: no further events    ROS neurology: Denies headache or dizziness. Denies weakness/numbness.  Denies speech/language deficits. Denies diplopia/blurred vision.  Denies confusion    MEDICATIONS  (STANDING):  carvedilol 25 milliGRAM(s) Oral every 12 hours  chlorhexidine 2% Cloths 1 Application(s) Topical daily  docusate sodium 100 milliGRAM(s) Oral daily  heparin  Injectable 5000 Unit(s) SubCutaneous every 8 hours  levETIRAcetam  IVPB 500 milliGRAM(s) IV Intermittent every 24 hours  pantoprazole    Tablet 40 milliGRAM(s) Oral before breakfast  senna 2 Tablet(s) Oral at bedtime  ursodiol Capsule 300 milliGRAM(s) Oral <User Schedule>    MEDICATIONS  (PRN):  acetaminophen   Tablet .. 650 milliGRAM(s) Oral every 4 hours PRN Temp greater or equal to 38C (100.4F), Mild Pain (1 - 3)  levETIRAcetam 500 milliGRAM(s) Oral daily PRN after dialysis    Vital Signs Last 24 Hrs  T(C): 36.8 (14 Feb 2019 13:00), Max: 36.8 (14 Feb 2019 05:00)  T(F): 98.2 (14 Feb 2019 13:00), Max: 98.2 (14 Feb 2019 05:00)  HR: 90 (14 Feb 2019 13:00) (67 - 92)  BP: 137/89 (14 Feb 2019 13:00) (137/85 - 146/82)  BP(mean): --  RR: 18 (14 Feb 2019 13:00) (17 - 20)  SpO2: 96% (14 Feb 2019 13:00) (95% - 96%)    Detailed neuro exam:    Mental status: The patient is awake, alert, and fully oriented. There is no aphasia.    Cranial nerves: . Pupils react symmetrically to light. There is no visual field deficit to confrontation. Extraocular motion is full with no nystagmus. There is no ptosis. Facial sensation is intact. Facial musculature is symmetric. Palate elevates symmetrically. Tongue is midline.    Motor: There is normal bulk and tone.  Strength is 5/5 in the right arm and leg.   Strength is 5/5 in the left arm and leg.    Sensation: Intact to light touch and pin.    Reflexes: 2+ throughout and plantar responses are flexor.    Cerebellar: There is no dysmetria on finger to nose testing.    LABS:                           10.7   3.1   )-----------( 139      ( 14 Feb 2019 11:15 )             32.2     02-14    133<L>  |  91<L>  |  43.0<H>  ----------------------------<  112  4.9   |  22.0  |  6.42<H>    Ca    8.6      14 Feb 2019 11:15  Phos  3.2     02-14  Mg     2.0     02-14    TPro  x   /  Alb  4.3  /  TBili  x   /  DBili  x   /  AST  x   /  ALT  x   /  AlkPhos  x   02-12    LIVER FUNCTIONS - ( 12 Feb 2019 18:12 )  Alb: 4.3 g/dL / Pro: x     / ALK PHOS: x     / ALT: x     / AST: x     / GGT: x           RADIOLOGY   CT head 2/12: There is subarachnoid hemorrhage in the right temporal-occipital and left frontal regions.     < from: US Duplex Carotid Arteries Complete, Bilateral (02.12.19 @ 13:41) >  IMPRESSION:    Mild plaque in the carotid bulbs and proximal internal carotid arteries,   greater on the right with less than 50% internal carotid artery stenosis   bilaterally.      Arrhythmia noted; clinical correlation is recommended.      < from: TTE Echo Complete w/Doppler (02.12.19 @ 12:01) >  Summary:   1. Left ventricular ejection fraction, by visual estimation, is 60 to   65%.   2. There is mild concentric left ventricular hypertrophy.   3. Thickening of the anterior and posterior mitral valve leaflets.   4. Mild tricuspid regurgitation.   5. Mild aortic regurgitation.   6. Sclerotic aortic valve with normal opening.   7. Spectral Doppler shows pseudonormal pattern of left ventricular   myocardial filling (Grade II diastolic dysfunction).   8. Normal global left ventricular systolic function.      < from: EEG Awake and Asleep (02.12.19 @ 12:31) >  Impression: This is an abnormal record characterized by generalized   slowing and disorganization. It is indicative of diffuse cerebral   dysfunction.  There were left central spike-wave complexes suggestive of a potential   focus of cortical irritability.  The clinical episodes of jerking movements had no epileptiform correlate.
[facility]                                        Neurology at [place]                                  Kina Curry, & Ole                                      [address]                                           [place], [place], [address]                                                [phone]        CC: subarachnoid hemorrhage and seizure     HISTORY: The patient is a 73y Male who presented after a fall the day prior to arrival. He reports that he tripped and fell in the bathroom. He hit his head. There was brief loss of consciousness.  He had some headache afterward and some speech difficulty. This has improved.  He was noted to have some facial twitching and some movements of the arms during which he was awake and aware.   This reportedly has been occurring frequently prior to the fall.  (JW 2/12)    Interval history: no further events    ROS neurology: Denies headache or dizziness. Denies weakness/numbness.  Denies speech/language deficits. Denies diplopia/blurred vision.  Denies confusion    MEDICATIONS  (STANDING):  chlorhexidine 2% Cloths 1 Application(s) Topical daily  docusate sodium 100 milliGRAM(s) Oral daily  heparin  Injectable 5000 Unit(s) SubCutaneous every 8 hours  levETIRAcetam  IVPB 500 milliGRAM(s) IV Intermittent every 24 hours  pantoprazole    Tablet 40 milliGRAM(s) Oral before breakfast  senna 2 Tablet(s) Oral at bedtime  ursodiol Capsule 300 milliGRAM(s) Oral <User Schedule>    MEDICATIONS  (PRN):  levETIRAcetam 500 milliGRAM(s) Oral daily PRN after dialysis      Vital Signs Last 24 Hrs  T(C): 36.9 (13 Feb 2019 15:53), Max: 37 (13 Feb 2019 07:00)  T(F): 98.4 (13 Feb 2019 15:53), Max: 98.6 (13 Feb 2019 07:00)  HR: 92 (13 Feb 2019 15:53) (75 - 92)  BP: 148/74 (13 Feb 2019 15:53) (133/72 - 174/74)  BP(mean): 101 (13 Feb 2019 12:00) (94 - 115)  RR: 18 (13 Feb 2019 15:53) (14 - 31)  SpO2: 96% (13 Feb 2019 13:25) (90% - 100%)    Detailed neuro exam:    Mental status: The patient is awake, alert, and fully oriented. There is no aphasia.    Cranial nerves: . Pupils react symmetrically to light. There is no visual field deficit to confrontation. Extraocular motion is full with no nystagmus. There is no ptosis. Facial sensation is intact. Facial musculature is symmetric. Palate elevates symmetrically. Tongue is midline.    Motor: There is normal bulk and tone.  Strength is 5/5 in the right arm and leg.   Strength is 5/5 in the left arm and leg.    Sensation: Intact to light touch and pin.    Reflexes: 2+ throughout and plantar responses are flexor.    Cerebellar: There is no dysmetria on finger to nose testing.    LABS:                         8.9    3.1   )-----------( 152      ( 13 Feb 2019 04:22 )             27.7     02-13    143  |  99  |  21.0<H>  ----------------------------<  83  4.4   |  28.0  |  4.23<H>    Ca    8.4<L>      13 Feb 2019 04:22  Phos  2.9     02-13  Mg     1.9     02-13    TPro  x   /  Alb  4.3  /  TBili  x   /  DBili  x   /  AST  x   /  ALT  x   /  AlkPhos  x   02-12    LIVER FUNCTIONS - ( 12 Feb 2019 18:12 )  Alb: 4.3 g/dL / Pro: x     / ALK PHOS: x     / ALT: x     / AST: x     / GGT: x           PT/INR - ( 12 Feb 2019 06:18 )   PT: 15.2 sec;   INR: 1.31 ratio         PTT - ( 11 Feb 2019 21:45 )  PTT:36.9 sec    RADIOLOGY   CT head: There is subarachnoid hemorrhage in the right temporal-occipital and left frontal regions.     < from: US Duplex Carotid Arteries Complete, Bilateral (02.12.19 @ 13:41) >  IMPRESSION:    Mild plaque in the carotid bulbs and proximal internal carotid arteries,   greater on the right with less than 50% internal carotid artery stenosis   bilaterally.      Arrhythmia noted; clinical correlation is recommended.      < from: TTE Echo Complete w/Doppler (02.12.19 @ 12:01) >  Summary:   1. Left ventricular ejection fraction, by visual estimation, is 60 to   65%.   2. There is mild concentric left ventricular hypertrophy.   3. Thickening of the anterior and posterior mitral valve leaflets.   4. Mild tricuspid regurgitation.   5. Mild aortic regurgitation.   6. Sclerotic aortic valve with normal opening.   7. Spectral Doppler shows pseudonormal pattern of left ventricular   myocardial filling (Grade II diastolic dysfunction).   8. Normal global left ventricular systolic function.      < from: EEG Awake and Asleep (02.12.19 @ 12:31) >  Impression: This is an abnormal record characterized by generalized   slowing and disorganization. It is indicative of diffuse cerebral   dysfunction.  There were left central spike-wave complexes suggestive of a potential   focus of cortical irritability.  The clinical episodes of jerking movements had no epileptiform correlate.
[facility]                                        Neurology at [place]                                 Kina Curry & Ole                                  [address]                                        [place], [place], [address]                                             [phone]        Addendum:    EEG reviewed.   The patient had frequent spike-wave complexes centered in the left central region (C3 electrode).   The video was recording during most of the study. There was no clinical correlate to the spikes.  The patient had clinical episodes of "chewing", "facial twitching", and "arm jerking" on several occasions during the recording.   There was no epileptiform correlate on EEG during these clinical episodes.     I would recommend continuing Keppra for now, however. I do not see need for continuous monitoring at this time given the above findings.
INTERVAL HPI/OVERNIGHT EVENTS:    Seizure like activity noted after pt’s jerking movements, partial complex like in nature.  Neurology consulted and EEG obtained which noted patient to have frequent spike-wave complexes centered in the left central region (C3 electrode). The video was recording during most of the study. There was no clinical correlate to the spikes.  The patient had clinical episodes of "chewing", "facial twitching", and "arm jerking" on several occasions during the recording.  There was no epileptiform correlate on EEG during these clinical episodes.  Syncope work up for nature of fall, Carotid Duplex NEG, Echo with preserved EF.  Dialysis completed without issue today.  Senna DC’d since pt with liquid BM x 2.   GCS 15 and remains without neurologic deficit.       MEDICATIONS  (STANDING):  chlorhexidine 2% Cloths 1 Application(s) Topical daily  dextrose 50% Injectable 12.5 Gram(s) IV Push once  dextrose 50% Injectable 25 Gram(s) IV Push once  dextrose 50% Injectable 25 Gram(s) IV Push once  docusate sodium 100 milliGRAM(s) Oral daily  insulin lispro (HumaLOG) corrective regimen sliding scale   SubCutaneous every 6 hours  levETIRAcetam  IVPB 500 milliGRAM(s) IV Intermittent every 24 hours  pantoprazole    Tablet 40 milliGRAM(s) Oral before breakfast  senna 2 Tablet(s) Oral at bedtime  sodium chloride 0.9%. 1000 milliLiter(s) (50 mL/Hr) IV Continuous <Continuous>  ursodiol Capsule 300 milliGRAM(s) Oral <User Schedule>    MEDICATIONS  (PRN):  dextrose 40% Gel 15 Gram(s) Oral once PRN Blood Glucose LESS THAN 70 milliGRAM(s)/deciliter  glucagon  Injectable 1 milliGRAM(s) IntraMuscular once PRN Glucose LESS THAN 70 milligrams/deciliter      Drug Dosing Weight  Height (cm): 170.18 (12 Feb 2019 01:05)  Weight (kg): 95.3 (12 Feb 2019 01:05)  BMI (kg/m2): 32.9 (12 Feb 2019 01:05)  BSA (m2): 2.06 (12 Feb 2019 01:05)      PAST MEDICAL & SURGICAL HISTORY:  Persistent atrial fibrillation  Carotid artery disease: mild  GERD (gastroesophageal reflux disease)  Low glucose level: Patient has h/o low blood sugars at times  Palpitations  Leg pain, bilateral: R&gt;L at rest and with short distant ambulation  Imbalance: secondary to PVD Uses cane  Obesity (BMI 30.0-34.9)  Claudication in peripheral vascular disease: R&gt;L  Right common iliac or ostial external iliac per June 2018 U/S  Left popliteal 50-75 and EDUIN stenosis  SUSANA 0.8  Kidney problem: spontanious renal bleed while on coumadin  c/b  renal  hematome  with  LT  nephrectomy  Hypotension: treated  with Midodrine  Tiredness  Renal hematoma, left  Hypercholesteremia  ESRD (end stage renal disease): on  hemodialysis  3  x  weekly.,  H/O  left  nephrectomy after  renal  bleed  Dyslipidemia  Dizziness  DM (diabetes mellitus)  Daytime sleepiness  Renal transplant recipient  AV fistula: right lower arm  Abnormality of left atrial appendage: WATCHMAN  LEFT ATRIAL APPENDAGE CLOSUIRE   Jan. 30 2017  History of unilateral nephrectomy: left  nephrectomy      ICU Vital Signs Last 24 Hrs  T(C): 36.8 (13 Feb 2019 04:09), Max: 36.9 (12 Feb 2019 16:00)  T(F): 98.3 (13 Feb 2019 04:09), Max: 98.5 (12 Feb 2019 21:55)  HR: 89 (13 Feb 2019 05:00) (69 - 92)  BP: 157/74 (13 Feb 2019 05:00) (125/100 - 174/74)  BP(mean): 106 (13 Feb 2019 05:00) (93 - 115)  ABP: --  ABP(mean): --  RR: 21 (13 Feb 2019 05:00) (13 - 42)  SpO2: 90% (13 Feb 2019 05:00) (90% - 100%)          I&O's Detail    12 Feb 2019 07:01  -  13 Feb 2019 05:38  --------------------------------------------------------  IN:    IV PiggyBack: 200 mL    Oral Fluid: 300 mL    Other: 900 mL    Packed Red Blood Cells: 373 mL    sodium chloride 0.9%.: 650 mL  Total IN: 2423 mL    OUT:    Other: 2900 mL  Total OUT: 2900 mL    Total NET: -477 mL        PHYSICAL EXAM:    NEURO: GCS 15.  CRANIAL NERVES: PERRL. EOMI without nystagmus. Facial sensation intact. Face symmetric, tongue midline. Hearing grossly intact. +Dysarthria  MOTOR: strength 5/5 b/l upper and lower extremities  SENSATION: grossly intact to light touch all extremities    HEAD:  Atraumatic, normocephalic    CHEST/LUNG: Nonlabored breathing, no respiratory distress    HEART: +S1/+S2; Regular rate    ABDOMEN: Soft, nontender, nondistended    EXTREMITIES: RUE AV fistula +thrill    SKIN: Warm, dry      LABS:  CBC Full  -  ( 13 Feb 2019 04:22 )  WBC Count : 3.1 K/uL  Hemoglobin : 8.9 g/dL  Hematocrit : 27.7 %  Platelet Count - Automated : 152 K/uL  Mean Cell Volume : 106.1 fl  Mean Cell Hemoglobin : 34.1 pg  Mean Cell Hemoglobin Concentration : 32.1 g/dL  Auto Neutrophil # : x  Auto Lymphocyte # : x  Auto Monocyte # : x  Auto Eosinophil # : x  Auto Basophil # : x  Auto Neutrophil % : 82.0 %  Auto Lymphocyte % : 7.0 %  Auto Monocyte % : 6.0 %  Auto Eosinophil % : 4.0 %  Auto Basophil % : x    02-13    143  |  99  |  21.0<H>  ----------------------------<  83  4.4   |  28.0  |  4.23<H>    Ca    8.4<L>      13 Feb 2019 04:22  Phos  2.9     02-13  Mg     1.9     02-13    TPro  x   /  Alb  4.3  /  TBili  x   /  DBili  x   /  AST  x   /  ALT  x   /  AlkPhos  x   02-12    PT/INR - ( 12 Feb 2019 06:18 )   PT: 15.2 sec;   INR: 1.31 ratio         PTT - ( 11 Feb 2019 21:45 )  PTT:36.9 sec
INTERVAL HPI/OVERNIGHT EVENTS:  73y Male PMH ESRD on HD T/Th/Sa, PVD on Coumadin/Plavix, admitted w/ difficulty speech/dysarthria x 1 week and fall yesterday 2/11 with + LOC, found with SAH. Patient seen. Speech appears improved at time of examination. No complaints at this time. S/p EEG this AM and initiation of Keppra d/t gaze preference and jerking of extremities earlier today. Denies headache, dizziness, n/v, chest pain, SOB, abdominal pain    Vital Signs Last 24 Hrs  T(C): 36.7 (12 Feb 2019 12:00), Max: 37.4 (12 Feb 2019 01:05)  T(F): 98.1 (12 Feb 2019 12:00), Max: 99.3 (12 Feb 2019 01:05)  HR: 69 (12 Feb 2019 12:00) (69 - 83)  BP: 143/79 (12 Feb 2019 12:00) (121/69 - 155/75)  BP(mean): 103 (12 Feb 2019 12:00) (92 - 117)  RR: 19 (12 Feb 2019 12:00) (15 - 42)  SpO2: 96% (12 Feb 2019 12:00) (95% - 99%)    PHYSICAL EXAM:  GENERAL: NAD, well-groomed, well-developed  HEAD:  Atraumatic, normocephalic  MENTAL STATUS: Sleeping comfortably, awakes to stimulation. Sustains eye opening and remains alert during examination. Answers simple questions appropriately in [place]. Oriented to self and hospital. Able to name high frequency objects and their purpose.; following commands  CRANIAL NERVES: PERRL. EOMI without nystagmus. Facial sensation intact. Face symmetric, tongue midline. Hearing grossly intact. +Dysarthria  MOTOR: strength 5/5 b/l upper and lower extremities  SENSATION: grossly intact to light touch all extremities  CHEST/LUNG: Nonlabored breathing, no respiratory distress  HEART: +S1/+S2; Regular rate  ABDOMEN: Soft, nontender, nondistended  EXTREMITIES: RUE AV fistula +thrill  SKIN: Warm, dry    LABS:                        8.0    4.1   )-----------( 168      ( 12 Feb 2019 06:17 )             24.4     02-12    141  |  97<L>  |  52.0<H>  ----------------------------<  120<H>  5.1   |  24.0  |  7.17<H>    Ca    8.0<L>      12 Feb 2019 06:17  Phos  3.4     02-12  Mg     1.9     02-12    TPro  7.8  /  Alb  4.1  /  TBili  0.5  /  DBili  x   /  AST  22  /  ALT  10  /  AlkPhos  135<H>  02-11    PT/INR - ( 12 Feb 2019 06:18 )   PT: 15.2 sec;   INR: 1.31 ratio    PTT - ( 11 Feb 2019 21:45 )  PTT:36.9 sec    02-12 @ 07:01  -  02-12 @ 12:18  --------------------------------------------------------  IN: 300 mL / OUT: 0 mL / NET: 300 mL    RADIOLOGY & ADDITIONAL TESTS:  CT Head No Cont (02.12.19 @ 07:19)  IMPRESSION:   Persistent hemorrhage particularly in the subarachnoid space unchanged as   compared to previous exam, no hydrocephalus, midline shift or herniation.    CT Cervical Spine No Cont (02.12.19 @ 01:00)  IMPRESSION:   Cervical degenerative spondylosis and degenerative changes as described   above. No definite evidence of an acute fracture.  If symptoms persist, and additional imaging evaluation is needed,   follow-up MRI is suggested.    CT Head No Cont (02.11.19 @ 21:28)  IMPRESSION:   Acute multifocal subarachnoid bleed without local mass effect or shift.
INTERVAL HPI/OVERNIGHT EVENTS:  73y Male PMH ESRD on HD T/Th/Sa, PVD/PAD on Coumadin/Plavix, afib s/p Watchman on ASA 81, admitted w/ difficulty speech/dysarthria x 1 week and fall 2/11 with + LOC, found with SAH. Patient seen earlier this AM, states he feels good. No complaints. Denies headache, nausea, vomiting, dizziness, weakness.    Vital Signs Last 24 Hrs  T(C): 37 (13 Feb 2019 07:00), Max: 37 (13 Feb 2019 07:00)  T(F): 98.6 (13 Feb 2019 07:00), Max: 98.6 (13 Feb 2019 07:00)  HR: 82 (13 Feb 2019 12:00) (71 - 92)  BP: 146/70 (13 Feb 2019 12:00) (133/72 - 174/74)  BP(mean): 101 (13 Feb 2019 12:00) (94 - 115)  RR: 21 (13 Feb 2019 12:00) (13 - 28)  SpO2: 95% (13 Feb 2019 12:00) (90% - 100%)    PHYSICAL EXAM:  GENERAL: NAD, well-groomed, well-developed  HEAD:  Atraumatic, normocephalic  MENTAL STATUS: Awake, alert, oriented to self, SSH, full date. Appropriately conversant in Malian without aphasia; following commands  CRANIAL NERVES: PERRL. EOMI without nystagmus. Facial sensation intact. Face symmetric, tongue midline. Hearing grossly intact  MOTOR: strength 5/5 b/l upper and lower extremities  SENSATION: grossly intact to light touch all extremities  CHEST/LUNG: Nonlabored breathing, no respiratory distress  EXTREMITIES: RUE AV fistula  SKIN: Warm, dry    LABS:                        8.9    3.1   )-----------( 152      ( 13 Feb 2019 04:22 )             27.7     02-13    143  |  99  |  21.0<H>  ----------------------------<  83  4.4   |  28.0  |  4.23<H>    Ca    8.4<L>      13 Feb 2019 04:22  Phos  2.9     02-13  Mg     1.9     02-13    TPro  x   /  Alb  4.3  /  TBili  x   /  DBili  x   /  AST  x   /  ALT  x   /  AlkPhos  x   02-12    PT/INR - ( 12 Feb 2019 06:18 )   PT: 15.2 sec;   INR: 1.31 ratio     PTT - ( 11 Feb 2019 21:45 )  PTT:36.9 sec    02-12 @ 07:01  -  02-13 @ 07:00  --------------------------------------------------------  IN: 2703 mL / OUT: 2900 mL / NET: -197 mL    02-13 @ 07:01  -  02-13 @ 12:45  --------------------------------------------------------  IN: 340 mL / OUT: 0 mL / NET: 340 mL    RADIOLOGY & ADDITIONAL TESTS:  CT Head No Cont (02.12.19 @ 07:19)  IMPRESSION:   Persistent hemorrhage particularly in the subarachnoid space unchanged as   compared to previous exam, no hydrocephalus, midline shift or herniation.    CT Cervical Spine No Cont (02.12.19 @ 01:00)  IMPRESSION:   Cervical degenerative spondylosis and degenerative changes as described   above. No definite evidence of an acute fracture.  If symptoms persist, and additional imaging evaluation is needed,   follow-up MRI is suggested.    CT Head No Cont (02.11.19 @ 21:28)  IMPRESSION:   Acute multifocal subarachnoid bleed without local mass effect or shift.
[facility] DIVISION OF KIDNEY DISEASES AND HYPERTENSION -- HEMODIALYSIS NOTE  --------------------------------------------------------------------------------  Chief Complaint: ESRD/Ongoing hemodialysis requirement    24 hour events/subjective:  Pt seen and evaluated in SICU  No complaints; lying in bed in NAD  HD planned for tomorrow      PAST HISTORY  --------------------------------------------------------------------------------  No significant changes to PMH, PSH, FHx, SHx, unless otherwise noted    ALLERGIES & MEDICATIONS  --------------------------------------------------------------------------------  Allergies    No Known Allergies    Intolerances      Standing Inpatient Medications  chlorhexidine 2% Cloths 1 Application(s) Topical daily  docusate sodium 100 milliGRAM(s) Oral daily  heparin  Injectable 5000 Unit(s) SubCutaneous every 8 hours  levETIRAcetam  IVPB 500 milliGRAM(s) IV Intermittent every 24 hours  pantoprazole    Tablet 40 milliGRAM(s) Oral before breakfast  senna 2 Tablet(s) Oral at bedtime  ursodiol Capsule 300 milliGRAM(s) Oral <User Schedule>    PRN Inpatient Medications      REVIEW OF SYSTEMS  --------------------------------------------------------------------------------  Gen: No weight changes, fatigue, fevers/chills, weakness  Skin: No rashes  Head/Eyes/Ears/Mouth: No headache; Normal hearing; Normal vision w/o blurriness; No sinus pain/discomfort, sore throat  Respiratory: No dyspnea, cough, wheezing, hemoptysis  CV: No chest pain, PND, orthopnea  GI: No abdominal pain, diarrhea, constipation, nausea, vomiting, melena, hematochezia  : No increased frequency, dysuria, hematuria, nocturia  MSK: No joint pain/swelling; no back pain; no edema  Neuro: No dizziness/lightheadedness, weakness, seizures, numbness, tingling  Heme: No easy bruising or bleeding  Endo: No heat/cold intolerance  Psych: No significant nervousness, anxiety, stress, depression    All other systems were reviewed and are negative, except as noted.    VITALS/PHYSICAL EXAM  --------------------------------------------------------------------------------  T(C): 37 (02-13-19 @ 07:00), Max: 37 (02-13-19 @ 07:00)  HR: 85 (02-13-19 @ 11:00) (69 - 92)  BP: 139/72 (02-13-19 @ 11:00) (133/72 - 174/74)  RR: 16 (02-13-19 @ 11:00) (13 - 28)  SpO2: 96% (02-13-19 @ 11:00) (90% - 100%)  Wt(kg): --  Height (cm): 170.18 (02-12-19 @ 01:05)  Weight (kg): 95.3 (02-12-19 @ 01:05)  BMI (kg/m2): 32.9 (02-12-19 @ 01:05)  BSA (m2): 2.06 (02-12-19 @ 01:05)      02-12-19 @ 07:01  -  02-13-19 @ 07:00  --------------------------------------------------------  IN: 2703 mL / OUT: 2900 mL / NET: -197 mL    02-13-19 @ 07:01  -  02-13-19 @ 11:49  --------------------------------------------------------  IN: 340 mL / OUT: 0 mL / NET: 340 mL      Physical Exam:  	Gen: NAD, well-appearing  	HEENT: PERRL, supple neck, clear oropharynx  	Pulm: CTA B/L  	CV: RRR, S1S2; no rub  	Back: No spinal or CVA tenderness; no sacral edema  	Abd: +BS, soft, nontender/nondistended  	: No suprapubic tenderness  	UE: Warm, FROM, no clubbing, intact strength; no edema; no asterixis  	LE: Warm, FROM, no clubbing, intact strength; no edema  	Neuro: No focal deficits, intact gait  	Psych: abnormal affect and mood  	Skin: Warm, without rashes  	Vascular access: R AVF+BT    LABS/STUDIES  --------------------------------------------------------------------------------              8.9    3.1   >-----------<  152      [02-13-19 @ 04:22]              27.7     143  |  99  |  21.0  ----------------------------<  83      [02-13-19 @ 04:22]  4.4   |  28.0  |  4.23        Ca     8.4     [02-13-19 @ 04:22]      Mg     1.9     [02-13-19 @ 04:22]      Phos  2.9     [02-13-19 @ 04:22]    TPro  x   /  Alb  4.3  /  TBili  x   /  DBili  x   /  AST  x   /  ALT  x   /  AlkPhos  x   [02-12-19 @ 18:12]    PT/INR: PT 15.2 , INR 1.31       [02-12-19 @ 06:18]  PTT: 36.9       [02-11-19 @ 21:45]    Troponin 0.07      [02-12-19 @ 11:40]        [02-11-19 @ 21:45]    HbA1c 4.6      [02-13-19 @ 04:22]
Patient in bed, reports his headache is improved.   He was sitting up earlier eating his breakfast.   Reports no other pain.   Worked with PT, but did not address stairs.     FUNCTIONAL PROGRESS  2/13  Bed Mobility: Rolling/Turning:     · Level of Meagher	minimum assist (75% patients effort)	  · Physical Assist/Nonphysical Assist	1 person assist	    Bed Mobility: Scooting/Bridging:     · Level of Meagher	minimum assist (75% patients effort)	  · Physical Assist/Nonphysical Assist	1 person assist	    Bed Mobility: Supine to Sit:     · Level of Meagher	minimum assist (75% patients effort)	  · Physical Assist/Nonphysical Assist	1 person assist	    Bed Mobility Analysis:     · Bed Mobility Limitations	decreased ability to use arms for pushing/pulling; decreased ability to use legs for bridging/pushing	  · Impairments Contributing to Impaired Bed Mobility	decreased strength; impaired balance	    Transfer: Sit to Stand:     · Level of Meagher	minimum assist (75% patients effort)	  · Physical Assist/Nonphysical Assist	1 person assist; nonverbal cues (demo/gestures); verbal cues	  · Weight-Bearing Restrictions	full weight-bearing	  · Assistive Device	rolling walker	    Transfer: Stand to Sit:     · Level of Meagher	contact guard	  · Physical Assist/Nonphysical Assist	1 person assist; nonverbal cues (demo/gestures); verbal cues	  · Weight-Bearing Restrictions	full weight-bearing	  · Assistive Device	rolling walker	    Sit/Stand Transfer Safety Analysis:     · Impairments Contributing to Impaired Transfers	decreased strength; impaired balance; pain	    Gait Skills:     · Level of Meagher	contact guard	  · Physical Assist/Nonphysical Assist	1 person assist	  · Weight-Bearing Restrictions	full weight-bearing	  · Assistive Device	rolling walker	  · Gait Distance	50 feet; including turns	      Bathing Training:     · Level of Meagher	minimum assist (75% patients effort); seated	  · Physical Assist/Nonphysical Assist	1 person assist	    Upper Body Dressing Training:     · Level of Meagher	minimum assist (75% patients effort); to don gown	  · Physical Assist/Nonphysical Assist	1 person assist	    Lower Body Dressing Training:     · Level of Meagher	minimum assist (75% patients effort); to don socks	  · Physical Assist/Nonphysical Assist	1 person assist	    Toilet Hygiene Training:     · Level of Meagher	to be assessed	    Grooming Training:     · Level of Meagher	supervision	    Eating/Self-Feeding Training:     · Level of Meagher	independent	        REVIEW OF SYSTEMS  Constitutional - No fever,  No fatigue  HEENT - No vertigo, No neck pain  Neurological - +headaches, No memory loss, No loss of strength, No numbness, No tremors  Skin - No rashes, No lesions   Musculoskeletal - No joint pain, No joint swelling, No muscle pain  Psychiatric - No depression, No anxiety    VITALS  T(C): 36.8 (02-14-19 @ 05:00), Max: 36.9 (02-13-19 @ 12:00)  HR: 67 (02-14-19 @ 05:00) (67 - 92)  BP: 142/86 (02-14-19 @ 05:00) (134/72 - 156/86)  RR: 18 (02-14-19 @ 05:00) (15 - 31)  SpO2: 95% (02-14-19 @ 05:00) (95% - 96%)  Wt(kg): --    MEDICATIONS   acetaminophen   Tablet .. 650 milliGRAM(s) every 4 hours PRN  chlorhexidine 2% Cloths 1 Application(s) daily  docusate sodium 100 milliGRAM(s) daily  heparin  Injectable 5000 Unit(s) every 8 hours  levETIRAcetam 500 milliGRAM(s) daily PRN  levETIRAcetam  IVPB 500 milliGRAM(s) every 24 hours  pantoprazole    Tablet 40 milliGRAM(s) before breakfast  senna 2 Tablet(s) at bedtime  ursodiol Capsule 300 milliGRAM(s) <User Schedule>      RECENT LABS/IMAGING  CBC Full  -  ( 13 Feb 2019 04:22 )  WBC Count : 3.1 K/uL  Hemoglobin : 8.9 g/dL  Hematocrit : 27.7 %  Platelet Count - Automated : 152 K/uL  Mean Cell Volume : 106.1 fl  Mean Cell Hemoglobin : 34.1 pg  Mean Cell Hemoglobin Concentration : 32.1 g/dL  Auto Neutrophil # : x  Auto Lymphocyte # : x  Auto Monocyte # : x  Auto Eosinophil # : x  Auto Basophil # : x  Auto Neutrophil % : 82.0 %  Auto Lymphocyte % : 7.0 %  Auto Monocyte % : 6.0 %  Auto Eosinophil % : 4.0 %  Auto Basophil % : x    02-13    143  |  99  |  21.0<H>  ----------------------------<  83  4.4   |  28.0  |  4.23<H>    Ca    8.4<L>      13 Feb 2019 04:22  Phos  2.9     02-13  Mg     1.9     02-13    TPro  x   /  Alb  4.3  /  TBili  x   /  DBili  x   /  AST  x   /  ALT  x   /  AlkPhos  x   02-12    ----------------------------------------------------------------------------------------  PHYSICAL EXAM  Constitutional - NAD, Comfortable  Extremities - No C/C/E, No calf tenderness   Neurologic Exam -                    Cognitive - Awake, Alert, AAO to self, place, date, year, situation     Motor - No focal deficits     Sensory - Intact to LT      OculoVestibular - No saccades, No nystagmus      Balance - WNL Static  Psychiatric - Mood stable, Affect WNL	  ----------------------------------------------------------------------------------------  ASSESSMENT/PLAN  73yMale with functional deficits after a fall sustaining a SAH  Pain - Headache  DVT PPX - SCDs, Heparin  ?Post-traumatic Seizures - Kera  Rehab - Continue to recommend DC HOME, therapy needs to provide ongoing assessment and rteatment for stairs and reintegration home. Will continue to follow for ongoing rehab needs and recommendations.     Continue bedside therapy as well as OOB throughout the day with mobilization by staff to maintain cardiopulmonary function and prevention of secondary complications related to debility.
Patient was seen and evaluated on dialysis.   No c/o CP SOB NV  no F/C  no swelling    T(C): 36.8 (02-13-19 @ 04:09), Max: 36.9 (02-12-19 @ 16:00)  HR: 87 (02-13-19 @ 06:00) (69 - 92)  BP: 147/79 (02-13-19 @ 06:00) (133/72 - 174/74)    PE ;  NAD, Pale, Responsive,  lungs - CTA  CV gr 1 murmur,  No gallop or rub  Abd : soft, NT BS +, No masses  Ext- + edema  Neuro : Grossly intact, moving extremities                         8.9    3.1   )-----------( 152      ( 13 Feb 2019 04:22 )             27.7     02-13    143  |  99  |  21.0<H>  ----------------------------<  83  4.4   |  28.0  |  4.23<H>    Ca    8.4<L>      13 Feb 2019 04:22  Phos  2.9     02-13  Mg     1.9     02-13    TPro  x   /  Alb  4.3  /  TBili  x   /  DBili  x   /  AST  x   /  ALT  x   /  AlkPhos  x   02-12    MEDICATIONS  (STANDING):  acetaminophen  IVPB ..  chlorhexidine 2% Cloths  dextrose 40% Gel PRN  dextrose 50% Injectable  dextrose 50% Injectable  dextrose 50% Injectable  docusate sodium  glucagon  Injectable PRN  insulin lispro (HumaLOG) corrective regimen sliding scale  levETIRAcetam  IVPB  pantoprazole    Tablet  senna  sodium chloride 0.9%.  ursodiol Capsule    Patient stable. EEG + Seizure activity,  Ten HD easily, 2 K + Dilysate, Recd., 1 unit - PRBC,  Continue HD - No Heparin,    D/W Neurology re : Keppra Drug Management  w. Dialysis ,

## 2019-02-14 NOTE — PROGRESS NOTE ADULT - ATTENDING COMMENTS
NSGY Attg:    see above    patient seen and examined    agree with plan as documented
The patient was seen and examined  No new problems  The patient is progressing quite well  Discharge planning  F/u neurosurgery
NSGY Attg:    see above    patient seen and examined    Exam:  alert, appropriately conversant  PERRL  EOMI  FS TML  PANDYA to command  no focal motor deficit    agree with plan as documented

## 2019-02-14 NOTE — DISCHARGE NOTE ADULT - MEDICATION SUMMARY - MEDICATIONS TO STOP TAKING
I will STOP taking the medications listed below when I get home from the hospital:    Plavix 75 mg oral tablet  -- 1 tab(s) by mouth once a day   -- Do not take aspirin or aspirin containing products without knowledge and consent of your physician.    Coumadin

## 2019-02-14 NOTE — PROGRESS NOTE ADULT - ASSESSMENT
1. ESRD on HD  2. Anemia of Chronic Renal Disease  3. Seizure Disorder    Patient stable   EEG + Seizure activity  S/P 1 unit - PRBC  Continue HD - No Heparin  HD today  Continue current management

## 2019-02-14 NOTE — DISCHARGE NOTE ADULT - CARE PROVIDERS DIRECT ADDRESSES
[email or web address]_Unknown

## 2019-02-14 NOTE — DISCHARGE NOTE ADULT - CARE PLAN
Principal Discharge DX:	Subarachnoid hemorrhage following injury  Goal:	alleviation of pain and symptoms  Assessment and plan of treatment:	Please follow up with Neurology, Neurosurgery, Acute Care Surgery and Nephrology as outpatient. Return to ED if worsening headaches, seizure like activity, inability to tolerate diet, increased pain or inability to carry out usual activities of daily living. Please hold Aspirin, Plavix, Coumadin until cleared by Neurosurgery to resume.  Secondary Diagnosis:	Renal transplant recipient  Secondary Diagnosis:	ESRD (end stage renal disease)  Secondary Diagnosis:	DM (diabetes mellitus)  Secondary Diagnosis:	Seizure after head injury Principal Discharge DX:	Subarachnoid hemorrhage following injury  Goal:	alleviation of pain and symptoms  Assessment and plan of treatment:	Please follow up with Neurology, Neurosurgery, Acute Care Surgery and Nephrology as outpatient. Return to ED if worsening headaches, seizure like activity, inability to tolerate diet, increased pain or inability to carry out usual activities of daily living. Please hold Aspirin, Plavix, Coumadin until cleared by Neurosurgery to resume.    Please do not drive until cleared by neuro/neurosx at outpatient ofllowup  Secondary Diagnosis:	Renal transplant recipient  Secondary Diagnosis:	ESRD (end stage renal disease)  Secondary Diagnosis:	DM (diabetes mellitus)  Secondary Diagnosis:	Seizure after head injury

## 2019-02-14 NOTE — DISCHARGE NOTE ADULT - PROVIDER TOKENS
PROVIDER:[TOKEN:[[id number]]],PROVIDER:[TOKEN:[[id number]]],PROVIDER:[TOKEN:[[id number]]],FREE:[LAST:[[facility]],PHONE:[phone]],FAX:[(   [phone]    -],ADDRESS:[[address] [place] [address]]]

## 2019-02-14 NOTE — DISCHARGE NOTE ADULT - SECONDARY DIAGNOSIS.
Renal transplant recipient ESRD (end stage renal disease) DM (diabetes mellitus) Seizure after head injury

## 2019-02-14 NOTE — PROGRESS NOTE ADULT - ASSESSMENT
74yo M s/p transfer from [facility] after fall on coumadin/plavix w/ resulting SAH complicated by post traumatic seizure. Currently asymptomatic and doing well s/p downgrade from SICU.     - Continue Keppra 500 mg q24h with additional dosing post dialysis.    -  Pulmonary toilet.  Continue incentive spirometer.  Chest PT.  Encourage OOB to chair and ambulation     - cont bowel reg    - HD TTS    - DVT ppx: HSQ, scd's    - Dispo: PT/PM&R rec home w/ HC.

## 2019-02-14 NOTE — DISCHARGE NOTE ADULT - CARE PROVIDER_API CALL
Boris Perdue)  Neurosurgery  [facility] [address]  [place] [address]  Phone: [phone]  Fax: [phone]  Follow Up Time:     Evert Castanon; PhD)  Neurology; Vascular Neurology  [address], [address]  [place] [address]  Phone: [phone]  Fax: [phone]  Follow Up Time:     Tianna Lopez)  Internal Medicine; Nephrology  [address] [place] [address]  Phone: [phone]  Fax: [phone]  Follow Up Time:     [facility],   [address], [address]st [place] [address]  Phone: [phone]  Fax: (   )    -  Follow Up Time:

## 2019-02-15 ENCOUNTER — INBOUND DOCUMENT (OUTPATIENT)
Age: 74
End: 2019-02-15

## 2019-02-15 LAB — LEVETIRACETAM SERPL-MCNC: 5 MCG/ML — LOW (ref 12–46)

## 2019-02-20 ENCOUNTER — APPOINTMENT (OUTPATIENT)
Dept: NEUROLOGY | Facility: CLINIC | Age: 74
End: 2019-02-20
Payer: MEDICARE

## 2019-02-20 VITALS
SYSTOLIC BLOOD PRESSURE: 132 MMHG | BODY MASS INDEX: 34.23 KG/M2 | DIASTOLIC BLOOD PRESSURE: 84 MMHG | HEIGHT: 66 IN | WEIGHT: 213 LBS

## 2019-02-20 DIAGNOSIS — S09.90XA UNSPECIFIED INJURY OF HEAD, INITIAL ENCOUNTER: ICD-10-CM

## 2019-02-20 PROCEDURE — 99214 OFFICE O/P EST MOD 30 MIN: CPT

## 2019-02-21 ENCOUNTER — INPATIENT (INPATIENT)
Facility: HOSPITAL | Age: 74
LOS: 0 days | Discharge: HOME | DRG: 302 | End: 2019-02-22
Attending: FAMILY MEDICINE | Admitting: INTERNAL MEDICINE
Payer: COMMERCIAL

## 2019-02-21 VITALS
HEART RATE: 69 BPM | WEIGHT: 220.02 LBS | SYSTOLIC BLOOD PRESSURE: 126 MMHG | TEMPERATURE: 97 F | HEIGHT: 65 IN | RESPIRATION RATE: 16 BRPM | DIASTOLIC BLOOD PRESSURE: 77 MMHG | OXYGEN SATURATION: 97 %

## 2019-02-21 DIAGNOSIS — R07.9 CHEST PAIN, UNSPECIFIED: ICD-10-CM

## 2019-02-21 DIAGNOSIS — Z90.5 ACQUIRED ABSENCE OF KIDNEY: Chronic | ICD-10-CM

## 2019-02-21 DIAGNOSIS — Q20.8 OTHER CONGENITAL MALFORMATIONS OF CARDIAC CHAMBERS AND CONNECTIONS: Chronic | ICD-10-CM

## 2019-02-21 DIAGNOSIS — I77.0 ARTERIOVENOUS FISTULA, ACQUIRED: Chronic | ICD-10-CM

## 2019-02-21 DIAGNOSIS — Z94.0 KIDNEY TRANSPLANT STATUS: Chronic | ICD-10-CM

## 2019-02-21 LAB
ALBUMIN SERPL ELPH-MCNC: 4.5 G/DL — SIGNIFICANT CHANGE UP (ref 3.3–5.2)
ALP SERPL-CCNC: 108 U/L — SIGNIFICANT CHANGE UP (ref 40–120)
ALT FLD-CCNC: 13 U/L — SIGNIFICANT CHANGE UP
ANION GAP SERPL CALC-SCNC: 20 MMOL/L — HIGH (ref 5–17)
ANISOCYTOSIS BLD QL: SLIGHT — SIGNIFICANT CHANGE UP
APTT BLD: 34.2 SEC — SIGNIFICANT CHANGE UP (ref 27.5–36.3)
AST SERPL-CCNC: 34 U/L — SIGNIFICANT CHANGE UP
BASOPHILS # BLD AUTO: 0 K/UL — SIGNIFICANT CHANGE UP (ref 0–0.2)
BASOPHILS NFR BLD AUTO: 0.8 % — SIGNIFICANT CHANGE UP (ref 0–2)
BILIRUB SERPL-MCNC: 0.5 MG/DL — SIGNIFICANT CHANGE UP (ref 0.4–2)
BUN SERPL-MCNC: 33 MG/DL — HIGH (ref 8–20)
CALCIUM SERPL-MCNC: 8.4 MG/DL — LOW (ref 8.6–10.2)
CHLORIDE SERPL-SCNC: 96 MMOL/L — LOW (ref 98–107)
CO2 SERPL-SCNC: 25 MMOL/L — SIGNIFICANT CHANGE UP (ref 22–29)
CREAT SERPL-MCNC: 5.72 MG/DL — HIGH (ref 0.5–1.3)
EOSINOPHIL # BLD AUTO: 0.2 K/UL — SIGNIFICANT CHANGE UP (ref 0–0.5)
EOSINOPHIL NFR BLD AUTO: 6.1 % — HIGH (ref 0–5)
GLUCOSE BLDC GLUCOMTR-MCNC: 80 MG/DL — SIGNIFICANT CHANGE UP (ref 70–99)
GLUCOSE BLDC GLUCOMTR-MCNC: 96 MG/DL — SIGNIFICANT CHANGE UP (ref 70–99)
GLUCOSE SERPL-MCNC: 95 MG/DL — SIGNIFICANT CHANGE UP (ref 70–115)
HCT VFR BLD CALC: 31.2 % — LOW (ref 42–52)
HGB BLD-MCNC: 10 G/DL — LOW (ref 14–18)
INR BLD: 1.25 RATIO — HIGH (ref 0.88–1.16)
LIDOCAIN IGE QN: 90 U/L — HIGH (ref 22–51)
LYMPHOCYTES # BLD AUTO: 0.6 K/UL — LOW (ref 1–4.8)
LYMPHOCYTES # BLD AUTO: 16.4 % — LOW (ref 20–55)
MACROCYTES BLD QL: SLIGHT — SIGNIFICANT CHANGE UP
MCHC RBC-ENTMCNC: 32.1 G/DL — SIGNIFICANT CHANGE UP (ref 32–36)
MCHC RBC-ENTMCNC: 34.5 PG — HIGH (ref 27–31)
MCV RBC AUTO: 107.6 FL — HIGH (ref 80–94)
MICROCYTES BLD QL: SLIGHT — SIGNIFICANT CHANGE UP
MONOCYTES # BLD AUTO: 0.4 K/UL — SIGNIFICANT CHANGE UP (ref 0–0.8)
MONOCYTES NFR BLD AUTO: 10.6 % — HIGH (ref 3–10)
NEUTROPHILS # BLD AUTO: 2.6 K/UL — SIGNIFICANT CHANGE UP (ref 1.8–8)
NEUTROPHILS NFR BLD AUTO: 65.6 % — SIGNIFICANT CHANGE UP (ref 37–73)
NT-PROBNP SERPL-SCNC: HIGH PG/ML (ref 0–300)
PLAT MORPH BLD: NORMAL — SIGNIFICANT CHANGE UP
PLATELET # BLD AUTO: 197 K/UL — SIGNIFICANT CHANGE UP (ref 150–400)
POTASSIUM SERPL-MCNC: 5 MMOL/L — SIGNIFICANT CHANGE UP (ref 3.5–5.3)
POTASSIUM SERPL-MCNC: 5.7 MMOL/L — HIGH (ref 3.5–5.3)
POTASSIUM SERPL-SCNC: 5 MMOL/L — SIGNIFICANT CHANGE UP (ref 3.5–5.3)
POTASSIUM SERPL-SCNC: 5.7 MMOL/L — HIGH (ref 3.5–5.3)
PROT SERPL-MCNC: 8.1 G/DL — SIGNIFICANT CHANGE UP (ref 6.6–8.7)
PROTHROM AB SERPL-ACNC: 14.5 SEC — HIGH (ref 10–12.9)
RBC # BLD: 2.9 M/UL — LOW (ref 4.6–6.2)
RBC # FLD: 18.1 % — HIGH (ref 11–15.6)
RBC BLD AUTO: ABNORMAL
SODIUM SERPL-SCNC: 141 MMOL/L — SIGNIFICANT CHANGE UP (ref 135–145)
TROPONIN T SERPL-MCNC: 0.06 NG/ML — SIGNIFICANT CHANGE UP (ref 0–0.06)
TROPONIN T SERPL-MCNC: 0.06 NG/ML — SIGNIFICANT CHANGE UP (ref 0–0.06)
WBC # BLD: 4 K/UL — LOW (ref 4.8–10.8)
WBC # FLD AUTO: 4 K/UL — LOW (ref 4.8–10.8)

## 2019-02-21 PROCEDURE — 99223 1ST HOSP IP/OBS HIGH 75: CPT | Mod: 25

## 2019-02-21 PROCEDURE — 99285 EMERGENCY DEPT VISIT HI MDM: CPT

## 2019-02-21 PROCEDURE — 99223 1ST HOSP IP/OBS HIGH 75: CPT

## 2019-02-21 PROCEDURE — 76705 ECHO EXAM OF ABDOMEN: CPT | Mod: 26

## 2019-02-21 PROCEDURE — 90937 HEMODIALYSIS REPEATED EVAL: CPT

## 2019-02-21 PROCEDURE — 71275 CT ANGIOGRAPHY CHEST: CPT | Mod: 26

## 2019-02-21 RX ORDER — URSODIOL 250 MG/1
300 TABLET, FILM COATED ORAL DAILY
Qty: 0 | Refills: 0 | Status: DISCONTINUED | OUTPATIENT
Start: 2019-02-21 | End: 2019-02-22

## 2019-02-21 RX ORDER — ACETAMINOPHEN 500 MG
650 TABLET ORAL EVERY 6 HOURS
Qty: 0 | Refills: 0 | Status: DISCONTINUED | OUTPATIENT
Start: 2019-02-21 | End: 2019-02-22

## 2019-02-21 RX ORDER — DOCUSATE SODIUM 100 MG
100 CAPSULE ORAL DAILY
Qty: 0 | Refills: 0 | Status: DISCONTINUED | OUTPATIENT
Start: 2019-02-21 | End: 2019-02-22

## 2019-02-21 RX ORDER — DEXTROSE 50 % IN WATER 50 %
12.5 SYRINGE (ML) INTRAVENOUS ONCE
Qty: 0 | Refills: 0 | Status: DISCONTINUED | OUTPATIENT
Start: 2019-02-21 | End: 2019-02-22

## 2019-02-21 RX ORDER — LEVETIRACETAM 250 MG/1
500 TABLET, FILM COATED ORAL DAILY
Qty: 0 | Refills: 0 | Status: DISCONTINUED | OUTPATIENT
Start: 2019-02-21 | End: 2019-02-22

## 2019-02-21 RX ORDER — INSULIN LISPRO 100/ML
VIAL (ML) SUBCUTANEOUS
Qty: 0 | Refills: 0 | Status: DISCONTINUED | OUTPATIENT
Start: 2019-02-21 | End: 2019-02-22

## 2019-02-21 RX ORDER — GLUCAGON INJECTION, SOLUTION 0.5 MG/.1ML
1 INJECTION, SOLUTION SUBCUTANEOUS ONCE
Qty: 0 | Refills: 0 | Status: DISCONTINUED | OUTPATIENT
Start: 2019-02-21 | End: 2019-02-22

## 2019-02-21 RX ORDER — URSODIOL 250 MG/1
300 TABLET, FILM COATED ORAL
Qty: 0 | Refills: 0 | Status: DISCONTINUED | OUTPATIENT
Start: 2019-02-21 | End: 2019-02-21

## 2019-02-21 RX ORDER — MIDODRINE HYDROCHLORIDE 2.5 MG/1
10 TABLET ORAL THREE TIMES A DAY
Qty: 0 | Refills: 0 | Status: DISCONTINUED | OUTPATIENT
Start: 2019-02-21 | End: 2019-02-22

## 2019-02-21 RX ORDER — ATORVASTATIN CALCIUM 80 MG/1
20 TABLET, FILM COATED ORAL AT BEDTIME
Qty: 0 | Refills: 0 | Status: DISCONTINUED | OUTPATIENT
Start: 2019-02-21 | End: 2019-02-22

## 2019-02-21 RX ORDER — SEVELAMER CARBONATE 2400 MG/1
800 POWDER, FOR SUSPENSION ORAL EVERY 12 HOURS
Qty: 0 | Refills: 0 | Status: DISCONTINUED | OUTPATIENT
Start: 2019-02-21 | End: 2019-02-21

## 2019-02-21 RX ORDER — DEXTROSE 50 % IN WATER 50 %
15 SYRINGE (ML) INTRAVENOUS ONCE
Qty: 0 | Refills: 0 | Status: DISCONTINUED | OUTPATIENT
Start: 2019-02-21 | End: 2019-02-22

## 2019-02-21 RX ORDER — SODIUM CHLORIDE 9 MG/ML
1000 INJECTION, SOLUTION INTRAVENOUS
Qty: 0 | Refills: 0 | Status: DISCONTINUED | OUTPATIENT
Start: 2019-02-21 | End: 2019-02-22

## 2019-02-21 RX ORDER — DEXTROSE 50 % IN WATER 50 %
25 SYRINGE (ML) INTRAVENOUS ONCE
Qty: 0 | Refills: 0 | Status: DISCONTINUED | OUTPATIENT
Start: 2019-02-21 | End: 2019-02-22

## 2019-02-21 RX ORDER — CINACALCET 30 MG/1
30 TABLET, FILM COATED ORAL
Qty: 0 | Refills: 0 | Status: DISCONTINUED | OUTPATIENT
Start: 2019-02-21 | End: 2019-02-22

## 2019-02-21 RX ORDER — SENNA PLUS 8.6 MG/1
2 TABLET ORAL AT BEDTIME
Qty: 0 | Refills: 0 | Status: DISCONTINUED | OUTPATIENT
Start: 2019-02-21 | End: 2019-02-22

## 2019-02-21 RX ORDER — SEVELAMER CARBONATE 2400 MG/1
800 POWDER, FOR SUSPENSION ORAL EVERY 12 HOURS
Qty: 0 | Refills: 0 | Status: DISCONTINUED | OUTPATIENT
Start: 2019-02-21 | End: 2019-02-22

## 2019-02-21 RX ORDER — HEPARIN SODIUM 5000 [USP'U]/ML
5000 INJECTION INTRAVENOUS; SUBCUTANEOUS EVERY 12 HOURS
Qty: 0 | Refills: 0 | Status: DISCONTINUED | OUTPATIENT
Start: 2019-02-21 | End: 2019-02-22

## 2019-02-21 RX ORDER — LORATADINE 10 MG/1
10 TABLET ORAL DAILY
Qty: 0 | Refills: 0 | Status: DISCONTINUED | OUTPATIENT
Start: 2019-02-21 | End: 2019-02-22

## 2019-02-21 RX ADMIN — Medication 650 MILLIGRAM(S): at 22:38

## 2019-02-21 RX ADMIN — CINACALCET 30 MILLIGRAM(S): 30 TABLET, FILM COATED ORAL at 17:03

## 2019-02-21 RX ADMIN — ATORVASTATIN CALCIUM 20 MILLIGRAM(S): 80 TABLET, FILM COATED ORAL at 22:39

## 2019-02-21 RX ADMIN — SENNA PLUS 2 TABLET(S): 8.6 TABLET ORAL at 22:38

## 2019-02-21 RX ADMIN — HEPARIN SODIUM 5000 UNIT(S): 5000 INJECTION INTRAVENOUS; SUBCUTANEOUS at 17:02

## 2019-02-21 RX ADMIN — Medication 650 MILLIGRAM(S): at 23:00

## 2019-02-21 RX ADMIN — MIDODRINE HYDROCHLORIDE 10 MILLIGRAM(S): 2.5 TABLET ORAL at 17:02

## 2019-02-21 RX ADMIN — SEVELAMER CARBONATE 800 MILLIGRAM(S): 2400 POWDER, FOR SUSPENSION ORAL at 17:16

## 2019-02-21 RX ADMIN — MIDODRINE HYDROCHLORIDE 10 MILLIGRAM(S): 2.5 TABLET ORAL at 23:39

## 2019-02-21 RX ADMIN — LORATADINE 10 MILLIGRAM(S): 10 TABLET ORAL at 17:02

## 2019-02-21 RX ADMIN — URSODIOL 300 MILLIGRAM(S): 250 TABLET, FILM COATED ORAL at 17:02

## 2019-02-21 RX ADMIN — Medication 100 MILLIGRAM(S): at 17:05

## 2019-02-21 NOTE — H&P ADULT - ASSESSMENT
Pt is a 74 yo M presenting w/ chest pain for atleast 5 days duration. PMH A fib s/p Watchman, recent Subarrachonoid hemorrhage, ESRD on HD, DM2, R iliac stenosis, post traumatic seizure, L nephrectomy, CAD.     Chest Pain: Trop negative x2, chronic chest pain. cardiology following.   -cannot give ASA or Plavix due to recent SAH. Patient was to f/u w/ Neurosurgery as outpatient for reapeat CT scan before restarting ASA or Plavix.   -f/u limited TTE ordered.   -EKG without any acute changes or signs of ischemia.   -monitor on tele.     Chronic cholecystitis: surgery consult appreciated.   -possible cholecystectomy prior to discharge.   -mgmt as per surgery.     A. fib: s/p watchman procedure.     CAD: ASA and Plavix on hold. , has hx of hypotension requiring midodrine.     ESRD on HD: seen by Nephrology. Continue HD as per nephro    SAH: recent SAH, ASA and plavix on hold. DVT ppx OK per neurosurgery.   -no heparin bolus w/ HD as per neurosurgery.   -patient will need repeat CT head in approx 1 week for monitoring.   -restart ASA and Plavix pending neurosurg rec's.  -on Keppra    DVT ppx: heparin see above.     Full code.

## 2019-02-21 NOTE — H&P ADULT - HISTORY OF PRESENT ILLNESS
Pt is a 72 yo M presenting w/ chest pain for atleast 5 days duration. PMH A fib s/p Watchman, recent Subarrachonoid hemorrhage, ESRD on HD, DM2, R iliac stenosis, post traumatic seizure, L nephrectomy, CAD.   Pt has been c/o chest pain/pressure for atleast 5 days now, L anterior sternum, pain is worse with coughing, denies exertional component, no associated SOB currently, but does get SOB at times, denies any radiation of the pain, no associated diaphoresis. He has a hx of chronic chest pain and had a stress test in May 2018, lexiscan which showed mild ischemia in RCA, he had a cardiac cath in July 2018 LAD 40% and RPLS 75% stenosis.   He has no other complaints. Of note he had a fall with a resultant SAH and was just discharged from the hospital last week. He has not restarted anti-platlet medications (ASA or Plavix), he is NOT on coumadin. He had a watchman procedure.   In ED patient seen by surgery due to CT scan showing gallbladder wall thickening, believed to have chronic cholecystitis. He was also seen by Nephro and cardiology.   Denies headaches, nausea, vomiting, palpitations, abdominal pain, constipation, diarrhea, melena, hematochezia. He does not produce urine.

## 2019-02-21 NOTE — ED PROVIDER NOTE - NOTES
think likely chronic cholecystitis, no acute surgical intervention now. if medically clear may proceed with cholecystectomy on this admission. nonemergent

## 2019-02-21 NOTE — ED ADULT TRIAGE NOTE - CHIEF COMPLAINT QUOTE
biba from home, c/o chest pain upon inspiration, pt dialysis T, Th, Sat, - right AV fistula, recent hospitalization for brain bleed

## 2019-02-21 NOTE — CONSULT NOTE ADULT - ASSESSMENT
72yo male with 2 week hx of left sided chest pain. US and CTA today confirms cholelithiasis with gallbladder wall thickening  - Hemodynamically stable  - T bili 0.5  - LFTs WNL  - WBC 4    Plan:  - Agree with plan for medicine admission. Old imaging reviewed which shows cholelithiasis on CT in 8/2018. Suspect chronic cholecytitis  - Agree with cardiac workup for presenting symptoms  - Pt likely will need cholecystectomy on this admission vs outpatient  - Final plan to be discussed with attending    INCOMPLETE NOTE 72yo male with 2 week hx of left sided chest pain. US and CTA today confirms cholelithiasis with gallbladder wall thickening  - Hemodynamically stable  - T bili 0.5  - LFTs WNL  - WBC 4    Plan:  - Agree with plan for medicine admission. Old imaging reviewed which shows cholelithiasis on CT in 8/2018. Suspect chronic cholecytitis  - Agree with cardiac workup for presenting symptoms  - Final plan to be discussed with attending    INCOMPLETE NOTE 72yo male with 2 week hx of left sided chest pain. US and CTA today confirms cholelithiasis with gallbladder wall thickening  - Hemodynamically stable  - T bili 0.5  - LFTs WNL  - WBC 4    Plan:  - Agree with plan for medicine admission. Old imaging reviewed which shows cholelithiasis on CT in 8/2018. Suspect chronic cholecytitis  - Agree with cardiac workup for presenting symptoms  - Final plan to be discussed with attending

## 2019-02-21 NOTE — H&P ADULT - NSHPPHYSICALEXAM_GEN_ALL_CORE
T(C): 36.7 (02-21-19 @ 10:58), Max: 36.7 (02-21-19 @ 10:58)  HR: 68 (02-21-19 @ 10:57) (61 - 69)  BP: 158/64 (02-21-19 @ 10:57) (120/60 - 158/64)  RR: 16 (02-21-19 @ 10:57) (16 - 17)  SpO2: 99% (02-21-19 @ 10:57) (97% - 99%)  Wt(kg): --    Physical Exam:   GENERAL: well-groomed, well-developed, NAD  HEENT: head NC/AT; EOM intact, conjunctiva & sclera clear; hearing grossly intact, moist mucous membranes  NECK: supple, no JVD  RESPIRATORY: CTA B/L, no wheezing, rales, rhonchi or rubs  CARDIOVASCULAR: S1&S2, RRR, no murmurs or gallops  ABDOMEN: soft, non-tender, non-distended, + Bowel sounds x4 quadrants, no guarding, rebound or rigidity, negative tatum sign  MUSCULOSKELETAL:  no clubbing, cyanosis or edema of all 4 extremities, RUE fistula  LYMPH: no cervical lymphadenopathy  VASCULAR: Radial pulses 2+ bilaterally, no varicose veins   SKIN: warm and dry, color normal  NEUROLOGIC: AA&O X3, CN2-12 intact w/ no focal deficits, no sensory loss, motor Strength 5/5 in UE & LE B/L  Psych: Normal mood and affect, normal behavior

## 2019-02-21 NOTE — CONSULT NOTE ADULT - SUBJECTIVE AND OBJECTIVE BOX
CARDIOLOGY CONSULTATION NOTE       History obtained by: Patient, family and medical record     obtained: No    Chief complaint:    Patient is a 73y old  Male who presents with a chief complaint of ESRD - HD (2019 09:36)      HPI:    73M hx R iliac stenosis and tried intervention but failed twice with complication, atrial fibrillation,  s/p watchman (s/p watchman. 1 year CANDIDA- no leak, DM, hypotension on Midodrine, s/p L nephrectomy due to bleeding, ESRD on HD, chronic chest pain (Stress Test: may 2018, Magdalena scan. mild ischemia in RCA)  (Cardiac Cath: 2018 , LAD 40%. RPLS- 75% stenosis.)  BIBA to ED from home, c/o two weeks history of L sided chest worsening with inspiration, has been on and off, admitted SOB, nonexertional, nonradiated;  because CP worsened he decided to come to ED. Denied fever, chills, orthopnea, PND, edema, palpitations nausea, vomiting hematuria, melena, syncope, near syncope.      PRIOR CARDIAC TESTING  EK2019 , Atrial fibrillation Low voltage -possible pulmonary disease. ABNORMAL   Stress Test: may 2018, Magdalena scan. mild ischemia in RCA   Echo: 2018, mild LVH. elevated filling pressures 28 mm Hg. Mild moderate MR. mild moderate TR. moderate pulm HTN., normal LV function, moderate pulmonary hypertension, enlarged LA size, no mitral regurgitation LVEF 62%.   Cardiac Cath: 2018 , LAD 40%. RPLS- 75% stenosis.          REVIEW OF SYMPTOMS:   Constitutional: Denied: fever, chills, weight loss or gain  Eyes: Denied: reddened ayes, eye discharge, eye pain  ENMT: Denied: ear pain, nasal discharge, mouth pain, throat pain or swelling  Cardiovascular: Denied: palpitation, arrhythmia, irregular or fast heart beats, edema  Respiratory: Denied: cough, phlegm production, wheezes, orthopnea, PND,   GI: Denied: Abdominal pain, nausea, vomiting, diarrhea, constipation, melena, rectal bleed  : Denied: hematuria, frequency  Musculoskeletal: Denied: Muscle  weakness  Hematology/lymp: Denied: Bleeding disorder, anemia, blood clotting  Neuro: Denied: Headache, light headedness, dizziness, numbness, aphasia, dysarthria, seizure,  syncope, near syncope  Psych: Denied: depression, anxiety  Integumentary/skin: Denied: rash, bruises, ecchymosis, itching  Allergy/Immunology: denied environmental or drug allergies    ALL OTHER REVIEW OF SYSTEMS ARE NEGATIVE.    MEDICATIONS  (STANDING):    Home Medications:   * Patient Currently Takes Medications as of 2019 17:54 documented in Structured Notes  · 	Keppra 500 mg oral tablet: 1 tab(s) orally once a day   · 	acetaminophen 325 mg oral tablet: 2 tab(s) orally every 4 hours, As needed, Temp greater or equal to 38C (100.4F), Mild Pain (1 - 3)  · 	senna oral tablet: 2 tab(s) orally once a day (at bedtime)  · 	Renvela 800 mg oral tablet: 1 tab(s) orally 2 times a day  · 	NexIUM 40 mg oral delayed release capsule: 1 cap(s) orally once a day  · 	Sensipar 30 mg oral tablet: 1 tab(s) orally 2 times a day  · 	ursodiol 300 mg oral capsule: 1 cap(s) orally once a day  · 	Colace 100 mg oral capsule: 1 cap(s) orally once a day  · 	Lipitor 20 mg oral tablet: 1 tab(s) orally once a day  · 	loratadine 10 mg oral capsule: 1 cap(s) orally once a day  · 	midodrine 10 mg oral tablet: 1 tab(s) orally 3 times a day  · 	glimepiride 1 mg oral tablet: 1 tab(s) orally once a day  · 	Mag-Ox 400: tab(s) orally once a day  · 	Lupron:  intramuscular every 3 mos,  due  for  dose  at  [facility]  oncologist  urology  2018  · 	nephlex rx: 1 tab(s) orally once a day          MEDICATIONS  (PRN):        PAST MEDICAL & SURGICAL HISTORY:  Persistent atrial fibrillation  Carotid artery disease: mild  GERD (gastroesophageal reflux disease)  Low glucose level: Patient has h/o low blood sugars at times  Palpitations  Leg pain, bilateral: R&gt;L at rest and with short distant ambulation  Imbalance: secondary to PVD Uses cane  Obesity (BMI 30.0-34.9)  Claudication in peripheral vascular disease: R&gt;L  Right common iliac or ostial external iliac per 2018 U/S  Left popliteal 50-75 and EDUIN stenosis  SUSANA 0.8  Kidney problem: spontanious renal bleed while on coumadin  c/b  renal  hematome  with  LT  nephrectomy  Hypotension: treated  with Midodrine  Tiredness  Renal hematoma, left  Hypercholesteremia  ESRD (end stage renal disease): on  hemodialysis  3  x  weekly.,  H/O  left  nephrectomy after  renal  bleed  Dyslipidemia  Dizziness  DM (diabetes mellitus)  Daytime sleepiness  Renal transplant recipient  AV fistula: right lower arm  Abnormality of left atrial appendage: WATCHMAN  LEFT ATRIAL APPENDAGE CLOSUIRE   2017  History of unilateral nephrectomy: left  nephrectomy      FAMILY HISTORY:  No pertinent family history in first degree relatives      SOCIAL HISTORY:    CIGARETTES:  No    ALCOHOL: No    DRUGS: No    Vital Signs Last 24 Hrs  T(C): 36.7 (2019 10:58), Max: 36.7 (2019 10:58)  T(F): 98 (2019 10:58), Max: 98 (2019 10:58)  HR: 68 (2019 10:57) (61 - 69)  BP: 158/64 (2019 10:57) (120/60 - 158/64)  BP(mean): --  RR: 16 (2019 10:57) (16 - 17)  SpO2: 99% (2019 10:57) (97% - 99%)    PHYSICAL EXAM:      Constitutional: No fever, chills, NAD, Comfortable    Eyes: Not reddened, no discharge    ENMT: No discharge, No pain    Neck: No JVD, No Bruit    Back: No CVA tenderness    Respiratory: Clear to auscultation bilaterally    Cardiovascular: IRR, Normal S1-2, No S3-, No friction rub    Gastrointestinal: Soft, NT/ND. BS+, No organomegaly    Extremities: No edema    Vascular: Distal pulses intact    Neurological: Alert, awake, oriented, able to move extremities, speach is clear    Skin: No ecchymosis, no rash    Musculoskeletal: Non tender, no weaknss    Psychiatric: Aproppriate mood, no anxiety        INTERPRETATION OF TELEMETRY:    ECG: Afib VR 58 QT/QTc 514/504 NSST-T abnl    I&O's Detail      LABS:                        10.0   4.0   )-----------( 197      ( 2019 04:22 )             31.2     02-21    x   |  x   |  x   ----------------------------<  x   5.0   |  x   |  x     Ca    8.4<L>      2019 04:22    TPro  8.1  /  Alb  4.5  /  TBili  0.5  /  DBili  x   /  AST  34  /  ALT  13  /  AlkPhos  108  02-21    CARDIAC MARKERS ( 2019 10:40 )  x     / 0.06 ng/mL / x     / x     / x      CARDIAC MARKERS ( 2019 04:22 )  x     / 0.06 ng/mL / x     / x     / x          PT/INR - ( 2019 04:22 )   PT: 14.5 sec;   INR: 1.25 ratio         PTT - ( 2019 04:22 )  PTT:34.2 sec    I&O's Summary CARDIOLOGY CONSULTATION NOTE       History obtained by: Patient, family and medical record     obtained: No    Chief complaint:    Patient is a 73y old  Male who presents with a chief complaint of ESRD - HD (2019 09:36)      HPI:    73M hx R iliac stenosis and tried intervention but failed twice with complication, atrial fibrillation,  s/p watchman (s/p watchman. 1 year CANDIDA- no leak, DM, hypotension on Midodrine, s/p L nephrectomy due to bleeding, ESRD on HD, chronic chest pain (Stress Test: may 2018, Magdalena scan. mild ischemia in RCA)  (Cardiac Cath: 2018 , LAD 40%. RPLS- 75% stenosis.), s/p fall with Subarachnoid hemorrhage (CT Brain 2019  Persistent hemorrhage particularly in the subarachnoid space unchanged)  BIBA to ED from home, c/o two weeks history of L sided chest worsening with inspiration, has been on and off, admitted SOB, nonexertional, nonradiated;  because CP worsened he decided to come to ED. Denied fever, chills, orthopnea, PND, edema, palpitations nausea, vomiting hematuria, melena, syncope, near syncope.      PRIOR CARDIAC TESTING  EK2019 , Atrial fibrillation Low voltage -possible pulmonary disease. ABNORMAL   Stress Test: may 2018, Magdalena scan. mild ischemia in RCA   Echo: 2018, mild LVH. elevated filling pressures 28 mm Hg. Mild moderate MR. mild moderate TR. moderate pulm HTN., normal LV function, moderate pulmonary hypertension, enlarged LA size, no mitral regurgitation LVEF 62%.   Cardiac Cath: 2018 , LAD 40%. RPLS- 75% stenosis.          REVIEW OF SYMPTOMS:   Constitutional: Denied: fever, chills, weight loss or gain  Eyes: Denied: reddened ayes, eye discharge, eye pain  ENMT: Denied: ear pain, nasal discharge, mouth pain, throat pain or swelling  Cardiovascular: Denied: palpitation, arrhythmia, irregular or fast heart beats, edema  Respiratory: Denied: cough, phlegm production, wheezes, orthopnea, PND,   GI: Denied: Abdominal pain, nausea, vomiting, diarrhea, constipation, melena, rectal bleed  : Denied: hematuria, frequency  Musculoskeletal: Denied: Muscle  weakness  Hematology/lymp: Denied: Bleeding disorder, anemia, blood clotting  Neuro: Denied: Headache, light headedness, dizziness, numbness, aphasia, dysarthria, seizure,  syncope, near syncope  Psych: Denied: depression, anxiety  Integumentary/skin: Denied: rash, bruises, ecchymosis, itching  Allergy/Immunology: denied environmental or drug allergies    ALL OTHER REVIEW OF SYSTEMS ARE NEGATIVE.    MEDICATIONS  (STANDING):    Home Medications:   * Patient Currently Takes Medications as of 2019 17:54 documented in Structured Notes  · 	Keppra 500 mg oral tablet: 1 tab(s) orally once a day   · 	acetaminophen 325 mg oral tablet: 2 tab(s) orally every 4 hours, As needed, Temp greater or equal to 38C (100.4F), Mild Pain (1 - 3)  · 	senna oral tablet: 2 tab(s) orally once a day (at bedtime)  · 	Renvela 800 mg oral tablet: 1 tab(s) orally 2 times a day  · 	NexIUM 40 mg oral delayed release capsule: 1 cap(s) orally once a day  · 	Sensipar 30 mg oral tablet: 1 tab(s) orally 2 times a day  · 	ursodiol 300 mg oral capsule: 1 cap(s) orally once a day  · 	Colace 100 mg oral capsule: 1 cap(s) orally once a day  · 	Lipitor 20 mg oral tablet: 1 tab(s) orally once a day  · 	loratadine 10 mg oral capsule: 1 cap(s) orally once a day  · 	midodrine 10 mg oral tablet: 1 tab(s) orally 3 times a day  · 	glimepiride 1 mg oral tablet: 1 tab(s) orally once a day  · 	Mag-Ox 400: tab(s) orally once a day  · 	Lupron:  intramuscular every 3 mos,  due  for  dose  at  [facility]  oncologist  urology  2018  · 	nephlex rx: 1 tab(s) orally once a day          MEDICATIONS  (PRN):        PAST MEDICAL & SURGICAL HISTORY:  Persistent atrial fibrillation  Carotid artery disease: mild  GERD (gastroesophageal reflux disease)  Low glucose level: Patient has h/o low blood sugars at times  Palpitations  Leg pain, bilateral: R&gt;L at rest and with short distant ambulation  Imbalance: secondary to PVD Uses cane  Obesity (BMI 30.0-34.9)  Claudication in peripheral vascular disease: R&gt;L  Right common iliac or ostial external iliac per 2018 U/S  Left popliteal 50-75 and EDUIN stenosis  SUSANA 0.8  Kidney problem: spontanious renal bleed while on coumadin  c/b  renal  hematome  with  LT  nephrectomy  Hypotension: treated  with Midodrine  Tiredness  Renal hematoma, left  Hypercholesteremia  ESRD (end stage renal disease): on  hemodialysis  3  x  weekly.,  H/O  left  nephrectomy after  renal  bleed  Dyslipidemia  Dizziness  DM (diabetes mellitus)  Daytime sleepiness  Renal transplant recipient  AV fistula: right lower arm  Abnormality of left atrial appendage: WATCHMAN  LEFT ATRIAL APPENDAGE CLOSUIRE   2017  History of unilateral nephrectomy: left  nephrectomy      FAMILY HISTORY:  No pertinent family history in first degree relatives      SOCIAL HISTORY:    CIGARETTES:  No    ALCOHOL: No    DRUGS: No    Vital Signs Last 24 Hrs  T(C): 36.7 (2019 10:58), Max: 36.7 (2019 10:58)  T(F): 98 (2019 10:58), Max: 98 (2019 10:58)  HR: 68 (2019 10:57) (61 - 69)  BP: 158/64 (2019 10:57) (120/60 - 158/64)  BP(mean): --  RR: 16 (2019 10:57) (16 - 17)  SpO2: 99% (2019 10:57) (97% - 99%)    PHYSICAL EXAM:      Constitutional: No fever, chills, NAD, Comfortable    Eyes: Not reddened, no discharge    ENMT: No discharge, No pain    Neck: No JVD, No Bruit    Back: No CVA tenderness    Respiratory: Clear to auscultation bilaterally    Cardiovascular: IRR, Normal S1-2, No S3-, No friction rub    Gastrointestinal: Soft, NT/ND. BS+, No organomegaly    Extremities: No edema    Vascular: Distal pulses intact    Neurological: Alert, awake, oriented, able to move extremities, speach is clear    Skin: No ecchymosis, no rash    Musculoskeletal: Non tender, no weaknss    Psychiatric: Aproppriate mood, no anxiety        INTERPRETATION OF TELEMETRY:    ECG: Afib VR 58 QT/QTc 514/504 NSST-T abnl    I&O's Detail      LABS:                        10.0   4.0   )-----------( 197      ( 2019 04:22 )             31.2     02-21    x   |  x   |  x   ----------------------------<  x   5.0   |  x   |  x     Ca    8.4<L>      2019 04:22    TPro  8.1  /  Alb  4.5  /  TBili  0.5  /  DBili  x   /  AST  34  /  ALT  13  /  AlkPhos  108  02-21    CARDIAC MARKERS ( 2019 10:40 )  x     / 0.06 ng/mL / x     / x     / x      CARDIAC MARKERS ( 2019 04:22 )  x     / 0.06 ng/mL / x     / x     / x          PT/INR - ( 2019 04:22 )   PT: 14.5 sec;   INR: 1.25 ratio         PTT - ( 2019 04:22 )  PTT:34.2 sec    I&O's Summary CARDIOLOGY CONSULTATION NOTE       History obtained by: Patient, family and medical record     obtained: No    Chief complaint:    Patient is a 73y old  Male who presents with a chief complaint of ESRD - HD (2019 09:36)      HPI:    73M hx R iliac stenosis and tried intervention but failed twice with complication, atrial fibrillation,  s/p watchman (s/p watchman. 1 year CANDIDA- no leak, DM, hypotension on Midodrine, s/p L nephrectomy due to bleeding, ESRD on HD, chronic chest pain (Stress Test: may 2018, Magdalena scan. mild ischemia in RCA)  (Cardiac Cath: 2018 , LAD 40%. RPLS- 75% stenosis.), s/p fall with Subarachnoid hemorrhage (CT Brain 2019  Persistent hemorrhage particularly in the subarachnoid space unchanged)  complicated by post traumatic seizure BIBA to ED from home, c/o two weeks history of L sided chest worsening with inspiration, has been on and off, admitted SOB, nonexertional, nonradiated;  because CP worsened he decided to come to ED. Denied fever, chills, orthopnea, PND, edema, palpitations nausea, vomiting hematuria, melena, syncope, near syncope.      PRIOR CARDIAC TESTING  EK2019 , Atrial fibrillation Low voltage -possible pulmonary disease. ABNORMAL   Stress Test: may 2018, Magdalena scan. mild ischemia in RCA   Echo: 2018, mild LVH. elevated filling pressures 28 mm Hg. Mild moderate MR. mild moderate TR. moderate pulm HTN., normal LV function, moderate pulmonary hypertension, enlarged LA size, no mitral regurgitation LVEF 62%.   Cardiac Cath: 2018 , LAD 40%. RPLS- 75% stenosis.          REVIEW OF SYMPTOMS:   Constitutional: Denied: fever, chills, weight loss or gain  Eyes: Denied: reddened ayes, eye discharge, eye pain  ENMT: Denied: ear pain, nasal discharge, mouth pain, throat pain or swelling  Cardiovascular: Denied: palpitation, arrhythmia, irregular or fast heart beats, edema  Respiratory: Denied: cough, phlegm production, wheezes, orthopnea, PND,   GI: Denied: Abdominal pain, nausea, vomiting, diarrhea, constipation, melena, rectal bleed  : Denied: hematuria, frequency  Musculoskeletal: Denied: Muscle  weakness  Hematology/lymp: Denied: Bleeding disorder, anemia, blood clotting  Neuro: Denied: Headache, light headedness, dizziness, numbness, aphasia, dysarthria, seizure,  syncope, near syncope  Psych: Denied: depression, anxiety  Integumentary/skin: Denied: rash, bruises, ecchymosis, itching  Allergy/Immunology: denied environmental or drug allergies    ALL OTHER REVIEW OF SYSTEMS ARE NEGATIVE.    MEDICATIONS  (STANDING):    Home Medications:   * Patient Currently Takes Medications as of 2019 17:54 documented in Structured Notes  · 	Keppra 500 mg oral tablet: 1 tab(s) orally once a day   · 	acetaminophen 325 mg oral tablet: 2 tab(s) orally every 4 hours, As needed, Temp greater or equal to 38C (100.4F), Mild Pain (1 - 3)  · 	senna oral tablet: 2 tab(s) orally once a day (at bedtime)  · 	Renvela 800 mg oral tablet: 1 tab(s) orally 2 times a day  · 	NexIUM 40 mg oral delayed release capsule: 1 cap(s) orally once a day  · 	Sensipar 30 mg oral tablet: 1 tab(s) orally 2 times a day  · 	ursodiol 300 mg oral capsule: 1 cap(s) orally once a day  · 	Colace 100 mg oral capsule: 1 cap(s) orally once a day  · 	Lipitor 20 mg oral tablet: 1 tab(s) orally once a day  · 	loratadine 10 mg oral capsule: 1 cap(s) orally once a day  · 	midodrine 10 mg oral tablet: 1 tab(s) orally 3 times a day  · 	glimepiride 1 mg oral tablet: 1 tab(s) orally once a day  · 	Mag-Ox 400: tab(s) orally once a day  · 	Lupron:  intramuscular every 3 mos,  due  for  dose  at  [facility]  oncologist  urology  2018  · 	nephlex rx: 1 tab(s) orally once a day          MEDICATIONS  (PRN):        PAST MEDICAL & SURGICAL HISTORY:  Persistent atrial fibrillation  Carotid artery disease: mild  GERD (gastroesophageal reflux disease)  Low glucose level: Patient has h/o low blood sugars at times  Palpitations  Leg pain, bilateral: R&gt;L at rest and with short distant ambulation  Imbalance: secondary to PVD Uses cane  Obesity (BMI 30.0-34.9)  Claudication in peripheral vascular disease: R&gt;L  Right common iliac or ostial external iliac per 2018 U/S  Left popliteal 50-75 and EDUIN stenosis  SUSANA 0.8  Kidney problem: spontanious renal bleed while on coumadin  c/b  renal  hematome  with  LT  nephrectomy  Hypotension: treated  with Midodrine  Tiredness  Renal hematoma, left  Hypercholesteremia  ESRD (end stage renal disease): on  hemodialysis  3  x  weekly.,  H/O  left  nephrectomy after  renal  bleed  Dyslipidemia  Dizziness  DM (diabetes mellitus)  Daytime sleepiness  Renal transplant recipient  AV fistula: right lower arm  Abnormality of left atrial appendage: WATCHMAN  LEFT ATRIAL APPENDAGE CLOSUIRE   2017  History of unilateral nephrectomy: left  nephrectomy      FAMILY HISTORY:  No pertinent family history in first degree relatives      SOCIAL HISTORY:    CIGARETTES:  No    ALCOHOL: No    DRUGS: No    Vital Signs Last 24 Hrs  T(C): 36.7 (2019 10:58), Max: 36.7 (2019 10:58)  T(F): 98 (2019 10:58), Max: 98 (2019 10:58)  HR: 68 (2019 10:57) (61 - 69)  BP: 158/64 (2019 10:57) (120/60 - 158/64)  BP(mean): --  RR: 16 (2019 10:57) (16 - 17)  SpO2: 99% (2019 10:57) (97% - 99%)    PHYSICAL EXAM:      Constitutional: No fever, chills, NAD, Comfortable    Eyes: Not reddened, no discharge    ENMT: No discharge, No pain    Neck: No JVD, No Bruit    Back: No CVA tenderness    Respiratory: Clear to auscultation bilaterally    Cardiovascular: IRR, Normal S1-2, No S3-, No friction rub    Gastrointestinal: Soft, NT/ND. BS+, No organomegaly    Extremities: No edema    Vascular: Distal pulses intact    Neurological: Alert, awake, oriented, able to move extremities, speach is clear    Skin: No ecchymosis, no rash    Musculoskeletal: Non tender, no weaknss    Psychiatric: Aproppriate mood, no anxiety        INTERPRETATION OF TELEMETRY:    ECG: Afib VR 58 QT/QTc 514/504 NSST-T abnl    I&O's Detail      LABS:                        10.0   4.0   )-----------( 197      ( 2019 04:22 )             31.2     02-21    x   |  x   |  x   ----------------------------<  x   5.0   |  x   |  x     Ca    8.4<L>      2019 04:22    TPro  8.1  /  Alb  4.5  /  TBili  0.5  /  DBili  x   /  AST  34  /  ALT  13  /  AlkPhos  108  02-    CARDIAC MARKERS ( 2019 10:40 )  x     / 0.06 ng/mL / x     / x     / x      CARDIAC MARKERS ( 2019 04:22 )  x     / 0.06 ng/mL / x     / x     / x          PT/INR - ( 2019 04:22 )   PT: 14.5 sec;   INR: 1.25 ratio         PTT - ( 2019 04:22 )  PTT:34.2 sec    I&O's Summary CARDIOLOGY CONSULTATION NOTE       History obtained by: Patient, family and medical record     obtained: No    Chief complaint:    Patient is a 73y old  Male who presents with a chief complaint of ESRD - HD (2019 09:36)      HPI:    73M hx R iliac stenosis and tried intervention but failed twice with complication, atrial fibrillation,  s/p watchman (s/p watchman. 1 year CANDIDA- no leak, DM, hypotension on Midodrine, s/p L nephrectomy due to bleeding, ESRD on HD, chronic chest pain (Stress Test: may 2018, Magdalena scan. mild ischemia in RCA)  (Cardiac Cath: 2018 , LAD 40%. RPLS- 75% stenosis.), s/p fall with Subarachnoid hemorrhage (CT Brain 2019  Persistent hemorrhage particularly in the subarachnoid space unchanged)  complicated by post traumatic seizure BIBA to ED from home, c/o two weeks history of L sided chest worsening with inspiration, has been on and off, admitted SOB, nonexertional, nonradiated;  because CP worsened he decided to come to ED. Denied fever, chills, orthopnea, PND, edema, palpitations nausea, vomiting hematuria, melena, syncope, near syncope.      PRIOR CARDIAC TESTING  EK2019 , Atrial fibrillation Low voltage -possible pulmonary disease. ABNORMAL   Stress Test: may 2018, Magdalena scan. mild ischemia in RCA   Echo: 2018, mild LVH. elevated filling pressures 28 mm Hg. Mild moderate MR. mild moderate TR. moderate pulm HTN., normal LV function, moderate pulmonary hypertension, enlarged LA size, no mitral regurgitation LVEF 62%.   Cardiac Cath: 2018 , LAD 40%. RPLS- 75% stenosis.          REVIEW OF SYMPTOMS:   Constitutional: Denied: fever, chills, weight loss or gain  Eyes: Denied: reddened ayes, eye discharge, eye pain  ENMT: Denied: ear pain, nasal discharge, mouth pain, throat pain or swelling  Cardiovascular: Denied: palpitation, arrhythmia, irregular or fast heart beats, edema  Respiratory: Denied: cough, phlegm production, wheezes, orthopnea, PND,   GI: Denied: Abdominal pain, nausea, vomiting, diarrhea, constipation, melena, rectal bleed  : Denied: hematuria, frequency  Musculoskeletal: Denied: Muscle  weakness  Hematology/lymp: Denied: Bleeding disorder, anemia, blood clotting  Neuro: Denied: Headache, light headedness, dizziness, numbness, aphasia, dysarthria, seizure,  syncope, near syncope  Psych: Denied: depression, anxiety  Integumentary/skin: Denied: rash, bruises, ecchymosis, itching  Allergy/Immunology: denied environmental or drug allergies    ALL OTHER REVIEW OF SYSTEMS ARE NEGATIVE.    MEDICATIONS  (STANDING):    Home Medications:   * Patient Currently Takes Medications as of 2019 17:54 documented in Structured Notes  · 	Keppra 500 mg oral tablet: 1 tab(s) orally once a day   · 	acetaminophen 325 mg oral tablet: 2 tab(s) orally every 4 hours, As needed, Temp greater or equal to 38C (100.4F), Mild Pain (1 - 3)  · 	senna oral tablet: 2 tab(s) orally once a day (at bedtime)  · 	Renvela 800 mg oral tablet: 1 tab(s) orally 2 times a day  · 	NexIUM 40 mg oral delayed release capsule: 1 cap(s) orally once a day  · 	Sensipar 30 mg oral tablet: 1 tab(s) orally 2 times a day  · 	ursodiol 300 mg oral capsule: 1 cap(s) orally once a day  · 	Colace 100 mg oral capsule: 1 cap(s) orally once a day  · 	Lipitor 20 mg oral tablet: 1 tab(s) orally once a day  · 	loratadine 10 mg oral capsule: 1 cap(s) orally once a day  · 	midodrine 10 mg oral tablet: 1 tab(s) orally 3 times a day  · 	glimepiride 1 mg oral tablet: 1 tab(s) orally once a day  · 	Mag-Ox 400: tab(s) orally once a day  · 	Lupron:  intramuscular every 3 mos,  due  for  dose  at  [facility]  oncologist  urology  2018  · 	nephlex rx: 1 tab(s) orally once a day          MEDICATIONS  (PRN):        PAST MEDICAL & SURGICAL HISTORY:  Persistent atrial fibrillation  Carotid artery disease: mild  GERD (gastroesophageal reflux disease)  Low glucose level: Patient has h/o low blood sugars at times  Palpitations  Leg pain, bilateral: R&gt;L at rest and with short distant ambulation  Imbalance: secondary to PVD Uses cane  Obesity (BMI 30.0-34.9)  Claudication in peripheral vascular disease: R&gt;L  Right common iliac or ostial external iliac per 2018 U/S  Left popliteal 50-75 and EDUIN stenosis  SUSANA 0.8  Kidney problem: spontanious renal bleed while on coumadin  c/b  renal  hematome  with  LT  nephrectomy  Hypotension: treated  with Midodrine  Tiredness  Renal hematoma, left  Hypercholesteremia  ESRD (end stage renal disease): on  hemodialysis  3  x  weekly.,  H/O  left  nephrectomy after  renal  bleed  Dyslipidemia  Dizziness  DM (diabetes mellitus)  Daytime sleepiness  Renal transplant recipient  AV fistula: right lower arm  Abnormality of left atrial appendage: WATCHMAN  LEFT ATRIAL APPENDAGE CLOSUIRE   2017  History of unilateral nephrectomy: left  nephrectomy      FAMILY HISTORY:  No pertinent family history in first degree relatives      SOCIAL HISTORY:    CIGARETTES:  No    ALCOHOL: No    DRUGS: No    Vital Signs Last 24 Hrs  T(C): 36.7 (2019 10:58), Max: 36.7 (2019 10:58)  T(F): 98 (2019 10:58), Max: 98 (2019 10:58)  HR: 68 (2019 10:57) (61 - 69)  BP: 158/64 (2019 10:57) (120/60 - 158/64)  BP(mean): --  RR: 16 (2019 10:57) (16 - 17)  SpO2: 99% (2019 10:57) (97% - 99%)    PHYSICAL EXAM:      Constitutional: No fever, chills, NAD, Comfortable    Eyes: Not reddened, no discharge    ENMT: No discharge, No pain    Neck: No JVD, No Bruit    Back: No CVA tenderness    Respiratory: Clear to auscultation bilaterally    Cardiovascular: IRR, Normal S1-2, No S3-, No friction rub    Gastrointestinal: Soft, NT/ND. BS+, No organomegaly    Extremities: No edema    Vascular: Distal pulses intact    Neurological: Alert, awake, oriented, able to move extremities, speach is clear    Skin: No ecchymosis, no rash    Musculoskeletal: Non tender, no weaknss    Psychiatric: Aproppriate mood, no anxiety        INTERPRETATION OF TELEMETRY:    ECG: Afib VR 58 QT/QTc 514/504 NSST-T abnl    I&O's Detail      LABS:                        10.0   4.0   )-----------( 197      ( 2019 04:22 )             31.2     02-21    x   |  x   |  x   ----------------------------<  x   5.0   |  x   |  x     Ca    8.4<L>      2019 04:22    TPro  8.1  /  Alb  4.5  /  TBili  0.5  /  DBili  x   /  AST  34  /  ALT  13  /  AlkPhos  108  02-    CARDIAC MARKERS ( 2019 10:40 )  x     / 0.06 ng/mL / x     / x     / x      CARDIAC MARKERS ( 2019 04:22 )  x     / 0.06 ng/mL / x     / x     / x          PT/INR - ( 2019 04:22 )   PT: 14.5 sec;   INR: 1.25 ratio         PTT - ( 2019 04:22 )  PTT:34.2 sec    I&O's Summary       Echo 2019    Summary:   1. Left ventricular ejection fraction, by visual estimation, is 60 to   65%.   2. There is mild concentric left ventricular hypertrophy.   3. Thickening of the anterior and posterior mitral valve leaflets.   4. Mild tricuspid regurgitation.   5. Mild aortic regurgitation.   6. Sclerotic aortic valve with normal opening.   7. Spectral Doppler shows pseudonormal pattern of left ventricular   myocardial filling (Grade II diastolic dysfunction).   8. Normal global left ventricular systolic function.

## 2019-02-21 NOTE — PROGRESS NOTE ADULT - SUBJECTIVE AND OBJECTIVE BOX
Vital Signs Last 24 Hrs,    T(C): 36.4 (2019 03:16), Max: 36.4 (2019 03:16)  T(F): 97.6 (2019 03:16), Max: 97.6 (2019 03:16)  HR: 61 (2019 03:16) (61 - 69)  BP: 120/60 (2019 03:16) (120/60 - 126/77)  BP(mean): --  RR: 17 (2019 03:16) (16 - 17)  SpO2: 97% (2019 03:16) (97% - 97%)    x      |  x      |  x      ----------------------------<  x      Ca:x     (2019 06:51)  5.0     |  x      |  x        eGFR if Non : 9 <L>  eGFR if : 10 <L>    TPro  8.1    /  Alb  4.5    /  TBili  0.5    /  DBili  x      /  AST  34     /  ALT  13     /  AlkPhos  108    2019 04:22                          10.0<L>  4.0<L> )-----------( 197      ( 2019 04:22 )               31.2<L>    Phos: 3.2 mg/dL M.0 mg/dL PTH:-- Uric acid:-- Serum Osm:--  Ferritin:-- Iron:-- TIBC:-- Tsat:--  B12:-- TSH:-- ( @ 11:15)    Full consult to Follow, After seen,

## 2019-02-21 NOTE — CONSULT NOTE ADULT - SUBJECTIVE AND OBJECTIVE BOX
[facility] DIVISION OF KIDNEY DISEASES AND HYPERTENSION -- INITIAL CONSULT NOTE  --------------------------------------------------------------------------------  HPI:   74 YO - M w.  R iliac stenosis ,  interventions  failed twice with complication, atrial fibrillation,  s/p watchman ( 1 year CANDIDA- no leak ) DM,  Chronic hypotension on Midodrine, s/p L nephrectomy due to bleeding, ESRD on HD, chronic chest pain (Stress Test: may 2018, Magdalena scan. mild ischemia in RCA)  (Cardiac Cath: july 2018 , LAD 40%. RPLS- 75% stenosis.), s/p fall with Subarachnoid hemorrhage (CT Brain 02/12/2019  Persistent hemorrhage particularly in the subarachnoid space unchanged)  complicated by post traumatic seizure BIBA to ED from home,     c/o two weeks history of L sided chest worsening with inspiration, has been on and off, admitted SOB, nonexertional, nonradiated;  because CP worsened he decided to come to ED. Denied fever, chills, orthopnea, PND, edema, palpitations nausea, vomiting hematuria, melena, syncope, near syncope.     PAST HISTORY  --------------------------------------------------------------------------------  PAST MEDICAL & SURGICAL HISTORY:  Persistent atrial fibrillation  Carotid artery disease: mild  Low glucose level: Patient has h/o low blood sugars at times  Obesity (BMI 30.0-34.9)  Claudication in peripheral vascular disease:   Right common iliac or ostial external iliac per June 2018 U/S  Left popliteal 50-75 and EDUIN stenosis  Kidney problem: spontanious renal bleed while on coumadin  c/b  renal  hematome  with  LT  nephrectomy  Hypotension: treated  with Midodrine  ESRD (end stage renal disease): on  hemodialysis  3  x  weekly.,  H/O  left  nephrectomy after  renal  bleed  DM (diabetes mellitus)  Renal transplant recipient,    AV fistula: right lower arm  Abnormality of left atrial appendage: WATCHMAN  LEFT ATRIAL APPENDAGE CLOSURE   Jan. 30 2017  History of  left  nephrectomy    FAMILY HISTORY:  No pertinent family history in first degree relatives    PAST SOCIAL HISTORY:  No ETOH,    ALLERGIES & MEDICATIONS  --------------------------------------------------------------------------------  Allergies    No Known Allergies    Standing Inpatient Medications  atorvastatin 20 milliGRAM(s) Oral at bedtime  cinacalcet 30 milliGRAM(s) Oral two times a day  dextrose 5%. 1000 milliLiter(s) IV Continuous <Continuous>  dextrose 50% Injectable 12.5 Gram(s) IV Push once  dextrose 50% Injectable 25 Gram(s) IV Push once  dextrose 50% Injectable 25 Gram(s) IV Push once  docusate sodium 100 milliGRAM(s) Oral daily  heparin  Injectable 5000 Unit(s) SubCutaneous every 12 hours  insulin lispro (HumaLOG) corrective regimen sliding scale   SubCutaneous three times a day before meals  levETIRAcetam 500 milliGRAM(s) Oral daily  loratadine 10 milliGRAM(s) Oral daily  midodrine 10 milliGRAM(s) Oral three times a day  senna 2 Tablet(s) Oral at bedtime  sevelamer hydrochloride 800 milliGRAM(s) Oral every 12 hours  ursodiol Oral Tab/Cap - Peds 300 milliGRAM(s) Oral daily    REVIEW OF SYSTEMS  --------------------------------------------------------------------------------  Gen: No weight changes, fatigue, fevers/chills, weakness  Skin: No rashes  Head/Eyes/Ears/Mouth: No headache; Normal hearing; Normal vision w/o blurriness; No sinus pain/discomfort, sore throat  Respiratory: No dyspnea, cough, wheezing, hemoptysis  CV: No chest pain, PND, orthopnea  GI: No abdominal pain, diarrhea, constipation, nausea, vomiting, melena, hematochezia  : No increased frequency, dysuria, hematuria, nocturia  MSK: No joint pain/swelling; no back pain; no edema  Neuro: No dizziness/lightheadedness, weakness, seizures, numbness, tingling  Heme: No easy bruising or bleeding  Endo: No heat/cold intolerance  Psych: No significant nervousness, anxiety, stress, depression    All other systems were reviewed and are negative, except as noted.    VITALS/PHYSICAL EXAM  --------------------------------------------------------------------------------  T(C): 36.7 (02-21-19 @ 10:58), Max: 36.7 (02-21-19 @ 10:58)  HR: 68 (02-21-19 @ 10:57) (61 - 69)  BP: 158/64 (02-21-19 @ 10:57) (120/60 - 158/64)  RR: 16 (02-21-19 @ 10:57) (16 - 17)  SpO2: 99% (02-21-19 @ 10:57) (97% - 99%)  Wt(kg): --  Height (cm): 165.1 (02-21-19 @ 01:46)  Weight (kg): 99.8 (02-21-19 @ 01:46)  BMI (kg/m2): 36.6 (02-21-19 @ 01:46)  BSA (m2): 2.06 (02-21-19 @ 01:46)    Physical Exam:  	Gen: NAD, well-appearing  	HEENT: PERRL, supple neck, Pale,  	Pulm: CTA B/L  	CV: AF,  S1S2; no rub  	Back: No spinal or CVA tenderness; no sacral edema  	Abd: +BS, soft, nontender/distended  	: No suprapubic tenderness  	UE: Warm, FROM, no clubbing, intact strength; no edema; no asterixis  	LE: Warm, FROM, no clubbing, intact strength; +  edema  	Neuro: No focal deficits, Ataxic,   	Psych: Normal affect and mood  	Skin: Warm, without rashes  	Vascular access: AVF,    LABS/STUDIES  --------------------------------------------------------------------------------              10.0   4.0   >-----------<  197      [02-21-19 @ 04:22]              31.2     x   |  x   |  x   ----------------------------<  x       [02-21-19 @ 06:51]  5.0   |  x   |  x         Ca     8.4     [02-21-19 @ 04:22]    TPro  8.1  /  Alb  4.5  /  TBili  0.5  /  DBili  x   /  AST  34  /  ALT  13  /  AlkPhos  108  [02-21-19 @ 04:22]    PT/INR: PT 14.5 , INR 1.25       [02-21-19 @ 04:22]  PTT: 34.2       [02-21-19 @ 04:22]    Troponin 0.06      [02-21-19 @ 10:40]    Creatinine Trend:  SCr 5.72 [02-21 @ 04:22]  SCr 6.42 [02-14 @ 11:15]  SCr 4.23 [02-13 @ 04:22]  SCr 7.57 [02-12 @ 18:12]  SCr 7.17 [02-12 @ 06:17]    HbA1c 4.6      [02-13-19 @ 04:22]    HBsAg Nonreact      [08-09-18 @ 18:29]  HCV 0.44, Nonreact      [08-09-18 @ 18:29]    EXAM:  CT ANGIO CHEST (W)AW                           PROCEDURE DATE:  02/21/2019      INTERPRETATION:  CLINICAL INFORMATION: Weakness and dizziness. Rule out   pulmonary embolus.    COMPARISON: CT chest of 11/8/2017.    PROCEDURE:   CTA of the Chest was performed with intravenous contrast.  90 ml of Omnipaque 300 was injected intravenously. 10 ml were discarded.  Sagittal and coronal reformats were performed as well as MIP   Reconstructions.    FINDINGS:    CHEST:     LUNGS AND LARGE AIRWAYS: Patent central airways. Post right upper lobe   wedge resection with associated linear opacity, which may represent   scarring overall pattern is relatively stable as compared with prior   examination of 11/8/2017. Scattered linear subsegmental atelectasis.  PLEURA: Stable nonspecific 10 mm nodular focus along the right posterior   pleura (series 5, image 70). No pleural effusion.  VESSELS: Adequate opacification of the pulmonary arteries. No evidence of   pulmonary embolus. Atherosclerosis of the thoracic aorta, which is   otherwise normal in caliber. Right brachiocephalic venous stent.  HEART: Heart size is normal. No pericardial effusion. Left atrial   appendage closure device. Coronary artery calcification.  MEDIASTINUM AND WHIT: No lymphadenopathy.  CHEST WALL AND LOWER NECK: Coarse calcification within the right thyroid   lobe.  VISUALIZED UPPER ABDOMEN: Cholelithiasis with suggestion of gallbladder   wall thickening and/or edema and right upper quadrant inflammatory   change. Minimal perisplenic ascites. Left retroperitoneal surgical clips.  BONES: Multilevel degenerative changes of the spine.    IMPRESSION:     No pulmonary embolus.    Cholelithiasis with suggestion of gallbladder wall thickening and/or   edema. Correlate with patient's symptomatology. Further evaluation with   right upper quadrant sonogram may be obtained.    Trace perisplenic ascites.    JESE SALEH M.D., ATTENDING RADIOLOGIST  This document has been electronically signed. Feb 21 2019  6:50AM      EXAM:  US GALLBLADDER                          PROCEDURE DATE:  02/21/2019      INTERPRETATION:  Right upper quadrant gallbladder and common bile duct   ultrasound.    Comparison: None   Clinical Indication: Right upper quadrant pain.. Assess for   cholelithiasis or acute cholecystitis.    FINDINGS:     The gallbladder  contains multiple gallstones.    The bladder wall diffusely thickened measuring 6 mm. . The common duct   measures 5.0 mm in maximum diameter.    The patient is taking painmedication. Maya's sign not applicable.      IMPRESSION:     Cholelithiasis with diffusely thickened gallbladder wall as described.   Common bile duct normal.      JESE SANDS M.D., ATTENDING RADIOLOGIST  This document has been electronically signed. Feb 21 2019  8:53AM

## 2019-02-21 NOTE — CONSULT NOTE ADULT - ASSESSMENT
73M hx R iliac stenosis and tried intervention but failed twice with complication, atrial fibrillation,  s/p watchman (s/p watchman. 1 year CANDIDA- no leak, DM, hypotension on Midodrine, s/p L nephrectomy due to bleeding, ESRD on HD, chronic chest pain (Stress Test: may 2018, Magdalena scan. mild ischemia in RCA)  (Cardiac Cath: july 2018 , LAD 40%. RPLS- 75% stenosis.)  BIBA to ED from home, c/o two weeks history of L sided chest worsening with inspiration, has been on and off, admitted SOB, nonexertional, nonradiating; because CP worsened he decided to come to ED. Denied fever, chills, orthopnea, PND, edema, palpitation nausea, vomiting hematuria, melena, syncope, near syncope      Chest pain: coronary artery disease stable angina.   ECG: No acute changes  Trop x2 neg  pBNO >70K  CTA of Chest: No pulmonary embolus.  Heart size is normal. No pericardial effusion. Left atrial appendage closure device.  Cannot give Antianginals due to hypotension (on midodrine)  Ranexa  Cont ASA, Plavix  A limited FU Echo planned    ESRD  On HD  Volume control with HD    Atrial fibrillation  VR controlled  s/p watchman. 1 year CANDIDA- no leak.  Cont asa 81.   Cont metoprolol 25mg Q12.     Hypotension: Orthostatic  On midodrine.   Monitor BP    Cholelithiasis  US Gallbladder: Cholelithiasis with diffusely thickened gallbladder wall .  As per primary team. 73M hx R iliac stenosis and tried intervention but failed twice with complication, atrial fibrillation,  s/p watchman (s/p watchman. 1 year CANDIDA- no leak, DM, hypotension on Midodrine, s/p L nephrectomy due to bleeding, ESRD on HD, chronic chest pain (Stress Test: may 2018, Magdalena scan. mild ischemia in RCA)  (Cardiac Cath: july 2018 , LAD 40%. RPLS- 75% stenosis.), s/p fall with Subarachnoid hemorrhage (CT Brain 02/12/2019  Persistent hemorrhage particularly in the subarachnoid space unchanged)  BIBA to ED from home, c/o two weeks history of L sided chest worsening with inspiration, has been on and off, admitted SOB, nonexertional, nonradiated;  because CP worsened he decided to come to ED. Denied fever, chills, orthopnea, PND, edema, palpitations nausea, vomiting hematuria, melena, syncope, near syncope.       Chest pain: coronary artery disease stable angina.   ECG: No acute changes  Trop x2 neg  pBNP >70K  CTA of Chest: No pulmonary embolus.  Heart size is normal. No pericardial effusion. Left atrial appendage closure device.  Cannot give Antianginals due to hypotension (on midodrine)  Ranexa  FU with Neurosurgery for subarachnoid hemorrhage: for  ASA, Plavix   A limited FU Echo planned    ESRD  On HD  Volume control with HD    Atrial fibrillation  VR controlled  s/p watchman. 1 year CANDIDA- no leak.  Cont asa 81.   Cont metoprolol 25mg Q12.     Hypotension: Orthostatic  On midodrine.   Monitor BP    Cholelithiasis  US Gallbladder: Cholelithiasis with diffusely thickened gallbladder wall .  As per primary team. 73M hx R iliac stenosis and tried intervention but failed twice with complication, atrial fibrillation,  s/p watchman (s/p watchman. 1 year CANDIDA- no leak, DM, hypotension on Midodrine, s/p L nephrectomy due to bleeding, ESRD on HD, chronic chest pain (Stress Test: may 2018, Magdalena scan. mild ischemia in RCA)  (Cardiac Cath: july 2018 , LAD 40%. RPLS- 75% stenosis.), s/p fall with Subarachnoid hemorrhage (CT Brain 02/12/2019  Persistent hemorrhage particularly in the subarachnoid space unchanged)  complicated by post traumatic seizure BIBA to ED from home, c/o two weeks history of L sided chest worsening with inspiration, has been on and off, admitted SOB, nonexertional, nonradiated;  because CP worsened he decided to come to ED. Denied fever, chills, orthopnea, PND, edema, palpitations nausea, vomiting hematuria, melena, syncope, near syncope.       Chest pain: coronary artery disease stable angina.   ECG: No acute changes  Trop x2 neg  pBNP >70K  CTA of Chest: No pulmonary embolus.  Heart size is normal. No pericardial effusion. Left atrial appendage closure device.  Cannot give Antianginals due to hypotension (on midodrine)  Ranexa  FU with Neurosurgery for subarachnoid hemorrhage: for  ASA, Plavix   A limited FU Echo planned    ESRD  On HD  Volume control with HD    Atrial fibrillation  VR controlled  s/p watchman. 1 year CANDIDA- no leak.  Cont asa 81.   Cont metoprolol 25mg Q12.     Hypotension: Orthostatic  On midodrine.   Monitor BP    Cholelithiasis  US Gallbladder: Cholelithiasis with diffusely thickened gallbladder wall .  As per primary team.

## 2019-02-21 NOTE — CONSULT NOTE ADULT - ATTENDING COMMENTS
Patient seen and examined.  Has been having LEFT sided chest pain as complains for ED visit.  he states no postprandial pain.  On exam no toxic, states Left side chest pain improved but present.  abdomen obese, soft, RUQ discomfort and negative Maya signs.  The patient has been tolerating diet with no abd complains.  The patient at this time does not have cholecystitis and the images findings are chronic and appear to be asymptomatics.  No need for any further biliary work up.  when medically adequate resume diet and we can discuss cholelithiasis as outpatient.

## 2019-02-21 NOTE — CONSULT NOTE ADULT - SUBJECTIVE AND OBJECTIVE BOX
GENERAL SURGERY CONSULT    Consulting surgical team: [facility] - Acute Care Surgery  Consulting attending: Sanket  Patient seen and examined: 02-21-19 @ 12:19    HPI:  Patient is a 73y Male presented to hospital with 2 week hx of sharp left sided chest pain. Pain worse with inspiration. Denies nausea, vomiting, fever, chills. CTA Chest was performed which was evident for cholelithiasis with gallbladder wall thickening. US confirmed findings. [facility] was consulted for cholecystitis. Pt reports intermittent right upper quadrant pain for multiple years. Denies pain following meals. Reports normal bowel movements with no constipation or diarrhea. Denies hematuria.         PAST MEDICAL HISTORY:  Persistent atrial fibrillation  Carotid artery disease  GERD (gastroesophageal reflux disease)  Low glucose level  Palpitations  Leg pain, bilateral  Imbalance  Obesity (BMI 30.0-34.9)  Claudication in peripheral vascular disease  Kidney problem  Hypotension  Tiredness  Renal hematoma, left  Hypercholesteremia  ESRD (end stage renal disease)  Dyslipidemia  Dizziness  DM (diabetes mellitus)  Daytime sleepiness  AF (atrial fibrillation)      PAST SURGICAL HISTORY:  Abnormality of left atrial appendage  Renal transplant recipient  AV fistula  H/O kidney transplant  Abnormality of left atrial appendage  History of unilateral nephrectomy      ALLERGIES:  No Known Allergies      MEDICATIONS:      VITALS & I/Os:  Vital Signs Last 24 Hrs  T(C): 36.7 (21 Feb 2019 10:58), Max: 36.7 (21 Feb 2019 10:58)  T(F): 98 (21 Feb 2019 10:58), Max: 98 (21 Feb 2019 10:58)  HR: 68 (21 Feb 2019 10:57) (61 - 69)  BP: 158/64 (21 Feb 2019 10:57) (120/60 - 158/64)  BP(mean): --  RR: 16 (21 Feb 2019 10:57) (16 - 17)  SpO2: 99% (21 Feb 2019 10:57) (97% - 99%)    I&O's Summary      PHYSICAL EXAM:  General: No acute distress  Respiratory: Nonlabored  Cardiovascular: RRR  Abdominal: Soft, mildly tender to right upper quadrant. No guarding or rebound. No massess palpated   Extremities: Warm  Vascular: Radial pulse 2+. no guarding    LABS:                        10.0   4.0   )-----------( 197      ( 21 Feb 2019 04:22 )             31.2     02-21    x   |  x   |  x   ----------------------------<  x   5.0   |  x   |  x     Ca    8.4<L>      21 Feb 2019 04:22    TPro  8.1  /  Alb  4.5  /  TBili  0.5  /  DBili  x   /  AST  34  /  ALT  13  /  AlkPhos  108  02-21    Lactate:    PT/INR - ( 21 Feb 2019 04:22 )   PT: 14.5 sec;   INR: 1.25 ratio         PTT - ( 21 Feb 2019 04:22 )  PTT:34.2 sec    CARDIAC MARKERS ( 21 Feb 2019 10:40 )  x     / 0.06 ng/mL / x     / x     / x      CARDIAC MARKERS ( 21 Feb 2019 04:22 )  x     / 0.06 ng/mL / x     / x     / x                  IMAGING:    EXAM: US GALLBLADDER     PROCEDURE DATE: 02/21/2019         INTERPRETATION: Right upper quadrant gallbladder and common bile duct   ultrasound.     Comparison: None   Clinical Indication: Right upper quadrant pain.. Assess for cholelithiasis   or acute cholecystitis.     FINDINGS:     The gallbladder contains multiple gallstones.   The bladder wall diffusely thickened measuring 6 mm. . The common duct   measures 5.0 mm in maximum diameter.     The patient is taking pain medication. Maya's sign not applicable.     IMPRESSION:     Cholelithiasis with diffusely thickened gallbladder wall as described.   Common bile duct normal.                 JESE SANDS M.D., ATTENDING RADIOLOGIST   This document has been electronically signed. Feb 21 2019 8:53AM

## 2019-02-21 NOTE — ED PROVIDER NOTE - OBJECTIVE STATEMENT
74 y/o M with PMHx of CAD, DM, ESRD (on dialysis, last went this past Thursday), GERD and a-fib presents to ED c/o 72 y/o M with PMHx of CAD, DM, ESRD (on dialysis, last went this past Thursday and completed treatment), GERD and a-fib and PSHx of AV fistula (R forearm), L nephrectomy and renal transplant presents to ED c/o L sided chest pain onset several days ago, worsening tonight. Exacerbated with taking deep breaths and exertion and notes associated shortness of breath. Has never had pain like this in the past. Patient does not produce urine at baseline. Denies fever. Denies HA or neck pain. No cough. No abd pain or n/v/d. No back pain. No motor or sensory deficits. Denies illicit drug use. No recent travel. No rash. No other acute issues, symptoms or concerns.    Nephrology: Dr. Epstein

## 2019-02-21 NOTE — ED PROVIDER NOTE - CLINICAL SUMMARY MEDICAL DECISION MAKING FREE TEXT BOX
72 y/o M with PMHx of CAD, DM, ESRD (on dialysis, last went this past Thursday and completed treatment), GERD and a-fib and PSHx of AV fistula (R forearm), L nephrectomy and renal transplant presents to ED c/o L sided chest pain onset several days ago, worsening tonight with associated shortness of breath. 74 y/o M with PMHx of CAD, DM, ESRD (on dialysis, last went this past Thursday and completed treatment), GERD and a-fib and PSHx of AV fistula (R forearm), L nephrectomy and renal transplant presents to ED c/o L sided chest pain onset several days ago, worsening tonight with associated shortness of breath  Pt with ekg at baseline - afib - no hyperK changes

## 2019-02-21 NOTE — ED ADULT NURSE REASSESSMENT NOTE - NS ED NURSE REASSESS COMMENT FT1
CT RN notified this writer LFA IV infilitrated upon completeion of ct with contrast. ct rn applied ice packs and filed incident report. upon arival back to ed, large hematoma noted to area above iv. iv catheter removed, ice packs applied, dr bee aware. new iv inserted and k repeated and sent to lab. pending further tx plan. updated on plan of care, verbalize understanding. call bell in reach

## 2019-02-21 NOTE — CONSULT NOTE ADULT - ASSESSMENT
Echo 2/12/2019     1. Left ventricular ejection fraction, by visual estimation, is 60 to 65%.   2. There is mild concentric left ventricular hypertrophy.   3. Thickening of the anterior and posterior mitral valve leaflets.   4. Mild tricuspid regurgitation.   5. Mild aortic regurgitation.   6. Sclerotic aortic valve with normal opening.   7. Spectral Doppler shows pseudo normal pattern of left ventricular myocardial filling (Grade II diastolic dysfunction).   8. Normal global left ventricular systolic function.    Assessment and Recommendation:     73M hx R iliac stenosis and tried intervention but failed twice with complication, atrial fibrillation,  s/p watchman (s/p watchman. 1 year CANDIDA- no leak, DM, hypotension on Midodrine, s/p L nephrectomy due to bleeding, ESRD on HD, chronic chest pain (Stress Test: may 2018, Magdalena scan. mild ischemia in RCA)  (Cardiac Cath: july 2018 , LAD 40%. RPLS- 75% stenosis.), s/p fall with Subarachnoid hemorrhage (CT Brain 02/12/2019  Persistent hemorrhage particularly in the subarachnoid space unchanged)  complicated by post traumatic seizure BIBA to ED from home, c/o two weeks history of L sided chest worsening with inspiration, has been on and off, admitted SOB, nonexertional, nonradiated;  because CP worsened he decided to come to ED. Denied fever, chills, orthopnea, PND, edema, palpitations nausea, vomiting hematuria, melena, syncope, near syncope.       Chest pain: coronary artery disease stable angina.   ECG: No acute changes  Trop x 2 neg  pBNP >70K  CTA of Chest: No pulmonary embolus.  Heart size is normal. No pericardial effusion. Left atrial appendage closure device.  FU with Neurosurgery for subarachnoid hemorrhage: for  ASA, Plavix     ESRD  On HD  Volume control with HD    Atrial fibrillation  VR controlled  s/p watchman. 1 year CANDIDA- no leak.  On ASA &  metoprolol 25mg Q12.     Cholelithiasis  US Gallbladder: Cholelithiasis with diffusely thickened gallbladder wall .    GBX, Once stable,

## 2019-02-21 NOTE — ED ADULT NURSE NOTE - OBJECTIVE STATEMENT
72yo male w/ hx CRF on HD tu/th/sat c/o sob and chest pain x 3 days. pt states on tues after HD 74yo male w/ hx CRF on HD tu/th/sat c/o sob and chest pain x 3 days. pt states on tues after HD he felt slight relief, today he could not take the pain anymore so he came to ED. states compliant with HD schedule and med reigmen. resp even and unlabored, lungs clear, nsr on cardiac monitor. no ble edema. RUE AVF + thrill/bruiy. pt states pain is worse when he takes a deep breath in. did not take anything for pain prior to coming to ed

## 2019-02-21 NOTE — ED PROVIDER NOTE - MUSCULOSKELETAL, MLM
Spine appears normal, range of motion is not limited, no muscle or joint tenderness. RUE with fistula.

## 2019-02-21 NOTE — ED ADULT NURSE REASSESSMENT NOTE - NS ED NURSE REASSESS COMMENT FT1
Pt received A&O x's 3 with chest discomfort x's 2 weeks that has become increasingly worse. Pt reports having difficulty laying down due to pain and SOB. On cardiac monitor in NSR, VSS. Awaiting admission orders.

## 2019-02-22 ENCOUNTER — TRANSCRIPTION ENCOUNTER (OUTPATIENT)
Age: 74
End: 2019-02-22

## 2019-02-22 VITALS
HEART RATE: 70 BPM | OXYGEN SATURATION: 94 % | TEMPERATURE: 98 F | SYSTOLIC BLOOD PRESSURE: 128 MMHG | DIASTOLIC BLOOD PRESSURE: 73 MMHG | RESPIRATION RATE: 18 BRPM

## 2019-02-22 LAB
GLUCOSE BLDC GLUCOMTR-MCNC: 101 MG/DL — HIGH (ref 70–99)
GLUCOSE BLDC GLUCOMTR-MCNC: 80 MG/DL — SIGNIFICANT CHANGE UP (ref 70–99)
GLUCOSE BLDC GLUCOMTR-MCNC: 95 MG/DL — SIGNIFICANT CHANGE UP (ref 70–99)

## 2019-02-22 PROCEDURE — 99261: CPT

## 2019-02-22 PROCEDURE — 84132 ASSAY OF SERUM POTASSIUM: CPT

## 2019-02-22 PROCEDURE — 93308 TTE F-UP OR LMTD: CPT

## 2019-02-22 PROCEDURE — 82962 GLUCOSE BLOOD TEST: CPT

## 2019-02-22 PROCEDURE — 85027 COMPLETE CBC AUTOMATED: CPT

## 2019-02-22 PROCEDURE — 36415 COLL VENOUS BLD VENIPUNCTURE: CPT

## 2019-02-22 PROCEDURE — 99239 HOSP IP/OBS DSCHRG MGMT >30: CPT

## 2019-02-22 PROCEDURE — 80053 COMPREHEN METABOLIC PANEL: CPT

## 2019-02-22 PROCEDURE — 90935 HEMODIALYSIS ONE EVALUATION: CPT

## 2019-02-22 PROCEDURE — 76705 ECHO EXAM OF ABDOMEN: CPT

## 2019-02-22 PROCEDURE — T1013: CPT

## 2019-02-22 PROCEDURE — 99233 SBSQ HOSP IP/OBS HIGH 50: CPT

## 2019-02-22 PROCEDURE — 99285 EMERGENCY DEPT VISIT HI MDM: CPT

## 2019-02-22 PROCEDURE — 85610 PROTHROMBIN TIME: CPT

## 2019-02-22 PROCEDURE — 85730 THROMBOPLASTIN TIME PARTIAL: CPT

## 2019-02-22 PROCEDURE — 71275 CT ANGIOGRAPHY CHEST: CPT

## 2019-02-22 PROCEDURE — 83880 ASSAY OF NATRIURETIC PEPTIDE: CPT

## 2019-02-22 PROCEDURE — 83690 ASSAY OF LIPASE: CPT

## 2019-02-22 PROCEDURE — 84484 ASSAY OF TROPONIN QUANT: CPT

## 2019-02-22 RX ORDER — ASPIRIN/CALCIUM CARB/MAGNESIUM 324 MG
1 TABLET ORAL
Qty: 30 | Refills: 0
Start: 2019-02-22 | End: 2019-03-23

## 2019-02-22 RX ORDER — METOPROLOL TARTRATE 50 MG
0.5 TABLET ORAL
Qty: 30 | Refills: 0
Start: 2019-02-22 | End: 2019-03-23

## 2019-02-22 RX ORDER — RANOLAZINE 500 MG/1
500 TABLET, FILM COATED, EXTENDED RELEASE ORAL
Qty: 0 | Refills: 0 | Status: DISCONTINUED | OUTPATIENT
Start: 2019-02-22 | End: 2019-02-22

## 2019-02-22 RX ORDER — RANOLAZINE 500 MG/1
1 TABLET, FILM COATED, EXTENDED RELEASE ORAL
Qty: 60 | Refills: 0
Start: 2019-02-22 | End: 2019-03-23

## 2019-02-22 RX ADMIN — Medication 650 MILLIGRAM(S): at 11:10

## 2019-02-22 RX ADMIN — SEVELAMER CARBONATE 800 MILLIGRAM(S): 2400 POWDER, FOR SUSPENSION ORAL at 18:09

## 2019-02-22 RX ADMIN — LORATADINE 10 MILLIGRAM(S): 10 TABLET ORAL at 11:08

## 2019-02-22 RX ADMIN — URSODIOL 300 MILLIGRAM(S): 250 TABLET, FILM COATED ORAL at 11:33

## 2019-02-22 RX ADMIN — Medication 650 MILLIGRAM(S): at 11:45

## 2019-02-22 RX ADMIN — HEPARIN SODIUM 5000 UNIT(S): 5000 INJECTION INTRAVENOUS; SUBCUTANEOUS at 11:08

## 2019-02-22 RX ADMIN — MIDODRINE HYDROCHLORIDE 10 MILLIGRAM(S): 2.5 TABLET ORAL at 05:29

## 2019-02-22 RX ADMIN — Medication 650 MILLIGRAM(S): at 07:55

## 2019-02-22 RX ADMIN — SEVELAMER CARBONATE 800 MILLIGRAM(S): 2400 POWDER, FOR SUSPENSION ORAL at 05:29

## 2019-02-22 RX ADMIN — CINACALCET 30 MILLIGRAM(S): 30 TABLET, FILM COATED ORAL at 05:29

## 2019-02-22 RX ADMIN — Medication 650 MILLIGRAM(S): at 05:29

## 2019-02-22 RX ADMIN — LEVETIRACETAM 500 MILLIGRAM(S): 250 TABLET, FILM COATED ORAL at 11:08

## 2019-02-22 NOTE — DISCHARGE NOTE ADULT - MEDICATION SUMMARY - MEDICATIONS TO TAKE
I will START or STAY ON the medications listed below when I get home from the hospital:    acetaminophen 325 mg oral tablet  -- 2 tab(s) by mouth every 4 hours, As needed, Temp greater or equal to 38C (100.4F), Mild Pain (1 - 3)  -- Indication: For Pain    aspirin 81 mg oral tablet  -- 1 tab(s) by mouth once a day   -- Take with food or milk.    -- Indication: For PAD    Keppra 500 mg oral tablet  -- 1 tab(s) by mouth once a day   -- Check with your doctor before becoming pregnant.  It is very important that you take or use this exactly as directed.  Do not skip doses or discontinue unless directed by your doctor.  May cause drowsiness or dizziness.  Obtain medical advice before taking any non-prescription drugs as some may affect the action of this medication.  Swallow whole.  Do not crush.  This drug may impair the ability to drive or operate machinery.  Use care until you become familiar with its effects.    -- Indication: For Seizure    glimepiride 1 mg oral tablet  -- 1 tab(s) by mouth once a day  -- Indication: For Diabetes Mellitus    loratadine 10 mg oral capsule  -- 1 cap(s) by mouth once a day  -- Indication: For Allergy    Lipitor 20 mg oral tablet  -- 1 tab(s) by mouth once a day  -- Indication: For PAD/HLD    Lupron  --  intramuscular every 3 months at [facility]  -- Indication: For Hisotry of Prostate Cancer    Sensipar 30 mg oral tablet  -- 1 tab(s) by mouth 2 times a day  -- Indication: For Secondary Hyperparathyroidism / ESRD    ursodiol 300 mg oral capsule  -- 1 cap(s) by mouth once a day  -- Indication: For Home medication    Colace 100 mg oral capsule  -- 1 cap(s) by mouth once a day  -- Indication: For Constipation    senna oral tablet  -- 2 tab(s) by mouth once a day (at bedtime)  -- Indication: For Constipation    Mag-Ox 400  -- tab(s) by mouth once a day  -- Indication: For Magnesium Supplement    midodrine 10 mg oral tablet  -- 1 tab(s) by mouth 3 times a day  -- Indication: For Home medication    Renvela 800 mg oral tablet  -- 1 tab(s) by mouth 2 times a day  -- Indication: For ESRD    NexIUM 40 mg oral delayed release capsule  -- 1 cap(s) by mouth once a day  -- Indication: For GERD I will START or STAY ON the medications listed below when I get home from the hospital:    acetaminophen 325 mg oral tablet  -- 2 tab(s) by mouth every 4 hours, As needed, Temp greater or equal to 38C (100.4F), Mild Pain (1 - 3)  -- Indication: For Pain    aspirin 81 mg oral tablet  -- 1 tab(s) by mouth once a day   -- Take with food or milk.    -- Indication: For PAD    Keppra 500 mg oral tablet  -- 1 tab(s) by mouth once a day   -- Check with your doctor before becoming pregnant.  It is very important that you take or use this exactly as directed.  Do not skip doses or discontinue unless directed by your doctor.  May cause drowsiness or dizziness.  Obtain medical advice before taking any non-prescription drugs as some may affect the action of this medication.  Swallow whole.  Do not crush.  This drug may impair the ability to drive or operate machinery.  Use care until you become familiar with its effects.    -- Indication: For Seizure    glimepiride 1 mg oral tablet  -- 1 tab(s) by mouth once a day  -- Indication: For Diabetes Mellitus    loratadine 10 mg oral capsule  -- 1 cap(s) by mouth once a day  -- Indication: For Allergy    Lipitor 20 mg oral tablet  -- 1 tab(s) by mouth once a day  -- Indication: For PAD/HLD    Lupron  --  intramuscular every 3 months at [facility]  -- Indication: For Home medication    Sensipar 30 mg oral tablet  -- 1 tab(s) by mouth 2 times a day  -- Indication: For Secondary Hyperparathyroidism / ESRD    ursodiol 300 mg oral capsule  -- 1 cap(s) by mouth once a day  -- Indication: For Home medication    Colace 100 mg oral capsule  -- 1 cap(s) by mouth once a day  -- Indication: For Constipation    senna oral tablet  -- 2 tab(s) by mouth once a day (at bedtime)  -- Indication: For Constipation    Mag-Ox 400  -- tab(s) by mouth once a day  -- Indication: For Magnesium Supplement    midodrine 10 mg oral tablet  -- 1 tab(s) by mouth 3 times a day  -- Indication: For Home medication    Renvela 800 mg oral tablet  -- 1 tab(s) by mouth 2 times a day  -- Indication: For ESRD    NexIUM 40 mg oral delayed release capsule  -- 1 cap(s) by mouth once a day  -- Indication: For GERD I will START or STAY ON the medications listed below when I get home from the hospital:    acetaminophen 325 mg oral tablet  -- 2 tab(s) by mouth every 4 hours, As needed, Temp greater or equal to 38C (100.4F), Mild Pain (1 - 3)  -- Indication: For Pain    aspirin 81 mg oral tablet  -- 1 tab(s) by mouth once a day   -- Take with food or milk.    -- Indication: For CAD/PAD    ranolazine 500 mg oral tablet, extended release  -- 1 tab(s) by mouth 2 times a day  -- Indication: For CAD    Keppra 500 mg oral tablet  -- 1 tab(s) by mouth once a day   -- Check with your doctor before becoming pregnant.  It is very important that you take or use this exactly as directed.  Do not skip doses or discontinue unless directed by your doctor.  May cause drowsiness or dizziness.  Obtain medical advice before taking any non-prescription drugs as some may affect the action of this medication.  Swallow whole.  Do not crush.  This drug may impair the ability to drive or operate machinery.  Use care until you become familiar with its effects.    -- Indication: For Seizure    glimepiride 1 mg oral tablet  -- 1 tab(s) by mouth once a day  -- Indication: For Diabetes Mellitus    loratadine 10 mg oral capsule  -- 1 cap(s) by mouth once a day  -- Indication: For Allergy    Lipitor 20 mg oral tablet  -- 1 tab(s) by mouth once a day  -- Indication: For CAD/PAD/HLD    Lupron  --  intramuscular every 3 months at [facility]  -- Indication: For Home medication    metoprolol tartrate 25 mg oral tablet  -- 0.5 tab(s) by mouth 2 times a day   -- It is very important that you take or use this exactly as directed.  Do not skip doses or discontinue unless directed by your doctor.  May cause drowsiness.  Alcohol may intensify this effect.  Use care when operating dangerous machinery.  Some non-prescription drugs may aggravate your condition.  Read all labels carefully.  If a warning appears, check with your doctor before taking.  Take with food or milk.  This drug may impair the ability to drive or operate machinery.  Use care until you become familiar with its effects.    -- Indication: For CAD    Sensipar 30 mg oral tablet  -- 1 tab(s) by mouth 2 times a day  -- Indication: For Secondary Hyperparathyroidism / ESRD    ursodiol 300 mg oral capsule  -- 1 cap(s) by mouth once a day  -- Indication: For Home medication    Colace 100 mg oral capsule  -- 1 cap(s) by mouth once a day  -- Indication: For Constipation    senna oral tablet  -- 2 tab(s) by mouth once a day (at bedtime)  -- Indication: For Constipation    Mag-Ox 400  -- tab(s) by mouth once a day  -- Indication: For Magnesium Supplement    midodrine 10 mg oral tablet  -- 1 tab(s) by mouth 3 times a day  -- Indication: For Home medication    Renvela 800 mg oral tablet  -- 1 tab(s) by mouth 2 times a day  -- Indication: For ESRD    NexIUM 40 mg oral delayed release capsule  -- 1 cap(s) by mouth once a day  -- Indication: For GERD

## 2019-02-22 NOTE — DISCHARGE NOTE ADULT - CARE PROVIDERS DIRECT ADDRESSES
[email or web address]

## 2019-02-22 NOTE — DISCHARGE NOTE ADULT - HOSPITAL COURSE
Pt is a 74 yo M presenting w/ chest pain for atleast 5 days duration. PMH A fib s/p Watchman, recent Subarrachonoid hemorrhage, ESRD on HD, DM2, R iliac stenosis, post traumatic seizure, L nephrectomy, CAD.   Pt has been c/o chest pain/pressure for atleast 5 days now, L anterior sternum, pain is worse with coughing, denies exertional component, no associated SOB currently, but does get SOB at times, denies any radiation of the pain, no associated diaphoresis. He has a hx of chronic chest pain and had a stress test in May 2018, lexiscan which showed mild ischemia in RCA, he had a cardiac cath in July 2018 LAD 40% and RPLS 75% stenosis.   He has no other complaints. Of note he had a fall with a resultant SAH and was just discharged from the hospital last week. He has not restarted anti-platlet medications (ASA or Plavix), he is NOT on coumadin. He had a watchman procedure.   In ED patient seen by surgery due to CT scan showing gallbladder wall thickening, believed to have chronic cholecystitis. He was also seen by Nephro and cardiology.   Denies headaches, nausea, vomiting, palpitations, abdominal pain, constipation, diarrhea, melena, hematochezia. He does not produce urine. Pt is a 74 yo M presenting w/ chest pain for atleast 5 days duration. PMH A fib s/p Watchman, recent Subarrachonoid hemorrhage, ESRD on HD, DM2, R iliac stenosis, post traumatic seizure, L nephrectomy, CAD.   Pt has been c/o chest pain/pressure for atleast 5 days now, L anterior sternum, pain is worse with coughing, denies exertional component, no associated SOB currently, but does get SOB at times, denies any radiation of the pain, no associated diaphoresis. He has a hx of chronic chest pain and had a stress test in May 2018, lexiscan which showed mild ischemia in RCA, he had a cardiac cath in July 2018 LAD 40% and RPLS 75% stenosis.   He has no other complaints. Of note he had a fall with a resultant SAH and was just discharged from the hospital last week. He has not restarted anti-platlet medications (ASA or Plavix), he is NOT on coumadin. He had a watchman procedure.     Seen by Cardio - stable angina. No intervention.   Seen by Surgery, no surgical intervention at this point. Findings are chronic. Will need outpatient follow up for Cholecystectomy.   Neurosurgery cleared to start Aspirin 81 mg. Plavix and Heparin during HD to be on hold until seen and cleared by Neurosurgery in 1 week.   He is feeling much better and is stable to be discharged home.    Physical Exam  Vital Signs   T(C): 37 (22 Feb 2019 10:21), Max: 37 (22 Feb 2019 10:21)  T(F): 98.6 (22 Feb 2019 10:21), Max: 98.6 (22 Feb 2019 10:21)  HR: 74 (22 Feb 2019 10:21) (65 - 90)  BP: 142/79 (22 Feb 2019 10:21) (130/70 - 167/88)  RR: 18 (22 Feb 2019 10:21) (17 - 18)  SpO2: 100% (22 Feb 2019 08:00) (91% - 100%)  General: Elderly male lying in bed comfortably. No acute distress  HEENT: EOMI. Clear conjunctivae. Moist mucus membrane  Neck: Supple. No JVD.   Chest: CTA bilaterally - no wheezing, rales or rhonchi. No chest wall tenderness.  Heart: S1 & S2 with irregular rhythm.   Abdomen: Soft. Non-tender. Non-distended. + BS  Ext: No pedal edema. No calf tenderness   Neuro: AAO x 3. No focal deficit. No speech disorder.  Skin: Warm and Dry  Psychiatry: Normal mood and affect    Time spent: 53 minutes

## 2019-02-22 NOTE — PROGRESS NOTE ADULT - ASSESSMENT
73M hx R iliac stenosis and tried intervention but failed twice with complication, atrial fibrillation,  s/p watchman (s/p watchman. 1 year CANDIDA- no leak, DM, hypotension on Midodrine, s/p L nephrectomy due to bleeding, ESRD on HD, chronic chest pain (Stress Test: may 2018, Magdalena scan. mild ischemia in RCA)  (Cardiac Cath: july 2018 , LAD 40%. RPLS- 75% stenosis.), s/p fall with Subarachnoid hemorrhage (CT Brain 02/12/2019  Persistent hemorrhage particularly in the subarachnoid space unchanged)  complicated by post traumatic seizure BIBA to ED from home, c/o two weeks history of L sided chest worsening with inspiration, has been on and off, admitted SOB, nonexertional, nonradiated;  because CP worsened he decided to come to ED. Denied fever, chills, orthopnea, PND, edema, palpitations nausea, vomiting hematuria, melena, syncope, near syncope.       Chest pain: coronary artery disease stable angina.   ECG: No acute changes  Trop x2 neg  pBNP >70K  CTA of Chest: No pulmonary embolus.  Heart size is normal. No pericardial effusion. Left atrial appendage closure device.  Cannot give Antianginals due to hypotension (on midodrine)  Ranexa started.  FU with Neurosurgery for subarachnoid hemorrhage if  pt is cleared start  ASA , Plavix   A limited FU Echo: Normal LVEF., Severe pHTN    ESRD  On HD  Volume control with HD    Atrial fibrillation  VR controlled  s/p watchman. 1 year CANDIDA- no leak.  Cont metoprolol 25mg Q12.     Hypotension: Orthostatic  On midodrine.   Monitor BP    Cholelithiasis  US Gallbladder: Cholelithiasis with diffusely thickened gallbladder wall .  Surgery evaluated. Outpt FU.

## 2019-02-22 NOTE — DISCHARGE NOTE ADULT - CARE PROVIDER_API CALL
Tianna Lopez)  Internal Medicine; Nephrology  [address] [place] [address]  Phone: [phone]  Fax: [phone]  Follow Up Time:     Boris Perdue)  Neurosurgery  [facility], [address]  [place] [id number]  Phone: [phone]  Fax: [phone]  Follow Up Time:     Tian Menon)  Cardiology; Cardiovascular Disease; Interventional Cardiology  [address], [address] [id number]  Phone: [phone]  Fax: [phone]  Follow Up Time:     David Jacobs)  Cardiovascular Disease  [address], [address]  [place] [address]  Phone: [phone]  Fax: [phone]  Follow Up Time:

## 2019-02-22 NOTE — DISCHARGE NOTE ADULT - CARE PLAN
Principal Discharge DX:	Chest pain  Goal:	ACS ruled out  Assessment and plan of treatment:	Follow up with Cardio in 1 week.  Secondary Diagnosis:	Cholelithiasis  Assessment and plan of treatment:	Outpatient follow up with Surgery for Cholecystectomy.  Secondary Diagnosis:	SAH (subarachnoid hemorrhage)  Assessment and plan of treatment:	Outpatient follow up with Neurosurgery in 1 week for repeat CT Head and further recommendations.  Secondary Diagnosis:	ESRD (end stage renal disease)  Assessment and plan of treatment:	Follow up with Renal tomorrow for HD.  Secondary Diagnosis:	DM (diabetes mellitus)  Assessment and plan of treatment:	Continue home medications.  Follow up with PMD in 1 week.  Secondary Diagnosis:	PAD (peripheral artery disease)  Assessment and plan of treatment:	Resume Aspirin 81 mg.  Plavix on hold until cleared by Neurosurgery next week.   Continue home medications.  Follow up with Vascular Surgery in 1 week. Principal Discharge DX:	Chest pain  Goal:	ACS ruled out  Assessment and plan of treatment:	Follow up with Cardio in 1 week.  Secondary Diagnosis:	Cholelithiasis  Assessment and plan of treatment:	Outpatient follow up with Surgery for Cholecystectomy.  Secondary Diagnosis:	SAH (subarachnoid hemorrhage)  Assessment and plan of treatment:	Outpatient follow up with Neurosurgery in 1 week for repeat CT Head and further recommendations.  Secondary Diagnosis:	ESRD (end stage renal disease)  Assessment and plan of treatment:	Follow up with Renal tomorrow for HD.  Secondary Diagnosis:	DM (diabetes mellitus)  Assessment and plan of treatment:	Continue home medications.  Follow up with PMD in 1 week.  Secondary Diagnosis:	PAD (peripheral artery disease)  Assessment and plan of treatment:	Resume Aspirin 81 mg.  Plavix on hold until cleared by Neurosurgery next week.   Continue home medications.  Follow up with Vascular Surgery in 1 week.  Secondary Diagnosis:	Atrial fibrillation  Assessment and plan of treatment:	s/p WATCHMAN  Continue Metoprolol 12.5 mg BID as per cardio.  Follow up with Cardio in 1 week.

## 2019-02-22 NOTE — DISCHARGE NOTE ADULT - PROVIDER TOKENS
PROVIDER:[TOKEN:[[id number]]],PROVIDER:[TOKEN:[[id number]]],PROVIDER:[TOKEN:[[id number]]],PROVIDER:[TOKEN:[[id number]]]

## 2019-02-22 NOTE — PROGRESS NOTE ADULT - SUBJECTIVE AND OBJECTIVE BOX
NSGY Attg:    Patient is well known to be from time of admission.  Case discussed with Dr. Jaquez this am.  From a medical perspective, the patient will need to resume ASA and Plavix secondary to history of LE stents.  Imaging reviewed.  From a neurosurgical perspective, there is no absolute neurosurgical contraindication to resuming ASA 81 mg at this time.  I will see the patient back as an outpatient next week and repeat a CT scan at the 2 week post ICH cherelle.  Pending review of the film, will discuss restarting Plavix and resuming heparin with dialysis.

## 2019-02-22 NOTE — DISCHARGE NOTE ADULT - PLAN OF CARE
Resume Aspirin 81 mg.  Plavix on hold until cleared by Neurosurgery next week.   Continue home medications.  Follow up with Vascular Surgery in 1 week. ACS ruled out Follow up with Cardio in 1 week. Outpatient follow up with Surgery for Cholecystectomy. Outpatient follow up with Neurosurgery in 1 week for repeat CT Head and further recommendations. Follow up with Renal tomorrow for HD. Continue home medications.  Follow up with PMD in 1 week. s/p WATCHMAN  Continue Metoprolol 12.5 mg BID as per cardio.  Follow up with Cardio in 1 week.

## 2019-02-22 NOTE — DISCHARGE NOTE ADULT - SECONDARY DIAGNOSIS.
PAD (peripheral artery disease) Cholelithiasis SAH (subarachnoid hemorrhage) ESRD (end stage renal disease) DM (diabetes mellitus) Atrial fibrillation

## 2019-02-22 NOTE — DISCHARGE NOTE ADULT - PATIENT PORTAL LINK FT
You can access the [facility], offered by [facility], by registering with the following website: [email or web address]

## 2019-02-22 NOTE — PROGRESS NOTE ADULT - SUBJECTIVE AND OBJECTIVE BOX
CARDIOLOGY PROGRESS NOTE   ([facility])                                                                                                        Subjective:     Vitals:  T(C): 37 (02-22-19 @ 10:21), Max: 37 (02-22-19 @ 10:21)  HR: 74 (02-22-19 @ 10:21) (65 - 90)  BP: 142/79 (02-22-19 @ 10:21) (130/70 - 167/88)  RR: 18 (02-22-19 @ 10:21) (16 - 18)  SpO2: 100% (02-22-19 @ 08:00) (91% - 100%)  Wt(kg): --  I&O's Summary    21 Feb 2019 07:01  -  22 Feb 2019 07:00  --------------------------------------------------------  IN: 0 mL / OUT: 2500 mL / NET: -2500 mL          PHYSICAL EXAM:  Appearance: Normal	  HEENT:   Atraumatic  Cardiovascular: Normal S1 S2, No JVD, No murmurs, No edema  Respiratory: Lungs clear to auscultation	  Gastrointestinal:  Soft, Non-tender, + BS	  Skin: No rashes, No ecchymoses, No cyanosis  Neurologic: Alert and awake, able to move extremities  Extremities: No edema    TELEMETRY: 	    ECG:  	      DIAGNOSTIC TESTING:  [ ] Echocardiogram:  [ ]  Catheterization:  [ ] Stress Test:    OTHER: 	      CURRENT MEDICATIONS:  midodrine 10 milliGRAM(s) Oral three times a day      loratadine 10 milliGRAM(s) Oral daily    acetaminophen   Tablet .. 650 milliGRAM(s) Oral every 6 hours PRN  levETIRAcetam 500 milliGRAM(s) Oral daily    docusate sodium 100 milliGRAM(s) Oral daily  senna 2 Tablet(s) Oral at bedtime  ursodiol Oral Tab/Cap - Peds 300 milliGRAM(s) Oral daily    atorvastatin 20 milliGRAM(s) Oral at bedtime  cinacalcet 30 milliGRAM(s) Oral two times a day  dextrose 40% Gel 15 Gram(s) Oral once PRN  dextrose 50% Injectable 12.5 Gram(s) IV Push once  dextrose 50% Injectable 25 Gram(s) IV Push once  dextrose 50% Injectable 25 Gram(s) IV Push once  glucagon  Injectable 1 milliGRAM(s) IntraMuscular once PRN  insulin lispro (HumaLOG) corrective regimen sliding scale   SubCutaneous three times a day before meals    dextrose 5%. 1000 milliLiter(s) IV Continuous <Continuous>  heparin  Injectable 5000 Unit(s) SubCutaneous every 12 hours      LABS:	 	    CARDIAC MARKERS:  Troponin I:   CPK total =  CK MB=   CKMB index=                           10.0   4.0   )-----------( 197      ( 21 Feb 2019 04:22 )             31.2     02-21    x   |  x   |  x   ----------------------------<  x   5.0   |  x   |  x     Ca    8.4<L>      21 Feb 2019 04:22    TPro  8.1  /  Alb  4.5  /  TBili  0.5  /  DBili  x   /  AST  34  /  ALT  13  /  AlkPhos  108  02-21    proBNP: Serum Pro-Brain Natriuretic Peptide: >32820 pg/mL (02-21 @ 04:22)    Lipid Profile:   HgA1c:   TSH: CARDIOLOGY PROGRESS NOTE   ([facility])                                                                                                        Subjective: no  new c/o, nad. alet, awake, laying in bed comfortable.     Vitals:  T(C): 37 (02-22-19 @ 10:21), Max: 37 (02-22-19 @ 10:21)  HR: 74 (02-22-19 @ 10:21) (65 - 90)  BP: 142/79 (02-22-19 @ 10:21) (130/70 - 167/88)  RR: 18 (02-22-19 @ 10:21) (16 - 18)  SpO2: 100% (02-22-19 @ 08:00) (91% - 100%)  Wt(kg): --  I&O's Summary    21 Feb 2019 07:01  -  22 Feb 2019 07:00  --------------------------------------------------------  IN: 0 mL / OUT: 2500 mL / NET: -2500 mL          PHYSICAL EXAM:  Appearance: Normal	  HEENT:   Atraumatic  Cardiovascular: Normal S1 S2, No JVD, No murmurs, No edema  Respiratory: Lungs clear to auscultation	  Gastrointestinal:  Soft, Non-tender, + BS	  Skin: No rashes, No ecchymoses, No cyanosis  Neurologic: Alert and awake, able to move extremities  Extremities: No edema    TELEMETRY: 	  AF controlled VR    CURRENT MEDICATIONS:  midodrine 10 milliGRAM(s) Oral three times a day      loratadine 10 milliGRAM(s) Oral daily    acetaminophen   Tablet .. 650 milliGRAM(s) Oral every 6 hours PRN  levETIRAcetam 500 milliGRAM(s) Oral daily    docusate sodium 100 milliGRAM(s) Oral daily  senna 2 Tablet(s) Oral at bedtime  ursodiol Oral Tab/Cap - Peds 300 milliGRAM(s) Oral daily    atorvastatin 20 milliGRAM(s) Oral at bedtime  cinacalcet 30 milliGRAM(s) Oral two times a day  dextrose 40% Gel 15 Gram(s) Oral once PRN  dextrose 50% Injectable 12.5 Gram(s) IV Push once  dextrose 50% Injectable 25 Gram(s) IV Push once  dextrose 50% Injectable 25 Gram(s) IV Push once  glucagon  Injectable 1 milliGRAM(s) IntraMuscular once PRN  insulin lispro (HumaLOG) corrective regimen sliding scale   SubCutaneous three times a day before meals    dextrose 5%. 1000 milliLiter(s) IV Continuous <Continuous>  heparin  Injectable 5000 Unit(s) SubCutaneous every 12 hours      LABS:	 	                            10.0   4.0   )-----------( 197      ( 21 Feb 2019 04:22 )             31.2     02-21    x   |  x   |  x   ----------------------------<  x   5.0   |  x   |  x     Ca    8.4<L>      21 Feb 2019 04:22    TPro  8.1  /  Alb  4.5  /  TBili  0.5  /  DBili  x   /  AST  34  /  ALT  13  /  AlkPhos  108  02-21    proBNP: Serum Pro-Brain Natriuretic Peptide: >09958 pg/mL (02-21 @ 04:22)        EXAM:  ECHO TRANSTHORACIC;F U OR LTD      PROCEDURE DATE:  Feb 21 2019   .      INTERPRETATION:  REPORT:    TRANSTHORACIC ECHOCARDIOGRAM REPORT         Patient Name:   AMIE ALFONSO Patient Location: [facility]  Medical Rec #:  [id number]     Accession #:      [id number]  Account #:                   Height:           65.0 in 165.0 cm  YOB: 1945   Weight:           220.0 lb 99.80 kg  Patient Age:    73 years     BSA:              2.06 m²  Patient Gender: M            BP:               158/64 mmHg       Date of Exam:        2/21/2019 3:42:43 PM  Sonographer:         Cheli Coles  Referring Physician: Laurent Ambrose MD    Procedure:   Follow up or Limited Echocardiogram.  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  Global LV systolic function was normal. Left ventricular ejection   fraction, by visual estimation, is 60 to 65%. The mitral in-flow pattern   reveals no discernable A-wave, therefore no comment on diastolic function   can be made.  Right Ventricle: The right ventricular size is mildly enlarged. RV   systolic function is mildly reduced. TV S' 1.0 m/s.  Left Atrium: Intra-atrial septal aneurysm.  Right Atrium: Severely enlarged right atrium.  Pericardium: Trivial pericardial effusion is present. The pericardial   effusion is anterior to the right ventricle.  Mitral Valve: Thickening of the anterior and posterior mitral valve   leaflets. Mild to moderate mitral valve regurgitation is seen.  Tricuspid Valve: Moderate tricuspid regurgitation is visualized.   Estimated pulmonary artery systolic pressure is 65.4 mmHg assuming a   right atrial pressure of 3 mmHg, which is consistent with severe   pulmonary hypertension.  Aortic Valve: The aortic valve is trileaflet. Mild to moderate aortic   valve regurgitation is seen.  Pulmonic Valve: The pulmonic valve was not well visualized. Trace   pulmonic valve regurgitation.  Aorta: The aortic root is normal in size and structure.  Pulmonary Artery: The pulmonary artery is not well seen.  Venous: A systolic blunting flow pattern is recorded from the right upper   pulmonary vein. The inferior vena cava was normal sized, with respiratory   size variation greater than 50%.  In comparison to the previous echocardiogram(s): Prior examinations are   available and were reviewed for comparison purposes. A prior echo from   2/12/2019 is available for comparison purposes.       Summary:   1. Left ventricular ejection fraction, by visual estimation, is 60 to   65%.   2. Normal global left ventricular systolic function.   3. LV Ejection Fraction by Theodore's Method with a biplane EF of 63 %.   4. Normal left ventricular internal cavity size.   5. The mitral in-flow pattern reveals no discernable A-wave, therefore   no comment on diastolic function can be made.   6. Thickening of the anterior and posterior mitral valve leaflets.   7. Moderate tricuspid regurgitation.   8. Mild to moderate aortic regurgitation.   9. Trace pulmonic valve regurgitation.  10. Estimated pulmonary artery systolic pressure is 65.4 mmHg assuming a   right atrial pressure of 3 mmHg, which is consistent with severe   pulmonary hypertension.  11. A prior echo from 2/12/2019 is available for comparison purposes.  12. Intra-atrial septal aneurysm.    LV Ejection Fraction by Theodore's Method with a biplane EF of 63 %.     [id number] Jim Lyons MD, Electronically signed on 2/21/2019 at 4:45:10 PM              *** Final ***                  JIM LYONS MD  This document has been electronically signed. Feb 21 2019  3:42PM

## 2019-02-25 ENCOUNTER — APPOINTMENT (OUTPATIENT)
Dept: NEUROSURGERY | Facility: CLINIC | Age: 74
End: 2019-02-25
Payer: MEDICARE

## 2019-02-25 VITALS
TEMPERATURE: 98.2 F | BODY MASS INDEX: 36.65 KG/M2 | SYSTOLIC BLOOD PRESSURE: 129 MMHG | WEIGHT: 220 LBS | DIASTOLIC BLOOD PRESSURE: 83 MMHG | HEIGHT: 65 IN | HEART RATE: 69 BPM

## 2019-02-25 PROCEDURE — 99213 OFFICE O/P EST LOW 20 MIN: CPT

## 2019-02-25 NOTE — REVIEW OF SYSTEMS
[Feeling Tired] : feeling tired [Depression] : depression [As Noted in HPI] : as noted in HPI [Leg Weakness] : leg weakness [Seizures] : convulsions [Difficulty Walking] : difficulty walking [Frequent Falls] : frequent falls [Negative] : Heme/Lymph [Numbness] : no numbness [Tingling] : no tingling [Dizziness] : no dizziness [Tension Headache] : no tension-type headache [FreeTextEntry8] : ESRD

## 2019-02-25 NOTE — PHYSICAL EXAM
[General Appearance - Alert] : alert [General Appearance - In No Acute Distress] : in no acute distress [General Appearance - Well Nourished] : well nourished [General Appearance - Well Developed] : well developed [General Appearance - Well-Appearing] : healthy appearing [] : normal voice and communication [Oriented To Time, Place, And Person] : oriented to person, place, and time [Impaired Insight] : insight and judgment were intact [Affect] : the affect was normal [Mood] : the mood was normal [Memory Recent] : recent memory was not impaired [Memory Remote] : remote memory was not impaired [Person] : oriented to person [Place] : oriented to place [Time] : oriented to time [Short Term Intact] : short term memory intact [Concentration Intact] : normal concentrating ability [Fluency] : fluency intact [Comprehension] : comprehension intact [Cranial Nerves Optic (II)] : visual acuity intact bilaterally,  pupils equal round and reactive to light [Cranial Nerves Oculomotor (III)] : extraocular motion intact [Cranial Nerves Trigeminal (V)] : facial sensation intact symmetrically [Cranial Nerves Facial (VII)] : face symmetrical [Cranial Nerves Glossopharyngeal (IX)] : tongue and palate midline [Cranial Nerves Accessory (XI - Cranial And Spinal)] : head turning and shoulder shrug symmetric [Cranial Nerves Hypoglossal (XII)] : there was no tongue deviation with protrusion [Motor Tone] : muscle tone was normal in all four extremities [Motor Strength] : muscle strength was normal in all four extremities [Sensation Tactile Decrease] : light touch was intact [Sensation Pain / Temperature Decrease] : pain and temperature was intact [Abnormal Walk] : normal gait [Balance] : balance was intact [No Visual Abnormalities] : no visible abnormailities [Over the Past 2 Weeks, Have You Felt Down, Depressed, or Hopeless?] : 1.) Over the past 2 weeks, have you felt down, depressed, or hopeless? No [Over the Past 2 Weeks, Have You Felt Little Interest or Pleasure Doing Things?] : 2.) Over the past 2 weeks, have you felt little interest or pleasure doing things? No

## 2019-02-25 NOTE — ASSESSMENT
[FreeTextEntry1] : Mr. Foster presents with above history.  He is neurologically intact at today's visit.  He will continue follow up with neurology for seizure management.  He will obtain a CT head w/o contrast to assess his resolution of his intracranial hemorrhage so he may resume his Plavix and Coumadin. He declines physical therapy referral.  He should follow up after imaging.  He knows to call the office if there are any new or concerning symptoms.

## 2019-02-25 NOTE — REASON FOR VISIT
[Post Hospitalization] : a post hospitalization visit [Family Member] : family member [FreeTextEntry1] : 73M of ESRD T, Th, Sa dialysis; and peripheral vascular disease; on coumadin and plavix; p/w 5 days of progressive difficulty with speech; lives alone, had been slurring words periodically; but declined to come to hospital; yesterday admits that he fell in his bathroom, +likely struck head, endorses LOC for unknown duration, has no recollection of events for several hours; \par At today's visit, patient denies any headaches, n/v or visual disturbances.  He is under the care of neurology for seizure activity and taking Keppra 500 BID for prophylaxis.  Denies any new falls or injuries. Denies any numbness/tingling of extremities.  He ambulates with the assistance of crutches.  He reports generalized weakness and deconditioning.  Denies any fever or chills.

## 2019-02-26 ENCOUNTER — INBOUND DOCUMENT (OUTPATIENT)
Age: 74
End: 2019-02-26

## 2019-03-11 ENCOUNTER — APPOINTMENT (OUTPATIENT)
Dept: CT IMAGING | Facility: CLINIC | Age: 74
End: 2019-03-11

## 2019-03-11 ENCOUNTER — OUTPATIENT (OUTPATIENT)
Dept: OUTPATIENT SERVICES | Facility: HOSPITAL | Age: 74
LOS: 1 days | End: 2019-03-11
Payer: MEDICARE

## 2019-03-11 ENCOUNTER — INBOUND DOCUMENT (OUTPATIENT)
Age: 74
End: 2019-03-11

## 2019-03-11 DIAGNOSIS — S06.6X9A TRAUMATIC SUBARACHNOID HEMORRHAGE WITH LOSS OF CONSCIOUSNESS OF UNSPECIFIED DURATION, INITIAL ENCOUNTER: ICD-10-CM

## 2019-03-11 DIAGNOSIS — I77.0 ARTERIOVENOUS FISTULA, ACQUIRED: Chronic | ICD-10-CM

## 2019-03-11 DIAGNOSIS — Q20.8 OTHER CONGENITAL MALFORMATIONS OF CARDIAC CHAMBERS AND CONNECTIONS: Chronic | ICD-10-CM

## 2019-03-11 DIAGNOSIS — Z94.0 KIDNEY TRANSPLANT STATUS: Chronic | ICD-10-CM

## 2019-03-11 DIAGNOSIS — Z90.5 ACQUIRED ABSENCE OF KIDNEY: Chronic | ICD-10-CM

## 2019-03-11 PROCEDURE — 70450 CT HEAD/BRAIN W/O DYE: CPT

## 2019-03-11 PROCEDURE — 70450 CT HEAD/BRAIN W/O DYE: CPT | Mod: 26

## 2019-03-13 ENCOUNTER — RX RENEWAL (OUTPATIENT)
Age: 74
End: 2019-03-13

## 2019-03-14 ENCOUNTER — RX RENEWAL (OUTPATIENT)
Age: 74
End: 2019-03-14

## 2019-03-14 ENCOUNTER — APPOINTMENT (OUTPATIENT)
Dept: NEUROSURGERY | Facility: CLINIC | Age: 74
End: 2019-03-14
Payer: MEDICARE

## 2019-03-14 VITALS
WEIGHT: 220 LBS | TEMPERATURE: 98.4 F | HEART RATE: 102 BPM | BODY MASS INDEX: 36.65 KG/M2 | HEIGHT: 65 IN | DIASTOLIC BLOOD PRESSURE: 78 MMHG | SYSTOLIC BLOOD PRESSURE: 134 MMHG

## 2019-03-14 PROCEDURE — 99213 OFFICE O/P EST LOW 20 MIN: CPT

## 2019-03-20 ENCOUNTER — APPOINTMENT (OUTPATIENT)
Dept: CARDIOLOGY | Facility: CLINIC | Age: 74
End: 2019-03-20

## 2019-03-20 NOTE — REVIEW OF SYSTEMS
[As Noted in HPI] : as noted in HPI [Negative] : Heme/Lymph [Difficulty Walking] : difficulty walking [Confused or Disoriented] : no confusion [Memory Lapses or Loss] : no memory loss [Seizures] : no convulsions [Dizziness] : no dizziness [Tension Headache] : no tension-type headache [Frequent Falls] : not falling [FreeTextEntry9] : ambulates with crutches.

## 2019-03-20 NOTE — ASSESSMENT
[FreeTextEntry1] : Mr. Foster presents for follow-up with history and imaging findings as above.  His CT is negative for ICH.  From a neurosurgical perspective, there is no absolute neurosurgical contraindication to resuming antiplatelet agents/anticoagulation.  I would be happy to see the patient back on a prn basis.  He knows to call the office with any new or concerning symptoms at any time.

## 2019-03-20 NOTE — PHYSICAL EXAM
[General Appearance - Alert] : alert [General Appearance - In No Acute Distress] : in no acute distress [General Appearance - Well Nourished] : well nourished [General Appearance - Well Developed] : well developed [General Appearance - Well-Appearing] : healthy appearing [Oriented To Time, Place, And Person] : oriented to person, place, and time [] : normal voice and communication [Impaired Insight] : insight and judgment were intact [Affect] : the affect was normal [Mood] : the mood was normal [Memory Recent] : recent memory was not impaired [Memory Remote] : remote memory was not impaired [Person] : oriented to person [Place] : oriented to place [Short Term Intact] : short term memory intact [Time] : oriented to time [Concentration Intact] : normal concentrating ability [Fluency] : fluency intact [Cranial Nerves Optic (II)] : visual acuity intact bilaterally,  pupils equal round and reactive to light [Comprehension] : comprehension intact [Cranial Nerves Oculomotor (III)] : extraocular motion intact [Cranial Nerves Trigeminal (V)] : facial sensation intact symmetrically [Cranial Nerves Facial (VII)] : face symmetrical [Cranial Nerves Glossopharyngeal (IX)] : tongue and palate midline [Cranial Nerves Accessory (XI - Cranial And Spinal)] : head turning and shoulder shrug symmetric [Cranial Nerves Hypoglossal (XII)] : there was no tongue deviation with protrusion [Motor Tone] : muscle tone was normal in all four extremities [Motor Strength] : muscle strength was normal in all four extremities [Sensation Tactile Decrease] : light touch was intact [Sensation Pain / Temperature Decrease] : pain and temperature was intact [Abnormal Walk] : normal gait [Balance] : balance was intact [No Visual Abnormalities] : no visible abnormailities [Over the Past 2 Weeks, Have You Felt Down, Depressed, or Hopeless?] : 1.) Over the past 2 weeks, have you felt down, depressed, or hopeless? No [Over the Past 2 Weeks, Have You Felt Little Interest or Pleasure Doing Things?] : 2.) Over the past 2 weeks, have you felt little interest or pleasure doing things? No

## 2019-03-20 NOTE — REASON FOR VISIT
[Follow-Up: _____] : a [unfilled] follow-up visit [Family Member] : family member [FreeTextEntry1] : Mr. Foster presents for follow up visit and review of imaging for intracranial hemorrhage.  73M of ESRD T, Th, Sa dialysis; and peripheral vascular disease; on coumadin and plavix; p/w 5 days of progressive difficulty with speech; lives alone, had been slurring words periodically; but declined to come to hospital; yesterday admits that he fell in his bathroom, +likely struck head, endorses LOC for unknown duration, has no recollection of events for several hours; \par At today's visit, patient denies any headaches, n/v or visual disturbances. He is under the care of neurology for seizure activity and taking Keppra 500 BID for prophylaxis. Denies any new falls or injuries. Denies any numbness/tingling of extremities. He ambulates with the assistance of crutches. He reports generalized weakness and deconditioning. Denies any fever or chills.

## 2019-04-03 ENCOUNTER — APPOINTMENT (OUTPATIENT)
Dept: NEUROLOGY | Facility: CLINIC | Age: 74
End: 2019-04-03

## 2019-04-10 ENCOUNTER — APPOINTMENT (OUTPATIENT)
Dept: CARDIOLOGY | Facility: CLINIC | Age: 74
End: 2019-04-10

## 2019-05-08 ENCOUNTER — APPOINTMENT (OUTPATIENT)
Dept: CARDIOLOGY | Facility: CLINIC | Age: 74
End: 2019-05-08
Payer: MEDICARE

## 2019-05-08 ENCOUNTER — NON-APPOINTMENT (OUTPATIENT)
Age: 74
End: 2019-05-08

## 2019-05-08 VITALS
OXYGEN SATURATION: 98 % | HEIGHT: 65 IN | DIASTOLIC BLOOD PRESSURE: 80 MMHG | HEART RATE: 63 BPM | WEIGHT: 220 LBS | BODY MASS INDEX: 36.65 KG/M2 | SYSTOLIC BLOOD PRESSURE: 134 MMHG

## 2019-05-08 DIAGNOSIS — R00.1 BRADYCARDIA, UNSPECIFIED: ICD-10-CM

## 2019-05-08 PROCEDURE — 93000 ELECTROCARDIOGRAM COMPLETE: CPT

## 2019-05-08 PROCEDURE — 99215 OFFICE O/P EST HI 40 MIN: CPT

## 2019-05-20 ENCOUNTER — APPOINTMENT (OUTPATIENT)
Dept: CARDIOLOGY | Facility: CLINIC | Age: 74
End: 2019-05-20
Payer: MEDICARE

## 2019-05-20 PROCEDURE — 78452 HT MUSCLE IMAGE SPECT MULT: CPT

## 2019-05-20 PROCEDURE — 93015 CV STRESS TEST SUPVJ I&R: CPT

## 2019-05-20 PROCEDURE — A9500: CPT

## 2019-05-20 RX ORDER — LEVETIRACETAM 500 MG/1
500 TABLET, FILM COATED ORAL TWICE DAILY
Qty: 60 | Refills: 3 | Status: DISCONTINUED | COMMUNITY
Start: 2019-02-20 | End: 2019-05-20

## 2019-05-20 RX ORDER — CALCIUM ACETATE 667 MG/1
667 CAPSULE ORAL
Refills: 0 | Status: DISCONTINUED | COMMUNITY
End: 2019-05-20

## 2019-05-20 RX ORDER — URSODIOL 300 MG/1
300 CAPSULE ORAL DAILY
Refills: 0 | Status: DISCONTINUED | COMMUNITY
End: 2019-05-20

## 2019-05-20 RX ORDER — RANOLAZINE 500 MG/1
500 TABLET, EXTENDED RELEASE ORAL
Qty: 180 | Refills: 1 | Status: DISCONTINUED | COMMUNITY
Start: 2018-10-31 | End: 2019-05-20

## 2019-05-20 RX ORDER — CINACALCET HYDROCHLORIDE 30 MG/1
30 TABLET, COATED ORAL TWICE DAILY
Refills: 0 | Status: DISCONTINUED | COMMUNITY
End: 2019-05-20

## 2019-05-20 RX ORDER — MAGNESIUM OXIDE 241.3 MG/1000MG
400 TABLET ORAL
Refills: 0 | Status: DISCONTINUED | COMMUNITY
End: 2019-05-20

## 2019-07-02 NOTE — DISCUSSION/SUMMARY
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   non cardiac cause. bradycardia and pause.  repeat  holter monitor to evaluate for bradycardia./ 72 hr HOLTEr   d/c metoprolol \par 2) chest pain: nuclear stress test. coronary artery disease stable angina.  2 D echo.   Cannot give her Antianginal duye to low BP and being on midodrine for low BP.  ct ranexa 500 mg Q12.  aspirin. plavix. \par 3) Leg pain: claudication. stable. symptoms. Peripheral vascular disease./  Failed Angioplasty of right common iliac .  ct medical therpay. \par 3) Atrial fibrillation: left renal hematoma and bleeding. s/p watchman.  1 year CANDIDA- no leak.   asa 81  . ct d/c metoprolol 25mg Q12.   rate controlled.  bradyca events \par 4) CAD prevention: Ct statins. lipitor 20 mg daily. : stress test. \par 5) Hypotension: On midodrine. Orthostatic.  midodrine to 15 mg Q8. Compression stiockings once we fix the arteries. \par 5 ) Mild carotid atherosclerosis. '6) SAH: due to fall.

## 2019-07-02 NOTE — ADDENDUM
[FreeTextEntry1] :  Pre-operative cardiovascular risk evaluation and management : No cardiac Symptoms. No diagnostic ischemic changes on ECG. stable cardiac condition. patient is optimized. No further cardiac work up is needed. Proceed for surgery as indicated.\par Any further work up will not change the risk of this patient. Benefits of surgery outweigh the risk.

## 2019-07-02 NOTE — HISTORY OF PRESENT ILLNESS
[FreeTextEntry1] : chest tightness, dyspnea, palpitations, atrial fibrillation\par \par feels tired. when he walks. No cehst pain,. no dyspnea. no palpitations.  patient was recently in the hospital \par \par old note: feels tired. fatigue and tired. \par getting hemodialyses regularly.  dizziness. Lightheadedness. \par discharge summaryL FEB 2019:  Pt is a 74 yo M presenting w/ chest pain for atleast 5 days duration. PMH A fib \par s/p Watchman, recent Subarrachonoid hemorrhage, ESRD on HD, DM2, R iliac \par stenosis, post traumatic seizure, L nephrectomy, CAD. Pt has been c/o chest stephania/pressure for atleast 5 days now, L anterior sternum, pain is worse with coughing, denies exertional component, no associated SOB currently, but does get SOB at times, denies any radiation of the pain, no associated diaphoresis. He has a hx of chronic chest pain and had a stress test in May 2018, lexiscan which showed mild ischemia in RCA, he had a cardiac cath in July 2018 LAD 40% and RPLS 75% stenosis. He has no other complaints. Of note he had a fall with a resultant SAH and was just discharged from the hospital last week. He has not restarted anti-platlet medications (ASA or Plavix), he is NOT on coumadin. He had a watchman procedure. \par \par \par old note/l :  c./c: here for pain management cleerance.\par here for Pre-operative cardiovascular risk evaluation and management for epidurakl injection for pain management, \par No chest stephania. no dyspnea. no headaches. No syncope. No paltpiations. \par \par old note: \par No chest pain. no dyspnea. 0.5 block activity.  no dyspnea. no dizzines. no syncope. \par \par old note: no chest pain. no dyspnea. no lightheadness. \par here for epidual injection  want to know if ok to hold aspirin and plavix. \par no chest pain. s/p watchman. 45 day post wathcmn :3.5 mm leak. 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  Anuric, AV graft/fistula right arm.\par Here for preoperative cardiovascular risk evaluation and management.

## 2019-07-02 NOTE — PHYSICAL EXAM
[General Appearance - Well Developed] : well developed [Normal Appearance] : normal appearance [Well Groomed] : well groomed [General Appearance - Well Nourished] : well nourished [No Deformities] : no deformities [Normal Conjunctiva] : the conjunctiva exhibited no abnormalities [General Appearance - In No Acute Distress] : no acute distress [Eyelids - No Xanthelasma] : the eyelids demonstrated no xanthelasmas [No Oral Pallor] : no oral pallor [Normal Oral Mucosa] : normal oral mucosa [Normal Jugular Venous A Waves Present] : normal jugular venous A waves present [No Oral Cyanosis] : no oral cyanosis [Normal Jugular Venous V Waves Present] : normal jugular venous V waves present [No Jugular Venous Campos A Waves] : no jugular venous campos A waves [Respiration, Rhythm And Depth] : normal respiratory rhythm and effort [Exaggerated Use Of Accessory Muscles For Inspiration] : no accessory muscle use [Auscultation Breath Sounds / Voice Sounds] : lungs were clear to auscultation bilaterally [Heart Rate And Rhythm] : heart rate and rhythm were normal [Heart Sounds] : normal S1 and S2 [Murmurs] : no murmurs present [Abdomen Soft] : soft [Abdomen Tenderness] : non-tender [Abdomen Mass (___ Cm)] : no abdominal mass palpated [Abnormal Walk] : normal gait [Gait - Sufficient For Exercise Testing] : the gait was sufficient for exercise testing [Nail Clubbing] : no clubbing of the fingernails [Cyanosis, Localized] : no localized cyanosis [Petechial Hemorrhages (___cm)] : no petechial hemorrhages [Skin Color & Pigmentation] : normal skin color and pigmentation [] : no rash [No Venous Stasis] : no venous stasis [No Skin Ulcers] : no skin ulcer [No Xanthoma] : no  xanthoma was observed [Skin Lesions] : no skin lesions [Oriented To Time, Place, And Person] : oriented to person, place, and time [Affect] : the affect was normal [Mood] : the mood was normal [No Anxiety] : not feeling anxious [FreeTextEntry1] : bilateral 2 cm groin hematoma. left groin tenderness to touch.

## 2019-07-02 NOTE — CARDIOLOGY SUMMARY
[LVEF ___%] : LVEF [unfilled]% [Moderate] : moderate pulmonary hypertension [Enlarged] : enlarged LA size [None] : no mitral regurgitation [___] : [unfilled] [de-identified] : feb 2019: Pause : 2 sec pause. afib.

## 2019-07-03 ENCOUNTER — APPOINTMENT (OUTPATIENT)
Dept: CARDIOLOGY | Facility: CLINIC | Age: 74
End: 2019-07-03
Payer: MEDICARE

## 2019-07-03 VITALS — DIASTOLIC BLOOD PRESSURE: 86 MMHG | HEART RATE: 85 BPM | SYSTOLIC BLOOD PRESSURE: 134 MMHG | OXYGEN SATURATION: 98 %

## 2019-07-03 VITALS — WEIGHT: 203 LBS | BODY MASS INDEX: 33.78 KG/M2

## 2019-07-03 DIAGNOSIS — Z01.810 ENCOUNTER FOR PREPROCEDURAL CARDIOVASCULAR EXAMINATION: ICD-10-CM

## 2019-07-03 DIAGNOSIS — R40.0 SOMNOLENCE: ICD-10-CM

## 2019-07-03 PROCEDURE — 93000 ELECTROCARDIOGRAM COMPLETE: CPT

## 2019-07-03 PROCEDURE — 99215 OFFICE O/P EST HI 40 MIN: CPT

## 2019-07-11 ENCOUNTER — NON-APPOINTMENT (OUTPATIENT)
Age: 74
End: 2019-07-11

## 2019-07-11 PROBLEM — Z01.810 PREOPERATIVE CARDIOVASCULAR EXAMINATION: Status: ACTIVE | Noted: 2019-07-11

## 2019-07-11 NOTE — HISTORY OF PRESENT ILLNESS
[FreeTextEntry1] : chest tightness, dyspnea, palpitations, atrial fibrillation\par \par feels tired. when he walks. No cehst pain,. no dyspnea. no palpitations.  patient was recently in the hospital \par \par old note: feels tired. fatigue and tired. \par getting hemodialyses regularly.  dizziness. Lightheadedness. \par discharge summaryL FEB 2019:  Pt is a 72 yo M presenting w/ chest pain for atleast 5 days duration. PMH A fib \par s/p Watchman, recent Subarrachonoid hemorrhage, ESRD on HD, DM2, R iliac \par stenosis, post traumatic seizure, L nephrectomy, CAD. Pt has been c/o chest stephania/pressure for atleast 5 days now, L anterior sternum, pain is worse with coughing, denies exertional component, no associated SOB currently, but does get SOB at times, denies any radiation of the pain, no associated diaphoresis. He has a hx of chronic chest pain and had a stress test in May 2018, lexiscan which showed mild ischemia in RCA, he had a cardiac cath in July 2018 LAD 40% and RPLS 75% stenosis. He has no other complaints. Of note he had a fall with a resultant SAH and was just discharged from the hospital last week. He has not restarted anti-platlet medications (ASA or Plavix), he is NOT on coumadin. He had a watchman procedure. \par \par \par old note/l :  c./c: here for pain management cleerance.\par here for Pre-operative cardiovascular risk evaluation and management for epidurakl injection for pain management, \par No chest stephania. no dyspnea. no headaches. No syncope. No paltpiations. \par \par old note: \par No chest pain. no dyspnea. 0.5 block activity.  no dyspnea. no dizzines. no syncope. \par \par old note: no chest pain. no dyspnea. no lightheadness. \par here for epidual injection  want to know if ok to hold aspirin and plavix. \par no chest pain. s/p watchman. 45 day post wathcmn :3.5 mm leak. 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  Anuric, AV graft/fistula right arm.\par Here for preoperative cardiovascular risk evaluation and management.  [Preoperative Visit] : for a medical evaluation prior to surgery [Scheduled Procedure ___] : a [unfilled] [Date of Surgery ___] : on [unfilled] [Good] : Good [Chills] : no chills [Fever] : no fever [Fatigue] : no fatigue [Chest Pain] : no chest pain [Cough] : no cough [Dyspnea] : dyspnea [Urinary Frequency] : no urinary frequency [Nausea] : no nausea [Dysuria] : no dysuria [Diarrhea] : no diarrhea [Abdominal Pain] : no abdominal pain [Vomiting] : no vomiting [Poor Exercise Tolerance] : no poor exercise tolerance [Easy Bruising] : no easy bruising [Lower Extremity Swelling] : no lower extremity swelling [Diabetes] : no diabetes [Pulmonary Disease] : no pulmonary disease [Cardiovascular Disease] : no cardiovascular disease [Nicotine Dependence] : no nicotine dependence [Alcohol Use] : no  alcohol use [Anti-Platelet Agents] : no anti-platelet agents [Renal Disease] : no renal disease [GI Disease] : no gastrointestinal disease [Sleep Apnea] : no sleep apnea [Thromboembolic Problems] : no thromboembolic problems [Frequent use of NSAIDs] : no use of NSAIDs [Steroid Use in Last 6 Months] : no steroid use in the last six months [Impaired Immunity] : no impaired immunity [Frequent Aspirin Use] : no frequent aspirin use [Prior Anesthesia] : No prior anesthesia [Prev Anesthesia Reaction] : no previous anesthesia reaction [Electrocardiogram] : ~T an ECG ~C was performed [Echocardiogram] : ~T an echocardiogram ~C was performed [Metabolic Capacity ___Mets%] : The patient has a metabolic capacity of [unfilled] Mets%  [Fair] : Fair [Sudden Death] : no sudden death [Anesthesia Reaction] : no anesthesia reaction [Bleeding Disorder] : no bleeding disorder [Clotting Disorder] : no clotting disorder

## 2019-07-11 NOTE — CARDIOLOGY SUMMARY
[LVEF ___%] : LVEF [unfilled]% [___] : [unfilled] [Moderate] : moderate pulmonary hypertension [Enlarged] : enlarged LA size [None] : no mitral regurgitation [de-identified] : feb 2019: Pause : 2 sec pause. afib.

## 2019-07-11 NOTE — REASON FOR VISIT
[FreeTextEntry2] : chest tightness, dyspnea, palpitations, atrial fibrillation [Follow-Up - Clinic] : a clinic follow-up of [FreeTextEntry1] : chest tightness, dyspnea, palpitations, atrial fibrillation

## 2019-07-11 NOTE — DISCUSSION/SUMMARY
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 Stable cardiac condition.  no further cardiac work up is needed. proceedd for surgery as indicated. Any further work up will not change the risk of patient. \par 2) Leg pain: claudication. stable. symptoms. Peripheral vascular disease./  Failed Angioplasty of right common iliac .  ct medical therpay. \par 3) Atrial fibrillation: left renal hematoma and bleeding. s/p watchman.  1 year CANDIDA- no leak.   asa 81  . ct d/c metoprolol 25mg Q12.   rate controlled.  bradyca events \par 4) CAD prevention: Ct statins. lipitor 20 mg daily. : stress test. \par 5) Hypotension: On midodrine. Orthostatic.  midodrine to 15 mg Q8. Compression stiockings once we fix the arteries. \par 5 ) Mild carotid atherosclerosis. '6) SAH: due to fall.

## 2019-07-11 NOTE — PHYSICAL EXAM
[Normal Appearance] : normal appearance [General Appearance - Well Developed] : well developed [Well Groomed] : well groomed [General Appearance - Well Nourished] : well nourished [No Deformities] : no deformities [General Appearance - In No Acute Distress] : no acute distress [Normal Conjunctiva] : the conjunctiva exhibited no abnormalities [Normal Oral Mucosa] : normal oral mucosa [Eyelids - No Xanthelasma] : the eyelids demonstrated no xanthelasmas [No Oral Cyanosis] : no oral cyanosis [No Oral Pallor] : no oral pallor [Normal Jugular Venous A Waves Present] : normal jugular venous A waves present [No Jugular Venous Campos A Waves] : no jugular venous campos A waves [Normal Jugular Venous V Waves Present] : normal jugular venous V waves present [Exaggerated Use Of Accessory Muscles For Inspiration] : no accessory muscle use [Respiration, Rhythm And Depth] : normal respiratory rhythm and effort [Auscultation Breath Sounds / Voice Sounds] : lungs were clear to auscultation bilaterally [Heart Rate And Rhythm] : heart rate and rhythm were normal [Murmurs] : no murmurs present [Heart Sounds] : normal S1 and S2 [Abdomen Soft] : soft [Abdomen Mass (___ Cm)] : no abdominal mass palpated [Abdomen Tenderness] : non-tender [Abnormal Walk] : normal gait [Gait - Sufficient For Exercise Testing] : the gait was sufficient for exercise testing [Petechial Hemorrhages (___cm)] : no petechial hemorrhages [Cyanosis, Localized] : no localized cyanosis [Nail Clubbing] : no clubbing of the fingernails [FreeTextEntry1] : bilateral 2 cm groin hematoma. left groin tenderness to touch.  [Skin Color & Pigmentation] : normal skin color and pigmentation [No Venous Stasis] : no venous stasis [] : no rash [No Xanthoma] : no  xanthoma was observed [Skin Lesions] : no skin lesions [No Skin Ulcers] : no skin ulcer [Mood] : the mood was normal [Oriented To Time, Place, And Person] : oriented to person, place, and time [Affect] : the affect was normal [No Anxiety] : not feeling anxious

## 2019-08-10 ENCOUNTER — EMERGENCY (EMERGENCY)
Facility: HOSPITAL | Age: 74
LOS: 1 days | Discharge: HOME | End: 2019-08-10
Attending: EMERGENCY MEDICINE
Payer: MEDICARE

## 2019-08-10 VITALS
TEMPERATURE: 98 F | DIASTOLIC BLOOD PRESSURE: 57 MMHG | WEIGHT: 199.96 LBS | HEART RATE: 105 BPM | SYSTOLIC BLOOD PRESSURE: 99 MMHG | OXYGEN SATURATION: 96 % | RESPIRATION RATE: 20 BRPM | HEIGHT: 65 IN

## 2019-08-10 DIAGNOSIS — I77.0 ARTERIOVENOUS FISTULA, ACQUIRED: Chronic | ICD-10-CM

## 2019-08-10 DIAGNOSIS — Q20.8 OTHER CONGENITAL MALFORMATIONS OF CARDIAC CHAMBERS AND CONNECTIONS: Chronic | ICD-10-CM

## 2019-08-10 DIAGNOSIS — Z94.0 KIDNEY TRANSPLANT STATUS: Chronic | ICD-10-CM

## 2019-08-10 DIAGNOSIS — Z90.5 ACQUIRED ABSENCE OF KIDNEY: Chronic | ICD-10-CM

## 2019-08-10 PROCEDURE — 73560 X-RAY EXAM OF KNEE 1 OR 2: CPT

## 2019-08-10 PROCEDURE — 73502 X-RAY EXAM HIP UNI 2-3 VIEWS: CPT | Mod: 26,RT

## 2019-08-10 PROCEDURE — 73560 X-RAY EXAM OF KNEE 1 OR 2: CPT | Mod: 26,RT

## 2019-08-10 PROCEDURE — 73502 X-RAY EXAM HIP UNI 2-3 VIEWS: CPT

## 2019-08-10 PROCEDURE — 99284 EMERGENCY DEPT VISIT MOD MDM: CPT

## 2019-08-10 PROCEDURE — 99283 EMERGENCY DEPT VISIT LOW MDM: CPT

## 2019-08-10 PROCEDURE — 71045 X-RAY EXAM CHEST 1 VIEW: CPT | Mod: 26

## 2019-08-10 PROCEDURE — 71045 X-RAY EXAM CHEST 1 VIEW: CPT

## 2019-08-10 NOTE — ED STATDOCS - ATTENDING CONTRIBUTION TO CARE
I, Inocencia Mcgraw, performed the initial face to face bedside interview with this patient regarding history of present illness, review of symptoms and relevant past medical, social and family history.  I completed an independent physical examination.  I was the initial provider who evaluated this patient. I have signed out the follow up of any pending tests (i.e. labs, radiological studies) to the ACP.  I have communicated the patient’s plan of care and disposition with the ACP.

## 2019-08-10 NOTE — ED STATDOCS - OBJECTIVE STATEMENT
74y/o M with PMHx of CAD, DM, HLD and Arthritis and PSHx of Cholecystectomy (3 days ago) presents to the ED s/p fall 3 days ago c/o LE pain radiating to hip. Additionally, patient presents with a splint to finger for finger lock. Pt is unable to walk or bend leg. Pt is currently on dialysis, last treatment today. No additional complaints at this time. Nephro: Jason

## 2019-08-10 NOTE — ED STATDOCS - CLINICAL SUMMARY MEDICAL DECISION MAKING FREE TEXT BOX
xrays xrays  marquez pain after a fall, difficulty walking. tender as decribed in pe. plan xray  reeval neg film  d/c

## 2019-08-10 NOTE — ED STATDOCS - NS_ ATTENDINGSCRIBEDETAILS _ED_A_ED_FT
I, Inocencia Mcgraw, performed the initial face to face bedside interview with this patient regarding history of present illness, review of symptoms and relevant past medical, social and family history.  I completed an independent physical examination.  I was the initial provider who evaluated this patient. I have signed out the follow up of any pending tests (i.e. labs, radiological studies) to the ACP.  I have communicated the patient’s plan of care and disposition with the ACP.  The history, relevant review of systems, past medical and surgical history, medical decision making, and physical examination was documented by the scribe in my presence and I attest to the accuracy of the documentation.

## 2019-08-28 ENCOUNTER — APPOINTMENT (OUTPATIENT)
Dept: CARDIOLOGY | Facility: CLINIC | Age: 74
End: 2019-08-28

## 2020-02-13 NOTE — ED ADULT TRIAGE NOTE - HEIGHT IN FEET
----- Message from Saida Ovalle MA sent at 2/13/2020  9:08 AM CST -----  Contact: Patient [phone]  When would you like to have this pt rescheduled?  ----- Message -----  From: Priscilla Prieto  Sent: 2/13/2020   7:37 AM CST  To: Taylor Saez Staff    Patient had to go out of town and will not be able to make his appt tomorrow. Panel is closed and it will not let me reschedule.    Please call and advise.    Thank You       5

## 2020-06-30 ENCOUNTER — TRANSCRIPTION ENCOUNTER (OUTPATIENT)
Age: 75
End: 2020-06-30

## 2020-06-30 ENCOUNTER — INPATIENT (INPATIENT)
Facility: HOSPITAL | Age: 75
LOS: 3 days | Discharge: HOME | DRG: 682 | End: 2020-07-04
Admitting: HOSPITALIST
Payer: MEDICARE

## 2020-06-30 VITALS
DIASTOLIC BLOOD PRESSURE: 78 MMHG | HEART RATE: 77 BPM | WEIGHT: 190.04 LBS | TEMPERATURE: 98 F | HEIGHT: 65 IN | OXYGEN SATURATION: 100 % | SYSTOLIC BLOOD PRESSURE: 152 MMHG | RESPIRATION RATE: 18 BRPM

## 2020-06-30 DIAGNOSIS — Z90.5 ACQUIRED ABSENCE OF KIDNEY: Chronic | ICD-10-CM

## 2020-06-30 DIAGNOSIS — Q20.8 OTHER CONGENITAL MALFORMATIONS OF CARDIAC CHAMBERS AND CONNECTIONS: Chronic | ICD-10-CM

## 2020-06-30 DIAGNOSIS — I77.0 ARTERIOVENOUS FISTULA, ACQUIRED: Chronic | ICD-10-CM

## 2020-06-30 DIAGNOSIS — E87.70 FLUID OVERLOAD, UNSPECIFIED: ICD-10-CM

## 2020-06-30 DIAGNOSIS — Z94.0 KIDNEY TRANSPLANT STATUS: Chronic | ICD-10-CM

## 2020-06-30 LAB
ALBUMIN SERPL ELPH-MCNC: 4.3 G/DL — SIGNIFICANT CHANGE UP (ref 3.3–5.2)
ALP SERPL-CCNC: 228 U/L — HIGH (ref 40–120)
ALT FLD-CCNC: 8 U/L — SIGNIFICANT CHANGE UP
ANION GAP SERPL CALC-SCNC: 21 MMOL/L — HIGH (ref 5–17)
AST SERPL-CCNC: 22 U/L — SIGNIFICANT CHANGE UP
BASOPHILS # BLD AUTO: 0.03 K/UL — SIGNIFICANT CHANGE UP (ref 0–0.2)
BASOPHILS NFR BLD AUTO: 0.7 % — SIGNIFICANT CHANGE UP (ref 0–2)
BILIRUB SERPL-MCNC: 0.5 MG/DL — SIGNIFICANT CHANGE UP (ref 0.4–2)
BUN SERPL-MCNC: 67 MG/DL — HIGH (ref 8–20)
CALCIUM SERPL-MCNC: 8.9 MG/DL — SIGNIFICANT CHANGE UP (ref 8.6–10.2)
CHLORIDE SERPL-SCNC: 96 MMOL/L — LOW (ref 98–107)
CO2 SERPL-SCNC: 22 MMOL/L — SIGNIFICANT CHANGE UP (ref 22–29)
CREAT SERPL-MCNC: 8.25 MG/DL — HIGH (ref 0.5–1.3)
EOSINOPHIL # BLD AUTO: 0.35 K/UL — SIGNIFICANT CHANGE UP (ref 0–0.5)
EOSINOPHIL NFR BLD AUTO: 7.8 % — HIGH (ref 0–6)
GLUCOSE SERPL-MCNC: 111 MG/DL — HIGH (ref 70–99)
HCT VFR BLD CALC: 30.8 % — LOW (ref 39–50)
HGB BLD-MCNC: 10.2 G/DL — LOW (ref 13–17)
IMM GRANULOCYTES NFR BLD AUTO: 0.2 % — SIGNIFICANT CHANGE UP (ref 0–1.5)
LYMPHOCYTES # BLD AUTO: 0.76 K/UL — LOW (ref 1–3.3)
LYMPHOCYTES # BLD AUTO: 17 % — SIGNIFICANT CHANGE UP (ref 13–44)
MAGNESIUM SERPL-MCNC: 1.5 MG/DL — LOW (ref 1.6–2.6)
MCHC RBC-ENTMCNC: 32.7 PG — SIGNIFICANT CHANGE UP (ref 27–34)
MCHC RBC-ENTMCNC: 33.1 GM/DL — SIGNIFICANT CHANGE UP (ref 32–36)
MCV RBC AUTO: 98.7 FL — SIGNIFICANT CHANGE UP (ref 80–100)
MONOCYTES # BLD AUTO: 0.46 K/UL — SIGNIFICANT CHANGE UP (ref 0–0.9)
MONOCYTES NFR BLD AUTO: 10.3 % — SIGNIFICANT CHANGE UP (ref 2–14)
NEUTROPHILS # BLD AUTO: 2.87 K/UL — SIGNIFICANT CHANGE UP (ref 1.8–7.4)
NEUTROPHILS NFR BLD AUTO: 64 % — SIGNIFICANT CHANGE UP (ref 43–77)
PHOSPHATE SERPL-MCNC: 4.2 MG/DL — SIGNIFICANT CHANGE UP (ref 2.4–4.7)
PLATELET # BLD AUTO: 171 K/UL — SIGNIFICANT CHANGE UP (ref 150–400)
POTASSIUM SERPL-MCNC: 5.1 MMOL/L — SIGNIFICANT CHANGE UP (ref 3.5–5.3)
POTASSIUM SERPL-SCNC: 5.1 MMOL/L — SIGNIFICANT CHANGE UP (ref 3.5–5.3)
PROT SERPL-MCNC: 7.7 G/DL — SIGNIFICANT CHANGE UP (ref 6.6–8.7)
RBC # BLD: 3.12 M/UL — LOW (ref 4.2–5.8)
RBC # FLD: 18.6 % — HIGH (ref 10.3–14.5)
SODIUM SERPL-SCNC: 139 MMOL/L — SIGNIFICANT CHANGE UP (ref 135–145)
WBC # BLD: 4.48 K/UL — SIGNIFICANT CHANGE UP (ref 3.8–10.5)
WBC # FLD AUTO: 4.48 K/UL — SIGNIFICANT CHANGE UP (ref 3.8–10.5)

## 2020-06-30 PROCEDURE — 93010 ELECTROCARDIOGRAM REPORT: CPT

## 2020-06-30 PROCEDURE — 99285 EMERGENCY DEPT VISIT HI MDM: CPT | Mod: CS

## 2020-06-30 PROCEDURE — 90937 HEMODIALYSIS REPEATED EVAL: CPT

## 2020-06-30 PROCEDURE — 99233 SBSQ HOSP IP/OBS HIGH 50: CPT | Mod: 25

## 2020-06-30 PROCEDURE — 99223 1ST HOSP IP/OBS HIGH 75: CPT | Mod: AI

## 2020-06-30 PROCEDURE — 71045 X-RAY EXAM CHEST 1 VIEW: CPT | Mod: 26

## 2020-06-30 RX ORDER — TUBERCULIN PURIFIED PROTEIN DERIVATIVE 5 [IU]/.1ML
5 INJECTION, SOLUTION INTRADERMAL ONCE
Refills: 0 | Status: DISCONTINUED | OUTPATIENT
Start: 2020-06-30 | End: 2020-07-01

## 2020-06-30 RX ORDER — ACETAMINOPHEN 500 MG
650 TABLET ORAL EVERY 6 HOURS
Refills: 0 | Status: DISCONTINUED | OUTPATIENT
Start: 2020-06-30 | End: 2020-07-04

## 2020-06-30 RX ORDER — HYDRALAZINE HCL 50 MG
5 TABLET ORAL EVERY 6 HOURS
Refills: 0 | Status: DISCONTINUED | OUTPATIENT
Start: 2020-06-30 | End: 2020-07-04

## 2020-06-30 RX ORDER — SEVELAMER CARBONATE 2400 MG/1
1 POWDER, FOR SUSPENSION ORAL
Qty: 0 | Refills: 0 | DISCHARGE

## 2020-06-30 RX ORDER — MAGNESIUM SULFATE 500 MG/ML
2 VIAL (ML) INJECTION ONCE
Refills: 0 | Status: COMPLETED | OUTPATIENT
Start: 2020-06-30 | End: 2020-06-30

## 2020-06-30 RX ORDER — MAGNESIUM OXIDE 400 MG ORAL TABLET 241.3 MG
0 TABLET ORAL
Qty: 0 | Refills: 0 | DISCHARGE

## 2020-06-30 RX ORDER — SEVELAMER CARBONATE 2400 MG/1
800 POWDER, FOR SUSPENSION ORAL THREE TIMES A DAY
Refills: 0 | Status: DISCONTINUED | OUTPATIENT
Start: 2020-06-30 | End: 2020-07-04

## 2020-06-30 RX ORDER — RANOLAZINE 500 MG/1
500 TABLET, FILM COATED, EXTENDED RELEASE ORAL
Refills: 0 | Status: DISCONTINUED | OUTPATIENT
Start: 2020-06-30 | End: 2020-07-04

## 2020-06-30 RX ORDER — CINACALCET 30 MG/1
30 TABLET, FILM COATED ORAL
Refills: 0 | Status: DISCONTINUED | OUTPATIENT
Start: 2020-06-30 | End: 2020-07-04

## 2020-06-30 RX ORDER — CLOPIDOGREL BISULFATE 75 MG/1
1 TABLET, FILM COATED ORAL
Qty: 0 | Refills: 0 | DISCHARGE
Start: 2020-06-30

## 2020-06-30 RX ORDER — MIDODRINE HYDROCHLORIDE 2.5 MG/1
1 TABLET ORAL
Qty: 0 | Refills: 0 | DISCHARGE

## 2020-06-30 RX ORDER — METOPROLOL TARTRATE 50 MG
12.5 TABLET ORAL
Refills: 0 | Status: DISCONTINUED | OUTPATIENT
Start: 2020-06-30 | End: 2020-07-04

## 2020-06-30 RX ORDER — HEPARIN SODIUM 5000 [USP'U]/ML
5000 INJECTION INTRAVENOUS; SUBCUTANEOUS EVERY 12 HOURS
Refills: 0 | Status: DISCONTINUED | OUTPATIENT
Start: 2020-06-30 | End: 2020-07-04

## 2020-06-30 RX ORDER — ATORVASTATIN CALCIUM 80 MG/1
20 TABLET, FILM COATED ORAL AT BEDTIME
Refills: 0 | Status: DISCONTINUED | OUTPATIENT
Start: 2020-06-30 | End: 2020-07-04

## 2020-06-30 RX ORDER — DOCUSATE SODIUM 100 MG
1 CAPSULE ORAL
Qty: 0 | Refills: 0 | DISCHARGE

## 2020-06-30 RX ORDER — CLOPIDOGREL BISULFATE 75 MG/1
75 TABLET, FILM COATED ORAL DAILY
Refills: 0 | Status: DISCONTINUED | OUTPATIENT
Start: 2020-06-30 | End: 2020-07-04

## 2020-06-30 RX ORDER — ASPIRIN/CALCIUM CARB/MAGNESIUM 324 MG
81 TABLET ORAL DAILY
Refills: 0 | Status: DISCONTINUED | OUTPATIENT
Start: 2020-06-30 | End: 2020-07-04

## 2020-06-30 RX ADMIN — CINACALCET 30 MILLIGRAM(S): 30 TABLET, FILM COATED ORAL at 17:23

## 2020-06-30 RX ADMIN — Medication 81 MILLIGRAM(S): at 17:23

## 2020-06-30 RX ADMIN — SEVELAMER CARBONATE 800 MILLIGRAM(S): 2400 POWDER, FOR SUSPENSION ORAL at 17:22

## 2020-06-30 RX ADMIN — CLOPIDOGREL BISULFATE 75 MILLIGRAM(S): 75 TABLET, FILM COATED ORAL at 17:23

## 2020-06-30 RX ADMIN — Medication 12.5 MILLIGRAM(S): at 17:23

## 2020-06-30 RX ADMIN — HEPARIN SODIUM 5000 UNIT(S): 5000 INJECTION INTRAVENOUS; SUBCUTANEOUS at 21:11

## 2020-06-30 RX ADMIN — SEVELAMER CARBONATE 800 MILLIGRAM(S): 2400 POWDER, FOR SUSPENSION ORAL at 21:11

## 2020-06-30 RX ADMIN — RANOLAZINE 500 MILLIGRAM(S): 500 TABLET, FILM COATED, EXTENDED RELEASE ORAL at 17:22

## 2020-06-30 RX ADMIN — Medication 50 GRAM(S): at 19:18

## 2020-06-30 RX ADMIN — ATORVASTATIN CALCIUM 20 MILLIGRAM(S): 80 TABLET, FILM COATED ORAL at 21:12

## 2020-06-30 NOTE — H&P ADULT - NSICDXPASTSURGICALHX_GEN_ALL_CORE_FT
PAST SURGICAL HISTORY:  Abnormality of left atrial appendage WATCHMAN  LEFT ATRIAL APPENDAGE CLOSUIRE   Jan. 30 2017    AV fistula right lower arm    History of unilateral nephrectomy left  nephrectomy    Renal transplant recipient

## 2020-06-30 NOTE — ED PROVIDER NOTE - OBJECTIVE STATEMENT
Pt is a 75 yo M due for dialysis.  Pt states that he just returned from [facility] after being there for 3 mos. PT states that he typically gets dialysis Tues, Thurs, Saturday and did receive dialysis on saturday in [facility].  Pt states that he is unable to sit for dialysis at his current center until he has a covid swab.  Pt unable to get outpatient covid swab so came to the ER. PT with no complaints currently. Pt is a 73 yo M due for dialysis.  Pt states that he just returned from [facility] after being there for 3 mos. PT states that he typically gets dialysis Tues, Thurs, Saturday and did receive dialysis on saturday in [facility].  Pt states that he is unable to sit for dialysis at his current center until he has a covid swab.  Pt unable to get outpatient covid swab so came to the ER. Pt admits to mild sob that is worse with walking.

## 2020-06-30 NOTE — DISCHARGE NOTE PROVIDER - HOSPITAL COURSE
Patient is a 75 yo M with pmh of esrd on HD on t/th/sa. Patient recently returned from [facility] and was unable to go to his current HD center due to need for a covid test.   Patient presented to the ed and is beinf admitted for HD. Patient seen at bedside and deneis any complaints such as fever or sob. Patient seen by renal and had hd and is stable for dc home once dialysis is completed        PE    GEN: nad,     heent perrl     cvs: s1s2+_    lungs: diminisihed    abd; soft    ext: no edema    neuro aao x3        time spent on dc 32 minutews Patient is a 75 yo M with pmh of esrd on HD on t/th/sa. Patient recently returned from [facility] and was unable to go to his current HD center due to need for a covid test.   Patient presented to the ed and is beinf admitted for HD. Patient seen at bedside and deneis any complaints such as fever or sob. Patient seen by renal and had hd. UNfortunately due to the long absence from his HD case management had to find the patient a new seat and ppd and hepatitis resent. Patient will be discharged once HD seat is secured        PE    GEN: nad,     heent perrl     cvs: s1s2+_    lungs: diminisihed    abd; soft    ext: no edema    neuro aao x3        time spent on dc 32 minutews 74 yr old male with  hypertension, hyperlipidemia, CAD, ESRD on HD, atrial fibrillation s/p watchman's procedure, PVD presented to ED to establish outpatient HD seat, need for COVID test. Due to his recent travel [place] he had lost his HD seat. Nephrology was consulted, HD was continued as per schedule. His home medications were continued. Social work was consulted, hepatitis panel and PPD was placed. HD seat was secured. He is stable for discharge home.        Spent > 35 mins in discharge plan and documentation.

## 2020-06-30 NOTE — H&P ADULT - HISTORY OF PRESENT ILLNESS
Patient is a 75 yo M with pmh of esrd on HD on t/th/sa, cad/pvd and htn  was recently in [facility] for 3 monthsr and was unable to go to his current HD center due to need for a covid test.   Patient presented to the ed and is being admitted for HD. Patient seen at bedside and padma any complaints such as fever or sob. Patient seen by renal and is planned for hd today. patient was swabbed for covid in the ed and spoke to case management and will need a new hd due to the prolonged absence. Patient denies any other complaints and states he keeps a strict diet and watches his fluid intake

## 2020-06-30 NOTE — DISCHARGE NOTE PROVIDER - PROVIDER TOKENS
PROVIDER:[TOKEN:[[id number]]] PROVIDER:[TOKEN:[[id number]]],FREE:[LAST:[primary care],PHONE:[(   )    -],FAX:[(   )    -]] PROVIDER:[TOKEN:[[id number]]],FREE:[LAST:[primary care],PHONE:[(   )    -],FAX:[(   )    -]],PROVIDER:[TOKEN:[[id number]]]

## 2020-06-30 NOTE — DISCHARGE NOTE PROVIDER - NSDCCPCAREPLAN_GEN_ALL_CORE_FT
PRINCIPAL DISCHARGE DIAGNOSIS  Diagnosis: Hypervolemia, unspecified hypervolemia type  Assessment and Plan of Treatment:       SECONDARY DISCHARGE DIAGNOSES  Diagnosis: ESRD on dialysis  Assessment and Plan of Treatment:

## 2020-06-30 NOTE — ED PROVIDER NOTE - CLINICAL SUMMARY MEDICAL DECISION MAKING FREE TEXT BOX
labs and imaging reviewed. Pt with sob and mild congestion on CXR. Pt due for dialysis. DR. Lopez consulted and will arrange for dialysis. Dr. Hay to admit for further management.

## 2020-06-30 NOTE — ED PROVIDER NOTE - NS ED ROS FT
No fever/chills, No photophobia/eye pain/changes in vision, No ear pain/sore throat/dysphagia, No chest pain/palpitations, no SOB/cough/wheeze/stridor, No abdominal pain, No N/V/D, no dysuria/frequency/discharge, No neck/back pain, no rash, no changes in neurological status/function. No fever/chills, No photophobia/eye pain/changes in vision, No ear pain/sore throat/dysphagia, No chest pain/palpitations, +SOB no cough/wheeze/stridor, No abdominal pain, No N/V/D, no dysuria/frequency/discharge, No neck/back pain, no rash, no changes in neurological status/function.

## 2020-06-30 NOTE — DISCHARGE NOTE PROVIDER - NSDCFUADDINST_GEN_ALL_CORE_FT
Continue medications as instructed, follow up with HD center on Tuesday for dialysis. Return to ED for worsening shortness of breath. Follow up with PMD and cardiology.

## 2020-06-30 NOTE — H&P ADULT - NSICDXPASTMEDICALHX_GEN_ALL_CORE_FT
PAST MEDICAL HISTORY:  Carotid artery disease mild    Claudication in peripheral vascular disease R>L  Right common iliac or ostial external iliac per June 2018 U/S  Left popliteal 50-75 and EDUIN stenosis  SUSANA 0.8    Daytime sleepiness     Dizziness     DM (diabetes mellitus)     Dyslipidemia     ESRD (end stage renal disease) on  hemodialysis  3  x  weekly.,  H/O  left  nephrectomy after  renal  bleed    GERD (gastroesophageal reflux disease)     Hypercholesteremia     Hypotension treated  with Midodrine    Imbalance secondary to PVD Uses cane    Kidney problem spontanious renal bleed while on coumadin  c/b  renal  hematome  with  LT  nephrectomy    Leg pain, bilateral R>L at rest and with short distant ambulation    Low glucose level Patient has h/o low blood sugars at times    Obesity (BMI 30.0-34.9)     Palpitations     Persistent atrial fibrillation     Renal hematoma, left     Tiredness

## 2020-06-30 NOTE — DISCHARGE NOTE PROVIDER - NSDCMRMEDTOKEN_GEN_ALL_CORE_FT
aspirin 81 mg oral tablet: 1 tab(s) orally once a day   clopidogrel 75 mg oral tablet: 1 tab(s) orally once a day  glimepiride 1 mg oral tablet: 1 tab(s) orally once a day  Keppra  mg oral tablet, extended release: 1 tab(s) orally once a day  Lipitor 20 mg oral tablet: 1 tab(s) orally once a day  loratadine 10 mg oral capsule: 1 cap(s) orally once a day  Lupron:  intramuscular every 3 months at [facility]  metoprolol tartrate 25 mg oral tablet: 0.5 tab(s) orally 2 times a day   midodrine 10 mg oral tablet: 1 tab(s) orally 3 times a day, As Needed  for hypotension   NexIUM 40 mg oral delayed release capsule: 1 cap(s) orally once a day  ranolazine 500 mg oral tablet, extended release: 1 tab(s) orally 2 times a day  Renvela 800 mg oral tablet: 1 tab(s) orally 3 times a day  Sensipar 30 mg oral tablet: 1 tab(s) orally 2 times a day  ursodiol 300 mg oral capsule: 1 cap(s) orally once a day aspirin 81 mg oral tablet: 1 tab(s) orally once a day   clopidogrel 75 mg oral tablet: 1 tab(s) orally once a day  glimepiride 1 mg oral tablet: 1 tab(s) orally once a day  Keppra  mg oral tablet, extended release: 1 tab(s) orally once a day  Lipitor 20 mg oral tablet: 1 tab(s) orally once a day  loratadine 10 mg oral capsule: 1 cap(s) orally once a day  Lupron:  intramuscular every 3 months at [facility]  metoprolol tartrate 25 mg oral tablet: 0.5 tab(s) orally 2 times a day   midodrine 10 mg oral tablet: 1 tab(s) orally 3 times a day, As Needed  for hypotension   NexIUM 40 mg oral delayed release capsule: 1 cap(s) orally once a day  ranolazine 500 mg oral tablet, extended release: 1 tab(s) orally 2 times a day  Renvela 800 mg oral tablet: 1 tab(s) orally 3 times a day  Sensipar 30 mg oral tablet: 1 tab(s) orally 2 times a day  ursodiol 300 mg oral capsule: 1 cap(s) orally once a day aspirin 81 mg oral tablet: 1 tab(s) orally once a day   clopidogrel 75 mg oral tablet: 1 tab(s) orally once a day  glimepiride 1 mg oral tablet: 1 tab(s) orally once a day  Keppra  mg oral tablet, extended release: 1 tab(s) orally once a day  Lipitor 20 mg oral tablet: 1 tab(s) orally once a day  loratadine 10 mg oral capsule: 1 cap(s) orally once a day  Lupron:  intramuscular every 3 months at [facility]  metoprolol tartrate 25 mg oral tablet: 0.5 tab(s) orally 2 times a day   midodrine 10 mg oral tablet: 1 tab(s) orally 3 times a day, As Needed  for hypotension   NexIUM 40 mg oral delayed release capsule: 1 cap(s) orally once a day  ranolazine 500 mg oral tablet, extended release: 1 tab(s) orally 2 times a day  Renvela 800 mg oral tablet: 1 tab(s) orally 3 times a day  Sensipar 30 mg oral tablet: 1 tab(s) orally 2 times a day  ursodiol 300 mg oral capsule: 1 cap(s) orally once a day

## 2020-06-30 NOTE — ED ADULT TRIAGE NOTE - NS ED TRIAGE AVPU SCALE
Alert-The patient is alert, awake and responds to voice. The patient is oriented to time, place, and person. The triage nurse is able to obtain subjective information. Agree

## 2020-06-30 NOTE — H&P ADULT - NSHPLABSRESULTS_GEN_ALL_CORE
10.2   4.48   )----------(  171       ( 30 Jun 2020 09:23 )               30.8      139    |  96     |  67.0   ----------------------------<  111        ( 30 Jun 2020 09:23 )  5.1     |  22.0   |  8.25     Ca    8.9        ( 30 Jun 2020 09:23 )  Phos  4.2       ( 30 Jun 2020 09:23 )  Mg     1.5       ( 30 Jun 2020 09:23 )    TPro  7.7    /  Alb  4.3    /  TBili  0.5    /  DBili  x      /  AST  22     /  ALT  8      /  AlkPhos  228    ( 30 Jun 2020 09:23 )    LIVER FUNCTIONS - ( 30 Jun 2020 09:23 )  Alb: 4.3 g/dL / Pro: 7.7 g/dL / ALK PHOS: 228 U/L / ALT: 8 U/L / AST: 22 U/L / GGT: x               CAPILLARY BLOOD GLUCOSE

## 2020-06-30 NOTE — CONSULT NOTE ADULT - SUBJECTIVE AND OBJECTIVE BOX
Patient is a 74y old  Male who presents with a chief complaint of     HPI: Returns for [facility],     PAST MEDICAL & SURGICAL HISTORY:    Persistent atrial fibrillation  Carotid artery disease: mild  GERD (gastroesophageal reflux disease)  Low glucose level: Patient has h/o low blood sugars at times  Palpitations  Leg pain, bilateral: R&gt;L at rest and with short distant ambulation  Imbalance: secondary to PVD Uses cane  Obesity (BMI 30.0-34.9)  Claudication in peripheral vascular disease: R&gt;L  Right common iliac or ostial external iliac per June 2018 U/S  Left popliteal 50-75 and EDUIN stenosis  SUSANA 0.8  Kidney problem: spontanious renal bleed while on coumadin  c/b  renal  hematome  with  LT  nephrectomy  Hypotension: treated  with Midodrine  Tiredness  Renal hematoma, left  Hypercholesteremia  ESRD (end stage renal disease): on  hemodialysis  3  x  weekly.,  H/O  left  nephrectomy after  renal  bleed  Dyslipidemia  Dizziness  DM (diabetes mellitus)  Daytime sleepiness  Renal transplant recipient  AV fistula: right lower arm  Abnormality of left atrial appendage: WATCHMAN  LEFT ATRIAL APPENDAGE CLOSUIRE   Jan. 30 2017  History of unilateral nephrectomy: left  nephrectomy    FAMILY HISTORY:  No pertinent family history in first degree relatives    Social History: Non Smoker,     MEDICATIONS  (STANDING):  aspirin  chewable 81 milliGRAM(s) Oral daily  atorvastatin 20 milliGRAM(s) Oral at bedtime  cinacalcet 30 milliGRAM(s) Oral two times a day  clopidogrel Tablet 75 milliGRAM(s) Oral daily  magnesium sulfate  IVPB 2 Gram(s) IV Intermittent once  metoprolol tartrate 12.5 milliGRAM(s) Oral two times a day  ranolazine 500 milliGRAM(s) Oral two times a day  sevelamer carbonate 800 milliGRAM(s) Oral three times a day    Allergies    No Known Allergies    REVIEW OF SYSTEMS:    CONSTITUTIONAL: No fever, weight loss, or fatigue  EYES: No eye pain, visual disturbances, or discharge  ENMT:  No difficulty hearing, tinnitus, vertigo; No sinus or throat pain  NECK: No pain or stiffness  RESPIRATORY: No cough, wheezing, chills or hemoptysis; No shortness of breath  CARDIOVASCULAR: No chest pain, palpitations, dizziness, or leg swelling  GASTROINTESTINAL: No abdominal or epigastric pain. No nausea, vomiting, or hematemesis; No diarrhea or constipation. No melena or hematochezia.  GENITOURINARY: No dysuria, frequency, hematuria, or incontinence  NEUROLOGICAL: No headaches, memory loss, loss of strength, numbness, or tremors  SKIN: No itching, burning, rashes, or lesions   LYMPH NODES: No enlarged glands  ENDOCRINE: No heat or cold intolerance; No hair loss  MUSCULOSKELETAL: No joint pain or swelling; No muscle, back, or extremity pain  PSYCHIATRIC: No depression, anxiety, mood swings, or difficulty sleeping  HEME/LYMPH: No easy bruising, or bleeding gums  ALLERGY AND IMMUNOLOGIC: No hives or eczema    Vital Signs Last 24 Hrs  T(C): 36.4 (30 Jun 2020 12:21), Max: 36.6 (30 Jun 2020 08:52)  T(F): 97.5 (30 Jun 2020 12:21), Max: 97.9 (30 Jun 2020 08:52)  HR: 65 (30 Jun 2020 12:21) (65 - 77)  BP: 149/76 (30 Jun 2020 12:15) (149/76 - 152/78)    RR: 16 (30 Jun 2020 12:21) (16 - 18)  SpO2: 100% (30 Jun 2020 12:21) (100% - 100%)    PHYSICAL EXAM:    GENERAL: NAD, well-groomed, well-developed  HEAD:  Atraumatic, Normocephalic  EYES: EOMI, PERRLA, conjunctiva and sclera clear  ENMT: Moist mucous membranes,   NECK: Supple, No JVD,   NERVOUS SYSTEM:  Alert & Oriented X 3,   CHEST/LUNG: Clear to percussion bilaterally; No rales, rhonchi, wheezing, or rubs  HEART: Regular rate and rhythm; No murmurs, rubs, or gallops  ABDOMEN: Soft, Nontender, Nondistended; Bowel sounds present  EXTREMITIES:  2+ Peripheral Pulses, No clubbing, cyanosis, or edema  LYMPH: No lymphadenopathy noted  SKIN: No rashes or lesions    LABS:                        10.2   4.48  )-----------( 171      ( 30 Jun 2020 09:23 )             30.8     06-30    139  |  96<L>  |  67.0<H>  ----------------------------<  111<H>  5.1   |  22.0  |  8.25<H>    Ca    8.9      30 Jun 2020 09:23  Phos  4.2     06-30  Mg     1.5     06-30    TPro  7.7  /  Alb  4.3  /  TBili  0.5  /  DBili  x   /  AST  22  /  ALT  8   /  AlkPhos  228<H>  06-30    Magnesium, Serum: 1.5 mg/dL (06-30 @ 09:23)  Phosphorus Level, Serum: 4.2 mg/dL (06-30 @ 09:23)    RADIOLOGY & ADDITIONAL TESTS:

## 2020-06-30 NOTE — ED ADULT TRIAGE NOTE - CHIEF COMPLAINT QUOTE
Pt just got off plane from [facility], needs dialysis today, unable to get dialysis without covid swab being done, offers no complaints, fistula right arm

## 2020-06-30 NOTE — ED ADULT NURSE NOTE - CHIEF COMPLAINT QUOTE
Pt just got off plane from [facility], needs dialysis today, unable to get dialysis without covid swab being done, offers no complaints, fistula right arm

## 2020-06-30 NOTE — ED ADULT NURSE NOTE - OBJECTIVE STATEMENT
pt comes to ED with reports of needing dialysis. pt unable to get appt today outpatient. pt with some COLLINS noted. no chest pain, no distress. skin warm dry and intact, no extremity edema noted. afebrile, returned home from [facility] yesterday. pt PANDYA with strength and purpose. AV fistula noted to right forearm.

## 2020-06-30 NOTE — DISCHARGE NOTE PROVIDER - CARE PROVIDER_API CALL
Tianna Lopez  INTERNAL MEDICINE  [address] [place] [address]  Phone: [phone]  Fax: [phone]  Follow Up Time: Tianna Lopez  INTERNAL MEDICINE  [address] [place] [address]  Phone: [phone]  Fax: [phone]  Follow Up Time:     primary care,   Phone: (   )    -  Fax: (   )    -  Follow Up Time: Tianna Lopez  INTERNAL MEDICINE  [address] [place] [address]  Phone: [phone]  Fax: [phone]  Follow Up Time:     primary care,   Phone: (   )    -  Fax: (   )    -  Follow Up Time:     Lexie Jerome  CARDIOLOGY  [address] [id number]  Phone: [phone]  Fax: [phone]  Follow Up Time:

## 2020-06-30 NOTE — PROGRESS NOTE ADULT - SUBJECTIVE AND OBJECTIVE BOX
Patient was seen and evaluated on dialysis.   No c/o CP SOB NV  no F/C  no swelling    T(C): 36.4 (06-30-20 @ 12:21), Max: 36.6 (06-30-20 @ 08:52)  HR: 65 (06-30-20 @ 12:21) (65 - 77)  BP: 149/76 (06-30-20 @ 12:15) (149/76 - 152/78)  Wt(kg): --  PE ;  NAD, Pale,   lungs - CTA  CV gr 1 murmur,  No gallop or rub  Abd : soft, NT BS +, No masses  Ext- No edema  Neuro : Grossly intact, moving extremities                         10.2   4.48  )-----------( 171      ( 30 Jun 2020 09:23 )             30.8     06-30    139  |  96<L>  |  67.0<H>  ----------------------------<  111<H>  5.1   |  22.0  |  8.25<H>    Ca    8.9      30 Jun 2020 09:23  Phos  4.2     06-30  Mg     1.5     06-30    TPro  7.7  /  Alb  4.3  /  TBili  0.5  /  DBili  x   /  AST  22  /  ALT  8   /  AlkPhos  228<H>  06-30    MEDICATIONS  (STANDING):  aspirin  chewable  atorvastatin  cinacalcet  clopidogrel Tablet  magnesium sulfate  IVPB  metoprolol tartrate  ranolazine  sevelamer carbonate    Patient stable  Ten HD easily  Continue ,  DEssieW Chelsi Hay & Jesenia,

## 2020-06-30 NOTE — CHART NOTE - NSCHARTNOTEFT_GEN_A_CORE
SW Note: SW alerted by [facility] Shanta that pt is an HD pt set to be d/c this afternoon after HD, and [place] from [facility] last night. SW met with pt at bedside with  Minerva to discuss. Pt's yamel Sneed who is an RN at [facility] also present for initial conversation. Pt reports that in [place] he goes to [facility] in [facility] for HD. Niece Nedra reports her family has been coordinating with HD SW to reinstate pt's HD, and were told he needs a covid swab prior to returning. Niece and pt report that pt HD slot is T TH S, at 5:30AM, and pt transports himself. Pt's received HD this admission, and covid results are pending.    SW placed call to [facility], spoke to RN Sharon. Sharon reports that pt has not been to [facility] for HD in "over 5 months, and does not currently have a slot on the schedule and he will have to go through [facility] to reinstate HD." SW and admissions are gone for the day.     SW met with pt once more with  Minerva, explained to pt that this writer can not definitively confirm his HD slot at [facility]. SW explained to pt that should he still decide to leave hospital tonight, he runs the risk of having to return to [facility] ED for HD treatment. Pt reports understanding, stating he will stay at hospital tonight until HD can be confirmed in AM. MD Hay aware of above, [facility] continues to follow

## 2020-06-30 NOTE — ED ADULT NURSE REASSESSMENT NOTE - NS ED NURSE REASSESS COMMENT FT1
Dr weaver, renal, at bedside for evaluation. even and unlabored resps present, no distress, AOX3.
Pt handed off to ESSU FREDDIE Ceja. VSS. Pt moved to CDU 4 L, oriented to unit, call bell given to pt, plan of care explained, pt verbalized understanding.
VSS a this time. meal tray provided for dinner and pt tolerated without issue. AOX3, even and unlabored resps present, skin warm dry and intact, continues to offer no complaints a this time. seen by SW and aware of plan for admission. awaiting bed assignment at this time.
pt returned from dialysis at this time, VSS, AOX3, awaiting SW for possible DC pending set up of dialysis out patient. pt offers no complaints.
pt taken to dialysis. report given. pt AOX3 no distress.
Report received from off-going RN at 19:30, VSS, pt A&Ox4, no apparent s/s of distress, resting comfortably in stretcher. Resp even and unlabored. Admitted, awaiting outpatient HD placement, patient aware of current plan of care. Will continue to monitor at this time.

## 2020-06-30 NOTE — H&P ADULT - ASSESSMENT
1) esrd - renal consult appreciated  --> hd today  --> ppd and hepatitis ordered  check covid    2) htn - home meds    3) cad/pvd history - c.w asa plavix and statin     4) hypomagensemia - replete    5) diet - renal diet    dispo- will need a new hd seat as per case management

## 2020-07-01 LAB
ALBUMIN SERPL ELPH-MCNC: 4 G/DL — SIGNIFICANT CHANGE UP (ref 3.3–5.2)
ALP SERPL-CCNC: 219 U/L — HIGH (ref 40–120)
ALT FLD-CCNC: 9 U/L — SIGNIFICANT CHANGE UP
ANION GAP SERPL CALC-SCNC: 19 MMOL/L — HIGH (ref 5–17)
ANISOCYTOSIS BLD QL: SLIGHT — SIGNIFICANT CHANGE UP
AST SERPL-CCNC: 24 U/L — SIGNIFICANT CHANGE UP
BASOPHILS # BLD AUTO: 0.02 K/UL — SIGNIFICANT CHANGE UP (ref 0–0.2)
BASOPHILS NFR BLD AUTO: 0.7 % — SIGNIFICANT CHANGE UP (ref 0–2)
BILIRUB SERPL-MCNC: 0.5 MG/DL — SIGNIFICANT CHANGE UP (ref 0.4–2)
BUN SERPL-MCNC: 39 MG/DL — HIGH (ref 8–20)
BURR CELLS BLD QL SMEAR: SLIGHT — SIGNIFICANT CHANGE UP
CALCIUM SERPL-MCNC: 8.6 MG/DL — SIGNIFICANT CHANGE UP (ref 8.6–10.2)
CHLORIDE SERPL-SCNC: 90 MMOL/L — LOW (ref 98–107)
CO2 SERPL-SCNC: 24 MMOL/L — SIGNIFICANT CHANGE UP (ref 22–29)
CREAT SERPL-MCNC: 5.83 MG/DL — HIGH (ref 0.5–1.3)
ELLIPTOCYTES BLD QL SMEAR: SLIGHT — SIGNIFICANT CHANGE UP
EOSINOPHIL # BLD AUTO: 0.35 K/UL — SIGNIFICANT CHANGE UP (ref 0–0.5)
EOSINOPHIL NFR BLD AUTO: 11.6 % — HIGH (ref 0–6)
GLUCOSE SERPL-MCNC: 75 MG/DL — SIGNIFICANT CHANGE UP (ref 70–99)
HAV IGM SER-ACNC: SIGNIFICANT CHANGE UP
HBV CORE IGM SER-ACNC: SIGNIFICANT CHANGE UP
HBV SURFACE AB SER-ACNC: REACTIVE
HBV SURFACE AG SER-ACNC: SIGNIFICANT CHANGE UP
HCT VFR BLD CALC: 30.9 % — LOW (ref 39–50)
HCV AB S/CO SERPL IA: 0.57 S/CO — SIGNIFICANT CHANGE UP (ref 0–0.99)
HCV AB SERPL-IMP: SIGNIFICANT CHANGE UP
HGB BLD-MCNC: 10.2 G/DL — LOW (ref 13–17)
HYPOCHROMIA BLD QL: SLIGHT — SIGNIFICANT CHANGE UP
IMM GRANULOCYTES NFR BLD AUTO: 0.3 % — SIGNIFICANT CHANGE UP (ref 0–1.5)
LYMPHOCYTES # BLD AUTO: 0.66 K/UL — LOW (ref 1–3.3)
LYMPHOCYTES # BLD AUTO: 21.9 % — SIGNIFICANT CHANGE UP (ref 13–44)
MACROCYTES BLD QL: SLIGHT — SIGNIFICANT CHANGE UP
MAGNESIUM SERPL-MCNC: 1.9 MG/DL — SIGNIFICANT CHANGE UP (ref 1.6–2.6)
MANUAL SMEAR VERIFICATION: SIGNIFICANT CHANGE UP
MCHC RBC-ENTMCNC: 32.4 PG — SIGNIFICANT CHANGE UP (ref 27–34)
MCHC RBC-ENTMCNC: 33 GM/DL — SIGNIFICANT CHANGE UP (ref 32–36)
MCV RBC AUTO: 98.1 FL — SIGNIFICANT CHANGE UP (ref 80–100)
MICROCYTES BLD QL: SLIGHT — SIGNIFICANT CHANGE UP
MONOCYTES # BLD AUTO: 0.48 K/UL — SIGNIFICANT CHANGE UP (ref 0–0.9)
MONOCYTES NFR BLD AUTO: 15.9 % — HIGH (ref 2–14)
NEUTROPHILS # BLD AUTO: 1.5 K/UL — LOW (ref 1.8–7.4)
NEUTROPHILS NFR BLD AUTO: 49.6 % — SIGNIFICANT CHANGE UP (ref 43–77)
OVALOCYTES BLD QL SMEAR: SLIGHT — SIGNIFICANT CHANGE UP
PLAT MORPH BLD: NORMAL — SIGNIFICANT CHANGE UP
PLATELET # BLD AUTO: 153 K/UL — SIGNIFICANT CHANGE UP (ref 150–400)
PLATELET COUNT - ESTIMATE: NORMAL — SIGNIFICANT CHANGE UP
POIKILOCYTOSIS BLD QL AUTO: SLIGHT — SIGNIFICANT CHANGE UP
POTASSIUM SERPL-MCNC: 5.7 MMOL/L — HIGH (ref 3.5–5.3)
POTASSIUM SERPL-SCNC: 5.7 MMOL/L — HIGH (ref 3.5–5.3)
PROT SERPL-MCNC: 7.5 G/DL — SIGNIFICANT CHANGE UP (ref 6.6–8.7)
RBC # BLD: 3.15 M/UL — LOW (ref 4.2–5.8)
RBC # FLD: 18.3 % — HIGH (ref 10.3–14.5)
RBC BLD AUTO: NORMAL — SIGNIFICANT CHANGE UP
SARS-COV-2 RNA SPEC QL NAA+PROBE: SIGNIFICANT CHANGE UP
SODIUM SERPL-SCNC: 132 MMOL/L — LOW (ref 135–145)
WBC # BLD: 3.02 K/UL — LOW (ref 3.8–10.5)
WBC # FLD AUTO: 3.02 K/UL — LOW (ref 3.8–10.5)

## 2020-07-01 PROCEDURE — 99233 SBSQ HOSP IP/OBS HIGH 50: CPT

## 2020-07-01 PROCEDURE — 99232 SBSQ HOSP IP/OBS MODERATE 35: CPT

## 2020-07-01 RX ORDER — TUBERCULIN PURIFIED PROTEIN DERIVATIVE 5 [IU]/.1ML
5 INJECTION, SOLUTION INTRADERMAL ONCE
Refills: 0 | Status: COMPLETED | OUTPATIENT
Start: 2020-07-01 | End: 2020-07-01

## 2020-07-01 RX ORDER — SODIUM POLYSTYRENE SULFONATE 4.1 MEQ/G
15 POWDER, FOR SUSPENSION ORAL ONCE
Refills: 0 | Status: COMPLETED | OUTPATIENT
Start: 2020-07-01 | End: 2020-07-01

## 2020-07-01 RX ORDER — SODIUM ZIRCONIUM CYCLOSILICATE 10 G/10G
10 POWDER, FOR SUSPENSION ORAL ONCE
Refills: 0 | Status: COMPLETED | OUTPATIENT
Start: 2020-07-01 | End: 2020-07-01

## 2020-07-01 RX ADMIN — SODIUM POLYSTYRENE SULFONATE 15 GRAM(S): 4.1 POWDER, FOR SUSPENSION ORAL at 17:34

## 2020-07-01 RX ADMIN — CLOPIDOGREL BISULFATE 75 MILLIGRAM(S): 75 TABLET, FILM COATED ORAL at 09:28

## 2020-07-01 RX ADMIN — SEVELAMER CARBONATE 800 MILLIGRAM(S): 2400 POWDER, FOR SUSPENSION ORAL at 09:28

## 2020-07-01 RX ADMIN — CINACALCET 30 MILLIGRAM(S): 30 TABLET, FILM COATED ORAL at 16:35

## 2020-07-01 RX ADMIN — TUBERCULIN PURIFIED PROTEIN DERIVATIVE 5 UNIT(S): 5 INJECTION, SOLUTION INTRADERMAL at 09:33

## 2020-07-01 RX ADMIN — ATORVASTATIN CALCIUM 20 MILLIGRAM(S): 80 TABLET, FILM COATED ORAL at 21:36

## 2020-07-01 RX ADMIN — CINACALCET 30 MILLIGRAM(S): 30 TABLET, FILM COATED ORAL at 04:53

## 2020-07-01 RX ADMIN — RANOLAZINE 500 MILLIGRAM(S): 500 TABLET, FILM COATED, EXTENDED RELEASE ORAL at 04:52

## 2020-07-01 RX ADMIN — HEPARIN SODIUM 5000 UNIT(S): 5000 INJECTION INTRAVENOUS; SUBCUTANEOUS at 21:36

## 2020-07-01 RX ADMIN — RANOLAZINE 500 MILLIGRAM(S): 500 TABLET, FILM COATED, EXTENDED RELEASE ORAL at 16:35

## 2020-07-01 RX ADMIN — SEVELAMER CARBONATE 800 MILLIGRAM(S): 2400 POWDER, FOR SUSPENSION ORAL at 16:18

## 2020-07-01 RX ADMIN — HEPARIN SODIUM 5000 UNIT(S): 5000 INJECTION INTRAVENOUS; SUBCUTANEOUS at 09:28

## 2020-07-01 RX ADMIN — Medication 12.5 MILLIGRAM(S): at 16:35

## 2020-07-01 RX ADMIN — SEVELAMER CARBONATE 800 MILLIGRAM(S): 2400 POWDER, FOR SUSPENSION ORAL at 21:36

## 2020-07-01 RX ADMIN — Medication 12.5 MILLIGRAM(S): at 04:53

## 2020-07-01 RX ADMIN — Medication 81 MILLIGRAM(S): at 09:28

## 2020-07-01 RX ADMIN — SODIUM ZIRCONIUM CYCLOSILICATE 10 GRAM(S): 10 POWDER, FOR SUSPENSION ORAL at 17:34

## 2020-07-01 NOTE — PROGRESS NOTE ADULT - SUBJECTIVE AND OBJECTIVE BOX
AMIE ALFONSO    [id number]    74y      Male    INTERVAL HPI/ OVERNIGHT EVENTS: patient being seen for ESRD,  in NAD,     REVIEW OF SYSTEMS:    CONSTITUTIONAL: No fever, weight loss, or fatigue  RESPIRATORY: No cough, wheezing, hemoptysis; No shortness of breath  CARDIOVASCULAR: No chest pain, palpitations  GASTROINTESTINAL: No abdominal or epigastric pain. No nausea, vomiting  NEUROLOGICAL: No headaches, memory loss, loss of strength.    Vital Signs Last 24 Hrs,    T(C): 36.7 (01 Jul 2020 11:15), Max: 36.8 (30 Jun 2020 20:59)  T(F): 98 (01 Jul 2020 11:15), Max: 98.3 (30 Jun 2020 20:59)  HR: 60 (01 Jul 2020 11:15) (60 - 80)  BP: 117/74 (01 Jul 2020 11:15) (111/71 - 143/86)  RR: 18 (01 Jul 2020 11:15) (16 - 18)  SpO2: 100% (01 Jul 2020 11:15) (98% - 100%)    PHYSICAL EXAM:    	GENERAL: NAD, well-groomed, well-developed  	HEAD:  Atraumatic, Normocephalic  	EYES: EOMI, PERRLA, conjunctiva and sclera clear  	ENMT: No tonsillar erythema, exudates, or enlargement; Moist mucous membranes, Good dentition, No lesions  	NECK: Supple, No JVD, Normal thyroid  	NERVOUS SYSTEM:  Alert & Oriented X3, Good concentration; Motor Strength 5/5 B/L upper and lower extremities; DTRs 2+ intact and symmetric  	CHEST/LUNG: Clear to percussion bilaterally; No rales, rhonchi, wheezing, or rubs  	HEART: Regular rate and rhythm; No murmurs, rubs, or gallops  	ABDOMEN: Soft, Nontender, Nondistended; Bowel sounds present  	EXTREMITIES:  2+ Peripheral Pulses, No clubbing, cyanosis, or edema  	LYMPH: No lymphadenopathy noted    SKIN: No rashes or lesions    LABS:                        10.2   3.02  )-----------( 153      ( 01 Jul 2020 08:53 )             30.9     07-01    132<L>  |  90<L>  |  39.0<H>  ----------------------------<  75  5.7<H>   |  24.0  |  5.83<H>    Ca    8.6      01 Jul 2020 08:53  Phos  4.2     06-30  Mg     1.9     07-01    TPro  7.5  /  Alb  4.0  /  TBili  0.5  /  DBili  x   /  AST  24  /  ALT  9   /  AlkPhos  219<H>  07-01    MEDICATIONS  (STANDING):  aspirin  chewable 81 milliGRAM(s) Oral daily  atorvastatin 20 milliGRAM(s) Oral at bedtime  cinacalcet 30 milliGRAM(s) Oral two times a day  clopidogrel Tablet 75 milliGRAM(s) Oral daily  heparin   Injectable 5000 Unit(s) SubCutaneous every 12 hours  metoprolol tartrate 12.5 milliGRAM(s) Oral two times a day  ranolazine 500 milliGRAM(s) Oral two times a day  sevelamer carbonate 800 milliGRAM(s) Oral three times a day  sodium polystyrene sulfonate Suspension 15 Gram(s) Oral once  sodium zirconium cyclosilicate 10 Gram(s) Oral once    MEDICATIONS  (PRN):  acetaminophen   Tablet .. 650 milliGRAM(s) Oral every 6 hours PRN Temp greater or equal to 38C (100.4F), Mild Pain (1 - 3)  hydrALAZINE Injectable 5 milliGRAM(s) IV Push every 6 hours PRN for sbp above 160 mmhg

## 2020-07-01 NOTE — PROGRESS NOTE ADULT - ASSESSMENT
ESRD     COVID - PCR Screen,    HD in AM, ESRD     COVID - PCR Screen,    HD in [facility] for Hyperkalemia,     Recent Conclusions. SZC increased serum bicarbonate concentrations  and reduced patient proportions with serum bicarbonate  <22mmol/L, likely due to SZC-binding of gastrointestinal  ammonium. These SZC-induced serum bicarbonate increases  occurred regardless of CKD stage and were sustained during  ongoing maintenance therapy.

## 2020-07-01 NOTE — PROGRESS NOTE ADULT - SUBJECTIVE AND OBJECTIVE BOX
AMIE ALFONSO    [id number]    74y      Male    INTERVAL HPI/OVERNIGHT EVENTS: patient being seen for esrd. patient awaiting a new HD seat. patient in nad    REVIEW OF SYSTEMS:    CONSTITUTIONAL: No fever, weight loss, or fatigue  RESPIRATORY: No cough, wheezing, hemoptysis; No shortness of breath  CARDIOVASCULAR: No chest pain, palpitations  GASTROINTESTINAL: No abdominal or epigastric pain. No nausea, vomiting  NEUROLOGICAL: No headaches, memory loss, loss of strength.  MISCELLANEOUS:      Vital Signs Last 24 Hrs  T(C): 36.7 (01 Jul 2020 11:15), Max: 36.8 (30 Jun 2020 20:59)  T(F): 98 (01 Jul 2020 11:15), Max: 98.3 (30 Jun 2020 20:59)  HR: 60 (01 Jul 2020 11:15) (60 - 80)  BP: 117/74 (01 Jul 2020 11:15) (111/71 - 143/86)  BP(mean): --  RR: 18 (01 Jul 2020 11:15) (16 - 18)  SpO2: 100% (01 Jul 2020 11:15) (98% - 100%)    PHYSICAL EXAM:    	GENERAL: NAD, well-groomed, well-developed  	HEAD:  Atraumatic, Normocephalic  	EYES: EOMI, PERRLA, conjunctiva and sclera clear  	ENMT: No tonsillar erythema, exudates, or enlargement; Moist mucous membranes, Good dentition, No lesions  	NECK: Supple, No JVD, Normal thyroid  	NERVOUS SYSTEM:  Alert & Oriented X3, Good concentration; Motor Strength 5/5 B/L upper and lower extremities; DTRs 2+ intact and symmetric  	CHEST/LUNG: Clear to percussion bilaterally; No rales, rhonchi, wheezing, or rubs  	HEART: Regular rate and rhythm; No murmurs, rubs, or gallops  	ABDOMEN: Soft, Nontender, Nondistended; Bowel sounds present  	EXTREMITIES:  2+ Peripheral Pulses, No clubbing, cyanosis, or edema  	LYMPH: No lymphadenopathy noted  SKIN: No rashes or lesions      LABS:                        10.2   3.02  )-----------( 153      ( 01 Jul 2020 08:53 )             30.9     07-01    132<L>  |  90<L>  |  39.0<H>  ----------------------------<  75  5.7<H>   |  24.0  |  5.83<H>    Ca    8.6      01 Jul 2020 08:53  Phos  4.2     06-30  Mg     1.9     07-01    TPro  7.5  /  Alb  4.0  /  TBili  0.5  /  DBili  x   /  AST  24  /  ALT  9   /  AlkPhos  219<H>  07-01            MEDICATIONS  (STANDING):  aspirin  chewable 81 milliGRAM(s) Oral daily  atorvastatin 20 milliGRAM(s) Oral at bedtime  cinacalcet 30 milliGRAM(s) Oral two times a day  clopidogrel Tablet 75 milliGRAM(s) Oral daily  heparin   Injectable 5000 Unit(s) SubCutaneous every 12 hours  metoprolol tartrate 12.5 milliGRAM(s) Oral two times a day  ranolazine 500 milliGRAM(s) Oral two times a day  sevelamer carbonate 800 milliGRAM(s) Oral three times a day  sodium polystyrene sulfonate Suspension 15 Gram(s) Oral once  sodium zirconium cyclosilicate 10 Gram(s) Oral once    MEDICATIONS  (PRN):  acetaminophen   Tablet .. 650 milliGRAM(s) Oral every 6 hours PRN Temp greater or equal to 38C (100.4F), Mild Pain (1 - 3)  hydrALAZINE Injectable 5 milliGRAM(s) IV Push every 6 hours PRN for sbp above 160 mmhg      RADIOLOGY & ADDITIONAL TESTS:

## 2020-07-01 NOTE — PROGRESS NOTE ADULT - ASSESSMENT
1) esrd - renal following   -->spoke to social work  --> needs a new HD spot     2) htn - home meds    3) cad/pvd history - c.w asa plavix and statin     dispo - dc once HD seat is secured

## 2020-07-01 NOTE — PROGRESS NOTE ADULT - ASSESSMENT
1) ESRD - HD    2) HTN - home meds    3) CAD- PAD  - On ASA, Plavix and statin     Disposition  - dc once HD arrangements as OP are  secured

## 2020-07-02 ENCOUNTER — TRANSCRIPTION ENCOUNTER (OUTPATIENT)
Age: 75
End: 2020-07-02

## 2020-07-02 LAB — GLUCOSE BLDC GLUCOMTR-MCNC: 75 MG/DL — SIGNIFICANT CHANGE UP (ref 70–99)

## 2020-07-02 PROCEDURE — 99232 SBSQ HOSP IP/OBS MODERATE 35: CPT

## 2020-07-02 PROCEDURE — 90937 HEMODIALYSIS REPEATED EVAL: CPT

## 2020-07-02 RX ADMIN — Medication 12.5 MILLIGRAM(S): at 05:07

## 2020-07-02 RX ADMIN — SEVELAMER CARBONATE 800 MILLIGRAM(S): 2400 POWDER, FOR SUSPENSION ORAL at 21:04

## 2020-07-02 RX ADMIN — CINACALCET 30 MILLIGRAM(S): 30 TABLET, FILM COATED ORAL at 05:07

## 2020-07-02 RX ADMIN — HEPARIN SODIUM 5000 UNIT(S): 5000 INJECTION INTRAVENOUS; SUBCUTANEOUS at 17:11

## 2020-07-02 RX ADMIN — RANOLAZINE 500 MILLIGRAM(S): 500 TABLET, FILM COATED, EXTENDED RELEASE ORAL at 05:07

## 2020-07-02 RX ADMIN — CINACALCET 30 MILLIGRAM(S): 30 TABLET, FILM COATED ORAL at 17:10

## 2020-07-02 RX ADMIN — SEVELAMER CARBONATE 800 MILLIGRAM(S): 2400 POWDER, FOR SUSPENSION ORAL at 17:09

## 2020-07-02 RX ADMIN — CLOPIDOGREL BISULFATE 75 MILLIGRAM(S): 75 TABLET, FILM COATED ORAL at 17:10

## 2020-07-02 RX ADMIN — Medication 12.5 MILLIGRAM(S): at 17:11

## 2020-07-02 RX ADMIN — SEVELAMER CARBONATE 800 MILLIGRAM(S): 2400 POWDER, FOR SUSPENSION ORAL at 05:07

## 2020-07-02 RX ADMIN — ATORVASTATIN CALCIUM 20 MILLIGRAM(S): 80 TABLET, FILM COATED ORAL at 21:04

## 2020-07-02 RX ADMIN — RANOLAZINE 500 MILLIGRAM(S): 500 TABLET, FILM COATED, EXTENDED RELEASE ORAL at 17:10

## 2020-07-02 RX ADMIN — Medication 81 MILLIGRAM(S): at 17:10

## 2020-07-02 NOTE — DISCHARGE NOTE NURSING/CASE MANAGEMENT/SOCIAL WORK - NSDCFUADDAPPT_GEN_ALL_CORE_FT
[facility]   [address], [place]   [phone]   New HD schedule is Tues/Thurs/Sat at 1:45pm. First session on Tuesday 7/7, arrive at 1pm.

## 2020-07-02 NOTE — PROGRESS NOTE ADULT - ASSESSMENT
1) ESRD - HD TTS schedule  OP HD set up by [facility]    2) HTN - BP stable  continue current meds    3) Anemia  Hb at goal  Monitor H/H

## 2020-07-02 NOTE — DISCHARGE NOTE NURSING/CASE MANAGEMENT/SOCIAL WORK - PATIENT PORTAL LINK FT
You can access the FollowMyHealth Patient Portal offered by [facility] by registering at the following website: [email or web address]. By joining [employer]’s FollowMyHealth portal, you will also be able to view your health information using other applications (apps) compatible with our system.

## 2020-07-02 NOTE — PROGRESS NOTE ADULT - SUBJECTIVE AND OBJECTIVE BOX
[facility] DIVISION OF KIDNEY DISEASES AND HYPERTENSION -- FOLLOW UP NOTE  --------------------------------------------------------------------------------  Chief Complaint: ESRD on HD    24 hour events/subjective:  Plan for HD today  Feels well; no acute complaint      PAST HISTORY  --------------------------------------------------------------------------------  No significant changes to PMH, PSH, FHx, SHx, unless otherwise noted    ALLERGIES & MEDICATIONS  --------------------------------------------------------------------------------  Allergies    No Known Allergies    Intolerances      Standing Inpatient Medications  aspirin  chewable 81 milliGRAM(s) Oral daily  atorvastatin 20 milliGRAM(s) Oral at bedtime  cinacalcet 30 milliGRAM(s) Oral two times a day  clopidogrel Tablet 75 milliGRAM(s) Oral daily  heparin   Injectable 5000 Unit(s) SubCutaneous every 12 hours  metoprolol tartrate 12.5 milliGRAM(s) Oral two times a day  ranolazine 500 milliGRAM(s) Oral two times a day  sevelamer carbonate 800 milliGRAM(s) Oral three times a day    PRN Inpatient Medications  acetaminophen   Tablet .. 650 milliGRAM(s) Oral every 6 hours PRN  hydrALAZINE Injectable 5 milliGRAM(s) IV Push every 6 hours PRN      REVIEW OF SYSTEMS  --------------------------------------------------------------------------------  Gen: No weight changes, fatigue, fevers/chills, weakness  Skin: No rashes  Head/Eyes/Ears/Mouth: No headache; Normal hearing; Normal vision w/o blurriness; No sinus pain/discomfort, sore throat  Respiratory: No dyspnea, cough, wheezing, hemoptysis  CV: No chest pain, PND, orthopnea  GI: No abdominal pain, diarrhea, constipation, nausea, vomiting, melena, hematochezia  : No increased frequency, dysuria, hematuria, nocturia  MSK: No joint pain/swelling; no back pain; no edema  Neuro: No dizziness/lightheadedness, weakness, seizures, numbness, tingling  Heme: No easy bruising or bleeding  Endo: No heat/cold intolerance  Psych: No significant nervousness, anxiety, stress, depression    All other systems were reviewed and are negative, except as noted.    VITALS/PHYSICAL EXAM  --------------------------------------------------------------------------------  T(C): 36.6 (07-02-20 @ 14:10), Max: 36.7 (07-02-20 @ 05:01)  HR: 69 (07-02-20 @ 14:10) (53 - 71)  BP: 144/86 (07-02-20 @ 14:10) (126/70 - 148/84)  RR: 18 (07-02-20 @ 14:10) (17 - 18)  SpO2: 100% (07-02-20 @ 14:10) (97% - 100%)  Wt(kg): --        Physical Exam:  	Gen: NAD, well-appearing  	HEENT: PERRL, supple neck, clear oropharynx  	Pulm: CTA B/L  	CV: RRR, S1S2; no rub  	Back: No spinal or CVA tenderness; no sacral edema  	Abd: +BS, soft, nontender/nondistended  	: No suprapubic tenderness  	UE: Warm,; no asterixis  	LE: Warm, no edema  	Neuro: No focal deficits, intact gait  	Psych: Normal affect and mood  	Skin: Warm, without rashes  	Vascular access: LUE AVF, anuerysmal+ thrill/ bruit+    LABS/STUDIES  --------------------------------------------------------------------------------              10.2   3.02  >-----------<  153      [07-01-20 @ 08:53]              30.9     132  |  90  |  39.0  ----------------------------<  75      [07-01-20 @ 08:53]  5.7   |  24.0  |  5.83        Ca     8.6     [07-01-20 @ 08:53]      Mg     1.9     [07-01-20 @ 08:53]    TPro  7.5  /  Alb  4.0  /  TBili  0.5  /  DBili  x   /  AST  24  /  ALT  9   /  AlkPhos  219  [07-01-20 @ 08:53]          Creatinine Trend:  SCr 5.83 [07-01 @ 08:53]  SCr 8.25 [06-30 @ 09:23]        HbA1c 4.6      [02-13-19 @ 04:22]    HBsAb Reactive      [07-01-20 @ 15:59]  HBsAg Nonreact      [07-01-20 @ 04:36]  HCV 0.57, Nonreact      [07-01-20 @ 04:36]

## 2020-07-02 NOTE — PROGRESS NOTE ADULT - ASSESSMENT
1) esrd - renal following   -->spoke to social work  --> will need to get HD here on saturday and will resume outpatient hd on tuesday     2) htn - home meds    3) cad/pvd history - c.w asa plavix and statin     dispo - dc on sat post HD

## 2020-07-02 NOTE — PROGRESS NOTE ADULT - SUBJECTIVE AND OBJECTIVE BOX
AMIE ALFONSO    [id number]    74y      Male    INTERVAL HPI/OVERNIGHT EVENTS: patient being seen for HD. patient in nad.     REVIEW OF SYSTEMS:    CONSTITUTIONAL: No fever, weight loss, or fatigue  RESPIRATORY: No cough, wheezing, hemoptysis; No shortness of breath  CARDIOVASCULAR: No chest pain, palpitations  GASTROINTESTINAL: No abdominal or epigastric pain. No nausea, vomiting  NEUROLOGICAL: No headaches, memory loss, loss of strength.  MISCELLANEOUS:      Vital Signs Last 24 Hrs  T(C): 36.4 (02 Jul 2020 11:00), Max: 36.7 (02 Jul 2020 05:01)  T(F): 97.6 (02 Jul 2020 11:00), Max: 98.1 (02 Jul 2020 05:01)  HR: 53 (02 Jul 2020 11:00) (53 - 71)  BP: 130/67 (02 Jul 2020 11:00) (126/70 - 148/84)  BP(mean): --  RR: 18 (02 Jul 2020 11:00) (17 - 18)  SpO2: 100% (02 Jul 2020 11:00) (97% - 100%)    PHYSICAL EXAM:    	GENERAL: NAD, well-groomed, well-developed  	HEAD:  Atraumatic, Normocephalic  	EYES: EOMI, PERRLA, conjunctiva and sclera clear  	ENMT: No tonsillar erythema, exudates, or enlargement; Moist mucous membranes, Good dentition, No lesions  	NECK: Supple, No JVD, Normal thyroid  	NERVOUS SYSTEM:  Alert & Oriented X3, Good concentration; Motor Strength 5/5 B/L upper and lower extremities;  	CHEST/LUNG: Clear to percussion bilaterally; No rales, rhonchi, wheezing, or rubs  	HEART: Regular rate and rhythm; No murmurs, rubs, or gallops  	ABDOMEN: Soft, Nontender, Nondistended; Bowel sounds present  	EXTREMITIES:  2+ Peripheral Pulses, No clubbing, cyanosis, or edema  	LYMPH: No lymphadenopathy noted  SKIN: No rashes or lesions      LABS:                        10.2   3.02  )-----------( 153      ( 01 Jul 2020 08:53 )             30.9     07-01    132<L>  |  90<L>  |  39.0<H>  ----------------------------<  75  5.7<H>   |  24.0  |  5.83<H>    Ca    8.6      01 Jul 2020 08:53  Mg     1.9     07-01    TPro  7.5  /  Alb  4.0  /  TBili  0.5  /  DBili  x   /  AST  24  /  ALT  9   /  AlkPhos  219<H>  07-01            MEDICATIONS  (STANDING):  aspirin  chewable 81 milliGRAM(s) Oral daily  atorvastatin 20 milliGRAM(s) Oral at bedtime  cinacalcet 30 milliGRAM(s) Oral two times a day  clopidogrel Tablet 75 milliGRAM(s) Oral daily  heparin   Injectable 5000 Unit(s) SubCutaneous every 12 hours  metoprolol tartrate 12.5 milliGRAM(s) Oral two times a day  ranolazine 500 milliGRAM(s) Oral two times a day  sevelamer carbonate 800 milliGRAM(s) Oral three times a day    MEDICATIONS  (PRN):  acetaminophen   Tablet .. 650 milliGRAM(s) Oral every 6 hours PRN Temp greater or equal to 38C (100.4F), Mild Pain (1 - 3)  hydrALAZINE Injectable 5 milliGRAM(s) IV Push every 6 hours PRN for sbp above 160 mmhg      RADIOLOGY & ADDITIONAL TESTS:

## 2020-07-03 LAB
ALBUMIN SERPL ELPH-MCNC: 4 G/DL — SIGNIFICANT CHANGE UP (ref 3.3–5.2)
ANION GAP SERPL CALC-SCNC: 16 MMOL/L — SIGNIFICANT CHANGE UP (ref 5–17)
BUN SERPL-MCNC: 36 MG/DL — HIGH (ref 8–20)
CALCIUM SERPL-MCNC: 7.8 MG/DL — LOW (ref 8.6–10.2)
CALCIUM SERPL-MCNC: 7.9 MG/DL — LOW (ref 8.4–10.5)
CHLORIDE SERPL-SCNC: 88 MMOL/L — LOW (ref 98–107)
CO2 SERPL-SCNC: 27 MMOL/L — SIGNIFICANT CHANGE UP (ref 22–29)
CREAT SERPL-MCNC: 5.31 MG/DL — HIGH (ref 0.5–1.3)
GLUCOSE BLDC GLUCOMTR-MCNC: 93 MG/DL — SIGNIFICANT CHANGE UP (ref 70–99)
GLUCOSE SERPL-MCNC: 95 MG/DL — SIGNIFICANT CHANGE UP (ref 70–99)
HCT VFR BLD CALC: 26.8 % — LOW (ref 39–50)
HGB BLD-MCNC: 9.3 G/DL — LOW (ref 13–17)
MCHC RBC-ENTMCNC: 33.5 PG — SIGNIFICANT CHANGE UP (ref 27–34)
MCHC RBC-ENTMCNC: 34.7 GM/DL — SIGNIFICANT CHANGE UP (ref 32–36)
MCV RBC AUTO: 96.4 FL — SIGNIFICANT CHANGE UP (ref 80–100)
PHOSPHATE SERPL-MCNC: 4.3 MG/DL — SIGNIFICANT CHANGE UP (ref 2.4–4.7)
PLATELET # BLD AUTO: 131 K/UL — LOW (ref 150–400)
POTASSIUM SERPL-MCNC: 4.9 MMOL/L — SIGNIFICANT CHANGE UP (ref 3.5–5.3)
POTASSIUM SERPL-SCNC: 4.9 MMOL/L — SIGNIFICANT CHANGE UP (ref 3.5–5.3)
PTH-INTACT FLD-MCNC: 268 PG/ML — HIGH (ref 15–65)
RBC # BLD: 2.78 M/UL — LOW (ref 4.2–5.8)
RBC # FLD: 18 % — HIGH (ref 10.3–14.5)
SODIUM SERPL-SCNC: 131 MMOL/L — LOW (ref 135–145)
TROPONIN T SERPL-MCNC: 0.06 NG/ML — SIGNIFICANT CHANGE UP (ref 0–0.06)
WBC # BLD: 2.52 K/UL — LOW (ref 3.8–10.5)
WBC # FLD AUTO: 2.52 K/UL — LOW (ref 3.8–10.5)

## 2020-07-03 PROCEDURE — 99232 SBSQ HOSP IP/OBS MODERATE 35: CPT

## 2020-07-03 PROCEDURE — 99233 SBSQ HOSP IP/OBS HIGH 50: CPT

## 2020-07-03 PROCEDURE — 71045 X-RAY EXAM CHEST 1 VIEW: CPT | Mod: 26,CS

## 2020-07-03 PROCEDURE — 93010 ELECTROCARDIOGRAM REPORT: CPT

## 2020-07-03 RX ORDER — SODIUM ZIRCONIUM CYCLOSILICATE 10 G/10G
10 POWDER, FOR SUSPENSION ORAL ONCE
Refills: 0 | Status: COMPLETED | OUTPATIENT
Start: 2020-07-03 | End: 2020-07-03

## 2020-07-03 RX ADMIN — CLOPIDOGREL BISULFATE 75 MILLIGRAM(S): 75 TABLET, FILM COATED ORAL at 12:29

## 2020-07-03 RX ADMIN — SEVELAMER CARBONATE 800 MILLIGRAM(S): 2400 POWDER, FOR SUSPENSION ORAL at 14:01

## 2020-07-03 RX ADMIN — SEVELAMER CARBONATE 800 MILLIGRAM(S): 2400 POWDER, FOR SUSPENSION ORAL at 22:17

## 2020-07-03 RX ADMIN — RANOLAZINE 500 MILLIGRAM(S): 500 TABLET, FILM COATED, EXTENDED RELEASE ORAL at 18:42

## 2020-07-03 RX ADMIN — Medication 12.5 MILLIGRAM(S): at 18:41

## 2020-07-03 RX ADMIN — HEPARIN SODIUM 5000 UNIT(S): 5000 INJECTION INTRAVENOUS; SUBCUTANEOUS at 18:42

## 2020-07-03 RX ADMIN — Medication 81 MILLIGRAM(S): at 12:29

## 2020-07-03 RX ADMIN — ATORVASTATIN CALCIUM 20 MILLIGRAM(S): 80 TABLET, FILM COATED ORAL at 22:18

## 2020-07-03 RX ADMIN — TUBERCULIN PURIFIED PROTEIN DERIVATIVE 5 UNIT(S): 5 INJECTION, SOLUTION INTRADERMAL at 16:13

## 2020-07-03 RX ADMIN — RANOLAZINE 500 MILLIGRAM(S): 500 TABLET, FILM COATED, EXTENDED RELEASE ORAL at 05:26

## 2020-07-03 RX ADMIN — HEPARIN SODIUM 5000 UNIT(S): 5000 INJECTION INTRAVENOUS; SUBCUTANEOUS at 05:26

## 2020-07-03 RX ADMIN — CINACALCET 30 MILLIGRAM(S): 30 TABLET, FILM COATED ORAL at 18:42

## 2020-07-03 RX ADMIN — SEVELAMER CARBONATE 800 MILLIGRAM(S): 2400 POWDER, FOR SUSPENSION ORAL at 05:26

## 2020-07-03 RX ADMIN — Medication 12.5 MILLIGRAM(S): at 05:26

## 2020-07-03 RX ADMIN — CINACALCET 30 MILLIGRAM(S): 30 TABLET, FILM COATED ORAL at 05:26

## 2020-07-03 RX ADMIN — SODIUM ZIRCONIUM CYCLOSILICATE 10 GRAM(S): 10 POWDER, FOR SUSPENSION ORAL at 16:08

## 2020-07-03 NOTE — PROGRESS NOTE ADULT - SUBJECTIVE AND OBJECTIVE BOX
AMIE ALFONSO    [id number]    74y      Male    INTERVAL HPI/OVERNIGHT EVENTS:    off service note   Patient is a 73 yo M with pmh of esrd on HD on t/th/sa. Patient recently returned from [facility] (after 3 -4 months of being away) and was unable to go to his current HD center due to need for a covid test. Patient seen by renal and had hd on day of admission, unfortunately due to the long absence from his HD case management had to find the patient a new seat and ppd and hepatitis resent. Patients HD now secured and is ready for dc home tomorrow and will resume outpatient HD on tuesday.       REVIEW OF SYSTEMS:    CONSTITUTIONAL: No fever, weight loss, or fatigue  RESPIRATORY: No cough, wheezing, hemoptysis; No shortness of breath  CARDIOVASCULAR: No chest pain, palpitations  GASTROINTESTINAL: No abdominal or epigastric pain. No nausea, vomiting  NEUROLOGICAL: No headaches, memory loss, loss of strength.  MISCELLANEOUS:      Vital Signs Last 24 Hrs  T(C): 36.5 (03 Jul 2020 01:52), Max: 36.8 (02 Jul 2020 15:10)  T(F): 97.7 (03 Jul 2020 01:52), Max: 98.3 (02 Jul 2020 15:10)  HR: 77 (03 Jul 2020 01:52) (37 - 79)  BP: 148/81 (03 Jul 2020 01:52) (130/67 - 156/81)  BP(mean): --  RR: 18 (03 Jul 2020 01:52) (18 - 18)  SpO2: 100% (03 Jul 2020 01:52) (98% - 100%)    PHYSICAL EXAM:    	GENERAL: NAD, well-groomed, well-developed  	HEAD:  Atraumatic, Normocephalic  	EYES: EOMI, PERRLA, conjunctiva and sclera clear  	ENMT: No tonsillar erythema, exudates, or enlargement; Moist mucous membranes, Good dentition, No lesions  	NECK: Supple, No JVD, Normal thyroid  	NERVOUS SYSTEM:  Alert & Oriented X3, Good concentration; Motor Strength 5/5 B/L upper and lower extremities;  	CHEST/LUNG: Clear to percussion bilaterally; No rales, rhonchi, wheezing, or rubs  	HEART: Regular rate and rhythm; No murmurs, rubs, or gallops  	ABDOMEN: Soft, Nontender, Nondistended; Bowel sounds present  	EXTREMITIES:  2+ Peripheral Pulses, No clubbing, cyanosis, or edema  	LYMPH: No lymphadenopathy noted  SKIN: No rashes or lesions      LABS:                        10.2   3.02  )-----------( 153      ( 01 Jul 2020 08:53 )             30.9     07-01    132<L>  |  90<L>  |  39.0<H>  ----------------------------<  75  5.7<H>   |  24.0  |  5.83<H>    Ca    8.6      01 Jul 2020 08:53  Mg     1.9     07-01    TPro  7.5  /  Alb  4.0  /  TBili  0.5  /  DBili  x   /  AST  24  /  ALT  9   /  AlkPhos  219<H>  07-01            MEDICATIONS  (STANDING):  aspirin  chewable 81 milliGRAM(s) Oral daily  atorvastatin 20 milliGRAM(s) Oral at bedtime  cinacalcet 30 milliGRAM(s) Oral two times a day  clopidogrel Tablet 75 milliGRAM(s) Oral daily  heparin   Injectable 5000 Unit(s) SubCutaneous every 12 hours  metoprolol tartrate 12.5 milliGRAM(s) Oral two times a day  ranolazine 500 milliGRAM(s) Oral two times a day  sevelamer carbonate 800 milliGRAM(s) Oral three times a day    MEDICATIONS  (PRN):  acetaminophen   Tablet .. 650 milliGRAM(s) Oral every 6 hours PRN Temp greater or equal to 38C (100.4F), Mild Pain (1 - 3)  hydrALAZINE Injectable 5 milliGRAM(s) IV Push every 6 hours PRN for sbp above 160 mmhg      RADIOLOGY & ADDITIONAL TESTS:

## 2020-07-03 NOTE — PROGRESS NOTE ADULT - SUBJECTIVE AND OBJECTIVE BOX
Chief Complaint: ESRD on HD    24 hour events/subjective:    Feels well; no acute complaints,     PAST HISTORY  --------------------------------------------------------------------------------  No significant changes to PMH, PSH, FHx, SHx, unless otherwise noted    ALLERGIES & MEDICATIONS  --------------------------------------------------------------------------------  Allergies    No Known Allergies    Standing Inpatient Medications  aspirin  chewable 81 milliGRAM(s) Oral daily  atorvastatin 20 milliGRAM(s) Oral at bedtime  cinacalcet 30 milliGRAM(s) Oral two times a day  clopidogrel Tablet 75 milliGRAM(s) Oral daily  heparin   Injectable 5000 Unit(s) SubCutaneous every 12 hours  metoprolol tartrate 12.5 milliGRAM(s) Oral two times a day  ranolazine 500 milliGRAM(s) Oral two times a day  sevelamer carbonate 800 milliGRAM(s) Oral three times a day    PRN Inpatient Medications  acetaminophen   Tablet .. 650 milliGRAM(s) Oral every 6 hours PRN  hydrALAZINE Injectable 5 milliGRAM(s) IV Push every 6 hours PRN    REVIEW OF SYSTEMS  --------------------------------------------------------------------------------  Gen: No weight changes, fatigue, fevers/chills, weakness  Skin: No rashes  Head/Eyes/Ears/Mouth: No headache; Normal hearing; Normal vision w/o blurriness; No sinus pain/discomfort, sore throat  Respiratory: No dyspnea, cough, wheezing, hemoptysis  CV: No chest pain, PND, orthopnea  GI: No abdominal pain, diarrhea, constipation, nausea, vomiting, melena, hematochezia  : No increased frequency, dysuria, hematuria, nocturia  MSK: No joint pain/swelling; no back pain; no edema  Neuro: No dizziness/lightheadedness, weakness, seizures, numbness, tingling  Heme: No easy bruising or bleeding  Endo: No heat/cold intolerance  Psych: No significant nervousness, anxiety, stress, depression    All other systems were reviewed and are negative, except as noted.    VITALS/PHYSICAL EXAM:    Vital Signs Last 24 Hrs,    T(C): 36.7 (2020 08:39), Max: 36.8 (2020 15:10)  T(F): 98 (2020 08:39), Max: 98.3 (2020 15:10)  HR: 73 (2020 08:39) (37 - 79)  BP: 126/75 (2020 08:39) (126/75 - 156/81)  RR: 18 (2020 08:39) (18 - 18)  SpO2: 95% (2020 08:39) (95% - 100%)  --------------------------------------------------------------------------------  T(C): 36.6 (20 @ 14:10), Max: 36.7 (20 @ 05:01)  HR: 69 (20 @ 14:10) (53 - 71)  BP: 144/86 (20 @ 14:10) (126/70 - 148/84)  RR: 18 (20 @ 14:10) (17 - 18)  SpO2: 100% (20 @ 14:10) (97% - 100%)    Physical Exam:  	Gen: NAD, well-appearing  	HEENT: PERRL, supple neck, clear oropharynx  	Pulm: CTA B/L  	CV: RRR, S1S2; no rub  	Back: No spinal or CVA tenderness; no sacral edema  	Abd: +BS, soft, nontender/nondistended  	: No suprapubic tenderness  	UE: Warm,; no asterixis  	LE: Warm, no edema  	Neuro: No focal deficits, intact gait  	Psych: Normal affect and mood  	Skin: Warm, without rashes  	Vascular access: LUE AVF, aneurysmal thrill/ bruit+    LABS/STUDIES:    TPro  7.5    /  Alb  4.0    /  TBili  0.5    /  DBili  x      /  AST  24     /  ALT  9      /  AlkPhos  219<H>  2020 08:53    M.9 mg/dL ( 08:53)    --------------------------------------------------------------------------------              10.2   3.02  >-----------<  153      [20 08:53]              30.9     132  |  90  |  39.0  ----------------------------<  75      [20 08:53]  5.7   |  24.0  |  5.83        Ca     8.6     [20 08:53]      Mg     1.9     [20 08:53]    TPro  7.5  /  Alb  4.0  /  TBili  0.5  /  DBili  x   /  AST  24  /  ALT  9   /  AlkPhos  219  [20 @ 08:53]    Creatinine Trend:  SCr 5.83 [ 08:53]  SCr 8.25 [ @ 09:23]    HbA1c 4.6      [19 @ 04:22]    HBsAb Reactive      [20 @ 15:59]  HBsAg Nonreact      [20 @ 04:36]  HCV 0.57, Nonreact      [20 @ 04:36]

## 2020-07-03 NOTE — PROGRESS NOTE ADULT - ASSESSMENT
1) ESRD - HD TTS schedule,    OP HD, Next week,     2) HTN - BP stable  Continue current meds    3) Anemia  Hgb.,  at goal    Leukopenia, SHPT, CAD ,       Cleared for discharge in AM, after HD

## 2020-07-04 VITALS
TEMPERATURE: 98 F | HEART RATE: 70 BPM | OXYGEN SATURATION: 98 % | DIASTOLIC BLOOD PRESSURE: 60 MMHG | RESPIRATION RATE: 18 BRPM | SYSTOLIC BLOOD PRESSURE: 120 MMHG

## 2020-07-04 DIAGNOSIS — I25.10 ATHEROSCLEROTIC HEART DISEASE OF NATIVE CORONARY ARTERY WITHOUT ANGINA PECTORIS: ICD-10-CM

## 2020-07-04 DIAGNOSIS — I10 ESSENTIAL (PRIMARY) HYPERTENSION: ICD-10-CM

## 2020-07-04 DIAGNOSIS — N18.6 END STAGE RENAL DISEASE: ICD-10-CM

## 2020-07-04 DIAGNOSIS — I48.91 UNSPECIFIED ATRIAL FIBRILLATION: ICD-10-CM

## 2020-07-04 LAB
ALBUMIN SERPL ELPH-MCNC: 4 G/DL — SIGNIFICANT CHANGE UP (ref 3.3–5.2)
ANION GAP SERPL CALC-SCNC: 20 MMOL/L — HIGH (ref 5–17)
BUN SERPL-MCNC: 49 MG/DL — HIGH (ref 8–20)
CALCIUM SERPL-MCNC: 7.6 MG/DL — LOW (ref 8.6–10.2)
CHLORIDE SERPL-SCNC: 87 MMOL/L — LOW (ref 98–107)
CO2 SERPL-SCNC: 23 MMOL/L — SIGNIFICANT CHANGE UP (ref 22–29)
CREAT SERPL-MCNC: 6.72 MG/DL — HIGH (ref 0.5–1.3)
GLUCOSE SERPL-MCNC: 80 MG/DL — SIGNIFICANT CHANGE UP (ref 70–99)
HCT VFR BLD CALC: 25.7 % — LOW (ref 39–50)
HGB BLD-MCNC: 9 G/DL — LOW (ref 13–17)
MCHC RBC-ENTMCNC: 33.3 PG — SIGNIFICANT CHANGE UP (ref 27–34)
MCHC RBC-ENTMCNC: 35 GM/DL — SIGNIFICANT CHANGE UP (ref 32–36)
MCV RBC AUTO: 95.2 FL — SIGNIFICANT CHANGE UP (ref 80–100)
PHOSPHATE SERPL-MCNC: 4.9 MG/DL — HIGH (ref 2.4–4.7)
PLATELET # BLD AUTO: 128 K/UL — LOW (ref 150–400)
POTASSIUM SERPL-MCNC: 4.1 MMOL/L — SIGNIFICANT CHANGE UP (ref 3.5–5.3)
POTASSIUM SERPL-SCNC: 4.1 MMOL/L — SIGNIFICANT CHANGE UP (ref 3.5–5.3)
RBC # BLD: 2.7 M/UL — LOW (ref 4.2–5.8)
RBC # FLD: 17.9 % — HIGH (ref 10.3–14.5)
SODIUM SERPL-SCNC: 130 MMOL/L — LOW (ref 135–145)
WBC # BLD: 2.49 K/UL — LOW (ref 3.8–10.5)
WBC # FLD AUTO: 2.49 K/UL — LOW (ref 3.8–10.5)

## 2020-07-04 PROCEDURE — 85027 COMPLETE CBC AUTOMATED: CPT

## 2020-07-04 PROCEDURE — 80053 COMPREHEN METABOLIC PANEL: CPT

## 2020-07-04 PROCEDURE — 86706 HEP B SURFACE ANTIBODY: CPT

## 2020-07-04 PROCEDURE — 99261: CPT

## 2020-07-04 PROCEDURE — 93005 ELECTROCARDIOGRAM TRACING: CPT

## 2020-07-04 PROCEDURE — 82962 GLUCOSE BLOOD TEST: CPT

## 2020-07-04 PROCEDURE — 83735 ASSAY OF MAGNESIUM: CPT

## 2020-07-04 PROCEDURE — 82310 ASSAY OF CALCIUM: CPT

## 2020-07-04 PROCEDURE — 80074 ACUTE HEPATITIS PANEL: CPT

## 2020-07-04 PROCEDURE — 83970 ASSAY OF PARATHORMONE: CPT

## 2020-07-04 PROCEDURE — U0003: CPT

## 2020-07-04 PROCEDURE — T1013: CPT

## 2020-07-04 PROCEDURE — 71045 X-RAY EXAM CHEST 1 VIEW: CPT

## 2020-07-04 PROCEDURE — 36415 COLL VENOUS BLD VENIPUNCTURE: CPT

## 2020-07-04 PROCEDURE — 99285 EMERGENCY DEPT VISIT HI MDM: CPT

## 2020-07-04 PROCEDURE — 80069 RENAL FUNCTION PANEL: CPT

## 2020-07-04 PROCEDURE — 84100 ASSAY OF PHOSPHORUS: CPT

## 2020-07-04 PROCEDURE — 90937 HEMODIALYSIS REPEATED EVAL: CPT

## 2020-07-04 PROCEDURE — 99239 HOSP IP/OBS DSCHRG MGMT >30: CPT

## 2020-07-04 PROCEDURE — 84484 ASSAY OF TROPONIN QUANT: CPT

## 2020-07-04 RX ORDER — CALCITRIOL 0.5 UG/1
1 CAPSULE ORAL
Qty: 30 | Refills: 0
Start: 2020-07-04 | End: 2020-08-02

## 2020-07-04 RX ORDER — DOXERCALCIFEROL 2.5 UG/1
2 CAPSULE ORAL
Refills: 0 | Status: DISCONTINUED | OUTPATIENT
Start: 2020-07-04 | End: 2020-07-04

## 2020-07-04 RX ORDER — CALCITRIOL 0.5 UG/1
0.5 CAPSULE ORAL DAILY
Refills: 0 | Status: DISCONTINUED | OUTPATIENT
Start: 2020-07-04 | End: 2020-07-04

## 2020-07-04 RX ORDER — ERYTHROPOIETIN 10000 [IU]/ML
10000 INJECTION, SOLUTION INTRAVENOUS; SUBCUTANEOUS
Refills: 0 | Status: DISCONTINUED | OUTPATIENT
Start: 2020-07-04 | End: 2020-07-04

## 2020-07-04 RX ADMIN — CALCITRIOL 0.5 MICROGRAM(S): 0.5 CAPSULE ORAL at 13:33

## 2020-07-04 RX ADMIN — SEVELAMER CARBONATE 800 MILLIGRAM(S): 2400 POWDER, FOR SUSPENSION ORAL at 06:05

## 2020-07-04 RX ADMIN — ERYTHROPOIETIN 10000 UNIT(S): 10000 INJECTION, SOLUTION INTRAVENOUS; SUBCUTANEOUS at 09:35

## 2020-07-04 RX ADMIN — DOXERCALCIFEROL 2 MICROGRAM(S): 2.5 CAPSULE ORAL at 09:35

## 2020-07-04 RX ADMIN — CINACALCET 30 MILLIGRAM(S): 30 TABLET, FILM COATED ORAL at 06:05

## 2020-07-04 RX ADMIN — HEPARIN SODIUM 5000 UNIT(S): 5000 INJECTION INTRAVENOUS; SUBCUTANEOUS at 06:05

## 2020-07-04 RX ADMIN — Medication 12.5 MILLIGRAM(S): at 06:05

## 2020-07-04 RX ADMIN — Medication 81 MILLIGRAM(S): at 13:33

## 2020-07-04 RX ADMIN — CLOPIDOGREL BISULFATE 75 MILLIGRAM(S): 75 TABLET, FILM COATED ORAL at 13:33

## 2020-07-04 RX ADMIN — RANOLAZINE 500 MILLIGRAM(S): 500 TABLET, FILM COATED, EXTENDED RELEASE ORAL at 06:05

## 2020-07-04 RX ADMIN — SEVELAMER CARBONATE 800 MILLIGRAM(S): 2400 POWDER, FOR SUSPENSION ORAL at 13:33

## 2020-07-04 NOTE — PROGRESS NOTE ADULT - REASON FOR ADMISSION
here for hd
ESRD
here for hd

## 2020-07-04 NOTE — PROGRESS NOTE ADULT - SUBJECTIVE AND OBJECTIVE BOX
[facility] DIVISION OF KIDNEY DISEASES AND HYPERTENSION -- HEMODIALYSIS NOTE  --------------------------------------------------------------------------------  Chief Complaint: ESRD/Ongoing hemodialysis requirement    24 hour events/subjective:    PAST HISTORY  --------------------------------------------------------------------------------  No significant changes to PMH, PSH, FHx, SHx, unless otherwise noted    ALLERGIES & MEDICATIONS  --------------------------------------------------------------------------------  Allergies    No Known Allergies    Standing Inpatient Medications  aspirin  chewable 81 milliGRAM(s) Oral daily  atorvastatin 20 milliGRAM(s) Oral at bedtime  clopidogrel Tablet 75 milliGRAM(s) Oral daily  doxercalciferol Injectable 2 MICROGram(s) IV Push <User Schedule>  heparin   Injectable 5000 Unit(s) SubCutaneous every 12 hours  metoprolol tartrate 12.5 milliGRAM(s) Oral two times a day  ranolazine 500 milliGRAM(s) Oral two times a day  sevelamer carbonate 800 milliGRAM(s) Oral three times a day    PRN Inpatient Medications  acetaminophen   Tablet .. 650 milliGRAM(s) Oral every 6 hours PRN  hydrALAZINE Injectable 5 milliGRAM(s) IV Push every 6 hours PRN    REVIEW OF SYSTEMS  --------------------------------------------------------------------------------  Gen: No weight changes, fatigue, fevers/chills, weakness  Skin: No rashes  Head/Eyes/Ears/Mouth: No headache; Normal hearing; Normal vision w/o blurriness; No sinus pain/discomfort, sore throat  Respiratory: No dyspnea, cough, wheezing, hemoptysis  CV: No chest pain, PND, orthopnea  GI: No abdominal pain, diarrhea, constipation, nausea, vomiting, melena, hematochezia  : No increased frequency, dysuria, hematuria, nocturia  MSK: No joint pain/swelling; no back pain; no edema  Neuro: No dizziness/lightheadedness, weakness, seizures, numbness, tingling  Heme: No easy bruising or bleeding  Endo: No heat/cold intolerance  Psych: No significant nervousness, anxiety, stress, depression    All other systems were reviewed and are negative, except as noted.    VITALS/PHYSICAL EXAM  --------------------------------------------------------------------------------  T(C): 36.4 (07-04-20 @ 07:23), Max: 36.8 (07-04-20 @ 00:29)  HR: 59 (07-04-20 @ 07:23) (59 - 81)  BP: 123/68 (07-04-20 @ 07:23) (113/68 - 135/67)  RR: 20 (07-04-20 @ 07:23) (16 - 20)  SpO2: 100% (07-04-20 @ 07:23) (98% - 100%)    Physical Exam:  	Gen: NAD, well-appearing, Pale,   	HEENT: PERRL, supple neck,   	Pulm: CTA B/L  	CV: RRR, S1S2; no rub  	Back: No spinal or CVA tenderness; no sacral edema  	Abd: +BS, soft, nontender/nondistended  	: No suprapubic tenderness  	UE: Warm, FROM, no clubbing, intact strength; no edema; no asterixis  	LE: Warm, FROM, no clubbing, intact strength; no edema  	Neuro: No focal deficits, intact gait  	Psych: Normal affect and mood  	Skin: Warm, without rashes  	Vascular access: AVF,    LABS/STUDIES  --------------------------------------------------------------------------------              9.0    2.49  >-----------<  128      [07-04-20 @ 08:32]              25.7     131  |  88  |  36.0  ----------------------------<  95      [07-03-20 @ 12:28]  4.9   |  27.0  |  5.31        Ca     7.8     [07-03-20 @ 12:28]      Phos  4.3     [07-03-20 @ 12:28]    TPro  x   /  Alb  4.0  /  TBili  x   /  DBili  x   /  AST  x   /  ALT  x   /  AlkPhos  x   [07-03-20 @ 12:28]    Troponin 0.06      [07-03-20 @ 12:28]    PTH -- (Ca 7.9)      [07-03-20 @ 15:46]   268  HbA1c 4.6      [02-13-19 @ 04:22]    HBsAb Reactive      [07-01-20 @ 15:59]  HBsAg Nonreact      [07-01-20 @ 04:36]  HCV 0.57, Nonreact      [07-01-20 @ 04:36]

## 2020-07-04 NOTE — PROGRESS NOTE ADULT - ASSESSMENT
74 yr old male with  hypertension, hyperlipidemia, CAD, ESRD on HD presented to ED to establish outpatient HD seat, need for COVID test. Nephrology was consulted, HD was continued as per schedule. His home medications were continued. Social work was consulted, hepatitis panel and PPD was placed. HD seat was secured. 74 yr old male with  hypertension, hyperlipidemia, CAD, ESRD on HD, atrial fibrillation s/p watchman's procedure, PVD presented to ED to establish outpatient HD seat, need for COVID test. Nephrology was consulted, HD was continued as per schedule. His home medications were continued. Social work was consulted, hepatitis panel and PPD was placed. HD seat was secured.

## 2020-07-04 NOTE — PROGRESS NOTE ADULT - SUBJECTIVE AND OBJECTIVE BOX
INTERVAL HPI/OVERNIGHT EVENTS:    CC:    Vital Signs Last 24 Hrs  T(C): 36.4 (04 Jul 2020 07:23), Max: 36.8 (04 Jul 2020 00:29)  T(F): 97.5 (04 Jul 2020 07:23), Max: 98.3 (04 Jul 2020 00:29)  HR: 59 (04 Jul 2020 07:23) (59 - 81)  BP: 123/68 (04 Jul 2020 07:23) (113/68 - 135/67)  BP(mean): --  RR: 20 (04 Jul 2020 07:23) (16 - 20)  SpO2: 100% (04 Jul 2020 07:23) (98% - 100%)    PHYSICAL EXAM:    GENERAL:   CHEST/LUNG:   HEART:   ABDOMEN:   EXTREMITIES:      MEDICATIONS  (STANDING):  aspirin  chewable 81 milliGRAM(s) Oral daily  atorvastatin 20 milliGRAM(s) Oral at bedtime  clopidogrel Tablet 75 milliGRAM(s) Oral daily  doxercalciferol Injectable 2 MICROGram(s) IV Push <User Schedule>  heparin   Injectable 5000 Unit(s) SubCutaneous every 12 hours  metoprolol tartrate 12.5 milliGRAM(s) Oral two times a day  ranolazine 500 milliGRAM(s) Oral two times a day  sevelamer carbonate 800 milliGRAM(s) Oral three times a day    MEDICATIONS  (PRN):  acetaminophen   Tablet .. 650 milliGRAM(s) Oral every 6 hours PRN Temp greater or equal to 38C (100.4F), Mild Pain (1 - 3)  hydrALAZINE Injectable 5 milliGRAM(s) IV Push every 6 hours PRN for sbp above 160 mmhg      Allergies    No Known Allergies    Intolerances          LABS:                          9.0    2.49  )-----------( 128      ( 04 Jul 2020 08:32 )             25.7     07-03    131<L>  |  88<L>  |  36.0<H>  ----------------------------<  95  4.9   |  27.0  |  5.31<H>    Ca    7.8<L>      03 Jul 2020 12:28  Phos  4.3     07-03    TPro  x   /  Alb  4.0  /  TBili  x   /  DBili  x   /  AST  x   /  ALT  x   /  AlkPhos  x   07-03          RADIOLOGY & ADDITIONAL TESTS: INTERVAL HPI/OVERNIGHT EVENTS:    CC: ESRD on HD, CAD, hypertension    Chart and course reviewed. No overnight events, seen during HD, denies complaints.    Vital Signs Last 24 Hrs  T(C): 36.4 (04 Jul 2020 07:23), Max: 36.8 (04 Jul 2020 00:29)  T(F): 97.5 (04 Jul 2020 07:23), Max: 98.3 (04 Jul 2020 00:29)  HR: 59 (04 Jul 2020 07:23) (59 - 81)  BP: 123/68 (04 Jul 2020 07:23) (113/68 - 135/67)  BP(mean): --  RR: 20 (04 Jul 2020 07:23) (16 - 20)  SpO2: 100% (04 Jul 2020 07:23) (98% - 100%)    PHYSICAL EXAM:    GENERAL: alert, not in distress  CHEST/LUNG: b/l air entry  HEART: regular  ABDOMEN: soft, bs+  EXTREMITIES: no edema, tenderness     MEDICATIONS  (STANDING):  aspirin  chewable 81 milliGRAM(s) Oral daily  atorvastatin 20 milliGRAM(s) Oral at bedtime  clopidogrel Tablet 75 milliGRAM(s) Oral daily  doxercalciferol Injectable 2 MICROGram(s) IV Push <User Schedule>  heparin   Injectable 5000 Unit(s) SubCutaneous every 12 hours  metoprolol tartrate 12.5 milliGRAM(s) Oral two times a day  ranolazine 500 milliGRAM(s) Oral two times a day  sevelamer carbonate 800 milliGRAM(s) Oral three times a day    MEDICATIONS  (PRN):  acetaminophen   Tablet .. 650 milliGRAM(s) Oral every 6 hours PRN Temp greater or equal to 38C (100.4F), Mild Pain (1 - 3)  hydrALAZINE Injectable 5 milliGRAM(s) IV Push every 6 hours PRN for sbp above 160 mmhg      Allergies    No Known Allergies    Intolerances          LABS:                          9.0    2.49  )-----------( 128      ( 04 Jul 2020 08:32 )             25.7     07-03    131<L>  |  88<L>  |  36.0<H>  ----------------------------<  95  4.9   |  27.0  |  5.31<H>    Ca    7.8<L>      03 Jul 2020 12:28  Phos  4.3     07-03    TPro  x   /  Alb  4.0  /  TBili  x   /  DBili  x   /  AST  x   /  ALT  x   /  AlkPhos  x   07-03          RADIOLOGY & ADDITIONAL TESTS:

## 2020-07-04 NOTE — PROGRESS NOTE ADULT - ASSESSMENT
1) ESRD - HD TTS,    2) HTN - BP stable  Continue current meds    3) Anemia  Hgb.,  at goal    Leukopenia, SHPT, CAD ,     Cleared for discharge in AM, after HD

## 2020-07-04 NOTE — PROGRESS NOTE ADULT - PROVIDER SPECIALTY LIST ADULT
Internal Medicine
Internal Medicine
Nephrology
Hospitalist
Nephrology
Nephrology
Internal Medicine
Nephrology

## 2020-07-04 NOTE — PROGRESS NOTE ADULT - PROBLEM SELECTOR PLAN 4
Reviewed EKG and outpatient cardiology notes. History of watchman's procedure, ? SAH. Continue Aspirin, outpatient cardiology follow up. Rate controlled.

## 2020-07-04 NOTE — PROGRESS NOTE ADULT - PROBLEM SELECTOR PLAN 1
Continue HD as per schedule, discussed with social work, has outpatient HD seat, to follow up on Tuesday.

## 2020-07-16 DIAGNOSIS — Z99.2 DEPENDENCE ON RENAL DIALYSIS: ICD-10-CM

## 2020-07-16 RX ORDER — RANOLAZINE 500 MG/1
500 TABLET, FILM COATED, EXTENDED RELEASE ORAL
Qty: 180 | Refills: 0 | Status: ACTIVE | COMMUNITY

## 2020-07-17 ENCOUNTER — NON-APPOINTMENT (OUTPATIENT)
Age: 75
End: 2020-07-17

## 2020-07-17 ENCOUNTER — APPOINTMENT (OUTPATIENT)
Dept: CARDIOLOGY | Facility: CLINIC | Age: 75
End: 2020-07-17
Payer: MEDICARE

## 2020-07-17 DIAGNOSIS — R53.83 OTHER FATIGUE: ICD-10-CM

## 2020-07-17 DIAGNOSIS — R42 DIZZINESS AND GIDDINESS: ICD-10-CM

## 2020-07-17 PROCEDURE — 93000 ELECTROCARDIOGRAM COMPLETE: CPT

## 2020-07-17 PROCEDURE — 99215 OFFICE O/P EST HI 40 MIN: CPT

## 2020-07-17 RX ORDER — LEUPROLIDE ACETATE 1 MG/0.2ML
VIAL (ML) SUBCUTANEOUS
Refills: 0 | Status: DISCONTINUED | COMMUNITY
End: 2020-07-17

## 2020-07-17 RX ORDER — CALCITRIOL 0.5 UG/1
0.5 CAPSULE, LIQUID FILLED ORAL DAILY
Refills: 0 | Status: DISCONTINUED | COMMUNITY
End: 2020-07-17

## 2020-07-17 RX ORDER — LEVOCETIRIZINE DIHYDROCHLORIDE 5 MG/1
5 TABLET ORAL DAILY
Refills: 0 | Status: ACTIVE | COMMUNITY

## 2020-07-17 RX ORDER — METOPROLOL TARTRATE 25 MG/1
25 TABLET, FILM COATED ORAL TWICE DAILY
Refills: 0 | Status: DISCONTINUED | COMMUNITY
End: 2020-07-17

## 2020-07-17 RX ORDER — OMEPRAZOLE 40 MG/1
40 CAPSULE, DELAYED RELEASE ORAL
Qty: 90 | Refills: 1 | Status: ACTIVE | COMMUNITY

## 2020-07-17 RX ORDER — GLIMEPIRIDE 1 MG/1
1 TABLET ORAL DAILY
Refills: 0 | Status: DISCONTINUED | COMMUNITY
End: 2020-07-17

## 2020-07-17 NOTE — CARDIOLOGY SUMMARY
[LVEF ___%] : LVEF [unfilled]% [Moderate] : moderate pulmonary hypertension [Enlarged] : enlarged LA size [None] : no mitral regurgitation [___] : [unfilled] [de-identified] : feb 2019: Pause : 2 sec pause. afib.

## 2020-07-17 NOTE — PHYSICAL EXAM
[Normal Appearance] : normal appearance [General Appearance - Well Developed] : well developed [Well Groomed] : well groomed [General Appearance - Well Nourished] : well nourished [No Deformities] : no deformities [General Appearance - In No Acute Distress] : no acute distress [Normal Conjunctiva] : the conjunctiva exhibited no abnormalities [Eyelids - No Xanthelasma] : the eyelids demonstrated no xanthelasmas [Normal Oral Mucosa] : normal oral mucosa [No Oral Pallor] : no oral pallor [No Oral Cyanosis] : no oral cyanosis [Normal Jugular Venous A Waves Present] : normal jugular venous A waves present [Normal Jugular Venous V Waves Present] : normal jugular venous V waves present [No Jugular Venous Campos A Waves] : no jugular venous campos A waves [Respiration, Rhythm And Depth] : normal respiratory rhythm and effort [Exaggerated Use Of Accessory Muscles For Inspiration] : no accessory muscle use [Auscultation Breath Sounds / Voice Sounds] : lungs were clear to auscultation bilaterally [Heart Rate And Rhythm] : heart rate and rhythm were normal [Heart Sounds] : normal S1 and S2 [Murmurs] : no murmurs present [Abdomen Soft] : soft [Abdomen Tenderness] : non-tender [Abdomen Mass (___ Cm)] : no abdominal mass palpated [Abnormal Walk] : normal gait [Gait - Sufficient For Exercise Testing] : the gait was sufficient for exercise testing [Cyanosis, Localized] : no localized cyanosis [Petechial Hemorrhages (___cm)] : no petechial hemorrhages [Nail Clubbing] : no clubbing of the fingernails [Skin Color & Pigmentation] : normal skin color and pigmentation [] : no rash [No Venous Stasis] : no venous stasis [Skin Lesions] : no skin lesions [No Skin Ulcers] : no skin ulcer [Oriented To Time, Place, And Person] : oriented to person, place, and time [No Xanthoma] : no  xanthoma was observed [Affect] : the affect was normal [No Anxiety] : not feeling anxious [Mood] : the mood was normal [FreeTextEntry1] : Uses crutches. Arthritis. Right knee worse. Insuffiecnet for testing.

## 2020-07-17 NOTE — HISTORY OF PRESENT ILLNESS
[FreeTextEntry1] : chest tightness, dyspnea, palpitations, atrial fibrillation\par \par HPI for today: : i am feeling bit tired.  palpitations. + several months. \par pain in the legs when he walks. compliant iwht meds.\par \par old note: feels tired. when he walks. No cehst pain,. no dyspnea. no palpitations.  patient was recently in the hospital \par \par old note: feels tired. fatigue and tired. \par getting hemodialyses regularly.  dizziness. Lightheadedness. \par discharge summaryL FEB 2019:  Pt is a 72 yo M presenting w/ chest pain for atleast 5 days duration. PMH A fib \par s/p Watchman, recent Subarrachonoid hemorrhage, ESRD on HD, DM2, R iliac \par stenosis, post traumatic seizure, L nephrectomy, CAD. Pt has been c/o chest stephania/pressure for atleast 5 days now, L anterior sternum, pain is worse with coughing, denies exertional component, no associated SOB currently, but does get SOB at times, denies any radiation of the pain, no associated diaphoresis. He has a hx of chronic chest pain and had a stress test in May 2018, lexiscan which showed mild ischemia in RCA, he had a cardiac cath in July 2018 LAD 40% and RPLS 75% stenosis. He has no other complaints. Of note he had a fall with a resultant SAH and was just discharged from the hospital last week. He has not restarted anti-platlet medications (ASA or Plavix), he is NOT on coumadin. He had a watchman procedure. \par \par \par old note/l :  c./c: here for pain management cleerance.\par here for Pre-operative cardiovascular risk evaluation and management for epidurakl injection for pain management, \par No chest stephania. no dyspnea. no headaches. No syncope. No paltpiations. \par \par old note: \par No chest pain. no dyspnea. 0.5 block activity.  no dyspnea. no dizzines. no syncope. \par \par old note: no chest pain. no dyspnea. no lightheadness. \par here for epidual injection  want to know if ok to hold aspirin and plavix. \par no chest pain. s/p watchman. 45 day post wathcmn :3.5 mm leak. 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  Anuric, AV graft/fistula right arm.\par Here for preoperative cardiovascular risk evaluation and management.

## 2020-07-17 NOTE — DISCUSSION/SUMMARY
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 : prior coronary artery disease .  prior bradycardia.  4 weeks event monitor.  \par intermediate risk factors for coronary artery disease.   echocardiogram with contrast if needed.   Nuclear stress test with IV technetium radiotracer. \par  2) Leg pain: claudication. stable. symptoms. Peripheral vascular disease./  Failed Angioplasty of right common iliac .  ct medical therpay. KAREEM and US areteial US. Dual antiplatelet therapy \par 3) Atrial fibrillation: left renal hematoma and bleeding. s/p watchman.  1 year CANDIDA- no leak.   asa 81  .  off metoprolo   rate controlled.  bradyca events \par 4) CAD: moderate  LAD diseae and severe RPKL disease. Dual antiplatelet therapy Ct statins. lipitor 20 mg daily. : stress test. \par 5) Hypotension: On midodrine. Orthostatic.  midodrine to 15 mg Q8. Compression stiockings \par 5 ) Mild carotid atherosclerosis. '6) SAH: due to fall.

## 2020-08-12 ENCOUNTER — APPOINTMENT (OUTPATIENT)
Dept: CARDIOLOGY | Facility: CLINIC | Age: 75
End: 2020-08-12
Payer: MEDICARE

## 2020-08-12 PROCEDURE — 93925 LOWER EXTREMITY STUDY: CPT

## 2020-08-12 PROCEDURE — 93923 UPR/LXTR ART STDY 3+ LVLS: CPT

## 2020-08-31 ENCOUNTER — APPOINTMENT (OUTPATIENT)
Dept: CARDIOLOGY | Facility: CLINIC | Age: 75
End: 2020-08-31
Payer: MEDICARE

## 2020-08-31 PROCEDURE — 93015 CV STRESS TEST SUPVJ I&R: CPT

## 2020-08-31 PROCEDURE — 78452 HT MUSCLE IMAGE SPECT MULT: CPT

## 2020-08-31 PROCEDURE — 93306 TTE W/DOPPLER COMPLETE: CPT

## 2020-08-31 PROCEDURE — A9500: CPT

## 2020-10-23 ENCOUNTER — LABORATORY RESULT (OUTPATIENT)
Age: 75
End: 2020-10-23

## 2020-10-23 NOTE — H&P PST ADULT - HISTORY OF PRESENT ILLNESS
Narrative: This is a 74 year old gentleman with a PMH of CAD, AF, ESRD, chronic chest tightness and dyspnea, peripheral vascular disease-presenting for bilateral lower extremity angiogram with left popliteal intervention with Dr Menon.     KAREEM Study-20-unable to obtain RUE pressures due to fistula    Symptoms:        Angina (Class):        Ischemic Symptoms:     Heart Failure:        Systolic/Diastolic/Combined:        NYHA Class (within 2 weeks):     Assessment of LVEF (Must be within 6 months):       EF: 55-60%       Assessed by: echo       Date: 20    Prior Cardiac Interventions (LHC, stents, CABG):       PCI's (Date, Stents, Vessels):        CABG (Date, Grafts):     Noninvasive Testing:   Stress Test: Date: 20       Protocol:        Duration of Exercise: deferred due to decreased exercise capacity       Symptoms:        EKG Changes:        DTS:        Myocardial Imagin20-Mild ischemia in RCA territory. LV normal in size. post stress LVEF-67%       Risk Assessment (Low, Medium, High):     Echo (Date, Findings): 20-EF-55-60%, mildly enlarged LA estimated PAP 42 mmHg. mild MR    Antianginal Therapies:        Beta Blockers:         Calcium Channel Blockers:        Long Acting Nitrates:        Ranexa:     Associated Risk Factors:        Cerebrovascular Disease: N/A       Chronic Lung Disease: N/A       Peripheral Arterial Disease: PVD       Chronic Kidney Disease (if yes, what is GFR): ESRD        Uncontrolled Diabetes (if yes, what is HgbA1C or FBS): N/A       Poorly Controlled Hypertension (if yes, what is SBP): N/A       Morbid Obesity (if yes, what is BMI): N/A       History of Recent Ventricular Arrhythmia: N/A       Inability to Ambulate Safely: N/A       Need for Therapeutic Anticoagulation: N/A       Antiplatelet or Contrast Allergy: N/A

## 2020-10-23 NOTE — H&P PST ADULT - ASSESSMENT
A 74 year old gentleman with a PMH of CAD, AF, ESRD, chronic chest tightness and dyspnea, peripheral vascular disease-presenting for bilateral lower extremity angiogram with left popliteal intervention with Dr Menon.     PRE-PROCEDURE ASSESSMENT  -NPO after midnight confirmed  -IV insert  -Patient seen and examined  -Labs reviewed  -Pre-procedure teaching  - consent  -Questions answered    ASA-  MALL-  eGFR-  Creat-  BRA-

## 2020-10-26 ENCOUNTER — OUTPATIENT (OUTPATIENT)
Dept: OUTPATIENT SERVICES | Facility: HOSPITAL | Age: 75
LOS: 1 days | End: 2020-10-26

## 2020-10-26 VITALS — HEIGHT: 66 IN | WEIGHT: 184.97 LBS

## 2020-10-26 DIAGNOSIS — Z90.5 ACQUIRED ABSENCE OF KIDNEY: Chronic | ICD-10-CM

## 2020-10-26 DIAGNOSIS — Z94.0 KIDNEY TRANSPLANT STATUS: Chronic | ICD-10-CM

## 2020-10-26 DIAGNOSIS — I77.0 ARTERIOVENOUS FISTULA, ACQUIRED: Chronic | ICD-10-CM

## 2020-10-26 DIAGNOSIS — Q20.8 OTHER CONGENITAL MALFORMATIONS OF CARDIAC CHAMBERS AND CONNECTIONS: Chronic | ICD-10-CM

## 2020-10-26 DIAGNOSIS — I73.9 PERIPHERAL VASCULAR DISEASE, UNSPECIFIED: ICD-10-CM

## 2020-10-26 LAB
ANION GAP SERPL CALC-SCNC: 17 MMOL/L — SIGNIFICANT CHANGE UP (ref 5–17)
APTT BLD: 35.8 SEC — HIGH (ref 27.5–35.5)
BUN SERPL-MCNC: 37 MG/DL — HIGH (ref 8–20)
CALCIUM SERPL-MCNC: 9 MG/DL — SIGNIFICANT CHANGE UP (ref 8.6–10.2)
CHLORIDE SERPL-SCNC: 95 MMOL/L — LOW (ref 98–107)
CO2 SERPL-SCNC: 28 MMOL/L — SIGNIFICANT CHANGE UP (ref 22–29)
CREAT SERPL-MCNC: 4.95 MG/DL — HIGH (ref 0.5–1.3)
GLUCOSE SERPL-MCNC: 100 MG/DL — HIGH (ref 70–99)
HCT VFR BLD CALC: 34.7 % — LOW (ref 39–50)
HGB BLD-MCNC: 11.2 G/DL — LOW (ref 13–17)
INR BLD: 1.19 RATIO — HIGH (ref 0.88–1.16)
MAGNESIUM SERPL-MCNC: 2 MG/DL — SIGNIFICANT CHANGE UP (ref 1.6–2.6)
MCHC RBC-ENTMCNC: 32.3 GM/DL — SIGNIFICANT CHANGE UP (ref 32–36)
MCHC RBC-ENTMCNC: 35.1 PG — HIGH (ref 27–34)
MCV RBC AUTO: 108.8 FL — HIGH (ref 80–100)
PLATELET # BLD AUTO: 206 K/UL — SIGNIFICANT CHANGE UP (ref 150–400)
POTASSIUM SERPL-MCNC: 5.2 MMOL/L — SIGNIFICANT CHANGE UP (ref 3.5–5.3)
POTASSIUM SERPL-SCNC: 5.2 MMOL/L — SIGNIFICANT CHANGE UP (ref 3.5–5.3)
PROTHROM AB SERPL-ACNC: 13.7 SEC — HIGH (ref 10.6–13.6)
RBC # BLD: 3.19 M/UL — LOW (ref 4.2–5.8)
RBC # FLD: 16.2 % — HIGH (ref 10.3–14.5)
SARS-COV-2 RNA SPEC QL NAA+PROBE: SIGNIFICANT CHANGE UP
SODIUM SERPL-SCNC: 140 MMOL/L — SIGNIFICANT CHANGE UP (ref 135–145)
WBC # BLD: 6.63 K/UL — SIGNIFICANT CHANGE UP (ref 3.8–10.5)
WBC # FLD AUTO: 6.63 K/UL — SIGNIFICANT CHANGE UP (ref 3.8–10.5)

## 2020-10-26 RX ORDER — CHLORHEXIDINE GLUCONATE 213 G/1000ML
1 SOLUTION TOPICAL ONCE
Refills: 0 | Status: DISCONTINUED | OUTPATIENT
Start: 2020-10-27 | End: 2020-10-27

## 2020-10-26 NOTE — H&P PST ADULT - ASSESSMENT
73 yo male with PMHX of Afib s/p Watchman procedure (1/30/2017), SAH 2/2 traumatic fall (2/10/2019) ESRD on HD (~20 years) s/p Left Nephrectomy 2/2 bleeding, DM2, Right Iliac stenosis, post traumatic seizure, CAD s/p cardiac cath (7/2018) which showed LAD 40% and RPLS 75% stenosis. Last echo 8/31/20 with EF 55-60%, moderate LVH. Nuclear stress (8/31/20) with mild ischemia in RCA territory, normal wall motion with post stress LVEF of 67%. Patient has significant history of PVD for which he underwent  a failed PTA of right common iliac  (8/7/2018 with Dr. Menon) c/b right external iliac perforation and RP bleed, caused by OCT guided atherectomy managed by balloon tamponade of proximal flow and distal tamponade of collaterals into right CFA, pt was monitored in MICU and was subsequently stable for discharge. Medical management was recommended. Patient underwent recent KAREEM study with segmental pressure ans PVR which showed significantly abnormal PVR's to right leg starting at level of thigh with normal values. He now presents for bilateral lower extremity angiogram  with possible left popliteal artery intervention with Dr. Menon.       -Patient seen and examined  -Labs and EKG reviewed   -Pre-procedure teaching completed with patient, questions answered   -Informed consent to be obtained by attending      75 yo male with PMHX of Afib s/p Watchman procedure (1/30/2017), SAH 2/2 traumatic fall (2/10/2019) ESRD on HD (~20 years) s/p Left Nephrectomy 2/2 bleeding, DM2, Right Iliac stenosis, post traumatic seizure, CAD s/p cardiac cath (7/2018) which showed LAD 40% and RPLS 75% stenosis. Patient has significant history of PVD for which he underwent  a failed PTA of right common iliac  (8/7/2018 with Dr. Menon) c/b right external iliac perforation and RP bleed, caused by OCT guided atherectomy managed by balloon tamponade of proximal flow and distal tamponade of collaterals into right CFA. Recent follow up with his cardiologist, Dr. Jerome, pt c/o pain in his legs with walking therefore he underwent recent KAREEM study with segmental pressure and PVR which showed significantly abnormal PVR's to right leg starting at level of thigh with normal values. He now presents for bilateral lower extremity angiogram  with possible left popliteal artery intervention with Dr. Menon.       -Patient seen and examined  -Labs and EKG reviewed   -Pre-procedure teaching completed with patient, questions answered   -Informed consent to be obtained by attending

## 2020-10-26 NOTE — H&P PST ADULT - HISTORY OF PRESENT ILLNESS
Narrative:     75 yo male with PMHX of Afib s/p Watchman procedure (1/30/2017), SAH 2/2 traumatic fall (2/10/2019) ESRD on HD (~20 years) s/p Left Nephrectomy 2/2 bleeding, DM2, Right Iliac stenosis, post traumatic seizure, CAD s/p cardiac cath (7/2018) which showed LAD 40% and RPLS 75% stenosis. Last echo 8/31/20 with EF 55-60%, moderate LVH. Nuclear stress (8/31/20) with mild ischemia in RCA territory, normal wall motion with post stress LVEF of 67%.           Symptoms:        Angina (Class):        Ischemic Symptoms:     Heart Failure:        Systolic/Diastolic/Combined:        NYHA Class (within 2 weeks):     Assessment of LVEF (Must be within 6 months):       EF:        Assessed by:        Date:     Prior Cardiac Interventions (LHC, stents, CABG):       PCI's (Date, Stents, Vessels):        CABG (Date, Grafts):     Noninvasive Testing:   Stress Test: Date:        Protocol:        Duration of Exercise:        Symptoms:        EKG Changes:        DTS:        Myocardial Imaging:        Risk Assessment (Low, Medium, High):     Echo (Date, Findings):     Antianginal Therapies:        Beta Blockers:         Calcium Channel Blockers:        Long Acting Nitrates:        Ranexa:     Associated Risk Factors:        Cerebrovascular Disease: N/A       Chronic Lung Disease: N/A       Peripheral Arterial Disease: N/A       Chronic Kidney Disease (if yes, what is GFR): N/A       Uncontrolled Diabetes (if yes, what is HgbA1C or FBS): N/A       Poorly Controlled Hypertension (if yes, what is SBP): N/A       Morbid Obesity (if yes, what is BMI): N/A       History of Recent Ventricular Arrhythmia: N/A       Inability to Ambulate Safely: N/A       Need for Therapeutic Anticoagulation: N/A       Antiplatelet or Contrast Allergy: N/A Narrative:     75 yo male with PMHX of Afib s/p Watchman procedure (1/30/2017), SAH 2/2 traumatic fall (2/10/2019) ESRD on HD (~20 years) s/p Left Nephrectomy 2/2 bleeding, DM2, Right Iliac stenosis, post traumatic seizure, CAD s/p cardiac cath (7/2018) which showed LAD 40% and RPLS 75% stenosis. Last echo 8/31/20 with EF 55-60%, moderate LVH. Nuclear stress (8/31/20) with mild ischemia in RCA territory, normal wall motion with post stress LVEF of 67%. Patient has significant history of PVD for which he underwent  a failed PTA of right common iliac  (8/7/2018 with Dr. Menon) c/b right external iliac perforation and RP bleed, caused by OCT guided atherectomy managed by balloon tamponade of proximal flow and distal tamponade of collaterals into right CFA, pt was monitored in MICU and was subsequently stable for discharge. Medical management was recommended. Patient underwent recent KAREEM study with segmental pressure ans PVR which showed significantly abnormal PVR's to right leg starting at level of thigh with normal values. He now presents for bilateral lower extremity angiogram  with possible left popliteal artery intervention with Dr. Menon.       ASA  Mallampati  GFR  Creat  Bleeding Risk  COVID-19        Symptoms:        Angina (Class):        Ischemic Symptoms:     Heart Failure:        Systolic/Diastolic/Combined:        NYHA Class (within 2 weeks):     Assessment of LVEF (Must be within 6 months):       EF:        Assessed by:        Date:     Prior Cardiac Interventions (LHC, stents, CABG):       PCI's (Date, Stents, Vessels):        CABG (Date, Grafts):     Noninvasive Testing:   Stress Test: Date:        Protocol:        Duration of Exercise:        Symptoms:        EKG Changes:        DTS:        Myocardial Imaging:        Risk Assessment (Low, Medium, High):     Echo (Date, Findings):     Antianginal Therapies:        Beta Blockers:         Calcium Channel Blockers:        Long Acting Nitrates:        Ranexa:     Associated Risk Factors:        Cerebrovascular Disease: N/A       Chronic Lung Disease: N/A       Peripheral Arterial Disease: N/A       Chronic Kidney Disease (if yes, what is GFR): N/A       Uncontrolled Diabetes (if yes, what is HgbA1C or FBS): N/A       Poorly Controlled Hypertension (if yes, what is SBP): N/A       Morbid Obesity (if yes, what is BMI): N/A       History of Recent Ventricular Arrhythmia: N/A       Inability to Ambulate Safely: N/A       Need for Therapeutic Anticoagulation: N/A       Antiplatelet or Contrast Allergy: N/A Narrative:     75 yo male with PMHX of Afib s/p Watchman procedure (1/30/2017), SAH 2/2 traumatic fall (2/10/2019) ESRD on HD (~20 years) s/p Left Nephrectomy 2/2 bleeding, DM2, Right Iliac stenosis, post traumatic seizure, CAD s/p cardiac cath (7/2018) which showed LAD 40% and RPLS 75% stenosis. Last echo 8/31/20 with EF 55-60%, moderate LVH. Nuclear stress (8/31/20) with mild ischemia in RCA territory, normal wall motion with post stress LVEF of 67%. Patient has significant history of PVD for which he underwent  a failed PTA of right common iliac  (8/7/2018 with Dr. Menon) c/b right external iliac perforation and RP bleed, caused by OCT guided atherectomy managed by balloon tamponade of proximal flow and distal tamponade of collaterals into right CFA, pt was monitored in MICU and was subsequently stable for discharge. Medical management was recommended. On recent follow up with his cardiologist, Dr. Jerome, pt c/o pain in his legs with walking therefore he underwent recent KAREEM study with segmental pressure and PVR which showed significantly abnormal PVR's to right leg starting at level of thigh with normal values. He now presents for bilateral lower extremity angiogram  with possible left popliteal artery intervention with Dr. Menon.       ASA  Mallampati  GFR  Creat  Bleeding Risk  COVID-19  negative 10/26/20       Symptoms:        Angina (Class):        Ischemic Symptoms:     Heart Failure:        Systolic/Diastolic/Combined:        NYHA Class (within 2 weeks):     Assessment of LVEF (Must be within 6 months):       EF:        Assessed by:        Date:     Prior Cardiac Interventions (LHC, stents, CABG):       PCI's (Date, Stents, Vessels):        CABG (Date, Grafts):     Noninvasive Testing:   Stress Test: Date:        Protocol:        Duration of Exercise:        Symptoms:        EKG Changes:        DTS:        Myocardial Imaging:        Risk Assessment (Low, Medium, High):     Echo (Date, Findings):     Antianginal Therapies:        Beta Blockers:         Calcium Channel Blockers:        Long Acting Nitrates:        Ranexa:     Associated Risk Factors:        Cerebrovascular Disease: N/A       Chronic Lung Disease: N/A       Peripheral Arterial Disease: N/A       Chronic Kidney Disease (if yes, what is GFR): N/A       Uncontrolled Diabetes (if yes, what is HgbA1C or FBS): N/A       Poorly Controlled Hypertension (if yes, what is SBP): N/A       Morbid Obesity (if yes, what is BMI): N/A       History of Recent Ventricular Arrhythmia: N/A       Inability to Ambulate Safely: N/A       Need for Therapeutic Anticoagulation: N/A       Antiplatelet or Contrast Allergy: N/A Narrative:     75 yo male with PMHX of Afib s/p Watchman procedure (1/30/2017), SAH 2/2 traumatic fall (2/10/2019) ESRD on HD (~20 years) s/p Left Nephrectomy 2/2 bleeding, DM2, Right Iliac stenosis, post traumatic seizure, CAD s/p cardiac cath (7/10/2018) which showed LAD 40% and Moderate LAD disease RPL of RCA 75% stenosis, medical management recommended.  Last echo 8/31/20 with EF 55-60%, moderate LVH. Nuclear stress (8/31/20) with mild ischemia in RCA territory, normal wall motion with post stress LVEF of 67%. Patient has significant history of PVD for which he underwent  a failed PTA of right common iliac  (8/7/2018 with Dr. Menon) c/b right external iliac perforation and RP bleed, caused by OCT guided atherectomy managed by balloon tamponade of proximal flow and distal tamponade of collaterals into right CFA, pt was monitored in MICU and was subsequently stable for discharge. Medical management was recommended. On recent follow up with his cardiologist, Dr. Jerome, pt c/o pain in his legs with walking therefore he underwent recent KAREEM study with segmental pressure and PVR which showed significantly abnormal PVR's to right leg starting at level of thigh with normal values. He now presents for bilateral lower extremity angiogram  with possible left popliteal artery intervention with Dr. Menon.       ASA  Mallampati  GFR  Creat  Bleeding Risk  COVID-19  negative 10/26/20       Symptoms:        Angina (Class):        Ischemic Symptoms:     Heart Failure:        Systolic/Diastolic/Combined:        NYHA Class (within 2 weeks):     Assessment of LVEF (Must be within 6 months):       EF: 55-60%       Assessed by:    Echocardiogram       Date:   8/31/20     Prior Cardiac Interventions (LHC, stents, CABG):       PCI's (Date, Stents, Vessels):   No, s/p cardiac cath with moderate LAD disease 7/10/2018 for which medical management was recommneded        CABG (Date, Grafts):     < from: Cardiac Cath Lab - Adult (07.10.18 @ 08:35) >  VENTRICLES: No left ventriculogram was performed.  CORONARY VESSELS: The coronary circulation is right dominant.  LM:   --  LM: The vessel was large sized. Angiography showed minor luminal  irregularities with no flow limiting lesions.  LAD:   --  Mid LAD: There was a diffuse 40 % stenosis.  --  Distal LAD: There was a diffuse 40 % stenosis.  CX:   --  Circumflex: The vessel was medium sized. Angiography showed mild  atherosclerosis with no flow limiting lesions.  RI:   --  Ramus intermedius: The vessel was small sized.  RCA:   --  RCA: The vessel was large sized (dominant). Angiography showed  mild atherosclerosis with no flow limiting lesions.  --  RPDA: The vessel was medium sized. Angiography showed mild  atherosclerosis with noflow limiting lesions.  --  RPLS: There was a tubular 75 % stenosis.    DIAGNOSTIC RECOMMENDATIONS: continue medical management for CAD ( RPL  severe disease and moderate LAD disease)  Staged percutaneous angiopolasty of the right external iliac artery due to  Jocelyn IIB lifestyle limiting intermittent claudication.  Prepared and signed by  Tian Menon MD  Signed 07/17/2018 13:20:18    < end of copied text >    < from: Cardiac Cath Lab - Adult (08.07.18 @ 08:33) >  INTERVENTIONAL IMPRESSIONS: Failed PTA of the right common iliac   Right external iliac perforation and RP bleed , caused by OCT guided  atherectomy  Managed by balloon tamponade of proximal flow and distal tamponade of  collaterals into rightCFA.  patient remained stable and will be transferred to MICU  INTERVENTIONAL RECOMMENDATIONS: Return to MICU  follow up H/H, BP  Bilateral femoral sheath left in place ( left 45 cm 6Fr destination sheath  and right 10 cm 6Fr sheath)  Prepared and signed by  Tian Menon MD  Signed 08/13/2018 15:46:52    < end of copied text >      Noninvasive Testing:   Stress Test: Date:        Protocol:        Duration of Exercise:        Symptoms:        EKG Changes:        DTS:        Myocardial Imaging:        Risk Assessment (Low, Medium, High):     Echo (Date, Findings):     Antianginal Therapies:        Beta Blockers:  Metoprolol        Calcium Channel Blockers:         Long Acting Nitrates:         Ranexa:     Associated Risk Factors:        Cerebrovascular Disease: N/A       Chronic Lung Disease: N/A       Peripheral Arterial Disease: N/A       Chronic Kidney Disease (if yes, what is GFR): N/A       Uncontrolled Diabetes (if yes, what is HgbA1C or FBS): N/A       Poorly Controlled Hypertension (if yes, what is SBP): N/A       Morbid Obesity (if yes, what is BMI): N/A       History of Recent Ventricular Arrhythmia: N/A       Inability to Ambulate Safely: N/A       Need for Therapeutic Anticoagulation: N/A       Antiplatelet or Contrast Allergy: N/A Narrative:     73 yo male with PMHX of Afib s/p Watchman procedure (1/30/2017), SAH 2/2 traumatic fall (2/10/2019) ESRD on HD (~20 years) s/p Left Nephrectomy 2/2 bleeding, DM2, Right Iliac stenosis, post traumatic seizure, CAD s/p cardiac cath (7/10/2018) which showed LAD 40% and Moderate LAD disease RPL of RCA 75% stenosis, medical management recommended.  Last echo 8/31/20 with EF 55-60%, moderate LVH. Nuclear stress (8/31/20) with mild ischemia in RCA territory, normal wall motion with post stress LVEF of 67%. Patient has significant history of PVD for which he underwent  a failed PTA of right common iliac  (8/7/2018 with Dr. Menon) c/b right external iliac perforation and RP bleed, caused by OCT guided atherectomy managed by balloon tamponade of proximal flow and distal tamponade of collaterals into right CFA, pt was monitored in MICU and was subsequently stable for discharge. Medical management was recommended. On recent follow up with his cardiologist, Dr. Jerome, pt c/o pain in his legs with walking therefore he underwent recent KAREEM study with segmental pressure and PVR which showed significantly abnormal PVR's to right leg starting at level of thigh with normal values. He now presents for bilateral lower extremity angiogram  with possible left popliteal artery intervention with Dr. Menon.       ASA 2  Mallampati 2  GFR 11  Creat 4.95  Bleeding Risk 2.5  COVID-19  negative 10/26/20       Symptoms:        Angina (Class):        Ischemic Symptoms:     Heart Failure:        Systolic/Diastolic/Combined:        NYHA Class (within 2 weeks):     Assessment of LVEF (Must be within 6 months):       EF: 55-60%       Assessed by:    Echocardiogram       Date:   8/31/20     Prior Cardiac Interventions (LHC, stents, CABG):       PCI's (Date, Stents, Vessels):   No, s/p cardiac cath with moderate LAD disease 7/10/2018 for which medical management was recommended        CABG (Date, Grafts):     < from: Cardiac Cath Lab - Adult (07.10.18 @ 08:35) >  VENTRICLES: No left ventriculogram was performed.  CORONARY VESSELS: The coronary circulation is right dominant.  LM:   --  LM: The vessel was large sized. Angiography showed minor luminal  irregularities with no flow limiting lesions.  LAD:   --  Mid LAD: There was a diffuse 40 % stenosis.  --  Distal LAD: There was a diffuse 40 % stenosis.  CX:   --  Circumflex: The vessel was medium sized. Angiography showed mild  atherosclerosis with no flow limiting lesions.  RI:   --  Ramus intermedius: The vessel was small sized.  RCA:   --  RCA: The vessel was large sized (dominant). Angiography showed  mild atherosclerosis with no flow limiting lesions.  --  RPDA: The vessel was medium sized. Angiography showed mild  atherosclerosis with noflow limiting lesions.  --  RPLS: There was a tubular 75 % stenosis.    DIAGNOSTIC RECOMMENDATIONS: continue medical management for CAD ( RPL  severe disease and moderate LAD disease)  Staged percutaneous angioplasty of the right external iliac artery due to  Jocelyn IIB lifestyle limiting intermittent claudication.  Prepared and signed by  Tian Menon MD  Signed 07/17/2018 13:20:18    < end of copied text >    < from: Cardiac Cath Lab - Adult (08.07.18 @ 08:33) >  INTERVENTIONAL IMPRESSIONS: Failed PTA of the right common iliac   Right external iliac perforation and RP bleed , caused by OCT guided  atherectomy  Managed by balloon tamponade of proximal flow and distal tamponade of  collaterals into right CFA.  patient remained stable and will be transferred to MICU  INTERVENTIONAL RECOMMENDATIONS: Return to MICU  follow up H/H, BP  Bilateral femoral sheath left in place ( left 45 cm 6Fr destination sheath  and right 10 cm 6Fr sheath)  Prepared and signed by  Tian Menon MD  Signed 08/13/2018 15:46:52    < end of copied text >      Noninvasive Testing:   Stress Test: Date:        Protocol:        Duration of Exercise:        Symptoms:        EKG Changes:        DTS:        Myocardial Imaging:        Risk Assessment (Low, Medium, High):     Echo (Date, Findings):     Antianginal Therapies:        Beta Blockers:  Metoprolol        Calcium Channel Blockers:         Long Acting Nitrates:         Ranexa:     Associated Risk Factors:        Cerebrovascular Disease: N/A       Chronic Lung Disease: N/A       Peripheral Arterial Disease: yes       Chronic Kidney Disease (if yes, what is GFR): yes dialysis 3xweek       Uncontrolled Diabetes (if yes, what is HgbA1C or FBS):  on oral medication       Poorly Controlled Hypertension (if yes, what is SBP): yes       Morbid Obesity (if yes, what is BMI): N/A       History of Recent Ventricular Arrhythmia: N/A       Inability to Ambulate Safely: N/A       Need for Therapeutic Anticoagulation: N/A       Antiplatelet or Contrast Allergy: N/A

## 2020-10-26 NOTE — H&P PST ADULT - CARDIOVASCULAR
Take Norco every 6 hours for severe pain.  Do not take Tramadol or additional Tylenol (acetaminophen) while taking Norco as Norco has Tylenol in it.    Ambulate as tolerated.  Follow up with your primary care physician to recheck your fractures.  Return to the ER for worsening pain, weakness, numbness, or ANY other concerns.    Pelvic Fracture  You have a break or fracture of the pelvic bone. Your fracture is stable because the bones are not out of place and there are no signs of serious internal bleeding. No surgery or other special treatment will be needed. As long as your pain is controlled by oral medicine, you can be treated at home. A broken pelvis will take about 6 to 8 weeks to heal. It can be painful to move for the first 3 to 4 weeks.    Home care  · Bed rest and pain medicine is the only treatment required. Stay in bed for the first 2 to 3 days to reduce pain with movement. During this time, you will need help bathing, using the bathroom, and meals. A bedpan or bedside commode may be easier to use than getting up to use the bathroom. As soon as possible, begin sitting or walking to avoid problems with prolonged bed rest (muscle weakness, worsening back stiffness and pain, blood clots in the legs). A walker, crutches, or cane will make walking easier in the first 3 weeks.  · Home healthcare may be available to provide in-home nursing services. Check with your healthcare provider, the hospital’s social service department or a private nursing agency to see if your insurance will cover this kind of care.  · During the first 2 days after the injury there will probably be localized swelling and bruising on the skin over the pelvis. During this time apply an ice pack to the painful area for no more than 20 minutes every 1 to 2 hours to reduce swelling and pain. Wrap the ice pack in a thin towel. Never put ice or an ice pack directly on your skin.  · You may use over-the-counter pain medicine to control pain,  unless another medicine was prescribed. Take pain medicine as directed. Call your healthcare provider if your pain is not well-controlled. A dose change or stronger medicine may be needed. If you have chronic liver or kidney disease, talk with your healthcare provider before using pain medicine.  Follow-up care  Follow up with your healthcare provider, or as advised. This will help to make sure the bone is healing properly.  If X-rays were taken, you will be told of any new findings that may affect your care.  Call 911  Call 911 if you have:  · Swelling, pain or redness below your knee  · Chest pain or shortness of breath  When to seek medical advice  Call your healthcare provider right away if any of these occur:  · Pain becomes worse or you are unable to walk with help for more than three days  · Blood in your urine or bleeding from the urethra (the opening where urine comes out)  · Difficulty passing urine or unable to pass stool due to pain  · Fever of 100.4°F (38°C) or above lasting for 24 to 48 hours  © 9998-0826 [employer]. [address] [place] [address], [place] [address]. All rights reserved. This information is not intended as a substitute for professional medical care. Always follow your healthcare professional's instructions.         Regular rate & rhythm, normal S1, S2; no murmurs, gallops or rubs; no S3, S4

## 2020-10-27 ENCOUNTER — TRANSCRIPTION ENCOUNTER (OUTPATIENT)
Age: 75
End: 2020-10-27

## 2020-10-27 ENCOUNTER — INPATIENT (INPATIENT)
Facility: HOSPITAL | Age: 75
LOS: 0 days | Discharge: HOME | DRG: 252 | End: 2020-10-27
Attending: INTERNAL MEDICINE | Admitting: INTERNAL MEDICINE
Payer: MEDICARE

## 2020-10-27 VITALS
SYSTOLIC BLOOD PRESSURE: 131 MMHG | OXYGEN SATURATION: 95 % | HEART RATE: 87 BPM | RESPIRATION RATE: 17 BRPM | DIASTOLIC BLOOD PRESSURE: 94 MMHG

## 2020-10-27 DIAGNOSIS — Z90.5 ACQUIRED ABSENCE OF KIDNEY: Chronic | ICD-10-CM

## 2020-10-27 DIAGNOSIS — I73.9 PERIPHERAL VASCULAR DISEASE, UNSPECIFIED: ICD-10-CM

## 2020-10-27 DIAGNOSIS — I77.0 ARTERIOVENOUS FISTULA, ACQUIRED: Chronic | ICD-10-CM

## 2020-10-27 DIAGNOSIS — Q20.8 OTHER CONGENITAL MALFORMATIONS OF CARDIAC CHAMBERS AND CONNECTIONS: Chronic | ICD-10-CM

## 2020-10-27 DIAGNOSIS — Z94.0 KIDNEY TRANSPLANT STATUS: Chronic | ICD-10-CM

## 2020-10-27 LAB — GLUCOSE BLDC GLUCOMTR-MCNC: 82 MG/DL — SIGNIFICANT CHANGE UP (ref 70–99)

## 2020-10-27 PROCEDURE — 99152 MOD SED SAME PHYS/QHP 5/>YRS: CPT

## 2020-10-27 PROCEDURE — C1894: CPT

## 2020-10-27 PROCEDURE — 93010 ELECTROCARDIOGRAM REPORT: CPT

## 2020-10-27 PROCEDURE — 37224: CPT

## 2020-10-27 PROCEDURE — 93005 ELECTROCARDIOGRAM TRACING: CPT

## 2020-10-27 PROCEDURE — 83735 ASSAY OF MAGNESIUM: CPT

## 2020-10-27 PROCEDURE — 85027 COMPLETE CBC AUTOMATED: CPT

## 2020-10-27 PROCEDURE — C1769: CPT

## 2020-10-27 PROCEDURE — 75716 ARTERY X-RAYS ARMS/LEGS: CPT | Mod: XU

## 2020-10-27 PROCEDURE — 85730 THROMBOPLASTIN TIME PARTIAL: CPT

## 2020-10-27 PROCEDURE — C1725: CPT

## 2020-10-27 PROCEDURE — 87635 SARS-COV-2 COVID-19 AMP PRB: CPT

## 2020-10-27 PROCEDURE — 36415 COLL VENOUS BLD VENIPUNCTURE: CPT

## 2020-10-27 PROCEDURE — 99261: CPT

## 2020-10-27 PROCEDURE — 82962 GLUCOSE BLOOD TEST: CPT

## 2020-10-27 PROCEDURE — 80048 BASIC METABOLIC PNL TOTAL CA: CPT

## 2020-10-27 PROCEDURE — 85610 PROTHROMBIN TIME: CPT

## 2020-10-27 PROCEDURE — 75716 ARTERY X-RAYS ARMS/LEGS: CPT | Mod: 26,XU

## 2020-10-27 PROCEDURE — C1887: CPT

## 2020-10-27 RX ORDER — LEVETIRACETAM 250 MG/1
1 TABLET, FILM COATED ORAL
Qty: 0 | Refills: 0 | DISCHARGE

## 2020-10-27 RX ORDER — ESOMEPRAZOLE MAGNESIUM 40 MG/1
1 CAPSULE, DELAYED RELEASE ORAL
Qty: 0 | Refills: 0 | DISCHARGE

## 2020-10-27 RX ORDER — ATORVASTATIN CALCIUM 80 MG/1
1 TABLET, FILM COATED ORAL
Qty: 0 | Refills: 0 | DISCHARGE

## 2020-10-27 RX ORDER — CLOPIDOGREL BISULFATE 75 MG/1
75 TABLET, FILM COATED ORAL DAILY
Refills: 0 | Status: DISCONTINUED | OUTPATIENT
Start: 2020-10-27 | End: 2020-10-27

## 2020-10-27 RX ORDER — ASPIRIN/CALCIUM CARB/MAGNESIUM 324 MG
81 TABLET ORAL DAILY
Refills: 0 | Status: DISCONTINUED | OUTPATIENT
Start: 2020-10-27 | End: 2020-10-27

## 2020-10-27 RX ORDER — GLIMEPIRIDE 1 MG
1 TABLET ORAL
Qty: 0 | Refills: 0 | DISCHARGE

## 2020-10-27 RX ORDER — URSODIOL 250 MG/1
1 TABLET, FILM COATED ORAL
Qty: 0 | Refills: 0 | DISCHARGE

## 2020-10-27 RX ORDER — LORATADINE 10 MG/1
1 TABLET ORAL
Qty: 0 | Refills: 0 | DISCHARGE

## 2020-10-27 NOTE — PROGRESS NOTE ADULT - SUBJECTIVE AND OBJECTIVE BOX
SUBJECTIVE:  Cardiology NP POST LHC:  Pt seen post procedure     HPI:  Narrative:     75 yo male with PMHX of Afib s/p Watchman procedure (1/30/2017), SAH 2/2 traumatic fall (2/10/2019) ESRD on HD (~20 years) s/p Left Nephrectomy 2/2 bleeding, DM2, Right Iliac stenosis, post traumatic seizure, CAD s/p cardiac cath (7/10/2018) which showed LAD 40% and Moderate LAD disease RPL of RCA 75% stenosis, medical management recommended.  Last echo 8/31/20 with EF 55-60%, moderate LVH. Nuclear stress (8/31/20) with mild ischemia in RCA territory, normal wall motion with post stress LVEF of 67%. Patient has significant history of PVD for which he underwent  a failed PTA of right common iliac  (8/7/2018 with Dr. Menon) c/b right external iliac perforation and RP bleed, caused by OCT guided atherectomy managed by balloon tamponade of proximal flow and distal tamponade of collaterals into right CFA, pt was monitored in MICU and was subsequently stable for discharge. Medical management was recommended. On recent follow up with his cardiologist, Dr. Jerome, pt c/o pain in his legs with walking therefore he underwent recent KAREEM study with segmental pressure and PVR which showed significantly abnormal PVR's to right leg starting at level of thigh with normal values. He now presents for bilateral lower extremity angiogram  with possible left popliteal artery intervention with Dr. Menon.     Post Procedure :  Successful PCI to the Left Popliteal artery  will have  of the right common iliac done in 2 weeks        MEDICATIONS  (STANDING):  aspirin enteric coated 81 milliGRAM(s) Oral daily  chlorhexidine 4% Liquid 1 Application(s) Topical Once  clopidogrel Tablet 75 milliGRAM(s) Oral daily    PHYSICAL EXAM:    T(C): 36.3 (10-27-20 @ 07:40), Max: 36.3 (10-27-20 @ 07:40)  HR: 74 (10-27-20 @ 07:42) (65 - 74)  BP: 127/87 (10-27-20 @ 07:42) (127/87 - 150/81)  RR: 18 (10-27-20 @ 07:42) (18 - 18)  SpO2: 100% (10-27-20 @ 07:42) (100% - 100%)    Appearance: Normal	  HEENT:   Normal oral mucosa,   Lymphatic: No lymphadenopathy  Cardiovascular: Normal S1 S2, No JVD, No murmurs, No edema  Respiratory: Lungs clear to auscultation,	Unlabored  Gastrointestinal:  Soft, Non-tender, + BS	 no Pain  Skin: warm and dry  Neurologic: Non-focal Unchanged from baseline  Extremities: Normal range of motion,:   Left Brachial   Vascular: Peripheral pulses palpable 2+ bilaterally    TELEMETRY: 	    LABS:	 	                              11.2   6.63  )-----------( 206      ( 26 Oct 2020 10:21 )             34.7     10-26    140  |  95<L>  |  37.0<H>  ----------------------------<  100<H>  5.2   |  28.0  |  4.95<H>    Ca    9.0      26 Oct 2020 10:21  Mg     2.0     10-26      PT/INR - ( 26 Oct 2020 10:21 )   PT: 13.7 sec;   INR: 1.19 ratio         PTT - ( 26 Oct 2020 10:21 )  PTT:35.8 sec      POCT Blood Glucose.: 82 mg/dL (27 Oct 2020 07:25)        ASSESSMENT:  HPI:    75 yo male with PMHX of Afib s/p Watchman procedure (1/30/2017), SAH 2/2 traumatic fall (2/10/2019) ESRD on HD (~20 years) s/p Left Nephrectomy 2/2 bleeding, DM2, Right Iliac stenosis, post traumatic seizure, CAD s/p cardiac cath (7/10/2018) which showed LAD 40% and Moderate LAD disease RPL of RCA 75% stenosis, medical management recommended.  Last echo 8/31/20 with EF 55-60%, moderate LVH. Nuclear stress (8/31/20) with mild ischemia in RCA territory, normal wall motion with post stress LVEF of 67%. Patient has significant history of PVD for which he underwent  a failed PTA of right common iliac  (8/7/2018 with Dr. Menon) c/b right external iliac perforation and RP bleed, caused by OCT guided atherectomy managed by balloon tamponade of proximal flow and distal tamponade of collaterals into right CFA, pt was monitored in MICU and was subsequently stable for discharge. Medical management was recommended. On recent follow up with his cardiologist, Dr. Jerome, pt c/o pain in his legs with walking therefore he underwent recent KAREEM study with segmental pressure and PVR which showed significantly abnormal PVR's to right leg starting at level of thigh with normal values. He now presents for bilateral lower extremity angiogram  with possible left popliteal artery intervention with Dr. Menon.  Successful intervention via left brachial to the left popliteal artery.  Will have  intervention of the Right common iliac.       Plan:  Post cath teaching reviewed with patient.   Importance of Antiplatlet therapy reinforced   Brachial care instructions explained and reinforced.    Home and new medications reviewed and enforced with patient.   Importance of follow up care after hospitalization enforced.   Cardiac rehab info provided and referral for cardiac rehab completed as well as communication to patient, cardiology attending and facility.   Will have  of right common iliac done in 1 month       Admit to: ONU for monitoring as well as dialysis

## 2020-10-27 NOTE — DISCHARGE NOTE PROVIDER - HOSPITAL COURSE
73 yo male with PMHX of Afib s/p Watchman procedure (1/30/2017), SAH 2/2 traumatic fall (2/10/2019) ESRD on HD (~20 years) s/p Left Nephrectomy 2/2 bleeding, DM2, Right Iliac stenosis, post traumatic seizure, CAD s/p cardiac cath (7/10/2018) which showed LAD 40% and Moderate LAD disease RPL of RCA 75% stenosis, medical management recommended.  Last echo 8/31/20 with EF 55-60%, moderate LVH. Nuclear stress (8/31/20) with mild ischemia in RCA territory, normal wall motion with post stress LVEF of 67%. Patient has significant history of PVD for which he underwent  a failed PTA of right common iliac  (8/7/2018 with Dr. Menon) c/b right external iliac perforation and RP bleed, caused by OCT guided atherectomy managed by balloon tamponade of proximal flow and distal tamponade of collaterals into right CFA, pt was monitored in MICU and was subsequently stable for discharge. Medical management was recommended. On recent follow up with his cardiologist, Dr. Jerome, pt c/o pain in his legs with walking therefore he underwent recent KAREEM study with segmental pressure and PVR which showed significantly abnormal PVR's to right leg starting at level of thigh with normal values. He now presents for bilateral lower extremity angiogram  with possible left popliteal artery intervention with Dr. Menon.     Post Procedure :  Successful PCI to the Left Popliteal artery  will have  of the right common iliac done in 2 weeks    Post cath teaching reviewed with patient.   Importance of Antiplatlet therapy reinforced   Brachial care instructions explained and reinforced.    Home and new medications reviewed and enforced with patient.   Importance of follow up care after hospitalization enforced.   Cardiac rehab info provided and referral for cardiac rehab completed as well as communication to patient, cardiology attending and facility.   Will have  of right common iliac done in 1 month   Post HD this evening

## 2020-10-27 NOTE — CONSULT NOTE ADULT - ASSESSMENT
ESRD on HD TTS schedule  Due fro HD today  from [facility] HD [place]  Pt s/p B/L lower extremity angiogram this AM  Anemia no need for JAYANT  BP acceptable  Consent obtained in chart    Will follow

## 2020-10-27 NOTE — DISCHARGE NOTE PROVIDER - NSDCFUADDINST_GEN_ALL_CORE_FT
Restricted use with no heavy lifting of affected arm for 48 hours.  No submerging the arm in water for 48 hours.  You may start showering today.  Call your doctor for any bleeding, swelling, loss of sensation in the hand or fingers, or fingers turning blue.  If heavy bleeding or large lumps form, hold pressure at the spot and come to the Emergency Room.   Continue Aspirin and Plavix indefinitely

## 2020-10-27 NOTE — DISCHARGE NOTE NURSING/CASE MANAGEMENT/SOCIAL WORK - NSDPLANGASYSACCPHN_GEN_ALL_CORE
"  2019      RE: Jessenia Elder  [address]  [facility] [address]       Pediatric Cardiology Visit    Patient:  Jessenia Elder MRN:  [id number]   YOB: 2019 Age:  3 month old   Date of Visit:  2019 PCP:  Marisel Mackay MD     Dear Dr. Mackay:    I had the pleasure of seeing Jessenia Elder at the [facility] in [facility] in [place] on 2019 in ongoing consultation for atrial septal defect, ventricular septal defect, and aortic ring. She presented today accompanied by mom and dad. 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 I last saw her on 2/1/19, and in the interval since that visit she has been healthy. Eating well; no parental concerns for fatigue with feeds, tachypnea, labored breathing, or diaphoresis. Good interval weight gain, trending along the 5-10%ile. No new concerns for parents.    Past medical history:  As above. I reviewed Jessenia Elder's medical records.    She currently has no medications in their medication list. She has No Known Allergies.    Family and Social History:  unchanged    The Review of Systems is negative other than noted in the HPI.    Physical Examination:  BP (!) 84/58 (BP Location: Right arm, Patient Position: Supine, Cuff Size: Infant)   Pulse 152   Resp (!) 48   Ht 0.575 m (1' 10.64\")   Wt 4.7 kg (10 lb 5.8 oz)   SpO2 96%   BMI 14.22 kg/m     GENERAL: Alert, vigorous, non-distressed  SKIN: Clear, no rash or abnormal pigmentation  HEAD: Normocephalic, nondysmorphic, AFOSF  LUNGS: CTAB, normal symmetric air entry, normal WOB, no rales/rhonchi/wheezes  HEART: Quiet precordium, RRR, normal S1/S2, 2/6 HSM along LLSB, quiet in diastole, no r/g  ABDOMEN: Soft, NT/ND, normoactive BS, liver palpable at 1cm below the right costal margin  EXTREMITIES: W/WP, no c/c/e, pulses 2+ throughout without " brachio-femoral delay  NEUROLOGIC: No focal deficits, normal tone throughout, normal reflexes for age.  GENITOURINARY: deferred    I reviewed her echo from today, which showed moderate pmVSD, left-to-right flow; no left heart enlargement. Moderate secundum ASD, bidirectional flow, no right heart enlargement. 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 Normal right and left ventricular function.    Assessment and Plan: Jessenia is a 2 month old female with sizable VSD and moderate ASD, and no signs or symptoms of pulmonary overcirculation. Given the size of the VSD and low velocity shunt, I am concerned that her absence of symptoms and good weight gain are a result of relatively increased pulmonary vascular resistance, and despite the good clinical picture she may still require surgery to close this defect to protect her pulmonary vasculature, if the VSD does not spontaneously close with accessory tricuspid valve tissue. I discussed findings today with parents. She will follow-up in 4 weeks with an echocardiogram. She has no activity restrictions. No antibiotic prophylaxis required for invasive procedures.    Thank you for the opportunity to follow Jessenia with you. Please don't hesitate to contact me with questions or concerns.    Mykel Goldsmith MD  Pediatric Cardiology  [facility] [address], 5[address] floor, [facility] [address]  Phone [phone]  Fax [phone]         Yes

## 2020-10-27 NOTE — DISCHARGE NOTE PROVIDER - NSDCMRMEDTOKEN_GEN_ALL_CORE_FT
aspirin 81 mg oral tablet: 1 tab(s) orally once a day   atorvastatin 20 mg oral tablet: 1 tab(s) orally once a day  clopidogrel 75 mg oral tablet: 1 tab(s) orally once a day  docusate sodium 100 mg oral tablet:   gemfibrozil 600 mg oral tablet: 1 tab(s) orally 2 times a day  levocetirizine 5 mg oral tablet: 1 tab(s) orally once a day (in the evening)  midodrine 10 mg oral tablet: 1 tab(s) orally 3 times a day, As Needed  for hypotension   omeprazole 40 mg oral delayed release capsule: 1 cap(s) orally once a day  ranolazine 500 mg oral tablet, extended release: 1 tab(s) orally 2 times a day  Renvela 800 mg oral tablet: 1 tab(s) orally 3 times a day  Sensipar 30 mg oral tablet: 1 tab(s) orally 2 times a day  ursodiol 500 mg oral tablet: 1 tab(s) orally 3 times a day

## 2020-10-27 NOTE — CONSULT NOTE ADULT - SUBJECTIVE AND OBJECTIVE BOX
HPI:  75 yo male with PMHX of Afib s/p Watchman procedure (1/30/2017), SAH 2/2 traumatic fall (2/10/2019) ESRD on HD (~20 years) s/p Left Nephrectomy 2/2 bleeding, DM2, Right Iliac stenosis, post traumatic seizure, CAD s/p cardiac cath (7/10/2018) which showed LAD 40% and Moderate LAD disease RPL of RCA 75% stenosis, medical management recommended.  Last echo 8/31/20 with EF 55-60%, moderate LVH. Nuclear stress (8/31/20) with mild ischemia in RCA territory, normal wall motion with post stress LVEF of 67%. Patient has significant history of PVD for which he underwent  a failed PTA of right common iliac  (8/7/2018 with Dr. Menon) c/b right external iliac perforation and RP bleed, caused by OCT guided atherectomy managed by balloon tamponade of proximal flow and distal tamponade of collaterals into right CFA, pt was monitored in MICU and was subsequently stable for discharge. Medical management was recommended. On recent follow up with his cardiologist, Dr. Jerome, pt c/o pain in his legs with walking therefore he underwent recent KAREEM study with segmental pressure and PVR which showed significantly abnormal PVR's to right leg starting at level of thigh with normal values. He now presents for bilateral lower extremity angiogram  with possible left popliteal artery intervention with Dr. Menon.       ASA 2  Mallampati 2  GFR 11  Creat 4.95  Bleeding Risk 2.5  COVID-19  negative 10/26/20       Symptoms:        Angina (Class):        Ischemic Symptoms:     Heart Failure:        Systolic/Diastolic/Combined:        NYHA Class (within 2 weeks):     Assessment of LVEF (Must be within 6 months):       EF: 55-60%       Assessed by:    Echocardiogram       Date:   8/31/20     Prior Cardiac Interventions (LHC, stents, CABG):       PCI's (Date, Stents, Vessels):   No, s/p cardiac cath with moderate LAD disease 7/10/2018 for which medical management was recommended        CABG (Date, Grafts):     < from: Cardiac Cath Lab - Adult (07.10.18 @ 08:35) >  VENTRICLES: No left ventriculogram was performed.  CORONARY VESSELS: The coronary circulation is right dominant.  LM:   --  LM: The vessel was large sized. Angiography showed minor luminal  irregularities with no flow limiting lesions.  LAD:   --  Mid LAD: There was a diffuse 40 % stenosis.  --  Distal LAD: There was a diffuse 40 % stenosis.  CX:   --  Circumflex: The vessel was medium sized. Angiography showed mild  atherosclerosis with no flow limiting lesions.  RI:   --  Ramus intermedius: The vessel was small sized.  RCA:   --  RCA: The vessel was large sized (dominant). Angiography showed  mild atherosclerosis with no flow limiting lesions.  --  RPDA: The vessel was medium sized. Angiography showed mild  atherosclerosis with noflow limiting lesions.  --  RPLS: There was a tubular 75 % stenosis.    DIAGNOSTIC RECOMMENDATIONS: continue medical management for CAD ( RPL  severe disease and moderate LAD disease)  Staged percutaneous angioplasty of the right external iliac artery due to  Jocelyn IIB lifestyle limiting intermittent claudication.  Prepared and signed by  Tian Menon MD  Signed 07/17/2018 13:20:18    < end of copied text >    < from: Cardiac Cath Lab - Adult (08.07.18 @ 08:33) >  INTERVENTIONAL IMPRESSIONS: Failed PTA of the right common iliac   Right external iliac perforation and RP bleed , caused by OCT guided  atherectomy  Managed by balloon tamponade of proximal flow and distal tamponade of  collaterals into right CFA.  patient remained stable and will be transferred to MICU  INTERVENTIONAL RECOMMENDATIONS: Return to MICU  follow up H/H, BP  Bilateral femoral sheath left in place ( left 45 cm 6Fr destination sheath  and right 10 cm 6Fr sheath)  Prepared and signed by  Tian Menon MD  Signed 08/13/2018 15:46:52    < end of copied text >      Noninvasive Testing:   Stress Test: Date:        Protocol:        Duration of Exercise:        Symptoms:        EKG Changes:        DTS:        Myocardial Imaging:        Risk Assessment (Low, Medium, High):     Echo (Date, Findings):     Antianginal Therapies:        Beta Blockers:  Metoprolol        Calcium Channel Blockers:         Long Acting Nitrates:         Ranexa:     Associated Risk Factors:        Cerebrovascular Disease: N/A       Chronic Lung Disease: N/A       Peripheral Arterial Disease: yes       Chronic Kidney Disease (if yes, what is GFR): yes dialysis 3xweek       Uncontrolled Diabetes (if yes, what is HgbA1C or FBS):  on oral medication       Poorly Controlled Hypertension (if yes, what is SBP): yes       Morbid Obesity (if yes, what is BMI): N/A       History of Recent Ventricular Arrhythmia: N/A       Inability to Ambulate Safely: N/A       Need for Therapeutic Anticoagulation: N/A       Antiplatelet or Contrast Allergy: N/A (26 Oct 2020 16:31)   Hx renal stones x1 not aware of type, no other renal  problems; elevated creatinine on admission.    PAST MEDICAL & SURGICAL HISTORY:  Persistent atrial fibrillation    Carotid artery disease  mild    GERD (gastroesophageal reflux disease)    Low glucose level  Patient has h/o low blood sugars at times    Palpitations    Leg pain, bilateral  R&gt;L at rest and with short distant ambulation    Imbalance  secondary to PVD Uses cane    Obesity (BMI 30.0-34.9)    Claudication in peripheral vascular disease  R&gt;L  Right common iliac or ostial external iliac per June 2018 U/S  Left popliteal 50-75 and EDUIN stenosis  SUSANA 0.8    Kidney problem  spontanious renal bleed while on coumadin  c/b  renal  hematome  with  LT  nephrectomy    Hypotension  treated  with Midodrine    Tiredness    Renal hematoma, left    Hypercholesteremia    ESRD (end stage renal disease)  on  hemodialysis  3  x  weekly.,  H/O  left  nephrectomy after  renal  bleed    Dyslipidemia    Dizziness    DM (diabetes mellitus)    Daytime sleepiness    Renal transplant recipient    AV fistula  right lower arm    Abnormality of left atrial appendage  WATCHMAN  LEFT ATRIAL APPENDAGE CLOSUIRE   Jan. 30 2017    History of unilateral nephrectomy  left  nephrectomy     DM 2, MO, hypothyroidism, prior DVT and IVC filter; Cholecystectomy    FAMILY HISTORY:  FH: hypertension  father    [place]    Social History:Non smoker    MEDICATIONS  (STANDING):  aspirin enteric coated 81 milliGRAM(s) Oral daily  chlorhexidine 4% Liquid 1 Application(s) Topical Once  clopidogrel Tablet 75 milliGRAM(s) Oral daily    MEDICATIONS  (PRN):   Meds reviewed    Vital Signs Last 24 Hrs  T(C): 36.3 (27 Oct 2020 07:40), Max: 36.3 (27 Oct 2020 07:40)  T(F): 97.4 (27 Oct 2020 07:40), Max: 97.4 (27 Oct 2020 07:40)  HR: 74 (27 Oct 2020 07:42) (65 - 74)  BP: 127/87 (27 Oct 2020 07:42) (127/87 - 150/81)  BP(mean): --  RR: 18 (27 Oct 2020 07:42) (18 - 18)  SpO2: 100% (27 Oct 2020 07:42) (100% - 100%)  Daily Height in cm: 167.64 (26 Oct 2020 16:31)    Daily     PHYSICAL EXAM:    GENERAL: appears chronically ill  HEAD:  Atraumatic, Normocephalic  EYES: EOMI  NECK: Supple, neck  veins full  NERVOUS SYSTEM:  Alert & Oriented X3  CHEST/LUNG: Clear to percussion bilaterally  HEART: Regular rate and rhythm  ABDOMEN: Soft, Nontender, Nondistended; Bowel sounds present  EXTREMITIES:   edema      LABS:                        11.2   6.63  )-----------( 206      ( 26 Oct 2020 10:21 )             34.7     10-26    140  |  95<L>  |  37.0<H>  ----------------------------<  100<H>  5.2   |  28.0  |  4.95<H>    Ca    9.0      26 Oct 2020 10:21  Mg     2.0     10-26      PT/INR - ( 26 Oct 2020 10:21 )   PT: 13.7 sec;   INR: 1.19 ratio         PTT - ( 26 Oct 2020 10:21 )  PTT:35.8 sec            RADIOLOGY & ADDITIONAL TESTS:     HPI:  73 yo male with PMHX of Afib s/p Watchman procedure (1/30/2017), SAH 2/2 traumatic fall (2/10/2019) ESRD on HD (~20 years) s/p Left Nephrectomy 2/2 bleeding, DM2, Right Iliac stenosis, post traumatic seizure, CAD s/p cardiac cath (7/10/2018) which showed LAD 40% and Moderate LAD disease RPL of RCA 75% stenosis, medical management recommended.  Last echo 8/31/20 with EF 55-60%, moderate LVH. Nuclear stress (8/31/20) with mild ischemia in RCA territory, normal wall motion with post stress LVEF of 67%. Patient has significant history of PVD for which he underwent  a failed PTA of right common iliac  (8/7/2018 with Dr. Menon) c/b right external iliac perforation and RP bleed, caused by OCT guided atherectomy managed by balloon tamponade of proximal flow and distal tamponade of collaterals into right CFA, pt was monitored in MICU and was subsequently stable for discharge. Medical management was recommended. On recent follow up with his cardiologist, Dr. Jerome, pt c/o pain in his legs with walking therefore he underwent recent KAREEM study with segmental pressure and PVR which showed significantly abnormal PVR's to right leg starting at level of thigh with normal values. He now presents for bilateral lower extremity angiogram  with possible left popliteal artery intervention with Dr. Menon. due for HD today       ASA 2  Mallampati 2  GFR 11  Creat 4.95  Bleeding Risk 2.5  COVID-19  negative 10/26/20       Symptoms:        Angina (Class):        Ischemic Symptoms:     Heart Failure:        Systolic/Diastolic/Combined:        NYHA Class (within 2 weeks):     Assessment of LVEF (Must be within 6 months):       EF: 55-60%       Assessed by:    Echocardiogram       Date:   8/31/20     Prior Cardiac Interventions (LHC, stents, CABG):       PCI's (Date, Stents, Vessels):   No, s/p cardiac cath with moderate LAD disease 7/10/2018 for which medical management was recommended        CABG (Date, Grafts):     < from: Cardiac Cath Lab - Adult (07.10.18 @ 08:35) >  VENTRICLES: No left ventriculogram was performed.  CORONARY VESSELS: The coronary circulation is right dominant.  LM:   --  LM: The vessel was large sized. Angiography showed minor luminal  irregularities with no flow limiting lesions.  LAD:   --  Mid LAD: There was a diffuse 40 % stenosis.  --  Distal LAD: There was a diffuse 40 % stenosis.  CX:   --  Circumflex: The vessel was medium sized. Angiography showed mild  atherosclerosis with no flow limiting lesions.  RI:   --  Ramus intermedius: The vessel was small sized.  RCA:   --  RCA: The vessel was large sized (dominant). Angiography showed  mild atherosclerosis with no flow limiting lesions.  --  RPDA: The vessel was medium sized. Angiography showed mild  atherosclerosis with noflow limiting lesions.  --  RPLS: There was a tubular 75 % stenosis.    DIAGNOSTIC RECOMMENDATIONS: continue medical management for CAD ( RPL  severe disease and moderate LAD disease)  Staged percutaneous angioplasty of the right external iliac artery due to  Jocelyn IIB lifestyle limiting intermittent claudication.  Prepared and signed by  Tian Menon MD  Signed 07/17/2018 13:20:18    < end of copied text >    < from: Cardiac Cath Lab - Adult (08.07.18 @ 08:33) >  INTERVENTIONAL IMPRESSIONS: Failed PTA of the right common iliac   Right external iliac perforation and RP bleed , caused by OCT guided  atherectomy  Managed by balloon tamponade of proximal flow and distal tamponade of  collaterals into right CFA.  patient remained stable and will be transferred to MICU  INTERVENTIONAL RECOMMENDATIONS: Return to MICU  follow up H/H, BP  Bilateral femoral sheath left in place ( left 45 cm 6Fr destination sheath  and right 10 cm 6Fr sheath)  Prepared and signed by  Tian Menon MD  Signed 08/13/2018 15:46:52    < end of copied text >      Noninvasive Testing:   Stress Test: Date:        Protocol:        Duration of Exercise:        Symptoms:        EKG Changes:        DTS:        Myocardial Imaging:        Risk Assessment (Low, Medium, High):     Echo (Date, Findings):     Antianginal Therapies:        Beta Blockers:  Metoprolol        Calcium Channel Blockers:         Long Acting Nitrates:         Ranexa:     Associated Risk Factors:        Cerebrovascular Disease: N/A       Chronic Lung Disease: N/A       Peripheral Arterial Disease: yes       Chronic Kidney Disease (if yes, what is GFR): yes dialysis 3xweek       Uncontrolled Diabetes (if yes, what is HgbA1C or FBS):  on oral medication       Poorly Controlled Hypertension (if yes, what is SBP): yes       Morbid Obesity (if yes, what is BMI): N/A       History of Recent Ventricular Arrhythmia: N/A       Inability to Ambulate Safely: N/A       Need for Therapeutic Anticoagulation: N/A       Antiplatelet or Contrast Allergy: N/A (26 Oct 2020 16:31)   Hx renal stones x1 not aware of type, no other renal  problems; elevated creatinine on admission.    PAST MEDICAL & SURGICAL HISTORY:  Persistent atrial fibrillation    Carotid artery disease  mild    GERD (gastroesophageal reflux disease)    Low glucose level  Patient has h/o low blood sugars at times    Palpitations    Leg pain, bilateral  R&gt;L at rest and with short distant ambulation    Imbalance  secondary to PVD Uses cane    Obesity (BMI 30.0-34.9)    Claudication in peripheral vascular disease  R&gt;L  Right common iliac or ostial external iliac per June 2018 U/S  Left popliteal 50-75 and EDUIN stenosis  SUSANA 0.8    Kidney problem  spontanious renal bleed while on coumadin  c/b  renal  hematome  with  LT  nephrectomy    Hypotension  treated  with Midodrine    Tiredness    Renal hematoma, left    Hypercholesteremia    ESRD (end stage renal disease)  on  hemodialysis  3  x  weekly.,  H/O  left  nephrectomy after  renal  bleed    Dyslipidemia    Dizziness    DM (diabetes mellitus)    Daytime sleepiness    Renal transplant recipient    AV fistula  right lower arm    Abnormality of left atrial appendage  WATCHMAN  LEFT ATRIAL APPENDAGE CLOSUIRE   Jan. 30 2017    History of unilateral nephrectomy  left  nephrectomy     DM 2, MO, hypothyroidism, prior DVT and IVC filter; Cholecystectomy    FAMILY HISTORY:  FH: hypertension  father    [place]    Social History:Non smoker    MEDICATIONS  (STANDING):  aspirin enteric coated 81 milliGRAM(s) Oral daily  chlorhexidine 4% Liquid 1 Application(s) Topical Once  clopidogrel Tablet 75 milliGRAM(s) Oral daily    MEDICATIONS  (PRN):   Meds reviewed    Vital Signs Last 24 Hrs  T(C): 36.3 (27 Oct 2020 07:40), Max: 36.3 (27 Oct 2020 07:40)  T(F): 97.4 (27 Oct 2020 07:40), Max: 97.4 (27 Oct 2020 07:40)  HR: 74 (27 Oct 2020 07:42) (65 - 74)  BP: 127/87 (27 Oct 2020 07:42) (127/87 - 150/81)  BP(mean): --  RR: 18 (27 Oct 2020 07:42) (18 - 18)  SpO2: 100% (27 Oct 2020 07:42) (100% - 100%)  Daily Height in cm: 167.64 (26 Oct 2020 16:31)    Daily     PHYSICAL EXAM:    GENERAL: appears chronically ill  HEAD:  Atraumatic, Normocephalic  EYES: EOMI  NECK: Supple, neck  veins full  NERVOUS SYSTEM:  Alert & Oriented X3  CHEST/LUNG: Clear to percussion bilaterally  HEART: Regular rate and rhythm  ABDOMEN: Soft, Nontender, Nondistended; Bowel sounds present  EXTREMITIES:  trace LE edema, UE AVF + thrill      LABS:                        11.2   6.63  )-----------( 206      ( 26 Oct 2020 10:21 )             34.7     10-26    140  |  95<L>  |  37.0<H>  ----------------------------<  100<H>  5.2   |  28.0  |  4.95<H>    Ca    9.0      26 Oct 2020 10:21  Mg     2.0     10-26      PT/INR - ( 26 Oct 2020 10:21 )   PT: 13.7 sec;   INR: 1.19 ratio         PTT - ( 26 Oct 2020 10:21 )  PTT:35.8 sec            RADIOLOGY & ADDITIONAL TESTS:

## 2020-10-27 NOTE — DISCHARGE NOTE PROVIDER - CARE PROVIDER_API CALL
Tian Menon  CARDIOVASCULAR DISEASE  [address], [address]  [place], [place] [id number]  Phone: [phone]  Fax: [phone]  Follow Up Time:

## 2020-10-27 NOTE — DISCHARGE NOTE PROVIDER - NSDCCPCAREPLAN_GEN_ALL_CORE_FT
PRINCIPAL DISCHARGE DIAGNOSIS  Diagnosis: PAD (peripheral artery disease)  Assessment and Plan of Treatment: Restricted use with no heavy lifting of affected arm for 48 hours.  No submerging the arm in water for 48 hours.  You may start showering today.  Call your doctor for any bleeding, swelling, loss of sensation in the hand or fingers, or fingers turning blue.  If heavy bleeding or large lumps form, hold pressure at the spot and come to the Emergency Room.        SECONDARY DISCHARGE DIAGNOSES  Diagnosis: CKD (chronic kidney disease) requiring chronic dialysis  Assessment and Plan of Treatment:

## 2020-10-27 NOTE — DISCHARGE NOTE NURSING/CASE MANAGEMENT/SOCIAL WORK - PATIENT PORTAL LINK FT
You can access the FollowMyHealth Patient Portal offered by [facility] by registering at the following website: [email or web address]. By joining [facility]’s FollowMyHealth portal, you will also be able to view your health information using other applications (apps) compatible with our system.

## 2020-10-27 NOTE — DISCHARGE NOTE PROVIDER - NSDCCPTREATMENT_GEN_ALL_CORE_FT
PRINCIPAL PROCEDURE  Procedure: Angioplasty, artery, peripheral  Findings and Treatment: Restricted use with no heavy lifting of affected arm for 48 hours.  No submerging the arm in water for 48 hours.  You may start showering today.  Call your doctor for any bleeding, swelling, loss of sensation in the hand or fingers, or fingers turning blue.  If heavy bleeding or large lumps form, hold pressure at the spot and come to the Emergency Room.   Continue Aspirin and Plavix daily

## 2020-11-03 DIAGNOSIS — Z99.2 DEPENDENCE ON RENAL DIALYSIS: ICD-10-CM

## 2020-11-03 DIAGNOSIS — Z98.890 OTHER SPECIFIED POSTPROCEDURAL STATES: ICD-10-CM

## 2020-11-03 DIAGNOSIS — R56.9 UNSPECIFIED CONVULSIONS: ICD-10-CM

## 2020-11-03 DIAGNOSIS — I77.1 STRICTURE OF ARTERY: ICD-10-CM

## 2020-11-03 DIAGNOSIS — Z86.79 PERSONAL HISTORY OF OTHER DISEASES OF THE CIRCULATORY SYSTEM: ICD-10-CM

## 2020-11-03 DIAGNOSIS — Z87.448 PERSONAL HISTORY OF OTHER DISEASES OF URINARY SYSTEM: ICD-10-CM

## 2020-11-03 DIAGNOSIS — Z86.39 PERSONAL HISTORY OF OTHER ENDOCRINE, NUTRITIONAL AND METABOLIC DISEASE: ICD-10-CM

## 2020-11-03 DIAGNOSIS — Z95.818 PRESENCE OF OTHER CARDIAC IMPLANTS AND GRAFTS: ICD-10-CM

## 2020-11-04 ENCOUNTER — APPOINTMENT (OUTPATIENT)
Dept: CARDIOLOGY | Facility: CLINIC | Age: 75
End: 2020-11-04
Payer: MEDICARE

## 2020-11-04 ENCOUNTER — NON-APPOINTMENT (OUTPATIENT)
Age: 75
End: 2020-11-04

## 2020-11-04 VITALS
OXYGEN SATURATION: 97 % | HEIGHT: 65 IN | DIASTOLIC BLOOD PRESSURE: 70 MMHG | TEMPERATURE: 98.5 F | BODY MASS INDEX: 31.65 KG/M2 | SYSTOLIC BLOOD PRESSURE: 119 MMHG | HEART RATE: 105 BPM | WEIGHT: 190 LBS

## 2020-11-04 DIAGNOSIS — N28.9 DISORDER OF KIDNEY AND URETER, UNSPECIFIED: ICD-10-CM

## 2020-11-04 DIAGNOSIS — R06.00 DYSPNEA, UNSPECIFIED: ICD-10-CM

## 2020-11-04 DIAGNOSIS — S06.6X9A TRAUMATIC SUBARACHNOID HEMORRHAGE WITH LOSS OF CONSCIOUSNESS OF UNSPECIFIED DURATION, INITIAL ENCOUNTER: ICD-10-CM

## 2020-11-04 DIAGNOSIS — I48.91 UNSPECIFIED ATRIAL FIBRILLATION: ICD-10-CM

## 2020-11-04 PROCEDURE — 99215 OFFICE O/P EST HI 40 MIN: CPT

## 2020-11-04 PROCEDURE — 93000 ELECTROCARDIOGRAM COMPLETE: CPT

## 2020-11-04 RX ORDER — MULTI VITAMIN/MINERAL SUPPLEMENT WITH ASCORBIC ACID, NIACIN, PYRIDOXINE, PANTOTHENIC ACID, FOLIC ACID, RIBOFLAVIN, THIAMIN, BIOTIN, COBALAMIN AND ZINC. 60; 20; 12.5; 10; 10; 1.7; 1.5; 1; .3; .006 MG/1; MG/1; MG/1; MG/1; MG/1; MG/1; MG/1; MG/1; MG/1; MG/1
TABLET, COATED ORAL DAILY
Refills: 0 | Status: DISCONTINUED | COMMUNITY
End: 2020-11-04

## 2020-11-04 NOTE — HISTORY OF PRESENT ILLNESS
[FreeTextEntry1] : chest tightness, dyspnea, palpitations, atrial fibrillation\par \par \par HPI for today: he is feeling better. he got intervention done on his left leg. : \par \par Old note:  i am feeling bit tired.  palpitations. + several months. \par pain in the legs when he walks. compliant iwht meds.\par \par old note: feels tired. when he walks. No cehst pain,. no dyspnea. no palpitations.  patient was recently in the hospital \par \par old note: feels tired. fatigue and tired. \par getting hemodialyses regularly.  dizziness. Lightheadedness. \par discharge summaryL FEB 2019:  Pt is a 74 yo M presenting w/ chest pain for atleast 5 days duration. PMH A fib \par s/p Watchman, recent Subarrachonoid hemorrhage, ESRD on HD, DM2, R iliac \par stenosis, post traumatic seizure, L nephrectomy, CAD. Pt has been c/o chest stephania/pressure for atleast 5 days now, L anterior sternum, pain is worse with coughing, denies exertional component, no associated SOB currently, but does get SOB at times, denies any radiation of the pain, no associated diaphoresis. He has a hx of chronic chest pain and had a stress test in May 2018, lexiscan which showed mild ischemia in RCA, he had a cardiac cath in July 2018 LAD 40% and RPLS 75% stenosis. He has no other complaints. Of note he had a fall with a resultant SAH and was just discharged from the hospital last week. He has not restarted anti-platlet medications (ASA or Plavix), he is NOT on coumadin. He had a watchman procedure. \par \par \par old note/l :  c./c: here for pain management cleerance.\par here for Pre-operative cardiovascular risk evaluation and management for epidurakl injection for pain management, \par No chest stephania. no dyspnea. no headaches. No syncope. No paltpiations. \par \par old note: \par No chest pain. no dyspnea. 0.5 block activity.  no dyspnea. no dizzines. no syncope. \par \par old note: no chest pain. no dyspnea. no lightheadness. \par here for epidual injection  want to know if ok to hold aspirin and plavix. \par no chest pain. s/p watchman. 45 day post wathcmn :3.5 mm leak. 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  Anuric, AV graft/fistula right arm.\par Here for preoperative cardiovascular risk evaluation and management.

## 2020-11-04 NOTE — DISCUSSION/SUMMARY
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 : prior coronary artery disease .  prior bradycardia.  4 weeks event monitor.  \par mild ischemia in RCa. medical management. \par  2) Leg pain: claudication. recent intervention on the left leg. Successful intervention via left brachial to the leftpopliteal artery. Failed Angioplasty of right common iliac .  Will have  intervention of the Right common iliac.\par  stable. symptoms. Peripheral vascular disease./    . Dual antiplatelet therapy \par 3) Atrial fibrillation: left renal hematoma and bleeding. s/p watchman.  1 year CANDIDA- no leak.   asa 81  .  off metoprolol   rate controlled.  \par 4) CAD: moderate  LAD diseae and severe RPKL disease. Dual antiplatelet therapy Ct statins. lipitor 20 mg daily. : stress test: mild RcA ischemia. medical management. \par 5) Hypotension: On midodrine. Orthostatic.  midodrine  15 mg Q8. Compression stiockings \par 5 ) Mild carotid atherosclerosis. '6) SAH: due to fall.

## 2020-11-04 NOTE — PHYSICAL EXAM
[General Appearance - Well Developed] : well developed [Normal Appearance] : normal appearance [Well Groomed] : well groomed [General Appearance - Well Nourished] : well nourished [No Deformities] : no deformities [General Appearance - In No Acute Distress] : no acute distress [Normal Conjunctiva] : the conjunctiva exhibited no abnormalities [Eyelids - No Xanthelasma] : the eyelids demonstrated no xanthelasmas [Normal Oral Mucosa] : normal oral mucosa [No Oral Pallor] : no oral pallor [No Oral Cyanosis] : no oral cyanosis [Normal Jugular Venous A Waves Present] : normal jugular venous A waves present [Normal Jugular Venous V Waves Present] : normal jugular venous V waves present [No Jugular Venous Campos A Waves] : no jugular venous campos A waves [Respiration, Rhythm And Depth] : normal respiratory rhythm and effort [Exaggerated Use Of Accessory Muscles For Inspiration] : no accessory muscle use [Auscultation Breath Sounds / Voice Sounds] : lungs were clear to auscultation bilaterally [Heart Rate And Rhythm] : heart rate and rhythm were normal [Heart Sounds] : normal S1 and S2 [Murmurs] : no murmurs present [Abdomen Soft] : soft [Abdomen Tenderness] : non-tender [Abdomen Mass (___ Cm)] : no abdominal mass palpated [Abnormal Walk] : normal gait [Gait - Sufficient For Exercise Testing] : the gait was sufficient for exercise testing [Nail Clubbing] : no clubbing of the fingernails [Cyanosis, Localized] : no localized cyanosis [Petechial Hemorrhages (___cm)] : no petechial hemorrhages [Skin Color & Pigmentation] : normal skin color and pigmentation [] : no rash [No Venous Stasis] : no venous stasis [Skin Lesions] : no skin lesions [No Skin Ulcers] : no skin ulcer [No Xanthoma] : no  xanthoma was observed [Oriented To Time, Place, And Person] : oriented to person, place, and time [Affect] : the affect was normal [Mood] : the mood was normal [No Anxiety] : not feeling anxious [FreeTextEntry1] : bilateral 2 cm groin hematoma. left groin tenderness to touch.

## 2020-11-04 NOTE — CARDIOLOGY SUMMARY
[___] : [unfilled] [LVEF ___%] : LVEF [unfilled]% [Moderate] : moderate pulmonary hypertension [Enlarged] : enlarged LA size [None] : no mitral regurgitation [___] : [unfilled] [de-identified] : feb 2019: Pause : 2 sec pause. afib.

## 2020-11-26 ENCOUNTER — OUTPATIENT (OUTPATIENT)
Dept: OUTPATIENT SERVICES | Facility: HOSPITAL | Age: 75
LOS: 1 days | End: 2020-11-26
Payer: MEDICARE

## 2020-11-26 DIAGNOSIS — Q20.8 OTHER CONGENITAL MALFORMATIONS OF CARDIAC CHAMBERS AND CONNECTIONS: Chronic | ICD-10-CM

## 2020-11-26 DIAGNOSIS — Z11.59 ENCOUNTER FOR SCREENING FOR OTHER VIRAL DISEASES: ICD-10-CM

## 2020-11-26 DIAGNOSIS — Z90.5 ACQUIRED ABSENCE OF KIDNEY: Chronic | ICD-10-CM

## 2020-11-26 DIAGNOSIS — I77.0 ARTERIOVENOUS FISTULA, ACQUIRED: Chronic | ICD-10-CM

## 2020-11-26 DIAGNOSIS — Z94.0 KIDNEY TRANSPLANT STATUS: Chronic | ICD-10-CM

## 2020-11-26 PROCEDURE — U0003: CPT

## 2020-11-27 DIAGNOSIS — Z11.59 ENCOUNTER FOR SCREENING FOR OTHER VIRAL DISEASES: ICD-10-CM

## 2020-11-27 LAB — SARS-COV-2 RNA SPEC QL NAA+PROBE: SIGNIFICANT CHANGE UP

## 2021-01-19 NOTE — ASU PATIENT PROFILE, ADULT - CAREGIVER ADDRESS
same What Type Of Note Output Would You Prefer (Optional)?: Standard Output How Severe Is Your Rash?: mild Is This A New Presentation, Or A Follow-Up?: Rash Additional History: Pt states that she uses lotion when rash is flared with no improvement. Rash is present today, but she has pictures of when it is flared.

## 2021-02-10 NOTE — ED PROVIDER NOTE - CPE EDP SKIN NORM
Addendum  created 02/10/21 1013 by Seb Santana MD    Child order released for a procedure order, Clinical Note Signed, Intraprocedure Blocks edited, Intraprocedure Meds edited
normal...

## 2021-02-13 NOTE — ED PROVIDER NOTE - CROS ED NEURO ALL NEG
negative... 31 y/o female low abd pain, palpitations x2 days with pos home pregnancy.  Eval for pregnancy, iup, ectopic, ov cyst, fibroids, uti, anemia.  Obtain cbc cmp hcg tvus ua ucx give pain med ivf reassess.

## 2021-02-23 DIAGNOSIS — Z03.818 ENCOUNTER FOR OBSERVATION FOR SUSPECTED EXPOSURE TO OTHER BIOLOGICAL AGENTS RULED OUT: ICD-10-CM

## 2021-02-23 NOTE — ED ADULT NURSE NOTE - ASSIST WITH
Bleeding that does not stop/Pain not relieved by Medications/Numbness, tingling, color or temperature change to extremity
standing/toileting/walking

## 2021-02-24 ENCOUNTER — APPOINTMENT (OUTPATIENT)
Dept: CARDIOLOGY | Facility: CLINIC | Age: 76
End: 2021-02-24

## 2021-02-26 ENCOUNTER — INPATIENT (INPATIENT)
Facility: HOSPITAL | Age: 76
LOS: 3 days | Discharge: HOME | DRG: 699 | End: 2021-03-02
Attending: INTERNAL MEDICINE | Admitting: STUDENT IN AN ORGANIZED HEALTH CARE EDUCATION/TRAINING PROGRAM
Payer: MEDICARE

## 2021-02-26 VITALS
RESPIRATION RATE: 18 BRPM | OXYGEN SATURATION: 100 % | HEART RATE: 74 BPM | TEMPERATURE: 98 F | SYSTOLIC BLOOD PRESSURE: 160 MMHG | WEIGHT: 179.9 LBS | DIASTOLIC BLOOD PRESSURE: 89 MMHG | HEIGHT: 66 IN

## 2021-02-26 DIAGNOSIS — N18.6 END STAGE RENAL DISEASE: ICD-10-CM

## 2021-02-26 DIAGNOSIS — Z90.5 ACQUIRED ABSENCE OF KIDNEY: Chronic | ICD-10-CM

## 2021-02-26 DIAGNOSIS — I77.0 ARTERIOVENOUS FISTULA, ACQUIRED: Chronic | ICD-10-CM

## 2021-02-26 DIAGNOSIS — Z94.0 KIDNEY TRANSPLANT STATUS: Chronic | ICD-10-CM

## 2021-02-26 DIAGNOSIS — Q20.8 OTHER CONGENITAL MALFORMATIONS OF CARDIAC CHAMBERS AND CONNECTIONS: Chronic | ICD-10-CM

## 2021-02-26 LAB
ALBUMIN SERPL ELPH-MCNC: 4.3 G/DL — SIGNIFICANT CHANGE UP (ref 3.3–5.2)
ALP SERPL-CCNC: 270 U/L — HIGH (ref 40–120)
ALT FLD-CCNC: 9 U/L — SIGNIFICANT CHANGE UP
ANION GAP SERPL CALC-SCNC: 18 MMOL/L — HIGH (ref 5–17)
ANISOCYTOSIS BLD QL: SLIGHT — SIGNIFICANT CHANGE UP
AST SERPL-CCNC: 22 U/L — SIGNIFICANT CHANGE UP
BASOPHILS # BLD AUTO: 0.02 K/UL — SIGNIFICANT CHANGE UP (ref 0–0.2)
BASOPHILS NFR BLD AUTO: 0.5 % — SIGNIFICANT CHANGE UP (ref 0–2)
BILIRUB SERPL-MCNC: 0.3 MG/DL — LOW (ref 0.4–2)
BUN SERPL-MCNC: 53 MG/DL — HIGH (ref 8–20)
CALCIUM SERPL-MCNC: 8.9 MG/DL — SIGNIFICANT CHANGE UP (ref 8.6–10.2)
CHLORIDE SERPL-SCNC: 98 MMOL/L — SIGNIFICANT CHANGE UP (ref 98–107)
CO2 SERPL-SCNC: 25 MMOL/L — SIGNIFICANT CHANGE UP (ref 22–29)
CREAT SERPL-MCNC: 8.16 MG/DL — HIGH (ref 0.5–1.3)
EOSINOPHIL # BLD AUTO: 0.21 K/UL — SIGNIFICANT CHANGE UP (ref 0–0.5)
EOSINOPHIL NFR BLD AUTO: 5.2 % — SIGNIFICANT CHANGE UP (ref 0–6)
GLUCOSE BLDC GLUCOMTR-MCNC: 128 MG/DL — HIGH (ref 70–99)
GLUCOSE BLDC GLUCOMTR-MCNC: 83 MG/DL — SIGNIFICANT CHANGE UP (ref 70–99)
GLUCOSE SERPL-MCNC: 125 MG/DL — HIGH (ref 70–99)
HCT VFR BLD CALC: 27.6 % — LOW (ref 39–50)
HGB BLD-MCNC: 9.1 G/DL — LOW (ref 13–17)
IMM GRANULOCYTES NFR BLD AUTO: 0.5 % — SIGNIFICANT CHANGE UP (ref 0–1.5)
LYMPHOCYTES # BLD AUTO: 0.63 K/UL — LOW (ref 1–3.3)
LYMPHOCYTES # BLD AUTO: 15.6 % — SIGNIFICANT CHANGE UP (ref 13–44)
MACROCYTES BLD QL: SLIGHT — SIGNIFICANT CHANGE UP
MAGNESIUM SERPL-MCNC: 1.8 MG/DL — SIGNIFICANT CHANGE UP (ref 1.6–2.6)
MANUAL SMEAR VERIFICATION: SIGNIFICANT CHANGE UP
MCHC RBC-ENTMCNC: 33 GM/DL — SIGNIFICANT CHANGE UP (ref 32–36)
MCHC RBC-ENTMCNC: 35.5 PG — HIGH (ref 27–34)
MCV RBC AUTO: 107.8 FL — HIGH (ref 80–100)
MONOCYTES # BLD AUTO: 0.43 K/UL — SIGNIFICANT CHANGE UP (ref 0–0.9)
MONOCYTES NFR BLD AUTO: 10.7 % — SIGNIFICANT CHANGE UP (ref 2–14)
NEUTROPHILS # BLD AUTO: 2.72 K/UL — SIGNIFICANT CHANGE UP (ref 1.8–7.4)
NEUTROPHILS NFR BLD AUTO: 67.5 % — SIGNIFICANT CHANGE UP (ref 43–77)
OVALOCYTES BLD QL SMEAR: SLIGHT — SIGNIFICANT CHANGE UP
PLAT MORPH BLD: NORMAL — SIGNIFICANT CHANGE UP
PLATELET # BLD AUTO: 187 K/UL — SIGNIFICANT CHANGE UP (ref 150–400)
POIKILOCYTOSIS BLD QL AUTO: SLIGHT — SIGNIFICANT CHANGE UP
POLYCHROMASIA BLD QL SMEAR: SLIGHT — SIGNIFICANT CHANGE UP
POTASSIUM SERPL-MCNC: 5.6 MMOL/L — HIGH (ref 3.5–5.3)
POTASSIUM SERPL-SCNC: 5.6 MMOL/L — HIGH (ref 3.5–5.3)
PROT SERPL-MCNC: 8.2 G/DL — SIGNIFICANT CHANGE UP (ref 6.6–8.7)
RBC # BLD: 2.56 M/UL — LOW (ref 4.2–5.8)
RBC # FLD: 14.2 % — SIGNIFICANT CHANGE UP (ref 10.3–14.5)
RBC BLD AUTO: ABNORMAL
SARS-COV-2 RNA SPEC QL NAA+PROBE: SIGNIFICANT CHANGE UP
SODIUM SERPL-SCNC: 141 MMOL/L — SIGNIFICANT CHANGE UP (ref 135–145)
WBC # BLD: 4.03 K/UL — SIGNIFICANT CHANGE UP (ref 3.8–10.5)
WBC # FLD AUTO: 4.03 K/UL — SIGNIFICANT CHANGE UP (ref 3.8–10.5)

## 2021-02-26 PROCEDURE — 71045 X-RAY EXAM CHEST 1 VIEW: CPT | Mod: 26

## 2021-02-26 PROCEDURE — 99285 EMERGENCY DEPT VISIT HI MDM: CPT | Mod: GC

## 2021-02-26 PROCEDURE — 93010 ELECTROCARDIOGRAM REPORT: CPT

## 2021-02-26 PROCEDURE — 99223 1ST HOSP IP/OBS HIGH 75: CPT

## 2021-02-26 RX ORDER — CLOPIDOGREL BISULFATE 75 MG/1
75 TABLET, FILM COATED ORAL DAILY
Refills: 0 | Status: DISCONTINUED | OUTPATIENT
Start: 2021-02-26 | End: 2021-03-02

## 2021-02-26 RX ORDER — ACETAMINOPHEN 500 MG
650 TABLET ORAL EVERY 6 HOURS
Refills: 0 | Status: DISCONTINUED | OUTPATIENT
Start: 2021-02-26 | End: 2021-03-02

## 2021-02-26 RX ORDER — ASPIRIN/CALCIUM CARB/MAGNESIUM 324 MG
81 TABLET ORAL DAILY
Refills: 0 | Status: DISCONTINUED | OUTPATIENT
Start: 2021-02-26 | End: 2021-03-02

## 2021-02-26 RX ORDER — ATORVASTATIN CALCIUM 80 MG/1
20 TABLET, FILM COATED ORAL AT BEDTIME
Refills: 0 | Status: DISCONTINUED | OUTPATIENT
Start: 2021-02-26 | End: 2021-03-02

## 2021-02-26 RX ORDER — INSULIN LISPRO 100/ML
VIAL (ML) SUBCUTANEOUS
Refills: 0 | Status: DISCONTINUED | OUTPATIENT
Start: 2021-02-26 | End: 2021-03-02

## 2021-02-26 RX ORDER — SEVELAMER CARBONATE 2400 MG/1
800 POWDER, FOR SUSPENSION ORAL THREE TIMES A DAY
Refills: 0 | Status: DISCONTINUED | OUTPATIENT
Start: 2021-02-26 | End: 2021-03-02

## 2021-02-26 RX ORDER — MIDODRINE HYDROCHLORIDE 2.5 MG/1
10 TABLET ORAL THREE TIMES A DAY
Refills: 0 | Status: DISCONTINUED | OUTPATIENT
Start: 2021-02-26 | End: 2021-03-02

## 2021-02-26 RX ORDER — SODIUM CHLORIDE 9 MG/ML
1000 INJECTION, SOLUTION INTRAVENOUS
Refills: 0 | Status: DISCONTINUED | OUTPATIENT
Start: 2021-02-26 | End: 2021-03-02

## 2021-02-26 RX ORDER — URSODIOL 250 MG/1
500 TABLET, FILM COATED ORAL THREE TIMES A DAY
Refills: 0 | Status: DISCONTINUED | OUTPATIENT
Start: 2021-02-26 | End: 2021-03-02

## 2021-02-26 RX ORDER — INFLUENZA VIRUS VACCINE 15; 15; 15; 15 UG/.5ML; UG/.5ML; UG/.5ML; UG/.5ML
0.5 SUSPENSION INTRAMUSCULAR ONCE
Refills: 0 | Status: DISCONTINUED | OUTPATIENT
Start: 2021-02-26 | End: 2021-03-02

## 2021-02-26 RX ORDER — PANTOPRAZOLE SODIUM 20 MG/1
40 TABLET, DELAYED RELEASE ORAL
Refills: 0 | Status: DISCONTINUED | OUTPATIENT
Start: 2021-02-26 | End: 2021-03-02

## 2021-02-26 RX ORDER — GEMFIBROZIL 600 MG
600 TABLET ORAL
Refills: 0 | Status: DISCONTINUED | OUTPATIENT
Start: 2021-02-26 | End: 2021-03-02

## 2021-02-26 RX ORDER — ERYTHROPOIETIN 10000 [IU]/ML
10000 INJECTION, SOLUTION INTRAVENOUS; SUBCUTANEOUS ONCE
Refills: 0 | Status: COMPLETED | OUTPATIENT
Start: 2021-02-26 | End: 2021-02-26

## 2021-02-26 RX ORDER — DEXTROSE 50 % IN WATER 50 %
15 SYRINGE (ML) INTRAVENOUS ONCE
Refills: 0 | Status: DISCONTINUED | OUTPATIENT
Start: 2021-02-26 | End: 2021-03-02

## 2021-02-26 RX ORDER — LORATADINE 10 MG/1
10 TABLET ORAL DAILY
Refills: 0 | Status: DISCONTINUED | OUTPATIENT
Start: 2021-02-26 | End: 2021-03-02

## 2021-02-26 RX ORDER — CINACALCET 30 MG/1
30 TABLET, FILM COATED ORAL
Refills: 0 | Status: DISCONTINUED | OUTPATIENT
Start: 2021-02-26 | End: 2021-03-02

## 2021-02-26 RX ORDER — DEXTROSE 50 % IN WATER 50 %
25 SYRINGE (ML) INTRAVENOUS ONCE
Refills: 0 | Status: DISCONTINUED | OUTPATIENT
Start: 2021-02-26 | End: 2021-03-02

## 2021-02-26 RX ORDER — HEPARIN SODIUM 5000 [USP'U]/ML
5000 INJECTION INTRAVENOUS; SUBCUTANEOUS EVERY 8 HOURS
Refills: 0 | Status: DISCONTINUED | OUTPATIENT
Start: 2021-02-26 | End: 2021-03-02

## 2021-02-26 RX ORDER — RANOLAZINE 500 MG/1
500 TABLET, FILM COATED, EXTENDED RELEASE ORAL
Refills: 0 | Status: DISCONTINUED | OUTPATIENT
Start: 2021-02-26 | End: 2021-03-02

## 2021-02-26 RX ORDER — DEXTROSE 50 % IN WATER 50 %
12.5 SYRINGE (ML) INTRAVENOUS ONCE
Refills: 0 | Status: DISCONTINUED | OUTPATIENT
Start: 2021-02-26 | End: 2021-03-02

## 2021-02-26 RX ORDER — GLUCAGON INJECTION, SOLUTION 0.5 MG/.1ML
1 INJECTION, SOLUTION SUBCUTANEOUS ONCE
Refills: 0 | Status: DISCONTINUED | OUTPATIENT
Start: 2021-02-26 | End: 2021-03-02

## 2021-02-26 RX ADMIN — CINACALCET 30 MILLIGRAM(S): 30 TABLET, FILM COATED ORAL at 20:40

## 2021-02-26 RX ADMIN — HEPARIN SODIUM 5000 UNIT(S): 5000 INJECTION INTRAVENOUS; SUBCUTANEOUS at 22:06

## 2021-02-26 RX ADMIN — ATORVASTATIN CALCIUM 20 MILLIGRAM(S): 80 TABLET, FILM COATED ORAL at 22:06

## 2021-02-26 RX ADMIN — ERYTHROPOIETIN 10000 UNIT(S): 10000 INJECTION, SOLUTION INTRAVENOUS; SUBCUTANEOUS at 16:33

## 2021-02-26 RX ADMIN — SEVELAMER CARBONATE 800 MILLIGRAM(S): 2400 POWDER, FOR SUSPENSION ORAL at 16:54

## 2021-02-26 RX ADMIN — RANOLAZINE 500 MILLIGRAM(S): 500 TABLET, FILM COATED, EXTENDED RELEASE ORAL at 20:47

## 2021-02-26 RX ADMIN — Medication 600 MILLIGRAM(S): at 20:40

## 2021-02-26 RX ADMIN — URSODIOL 500 MILLIGRAM(S): 250 TABLET, FILM COATED ORAL at 22:06

## 2021-02-26 RX ADMIN — Medication 650 MILLIGRAM(S): at 22:06

## 2021-02-26 NOTE — ED PROVIDER NOTE - CLINICAL SUMMARY MEDICAL DECISION MAKING FREE TEXT BOX
74yo M w/pmh DM, CAD/PAD, ESRD on dialysis (Tues/Thur/Sat) presents for dialysis after returning from [facility], last dialysis Tuesday in [facility], denying any symptoms currently. Labs, EKG, CXR pending. Will consult nephrology.

## 2021-02-26 NOTE — ED ADULT NURSE NOTE - CHIEF COMPLAINT QUOTE
"[place] and missed dialysis" "last dialysis was Tuesday" c/o needing HD, Schedule is TuThursSat with last tx Tuesday. Right AVF noted. Recent travel from [facility] returned 2x days ago, denies fevers/chills. Difficulty ambulating @ baseline, Placed in isolation for r/o COVID, denies cp denies sob

## 2021-02-26 NOTE — H&P ADULT - HISTORY OF PRESENT ILLNESS
74 y/o M with PMH of DM, CAD, ESRD on HD (T/Th/Sat), Afib s/p watchman procedure (1/30/2017) presents for dialysis. The patient states he was visiting [facility] for the past 3 months and his last dialysis was there on Tuesday. Patient flew home and missed his HD on Thursday. His usual dialysis center told him he needs to be evaluated at the hospital before restarting HD at his usual center.

## 2021-02-26 NOTE — ED ADULT NURSE NOTE - OBJECTIVE STATEMENT
"went to visit family in another country. dialysis wont take me back unless I get checked out at the hospital. I missed dialysis on thursday. I get it tuesday thursday saturday." pt has right av fistula.

## 2021-02-26 NOTE — H&P ADULT - ASSESSMENT
74 y/o M with PMH of DM, CAD, ESRD on HD (T/Th/Sat), Afib s/p watchman procedure (1/30/2017) presents for dialysis, patient recently returned from [facility] and needed to be evaluated prior to reinstatement of dialysis.     Missed HD  - not clinically fluid overloaded  - Nephro consulted for HD today  - c/w Renvela 800mg TID  - c/w Sensipar 30mg BID  - Midodrine 10mg TID for hypotension    DM  - FS with ISS    CAD  - c/w ASA 81mg daily  - c/w Plavix 75mg daily    Afib   - s/p watchman procedure    DVT ppx  - Heparin SQ

## 2021-02-26 NOTE — ED PROVIDER NOTE - OBJECTIVE STATEMENT
74yo M w/pmh DM, CAD/PAD, ESRD on dialysis (Tues/Thur/Sat) presents for dialysis. He reports visiting [facility] for the past 3 months, last dialysis was there on Tuesday before flying home. He missed his Thursday dialysis. Reports he called his usual dialysis center but was told he needed to be evaluated at the hospital before making an appointment there. Nephrologist is Dr. Lopez. 76yo M w/pmh DM, CAD/PAD, ESRD on dialysis (Tues/Thur/Sat) presents for dialysis. He reports visiting [facility] for the past 3 months, last dialysis was there on Tuesday before flying home. He missed his Thursday dialysis. Reports he called his usual dialysis center but was told he needed to be evaluated at the hospital before making an appointment there. Nephrologist is Dr. Lopez. Patient denies any symptoms. Denies f/ch, CP, SOB, cough, n/v, abdominal pain, weakness. Denies sick contacts. 76yo M w/pmh DM, CAD/PAD, ESRD on dialysis (Tues/Thur/Sat) presents for dialysis. He reports visiting [facility] for the past 3 months, last dialysis was there on Tuesday before flying home. He missed his Thursday dialysis. Reports he called his usual dialysis center but was told he needed to be evaluated at the hospital before making an appointment there. Nephrologist is Dr. Lopez. Patient denies any symptoms. Denies f/ch, CP, SOB, cough, n/v, abdominal pain, weakness. Denies sick contacts. Reports a negative COVID test 5d ago prior to flying back to [place].

## 2021-02-26 NOTE — H&P ADULT - NSHPLABSRESULTS_GEN_ALL_CORE
9.1    4.03  )-----------( 187      ( 26 Feb 2021 12:00 )             27.6   02-26    141  |  98  |  53.0<H>  ----------------------------<  125<H>  5.6<H>   |  25.0  |  8.16<H>    Ca    8.9      26 Feb 2021 12:00  Mg     1.8     02-26    TPro  8.2  /  Alb  4.3  /  TBili  0.3<L>  /  DBili  x   /  AST  22  /  ALT  9   /  AlkPhos  270<H>  02-26

## 2021-02-26 NOTE — H&P ADULT - NSHPPHYSICALEXAM_GEN_ALL_CORE
Vital Signs Last 24 Hrs  T(F): 99.2 (26 Feb 2021 12:04), Max: 99.2 (26 Feb 2021 12:04)  HR: 67 (26 Feb 2021 12:04) (67 - 74)  BP: 117/72 (26 Feb 2021 12:04) (117/72 - 182/83)  RR: 16 (26 Feb 2021 12:04) (16 - 20)  SpO2: 96% (26 Feb 2021 12:04) (96% - 100%)    Physical Exam:  Constitutional: alert and oriented, in no acute distress   Neck: Soft and supple, No LAD, No JVD  Respiratory: Clear to auscultation bilaterally, no wheezes or crackles  Cardiovascular: Regular rate and rhythm, no murmurs, gallops, rubs  Gastrointestinal: Soft, non-tender to palpation, +bs  Vascular: 2+ peripheral pulses  Neurological: A/O x 3, no focal neurological deficits  Musculoskeletal: 5/5 strength b/l upper and lower extremities, no lower extremity edema bilaterally  Skin: no rashes, right AVF  Psych: answering questions appropriately

## 2021-02-26 NOTE — H&P ADULT - NSHPREVIEWOFSYSTEMS_GEN_ALL_CORE
Review of Systems:  CONSTITUTIONAL: No weakness, fevers or chills  EYES/ENT: No visual changes;  No vertigo or throat pain   NECK: No pain or stiffness  RESPIRATORY: No cough, wheezing, hemoptysis; No shortness of breath,   CARDIOVASCULAR: No chest pain or palpitations  GASTROINTESTINAL: No abdominal or epigastric pain. No nausea, vomiting, or hematemesis; No diarrhea or constipation.   GENITOURINARY: No dysuria, frequency or hematuria  NEUROLOGICAL: No numbness or weakness  SKIN: No itching, burning, rashes, or lesions   All other review of systems is negative unless indicated above

## 2021-02-26 NOTE — ED PROVIDER NOTE - PHYSICAL EXAMINATION
General: well appearing, NAD  Head: NC/AT  Cardiac: RRR, no m/r/g  Respiratory: CTABL, equal chest wall expansions, no conversational dyspnea  Abdomen: soft, ND, NT  Neuro: AAOx3,coordinated movement of all 4 extremities  Psych: calm, cooperative, normal affect  Skin: warm and dry

## 2021-02-26 NOTE — H&P ADULT - NSICDXPASTMEDICALHX_GEN_ALL_CORE_FT
PAST MEDICAL HISTORY:  Carotid artery disease mild    Claudication in peripheral vascular disease R>L  Right common iliac or ostial external iliac per June 2018 U/S  Left popliteal 50-75 and EDUIN stenosis  SUSANA 0.8    Daytime sleepiness     Dizziness     DM (diabetes mellitus)     Dyslipidemia     ESRD (end stage renal disease) on  hemodialysis  3  x  weekly.,  H/O  left  nephrectomy after  renal  bleed    GERD (gastroesophageal reflux disease)     Hypercholesteremia     Hypotension treated  with Midodrine    Imbalance secondary to PVD Uses cane    Kidney problem spontanious renal bleed while on coumadin  c/b  renal  hematome  with  LT  nephrectomy    Leg pain, bilateral R>L at rest and with short distant ambulation    Low glucose level Patient has h/o low blood sugars at times    Obesity (BMI 30.0-34.9)     Palpitations     Renal hematoma, left     Tiredness

## 2021-02-26 NOTE — ED PROVIDER NOTE - NS ED ROS FT
Constitutional: no fever, sweats, and no chills.  Eyes: no pain, no redness, and no visual changes.  ENMT: no ear pain and no hearing problems, no nasal congestion/drainage, no dysphagia, and no throat pain, no neck pain, no stiffness  CV: no chest pain, no edema.  Resp: no cough, no dyspnea  GI: no abdominal pain, no bloating, no constipation, no diarrhea, no nausea and no vomiting.  : no dysuria, no hematuria  MSK: no msk pain, no weakness  Skin: no lesions, and no rashes.  Neuro: no LOC, no headache, no sensory deficits, and no weakness. Constitutional: no fever, sweats, and no chills.  Eyes: no pain, no redness, and no visual changes.  CV: no chest pain, no edema.  Resp: no cough, no dyspnea  GI: no abdominal pain, no bloating, no constipation, no diarrhea, no nausea and no vomiting.  : no dysuria, no hematuria  MSK: no msk pain, no weakness  Skin: no lesions, and no rashes.  Neuro: no LOC, no headache, no sensory deficits

## 2021-02-26 NOTE — ED ADULT TRIAGE NOTE - CHIEF COMPLAINT QUOTE
"I went to [place] to [place] and missed dialysis" "last dialysis was Tuesday" c/o needing HD, Schedule is TuThursSat with last tx Tuesday. Right AVF noted. Recent travel from [facility] returned 2x days ago, denies fevers/chills. Difficulty ambulating @ baseline, Placed in isolation for r/o COVID, denies cp denies sob

## 2021-02-26 NOTE — CONSULT NOTE ADULT - ASSESSMENT
ESRD - Traveled to [facility] x 3 months   Now returned   Will arrange HD today   See orders   Needs Covid test ( pending )   Update Hep B and C     Anemia - Await ER labs   Retacrit with HD

## 2021-02-26 NOTE — ED PROVIDER NOTE - ATTENDING CONTRIBUTION TO CARE
Meghan: I performed a face to face evaluation of this patient and performed a full history and physical examination on the patient.  I agree with the resident's history, physical examination, and plan of the patient unless otherwise noted. My brief assessment is as follows: pt recently returned from [facility], dialyzed there, t, th, sat, last dialyzed on tuseday, unable to get dialyzed here at outpt due to recent travel. neg covid recently. no complaints. non toxic, well appearing. ctab, rrr, abd benign, no swelling b/l LE. labs, renal consult for dialysis.

## 2021-02-26 NOTE — CONSULT NOTE ADULT - SUBJECTIVE AND OBJECTIVE BOX
Patient is a 75y old  Male who presents with a chief complaint of     HPI:  · Chief Complaint: The patient is a 75y Male complaining of dialysis.  · HPI Objective Statement: 74yo M w/pmh DM, CAD/PAD, ESRD on dialysis (Tues/Thur/Sat) presents for dialysis. He reports visiting [facility] for the past 3 months, last dialysis was there on Tuesday before flying home. He missed his Thursday dialysis. Reports he called his usual dialysis center but was told he needed to be evaluated at the hospital before making an appointment there. Nephrologist is Dr. Kraft Patient denies any symptoms. Denies f/ch, CP, SOB, cough, n/v, abdominal pain, weakness. Denies sick contacts. Reports a negative COVID test 5d ago prior to [place] back to [place].    Above noted   Missed HD   Usually [facility] pt TTS     PAST MEDICAL & SURGICAL HISTORY:  Persistent atrial fibrillation    Carotid artery disease  mild    GERD (gastroesophageal reflux disease)    Low glucose level  Patient has h/o low blood sugars at times    Palpitations    Leg pain, bilateral  R&gt;L at rest and with short distant ambulation    Imbalance  secondary to PVD Uses cane    Obesity (BMI 30.0-34.9)    Claudication in peripheral vascular disease  R&gt;L  Right common iliac or ostial external iliac per June 2018 U/S  Left popliteal 50-75 and EDUIN stenosis  SUSANA 0.8    Kidney problem  spontanious renal bleed while on coumadin  c/b  renal  hematome  with  LT  nephrectomy    Hypotension  treated  with Midodrine    Tiredness    Renal hematoma, left    Hypercholesteremia    ESRD (end stage renal disease)  on  hemodialysis  3  x  weekly.,  H/O  left  nephrectomy after  renal  bleed    Dyslipidemia    Dizziness    DM (diabetes mellitus)    Daytime sleepiness    Renal transplant recipient    AV fistula  right lower arm    Abnormality of left atrial appendage  WATCHMAN  LEFT ATRIAL APPENDAGE CLOSUIRE   Jan. 30 2017    History of unilateral nephrectomy  left  nephrectomy        FAMILY HISTORY:  FH: hypertension  father        Social History:    MEDICATIONS  (STANDING):    MEDICATIONS  (PRN):      Allergies    No Known Allergies    Intolerances        Vital Signs Last 24 Hrs  T(C): 36.6 (26 Feb 2021 09:15), Max: 36.6 (26 Feb 2021 09:15)  T(F): 97.9 (26 Feb 2021 09:15), Max: 97.9 (26 Feb 2021 09:15)  HR: 74 (26 Feb 2021 09:15) (74 - 74)  BP: 160/89 (26 Feb 2021 09:15) (160/89 - 160/89)  BP(mean): --  RR: 18 (26 Feb 2021 09:15) (18 - 18)  SpO2: 100% (26 Feb 2021 09:15) (100% - 100%)  Daily Height in cm: 167.64 (26 Feb 2021 09:15)    Daily   I&O's Detail    I&O's Summary      PHYSICAL EXAM:    · Physical Examination: General: well appearing, NAD  	Head: NC/AT  	Cardiac: RRR, no m/r/g  	Respiratory: CTABL, equal chest wall expansions, no conversational dyspnea  	Abdomen: soft, ND, NT  	Neuro: AAOx3,coordinated movement of all 4 extremities  	Psych: calm, cooperative, normal affect  R arm access with thrill         LABS:    Pending     CXR   Pending                   RADIOLOGY & ADDITIONAL TESTS:

## 2021-02-26 NOTE — ED ADULT NURSE NOTE - NSIMPLEMENTINTERV_GEN_ALL_ED
Implemented All Universal Safety Interventions:  [place] to call system. Call bell, personal items and telephone within reach. Instruct patient to call for assistance. Room bathroom lighting operational. Non-slip footwear when patient is off stretcher. Physically safe environment: no spills, clutter or unnecessary equipment. Stretcher in lowest position, wheels locked, appropriate side rails in place.

## 2021-02-27 LAB
A1C WITH ESTIMATED AVERAGE GLUCOSE RESULT: 4.8 % — SIGNIFICANT CHANGE UP (ref 4–5.6)
ALBUMIN SERPL ELPH-MCNC: 4 G/DL — SIGNIFICANT CHANGE UP (ref 3.3–5.2)
ALP SERPL-CCNC: 232 U/L — HIGH (ref 40–120)
ALT FLD-CCNC: 7 U/L — SIGNIFICANT CHANGE UP
ANION GAP SERPL CALC-SCNC: 14 MMOL/L — SIGNIFICANT CHANGE UP (ref 5–17)
ANISOCYTOSIS BLD QL: SLIGHT — SIGNIFICANT CHANGE UP
AST SERPL-CCNC: 21 U/L — SIGNIFICANT CHANGE UP
BASOPHILS # BLD AUTO: 0 K/UL — SIGNIFICANT CHANGE UP (ref 0–0.2)
BASOPHILS NFR BLD AUTO: 0 % — SIGNIFICANT CHANGE UP (ref 0–2)
BILIRUB SERPL-MCNC: 0.5 MG/DL — SIGNIFICANT CHANGE UP (ref 0.4–2)
BUN SERPL-MCNC: 24 MG/DL — HIGH (ref 8–20)
BURR CELLS BLD QL SMEAR: PRESENT — SIGNIFICANT CHANGE UP
CALCIUM SERPL-MCNC: 8.5 MG/DL — LOW (ref 8.6–10.2)
CHLORIDE SERPL-SCNC: 93 MMOL/L — LOW (ref 98–107)
CO2 SERPL-SCNC: 27 MMOL/L — SIGNIFICANT CHANGE UP (ref 22–29)
CREAT SERPL-MCNC: 4.42 MG/DL — HIGH (ref 0.5–1.3)
DACRYOCYTES BLD QL SMEAR: SLIGHT — SIGNIFICANT CHANGE UP
ELLIPTOCYTES BLD QL SMEAR: SLIGHT — SIGNIFICANT CHANGE UP
EOSINOPHIL # BLD AUTO: 0.15 K/UL — SIGNIFICANT CHANGE UP (ref 0–0.5)
EOSINOPHIL NFR BLD AUTO: 4.5 % — SIGNIFICANT CHANGE UP (ref 0–6)
ESTIMATED AVERAGE GLUCOSE: 91 MG/DL — SIGNIFICANT CHANGE UP (ref 68–114)
GLUCOSE BLDC GLUCOMTR-MCNC: 102 MG/DL — HIGH (ref 70–99)
GLUCOSE BLDC GLUCOMTR-MCNC: 106 MG/DL — HIGH (ref 70–99)
GLUCOSE BLDC GLUCOMTR-MCNC: 93 MG/DL — SIGNIFICANT CHANGE UP (ref 70–99)
GLUCOSE BLDC GLUCOMTR-MCNC: 94 MG/DL — SIGNIFICANT CHANGE UP (ref 70–99)
GLUCOSE SERPL-MCNC: 80 MG/DL — SIGNIFICANT CHANGE UP (ref 70–99)
HAV IGM SER-ACNC: SIGNIFICANT CHANGE UP
HBV CORE IGM SER-ACNC: SIGNIFICANT CHANGE UP
HBV SURFACE AB SER-ACNC: REACTIVE
HBV SURFACE AG SER-ACNC: SIGNIFICANT CHANGE UP
HCT VFR BLD CALC: 25.4 % — LOW (ref 39–50)
HCV AB S/CO SERPL IA: 0.55 S/CO — SIGNIFICANT CHANGE UP (ref 0–0.99)
HCV AB SERPL-IMP: SIGNIFICANT CHANGE UP
HGB BLD-MCNC: 8.5 G/DL — LOW (ref 13–17)
HYPOCHROMIA BLD QL: SLIGHT — SIGNIFICANT CHANGE UP
LYMPHOCYTES # BLD AUTO: 0.49 K/UL — LOW (ref 1–3.3)
LYMPHOCYTES # BLD AUTO: 15.2 % — SIGNIFICANT CHANGE UP (ref 13–44)
MACROCYTES BLD QL: SLIGHT — SIGNIFICANT CHANGE UP
MANUAL SMEAR VERIFICATION: SIGNIFICANT CHANGE UP
MCHC RBC-ENTMCNC: 33.5 GM/DL — SIGNIFICANT CHANGE UP (ref 32–36)
MCHC RBC-ENTMCNC: 34.8 PG — HIGH (ref 27–34)
MCV RBC AUTO: 104.1 FL — HIGH (ref 80–100)
MICROCYTES BLD QL: SLIGHT — SIGNIFICANT CHANGE UP
MONOCYTES # BLD AUTO: 0.32 K/UL — SIGNIFICANT CHANGE UP (ref 0–0.9)
MONOCYTES NFR BLD AUTO: 9.8 % — SIGNIFICANT CHANGE UP (ref 2–14)
MYELOCYTES NFR BLD: 0.9 % — HIGH (ref 0–0)
NEUTROPHILS # BLD AUTO: 2.26 K/UL — SIGNIFICANT CHANGE UP (ref 1.8–7.4)
NEUTROPHILS NFR BLD AUTO: 69.6 % — SIGNIFICANT CHANGE UP (ref 43–77)
OVALOCYTES BLD QL SMEAR: SLIGHT — SIGNIFICANT CHANGE UP
PLAT MORPH BLD: NORMAL — SIGNIFICANT CHANGE UP
PLATELET # BLD AUTO: 188 K/UL — SIGNIFICANT CHANGE UP (ref 150–400)
PLATELET COUNT - ESTIMATE: NORMAL — SIGNIFICANT CHANGE UP
POIKILOCYTOSIS BLD QL AUTO: SLIGHT — SIGNIFICANT CHANGE UP
POTASSIUM SERPL-MCNC: 4.4 MMOL/L — SIGNIFICANT CHANGE UP (ref 3.5–5.3)
POTASSIUM SERPL-SCNC: 4.4 MMOL/L — SIGNIFICANT CHANGE UP (ref 3.5–5.3)
PROT SERPL-MCNC: 7.3 G/DL — SIGNIFICANT CHANGE UP (ref 6.6–8.7)
RBC # BLD: 2.44 M/UL — LOW (ref 4.2–5.8)
RBC # FLD: 13.7 % — SIGNIFICANT CHANGE UP (ref 10.3–14.5)
RBC BLD AUTO: NORMAL — SIGNIFICANT CHANGE UP
SARS-COV-2 IGG SERPL QL IA: NEGATIVE — SIGNIFICANT CHANGE UP
SARS-COV-2 IGM SERPL IA-ACNC: 0.09 INDEX — SIGNIFICANT CHANGE UP
SODIUM SERPL-SCNC: 134 MMOL/L — LOW (ref 135–145)
WBC # BLD: 3.24 K/UL — LOW (ref 3.8–10.5)
WBC # FLD AUTO: 3.24 K/UL — LOW (ref 3.8–10.5)

## 2021-02-27 PROCEDURE — 99233 SBSQ HOSP IP/OBS HIGH 50: CPT

## 2021-02-27 RX ORDER — TUBERCULIN PURIFIED PROTEIN DERIVATIVE 5 [IU]/.1ML
5 INJECTION, SOLUTION INTRADERMAL ONCE
Refills: 0 | Status: COMPLETED | OUTPATIENT
Start: 2021-02-27 | End: 2021-02-27

## 2021-02-27 RX ADMIN — URSODIOL 500 MILLIGRAM(S): 250 TABLET, FILM COATED ORAL at 12:52

## 2021-02-27 RX ADMIN — CLOPIDOGREL BISULFATE 75 MILLIGRAM(S): 75 TABLET, FILM COATED ORAL at 12:52

## 2021-02-27 RX ADMIN — LORATADINE 10 MILLIGRAM(S): 10 TABLET ORAL at 12:52

## 2021-02-27 RX ADMIN — URSODIOL 500 MILLIGRAM(S): 250 TABLET, FILM COATED ORAL at 04:37

## 2021-02-27 RX ADMIN — Medication 600 MILLIGRAM(S): at 04:37

## 2021-02-27 RX ADMIN — SEVELAMER CARBONATE 800 MILLIGRAM(S): 2400 POWDER, FOR SUSPENSION ORAL at 12:52

## 2021-02-27 RX ADMIN — TUBERCULIN PURIFIED PROTEIN DERIVATIVE 5 UNIT(S): 5 INJECTION, SOLUTION INTRADERMAL at 18:40

## 2021-02-27 RX ADMIN — RANOLAZINE 500 MILLIGRAM(S): 500 TABLET, FILM COATED, EXTENDED RELEASE ORAL at 04:37

## 2021-02-27 RX ADMIN — HEPARIN SODIUM 5000 UNIT(S): 5000 INJECTION INTRAVENOUS; SUBCUTANEOUS at 21:23

## 2021-02-27 RX ADMIN — Medication 650 MILLIGRAM(S): at 18:24

## 2021-02-27 RX ADMIN — CINACALCET 30 MILLIGRAM(S): 30 TABLET, FILM COATED ORAL at 04:37

## 2021-02-27 RX ADMIN — Medication 81 MILLIGRAM(S): at 12:52

## 2021-02-27 RX ADMIN — SEVELAMER CARBONATE 800 MILLIGRAM(S): 2400 POWDER, FOR SUSPENSION ORAL at 21:24

## 2021-02-27 RX ADMIN — HEPARIN SODIUM 5000 UNIT(S): 5000 INJECTION INTRAVENOUS; SUBCUTANEOUS at 12:52

## 2021-02-27 RX ADMIN — Medication 600 MILLIGRAM(S): at 18:23

## 2021-02-27 RX ADMIN — HEPARIN SODIUM 5000 UNIT(S): 5000 INJECTION INTRAVENOUS; SUBCUTANEOUS at 04:37

## 2021-02-27 RX ADMIN — PANTOPRAZOLE SODIUM 40 MILLIGRAM(S): 20 TABLET, DELAYED RELEASE ORAL at 04:37

## 2021-02-27 RX ADMIN — URSODIOL 500 MILLIGRAM(S): 250 TABLET, FILM COATED ORAL at 21:24

## 2021-02-27 RX ADMIN — ATORVASTATIN CALCIUM 20 MILLIGRAM(S): 80 TABLET, FILM COATED ORAL at 21:24

## 2021-02-27 RX ADMIN — RANOLAZINE 500 MILLIGRAM(S): 500 TABLET, FILM COATED, EXTENDED RELEASE ORAL at 18:24

## 2021-02-27 RX ADMIN — SEVELAMER CARBONATE 800 MILLIGRAM(S): 2400 POWDER, FOR SUSPENSION ORAL at 04:37

## 2021-02-27 RX ADMIN — CINACALCET 30 MILLIGRAM(S): 30 TABLET, FILM COATED ORAL at 18:40

## 2021-02-27 NOTE — PROGRESS NOTE ADULT - ASSESSMENT
74 y/o M with PMH of DM, CAD, ESRD on HD (T/Th/Sat), Afib s/p watchman procedure (1/30/2017) presents for dialysis, patient recently returned from [facility] and needed to be evaluated prior to reinstatement of dialysis.     ESRD on HD   Missed HD  - T/TH/SAT schedule   - Had HD yesterday, per Nephro will plan for another session over the weekend   - Nephro consult appreciated   - c/w Renvela 800mg TID  - c/w Sensipar 30mg BID  - Midodrine 10mg TID for hypotension  - Needs testing to have HD set up after discharge: COVID, Hep panel, PPD ordered     DM  - FS   - ISS    CAD  - c/w ASA 81mg daily  - c/w Plavix 75mg daily    Afib   - s/p watchman procedure    DVT ppx  - Heparin SQ    Dispo: Per SW/CM, pt needs further testing and will need to have paperwork done again to have him set up at the HD facility as outpatient because he was away for 3 months, likely Monday.

## 2021-02-27 NOTE — PROGRESS NOTE ADULT - ASSESSMENT
ESRD - Traveled to [facility] x 3 months   Now returned   Will arrange HD today to get back on TTS schedule   See orders    Covid test Negative    Updated Hep B and C and PPD pending     Anemia -   Retacrit with HD   Follow CBC     RO - Continue binders

## 2021-02-28 LAB
ANION GAP SERPL CALC-SCNC: 15 MMOL/L — SIGNIFICANT CHANGE UP (ref 5–17)
BASOPHILS # BLD AUTO: 0.02 K/UL — SIGNIFICANT CHANGE UP (ref 0–0.2)
BASOPHILS NFR BLD AUTO: 0.6 % — SIGNIFICANT CHANGE UP (ref 0–2)
BUN SERPL-MCNC: 19 MG/DL — SIGNIFICANT CHANGE UP (ref 8–20)
CALCIUM SERPL-MCNC: 8.7 MG/DL — SIGNIFICANT CHANGE UP (ref 8.6–10.2)
CHLORIDE SERPL-SCNC: 93 MMOL/L — LOW (ref 98–107)
CO2 SERPL-SCNC: 28 MMOL/L — SIGNIFICANT CHANGE UP (ref 22–29)
CREAT SERPL-MCNC: 3.78 MG/DL — HIGH (ref 0.5–1.3)
EOSINOPHIL # BLD AUTO: 0.13 K/UL — SIGNIFICANT CHANGE UP (ref 0–0.5)
EOSINOPHIL NFR BLD AUTO: 3.6 % — SIGNIFICANT CHANGE UP (ref 0–6)
GLUCOSE BLDC GLUCOMTR-MCNC: 78 MG/DL — SIGNIFICANT CHANGE UP (ref 70–99)
GLUCOSE BLDC GLUCOMTR-MCNC: 85 MG/DL — SIGNIFICANT CHANGE UP (ref 70–99)
GLUCOSE BLDC GLUCOMTR-MCNC: 86 MG/DL — SIGNIFICANT CHANGE UP (ref 70–99)
GLUCOSE BLDC GLUCOMTR-MCNC: 92 MG/DL — SIGNIFICANT CHANGE UP (ref 70–99)
GLUCOSE SERPL-MCNC: 86 MG/DL — SIGNIFICANT CHANGE UP (ref 70–99)
HCT VFR BLD CALC: 30.3 % — LOW (ref 39–50)
HGB BLD-MCNC: 10.1 G/DL — LOW (ref 13–17)
IMM GRANULOCYTES NFR BLD AUTO: 0.6 % — SIGNIFICANT CHANGE UP (ref 0–1.5)
LYMPHOCYTES # BLD AUTO: 0.75 K/UL — LOW (ref 1–3.3)
LYMPHOCYTES # BLD AUTO: 20.7 % — SIGNIFICANT CHANGE UP (ref 13–44)
MCHC RBC-ENTMCNC: 33.3 GM/DL — SIGNIFICANT CHANGE UP (ref 32–36)
MCHC RBC-ENTMCNC: 35.2 PG — HIGH (ref 27–34)
MCV RBC AUTO: 105.6 FL — HIGH (ref 80–100)
MONOCYTES # BLD AUTO: 0.61 K/UL — SIGNIFICANT CHANGE UP (ref 0–0.9)
MONOCYTES NFR BLD AUTO: 16.9 % — HIGH (ref 2–14)
NEUTROPHILS # BLD AUTO: 2.09 K/UL — SIGNIFICANT CHANGE UP (ref 1.8–7.4)
NEUTROPHILS NFR BLD AUTO: 57.6 % — SIGNIFICANT CHANGE UP (ref 43–77)
PLATELET # BLD AUTO: 206 K/UL — SIGNIFICANT CHANGE UP (ref 150–400)
POTASSIUM SERPL-MCNC: 5.4 MMOL/L — HIGH (ref 3.5–5.3)
POTASSIUM SERPL-SCNC: 5.4 MMOL/L — HIGH (ref 3.5–5.3)
RBC # BLD: 2.87 M/UL — LOW (ref 4.2–5.8)
RBC # FLD: 13.6 % — SIGNIFICANT CHANGE UP (ref 10.3–14.5)
SODIUM SERPL-SCNC: 136 MMOL/L — SIGNIFICANT CHANGE UP (ref 135–145)
WBC # BLD: 3.62 K/UL — LOW (ref 3.8–10.5)
WBC # FLD AUTO: 3.62 K/UL — LOW (ref 3.8–10.5)

## 2021-02-28 PROCEDURE — 99232 SBSQ HOSP IP/OBS MODERATE 35: CPT

## 2021-02-28 RX ADMIN — Medication 81 MILLIGRAM(S): at 12:01

## 2021-02-28 RX ADMIN — LORATADINE 10 MILLIGRAM(S): 10 TABLET ORAL at 15:04

## 2021-02-28 RX ADMIN — URSODIOL 500 MILLIGRAM(S): 250 TABLET, FILM COATED ORAL at 05:20

## 2021-02-28 RX ADMIN — SEVELAMER CARBONATE 800 MILLIGRAM(S): 2400 POWDER, FOR SUSPENSION ORAL at 15:04

## 2021-02-28 RX ADMIN — URSODIOL 500 MILLIGRAM(S): 250 TABLET, FILM COATED ORAL at 21:33

## 2021-02-28 RX ADMIN — URSODIOL 500 MILLIGRAM(S): 250 TABLET, FILM COATED ORAL at 15:04

## 2021-02-28 RX ADMIN — RANOLAZINE 500 MILLIGRAM(S): 500 TABLET, FILM COATED, EXTENDED RELEASE ORAL at 05:20

## 2021-02-28 RX ADMIN — RANOLAZINE 500 MILLIGRAM(S): 500 TABLET, FILM COATED, EXTENDED RELEASE ORAL at 17:35

## 2021-02-28 RX ADMIN — CINACALCET 30 MILLIGRAM(S): 30 TABLET, FILM COATED ORAL at 05:20

## 2021-02-28 RX ADMIN — HEPARIN SODIUM 5000 UNIT(S): 5000 INJECTION INTRAVENOUS; SUBCUTANEOUS at 05:20

## 2021-02-28 RX ADMIN — HEPARIN SODIUM 5000 UNIT(S): 5000 INJECTION INTRAVENOUS; SUBCUTANEOUS at 15:04

## 2021-02-28 RX ADMIN — Medication 600 MILLIGRAM(S): at 05:20

## 2021-02-28 RX ADMIN — HEPARIN SODIUM 5000 UNIT(S): 5000 INJECTION INTRAVENOUS; SUBCUTANEOUS at 21:34

## 2021-02-28 RX ADMIN — CINACALCET 30 MILLIGRAM(S): 30 TABLET, FILM COATED ORAL at 17:36

## 2021-02-28 RX ADMIN — CLOPIDOGREL BISULFATE 75 MILLIGRAM(S): 75 TABLET, FILM COATED ORAL at 12:01

## 2021-02-28 RX ADMIN — SEVELAMER CARBONATE 800 MILLIGRAM(S): 2400 POWDER, FOR SUSPENSION ORAL at 21:33

## 2021-02-28 RX ADMIN — ATORVASTATIN CALCIUM 20 MILLIGRAM(S): 80 TABLET, FILM COATED ORAL at 21:34

## 2021-02-28 RX ADMIN — SEVELAMER CARBONATE 800 MILLIGRAM(S): 2400 POWDER, FOR SUSPENSION ORAL at 05:20

## 2021-02-28 RX ADMIN — Medication 600 MILLIGRAM(S): at 17:35

## 2021-02-28 RX ADMIN — PANTOPRAZOLE SODIUM 40 MILLIGRAM(S): 20 TABLET, DELAYED RELEASE ORAL at 05:21

## 2021-02-28 NOTE — PROGRESS NOTE ADULT - ASSESSMENT
ESRD - Traveled to [facility] x 3 months   Now returned   Will arrange HD today to get back on TTS schedule   See orders    Covid test Negative    Updated Hep B and C and PPD pending     Anemia -   Retacrit with HD   Follow CBC     RO - Continue binders

## 2021-02-28 NOTE — PROGRESS NOTE ADULT - ASSESSMENT
74 y/o M with PMH of DM, CAD, ESRD on HD (T/Th/Sat), Afib s/p watchman procedure (1/30/2017) presents for dialysis, patient recently returned from [facility] and needed to be evaluated prior to reinstatement of dialysis.     ESRD on HD   Missed HD  - T/TH/SAT schedule   - Had HD on Fri and Sat    - Nephro consult appreciated   - c/w Renvela 800mg TID  - c/w Sensipar 30mg BID  - Midodrine 10mg TID for hypotension  - Needs testing to have HD set up after discharge: COVID, Hep panel, PPD ordered     DM  - FS   - ISS    CAD  - c/w ASA 81mg daily  - c/w Plavix 75mg daily    Afib   - s/p watchman procedure    DVT ppx  - Heparin SQ    Dispo: Pt needs further testing and will need to have paperwork re-submitted to have him set up at the HD facility as outpatient because he was away for 3 months, likely Monday.

## 2021-03-01 LAB
ANION GAP SERPL CALC-SCNC: 22 MMOL/L — HIGH (ref 5–17)
BASOPHILS # BLD AUTO: 0.02 K/UL — SIGNIFICANT CHANGE UP (ref 0–0.2)
BASOPHILS NFR BLD AUTO: 0.6 % — SIGNIFICANT CHANGE UP (ref 0–2)
BUN SERPL-MCNC: 35 MG/DL — HIGH (ref 8–20)
CALCIUM SERPL-MCNC: 8.1 MG/DL — LOW (ref 8.6–10.2)
CHLORIDE SERPL-SCNC: 88 MMOL/L — LOW (ref 98–107)
CO2 SERPL-SCNC: 23 MMOL/L — SIGNIFICANT CHANGE UP (ref 22–29)
CREAT SERPL-MCNC: 5.31 MG/DL — HIGH (ref 0.5–1.3)
EOSINOPHIL # BLD AUTO: 0.22 K/UL — SIGNIFICANT CHANGE UP (ref 0–0.5)
EOSINOPHIL NFR BLD AUTO: 7.1 % — HIGH (ref 0–6)
GLUCOSE BLDC GLUCOMTR-MCNC: 100 MG/DL — HIGH (ref 70–99)
GLUCOSE BLDC GLUCOMTR-MCNC: 84 MG/DL — SIGNIFICANT CHANGE UP (ref 70–99)
GLUCOSE BLDC GLUCOMTR-MCNC: 88 MG/DL — SIGNIFICANT CHANGE UP (ref 70–99)
GLUCOSE SERPL-MCNC: 77 MG/DL — SIGNIFICANT CHANGE UP (ref 70–99)
HCT VFR BLD CALC: 26.5 % — LOW (ref 39–50)
HGB BLD-MCNC: 8.9 G/DL — LOW (ref 13–17)
IMM GRANULOCYTES NFR BLD AUTO: 0.6 % — SIGNIFICANT CHANGE UP (ref 0–1.5)
LYMPHOCYTES # BLD AUTO: 0.7 K/UL — LOW (ref 1–3.3)
LYMPHOCYTES # BLD AUTO: 22.5 % — SIGNIFICANT CHANGE UP (ref 13–44)
MCHC RBC-ENTMCNC: 33.6 GM/DL — SIGNIFICANT CHANGE UP (ref 32–36)
MCHC RBC-ENTMCNC: 34.9 PG — HIGH (ref 27–34)
MCV RBC AUTO: 103.9 FL — HIGH (ref 80–100)
MONOCYTES # BLD AUTO: 0.36 K/UL — SIGNIFICANT CHANGE UP (ref 0–0.9)
MONOCYTES NFR BLD AUTO: 11.6 % — SIGNIFICANT CHANGE UP (ref 2–14)
NEUTROPHILS # BLD AUTO: 1.79 K/UL — LOW (ref 1.8–7.4)
NEUTROPHILS NFR BLD AUTO: 57.6 % — SIGNIFICANT CHANGE UP (ref 43–77)
PLATELET # BLD AUTO: 201 K/UL — SIGNIFICANT CHANGE UP (ref 150–400)
POTASSIUM SERPL-MCNC: 5.1 MMOL/L — SIGNIFICANT CHANGE UP (ref 3.5–5.3)
POTASSIUM SERPL-SCNC: 5.1 MMOL/L — SIGNIFICANT CHANGE UP (ref 3.5–5.3)
RBC # BLD: 2.55 M/UL — LOW (ref 4.2–5.8)
RBC # FLD: 13.7 % — SIGNIFICANT CHANGE UP (ref 10.3–14.5)
SODIUM SERPL-SCNC: 133 MMOL/L — LOW (ref 135–145)
WBC # BLD: 3.11 K/UL — LOW (ref 3.8–10.5)
WBC # FLD AUTO: 3.11 K/UL — LOW (ref 3.8–10.5)

## 2021-03-01 PROCEDURE — 99232 SBSQ HOSP IP/OBS MODERATE 35: CPT

## 2021-03-01 RX ORDER — HYDRALAZINE HCL 50 MG
5 TABLET ORAL ONCE
Refills: 0 | Status: COMPLETED | OUTPATIENT
Start: 2021-03-01 | End: 2021-03-01

## 2021-03-01 RX ORDER — ACETAMINOPHEN 500 MG
650 TABLET ORAL ONCE
Refills: 0 | Status: COMPLETED | OUTPATIENT
Start: 2021-03-01 | End: 2021-03-01

## 2021-03-01 RX ADMIN — RANOLAZINE 500 MILLIGRAM(S): 500 TABLET, FILM COATED, EXTENDED RELEASE ORAL at 04:52

## 2021-03-01 RX ADMIN — RANOLAZINE 500 MILLIGRAM(S): 500 TABLET, FILM COATED, EXTENDED RELEASE ORAL at 17:19

## 2021-03-01 RX ADMIN — HEPARIN SODIUM 5000 UNIT(S): 5000 INJECTION INTRAVENOUS; SUBCUTANEOUS at 12:50

## 2021-03-01 RX ADMIN — Medication 81 MILLIGRAM(S): at 12:49

## 2021-03-01 RX ADMIN — CLOPIDOGREL BISULFATE 75 MILLIGRAM(S): 75 TABLET, FILM COATED ORAL at 12:49

## 2021-03-01 RX ADMIN — ATORVASTATIN CALCIUM 20 MILLIGRAM(S): 80 TABLET, FILM COATED ORAL at 21:11

## 2021-03-01 RX ADMIN — HEPARIN SODIUM 5000 UNIT(S): 5000 INJECTION INTRAVENOUS; SUBCUTANEOUS at 21:12

## 2021-03-01 RX ADMIN — URSODIOL 500 MILLIGRAM(S): 250 TABLET, FILM COATED ORAL at 04:53

## 2021-03-01 RX ADMIN — CINACALCET 30 MILLIGRAM(S): 30 TABLET, FILM COATED ORAL at 04:52

## 2021-03-01 RX ADMIN — URSODIOL 500 MILLIGRAM(S): 250 TABLET, FILM COATED ORAL at 12:49

## 2021-03-01 RX ADMIN — SEVELAMER CARBONATE 800 MILLIGRAM(S): 2400 POWDER, FOR SUSPENSION ORAL at 12:49

## 2021-03-01 RX ADMIN — Medication 600 MILLIGRAM(S): at 04:52

## 2021-03-01 RX ADMIN — Medication 650 MILLIGRAM(S): at 21:12

## 2021-03-01 RX ADMIN — LORATADINE 10 MILLIGRAM(S): 10 TABLET ORAL at 12:49

## 2021-03-01 RX ADMIN — PANTOPRAZOLE SODIUM 40 MILLIGRAM(S): 20 TABLET, DELAYED RELEASE ORAL at 04:53

## 2021-03-01 RX ADMIN — URSODIOL 500 MILLIGRAM(S): 250 TABLET, FILM COATED ORAL at 21:11

## 2021-03-01 RX ADMIN — HEPARIN SODIUM 5000 UNIT(S): 5000 INJECTION INTRAVENOUS; SUBCUTANEOUS at 04:52

## 2021-03-01 RX ADMIN — CINACALCET 30 MILLIGRAM(S): 30 TABLET, FILM COATED ORAL at 17:27

## 2021-03-01 RX ADMIN — Medication 5 MILLIGRAM(S): at 21:06

## 2021-03-01 RX ADMIN — SEVELAMER CARBONATE 800 MILLIGRAM(S): 2400 POWDER, FOR SUSPENSION ORAL at 21:11

## 2021-03-01 RX ADMIN — SEVELAMER CARBONATE 800 MILLIGRAM(S): 2400 POWDER, FOR SUSPENSION ORAL at 04:52

## 2021-03-01 RX ADMIN — Medication 600 MILLIGRAM(S): at 17:19

## 2021-03-01 NOTE — PROGRESS NOTE ADULT - ASSESSMENT
ESRD - Traveled to [facility] x 3 months   Now returned   Will arrange HD tomorrow    See orders    Covid test Negative    Updated Hep B and C OK     Anemia -   Retacrit with HD   Follow CBC     RO - Continue binders

## 2021-03-01 NOTE — CDI QUERY NOTE - NSCDIOTHERTXTBX_GEN_ALL_CORE_HH
H&P- 76 y/o M with PMH of DM, CAD, ESRD on HD (T/Th/Sat), Afib s/p watchman procedure (1/30/2017) presents for dialysis    Afib   - s/p watchman procedure    Please specify type of Afib    1) Chronic AFib  2) Persistent AFib  3) Chronic persistent Afib  4) Other

## 2021-03-01 NOTE — CHART NOTE - NSCHARTNOTEFT_GEN_A_CORE
Called by RN for patient with elevated /80    Vital Signs Last 24 Hrs  T(C): 36.8 (01 Mar 2021 20:24), Max: 36.8 (01 Mar 2021 18:00)  T(F): 98.3 (01 Mar 2021 20:24), Max: 98.3 (01 Mar 2021 18:00)  HR: 66 (01 Mar 2021 20:24) (66 - 81)  BP: 168/80 (01 Mar 2021 20:24) (143/85 - 168/80)  BP(mean): --  RR: 18 (01 Mar 2021 20:24) (16 - 20)  SpO2: 98% (01 Mar 2021 20:24) (98% - 100%)      -Tylenol 650mg PO x1 for Headache  -Hydralazine 5mg IV x1 for elevated BP Called by RN for patient with elevated /80, and complaint of headache.    Patient and examined at bedside, and chart reviewed. Patient is a 76 y/o M with PMH of DM, CAD, ESRD on HD (T/Th/Sat), Afib s/p watchman procedure (1/30/2017) admitted for dialysis. Patient noted sitting comfortably in chair, in NAD, with unlabored breathing. Patient complained of headache 7/10 in severity, located in anterior area of head. He describes his headache as throbbing, and has been ongoing for about 4 hours. He denies visual changes, nausea, vomiting, stiff neck, sob, chest pain, palpitations. Patient, otherwise asymptomatic. Exam unremarkable.    Vital Signs Last 24 Hrs  T(C): 36.8 (01 Mar 2021 20:24), Max: 36.8 (01 Mar 2021 18:00)  T(F): 98.3 (01 Mar 2021 20:24), Max: 98.3 (01 Mar 2021 18:00)  HR: 66 (01 Mar 2021 20:24) (66 - 81)  BP: 168/80 (01 Mar 2021 20:24) (143/85 - 168/80)  BP(mean): --  RR: 18 (01 Mar 2021 20:24) (16 - 20)  SpO2: 98% (01 Mar 2021 20:24) (98% - 100%)    EXAM  Gen: Alert and oriented x4, NAD  HEENT: head atraumatic, normocephalic, PERRL, EOM intact, oropharynx clear, mucus membrane moist  Neck: Supple , no JVD  Lungs: CTA  Circulatory: S1S2, RRR. No murmur  Neurologic: CNs appear intact. No focal defits grossly noted.     A/P: Unclear etiology of headache, possibly tension headache. No neurologic abnormality noted.  -Tylenol 650mg PO x1 for Headache  -Hydralazine 5mg IV x1 for elevated BP

## 2021-03-01 NOTE — PROGRESS NOTE ADULT - ASSESSMENT
74 y/o M with PMH of DM, CAD, ESRD on HD (T/Th/Sat), Afib s/p watchman procedure (1/30/2017) presents for dialysis, patient recently returned from [facility] and needed to be evaluated prior to reinstatement of dialysis.     ESRD on HD   Missed HD  - T/TH/SAT schedule   - Had HD on Fri and Sat    - Nephro consult appreciated   - Possible HD tomorrow   - c/w Renvela 800mg TID  - c/w Sensipar 30mg BID  - Midodrine 10mg TID for hypotension  - Needs testing to have HD set up after discharge: COVID, Hep panel, PPD done     DM  - FS   - ISS    CAD  - c/w ASA 81mg daily  - c/w Plavix 75mg daily    Afib   - s/p watchman procedure    DVT ppx  - Heparin SQ    Dispo: Awaiting slot at HD center    74 y/o M with PMH of DM, CAD, ESRD on HD (T/Th/Sat), Afib s/p watchman procedure (1/30/2017) presents for dialysis, patient recently returned from [facility] and needed to be evaluated prior to reinstatement of dialysis.     ESRD on HD   Missed HD  - T/TH/SAT schedule   - Had HD on Fri and Sat    - Nephro consult appreciated   - Possible HD tomorrow   - c/w Renvela 800mg TID  - c/w Sensipar 30mg BID  - Midodrine 10mg TID for hypotension  - Needs testing to have HD set up after discharge: COVID, Hep panel, PPD done     DM  - FS   - ISS    CAD  - c/w ASA 81mg daily  - c/w Plavix 75mg daily    Chronic Afib   - s/p watchman procedure    DVT ppx  - Heparin SQ    Dispo: Awaiting slot at HD center

## 2021-03-02 ENCOUNTER — TRANSCRIPTION ENCOUNTER (OUTPATIENT)
Age: 76
End: 2021-03-02

## 2021-03-02 VITALS
WEIGHT: 193.12 LBS | OXYGEN SATURATION: 97 % | DIASTOLIC BLOOD PRESSURE: 72 MMHG | RESPIRATION RATE: 18 BRPM | TEMPERATURE: 99 F | HEART RATE: 84 BPM | SYSTOLIC BLOOD PRESSURE: 148 MMHG

## 2021-03-02 LAB
GLUCOSE BLDC GLUCOMTR-MCNC: 100 MG/DL — HIGH (ref 70–99)
GLUCOSE BLDC GLUCOMTR-MCNC: 83 MG/DL — SIGNIFICANT CHANGE UP (ref 70–99)
GLUCOSE BLDC GLUCOMTR-MCNC: 88 MG/DL — SIGNIFICANT CHANGE UP (ref 70–99)
GLUCOSE BLDC GLUCOMTR-MCNC: 92 MG/DL — SIGNIFICANT CHANGE UP (ref 70–99)

## 2021-03-02 PROCEDURE — 99239 HOSP IP/OBS DSCHRG MGMT >30: CPT

## 2021-03-02 RX ORDER — ERYTHROPOIETIN 10000 [IU]/ML
10000 INJECTION, SOLUTION INTRAVENOUS; SUBCUTANEOUS ONCE
Refills: 0 | Status: COMPLETED | OUTPATIENT
Start: 2021-03-02 | End: 2021-03-02

## 2021-03-02 RX ORDER — HYDRALAZINE HCL 50 MG
5 TABLET ORAL ONCE
Refills: 0 | Status: COMPLETED | OUTPATIENT
Start: 2021-03-02 | End: 2021-03-02

## 2021-03-02 RX ADMIN — CINACALCET 30 MILLIGRAM(S): 30 TABLET, FILM COATED ORAL at 16:24

## 2021-03-02 RX ADMIN — PANTOPRAZOLE SODIUM 40 MILLIGRAM(S): 20 TABLET, DELAYED RELEASE ORAL at 05:01

## 2021-03-02 RX ADMIN — CLOPIDOGREL BISULFATE 75 MILLIGRAM(S): 75 TABLET, FILM COATED ORAL at 14:50

## 2021-03-02 RX ADMIN — URSODIOL 500 MILLIGRAM(S): 250 TABLET, FILM COATED ORAL at 04:58

## 2021-03-02 RX ADMIN — Medication 600 MILLIGRAM(S): at 16:24

## 2021-03-02 RX ADMIN — RANOLAZINE 500 MILLIGRAM(S): 500 TABLET, FILM COATED, EXTENDED RELEASE ORAL at 05:00

## 2021-03-02 RX ADMIN — URSODIOL 500 MILLIGRAM(S): 250 TABLET, FILM COATED ORAL at 14:50

## 2021-03-02 RX ADMIN — SEVELAMER CARBONATE 800 MILLIGRAM(S): 2400 POWDER, FOR SUSPENSION ORAL at 12:33

## 2021-03-02 RX ADMIN — LORATADINE 10 MILLIGRAM(S): 10 TABLET ORAL at 14:50

## 2021-03-02 RX ADMIN — Medication 5 MILLIGRAM(S): at 05:09

## 2021-03-02 RX ADMIN — RANOLAZINE 500 MILLIGRAM(S): 500 TABLET, FILM COATED, EXTENDED RELEASE ORAL at 16:24

## 2021-03-02 RX ADMIN — HEPARIN SODIUM 5000 UNIT(S): 5000 INJECTION INTRAVENOUS; SUBCUTANEOUS at 14:50

## 2021-03-02 RX ADMIN — TUBERCULIN PURIFIED PROTEIN DERIVATIVE 5 UNIT(S): 5 INJECTION, SOLUTION INTRADERMAL at 09:31

## 2021-03-02 RX ADMIN — CINACALCET 30 MILLIGRAM(S): 30 TABLET, FILM COATED ORAL at 04:58

## 2021-03-02 RX ADMIN — ERYTHROPOIETIN 10000 UNIT(S): 10000 INJECTION, SOLUTION INTRAVENOUS; SUBCUTANEOUS at 12:40

## 2021-03-02 RX ADMIN — HEPARIN SODIUM 5000 UNIT(S): 5000 INJECTION INTRAVENOUS; SUBCUTANEOUS at 04:59

## 2021-03-02 RX ADMIN — SEVELAMER CARBONATE 800 MILLIGRAM(S): 2400 POWDER, FOR SUSPENSION ORAL at 04:58

## 2021-03-02 RX ADMIN — Medication 600 MILLIGRAM(S): at 04:59

## 2021-03-02 RX ADMIN — Medication 81 MILLIGRAM(S): at 14:49

## 2021-03-02 NOTE — PROGRESS NOTE ADULT - ASSESSMENT
ESRD - Traveled to [facility] x 3 months   Now returned   Will arrange HD today   See orders    Covid test Negative    Updated Hep B and C OK     Anemia -   Retacrit with HD       RO - Continue binders     Dispo - Has out pt spot in [facility] ---> will go there tomorrow on D/C

## 2021-03-02 NOTE — DISCHARGE NOTE PROVIDER - CARE PROVIDER_API CALL
Jerad Kraft (DO)  Medicine  [address] [place] [address] [place] [address]  Phone: [phone]  Fax: [phone]  Follow Up Time:

## 2021-03-02 NOTE — DISCHARGE NOTE NURSING/CASE MANAGEMENT/SOCIAL WORK - NSDCFUADDAPPT_GEN_ALL_CORE_FT
[facility] [address] [address]  [phone]    You are scheduled for your first session of dialysis on Wednesday March 3, 2021 at 2:00pm.   Your following dialysis days will be Monday, Wednesday and Friday at 2:45pm.

## 2021-03-02 NOTE — PROGRESS NOTE ADULT - SUBJECTIVE AND OBJECTIVE BOX
[facility] Division of Hospital Medicine    Chief Complaint:  Missed HD     SUBJECTIVE / OVERNIGHT EVENTS:   used for this encounter   Pt says he feels well, slept well overnight  Had HD session yesterday   Has been eating well  Asking to go home today     Patient denies chest pain, SOB, abd pain, N/V, fever, chills, dysuria or any other complaints. All remainder ROS negative.     MEDICATIONS  (STANDING):  aspirin enteric coated 81 milliGRAM(s) Oral daily  atorvastatin 20 milliGRAM(s) Oral at bedtime  cinacalcet 30 milliGRAM(s) Oral two times a day  clopidogrel Tablet 75 milliGRAM(s) Oral daily  dextrose 40% Gel 15 Gram(s) Oral once  dextrose 5%. 1000 milliLiter(s) (50 mL/Hr) IV Continuous <Continuous>  dextrose 5%. 1000 milliLiter(s) (100 mL/Hr) IV Continuous <Continuous>  dextrose 50% Injectable 25 Gram(s) IV Push once  dextrose 50% Injectable 12.5 Gram(s) IV Push once  dextrose 50% Injectable 25 Gram(s) IV Push once  gemfibrozil 600 milliGRAM(s) Oral two times a day  glucagon  Injectable 1 milliGRAM(s) IntraMuscular once  heparin   Injectable 5000 Unit(s) SubCutaneous every 8 hours  influenza   Vaccine 0.5 milliLiter(s) IntraMuscular once  insulin lispro (ADMELOG) corrective regimen sliding scale   SubCutaneous three times a day before meals  loratadine 10 milliGRAM(s) Oral daily  pantoprazole    Tablet 40 milliGRAM(s) Oral before breakfast  PPD  5 Tuberculin Unit(s) Injectable 5 Unit(s) IntraDermal once  ranolazine 500 milliGRAM(s) Oral two times a day  sevelamer carbonate 800 milliGRAM(s) Oral three times a day  ursodiol Tablet 500 milliGRAM(s) Oral three times a day    MEDICATIONS  (PRN):  acetaminophen   Tablet .. 650 milliGRAM(s) Oral every 6 hours PRN Temp greater or equal to 38C (100.4F), Mild Pain (1 - 3)  midodrine. 10 milliGRAM(s) Oral three times a day PRN hypotension        I&O's Summary    26 Feb 2021 07:01  -  27 Feb 2021 07:00  --------------------------------------------------------  IN: 480 mL / OUT: 2200 mL / NET: -1720 mL        PHYSICAL EXAM:  Vital Signs Last 24 Hrs  T(C): 36.6 (27 Feb 2021 14:03), Max: 37.2 (26 Feb 2021 21:30)  T(F): 97.9 (27 Feb 2021 14:03), Max: 98.9 (26 Feb 2021 21:30)  HR: 80 (27 Feb 2021 14:03) (78 - 94)  BP: 148/81 (27 Feb 2021 14:03) (113/58 - 167/83)  BP(mean): 93 (27 Feb 2021 12:58) (76 - 108)  RR: 18 (27 Feb 2021 14:03) (16 - 18)  SpO2: 99% (27 Feb 2021 14:03) (96% - 100%)        CONSTITUTIONAL: NAD, well-developed   ENMT: Moist oral mucosa, no pharyngeal injection or exudates   RESPIRATORY: Normal respiratory effort; lungs are clear to auscultation bilaterally  CARDIOVASCULAR: Regular rate and rhythm, normal S1 and S2, No lower extremity edema  ABDOMEN: Nontender to palpation, normoactive bowel sounds   MUSCLOSKELETAL: no joint swelling or tenderness to palpation  PSYCH: A+O to person, place, and time; affect appropriate  NEUROLOGY: CN 2-12 are intact and symmetric; no gross sensory deficits    SKIN: No rashes; R AVF     LABS:                        8.5    3.24  )-----------( 188      ( 27 Feb 2021 08:06 )             25.4     02-27    134<L>  |  93<L>  |  24.0<H>  ----------------------------<  80  4.4   |  27.0  |  4.42<H>    Ca    8.5<L>      27 Feb 2021 08:06  Mg     1.8     02-26    TPro  7.3  /  Alb  4.0  /  TBili  0.5  /  DBili  x   /  AST  21  /  ALT  7   /  AlkPhos  232<H>  02-27              CAPILLARY BLOOD GLUCOSE      POCT Blood Glucose.: 94 mg/dL (27 Feb 2021 12:00)  POCT Blood Glucose.: 93 mg/dL (27 Feb 2021 08:07)  POCT Blood Glucose.: 128 mg/dL (26 Feb 2021 22:07)  POCT Blood Glucose.: 83 mg/dL (26 Feb 2021 16:56)    
[facility] Division of Hospital Medicine    Chief Complaint:  Missed HD     SUBJECTIVE / OVERNIGHT EVENTS:  [place] video  used for this encounter   Pt says he feels well, wants to go home soon   Still awaiting slot at HD center     Patient denies chest pain, SOB, abd pain, N/V, fever, chills, dysuria or any other complaints. All remainder ROS negative.     MEDICATIONS  (STANDING):  aspirin enteric coated 81 milliGRAM(s) Oral daily  atorvastatin 20 milliGRAM(s) Oral at bedtime  cinacalcet 30 milliGRAM(s) Oral two times a day  clopidogrel Tablet 75 milliGRAM(s) Oral daily  dextrose 40% Gel 15 Gram(s) Oral once  dextrose 5%. 1000 milliLiter(s) (50 mL/Hr) IV Continuous <Continuous>  dextrose 5%. 1000 milliLiter(s) (100 mL/Hr) IV Continuous <Continuous>  dextrose 50% Injectable 25 Gram(s) IV Push once  dextrose 50% Injectable 12.5 Gram(s) IV Push once  dextrose 50% Injectable 25 Gram(s) IV Push once  gemfibrozil 600 milliGRAM(s) Oral two times a day  glucagon  Injectable 1 milliGRAM(s) IntraMuscular once  heparin   Injectable 5000 Unit(s) SubCutaneous every 8 hours  influenza   Vaccine 0.5 milliLiter(s) IntraMuscular once  insulin lispro (ADMELOG) corrective regimen sliding scale   SubCutaneous three times a day before meals  loratadine 10 milliGRAM(s) Oral daily  pantoprazole    Tablet 40 milliGRAM(s) Oral before breakfast  ranolazine 500 milliGRAM(s) Oral two times a day  sevelamer carbonate 800 milliGRAM(s) Oral three times a day  ursodiol Tablet 500 milliGRAM(s) Oral three times a day    MEDICATIONS  (PRN):  acetaminophen   Tablet .. 650 milliGRAM(s) Oral every 6 hours PRN Temp greater or equal to 38C (100.4F), Mild Pain (1 - 3)  midodrine. 10 milliGRAM(s) Oral three times a day PRN hypotension        I&O's Summary      PHYSICAL EXAM:  Vital Signs Last 24 Hrs  T(C): 36.5 (01 Mar 2021 10:21), Max: 36.6 (28 Feb 2021 17:47)  T(F): 97.7 (01 Mar 2021 10:21), Max: 97.8 (28 Feb 2021 17:47)  HR: 74 (01 Mar 2021 10:21) (68 - 81)  BP: 156/92 (01 Mar 2021 10:21) (143/85 - 156/92)  BP(mean): --  RR: 18 (01 Mar 2021 10:21) (18 - 18)  SpO2: 100% (01 Mar 2021 10:21) (100% - 100%)      CONSTITUTIONAL: NAD, well-developed, lying in bed   ENMT: Moist oral mucosa, no pharyngeal injection or exudates   RESPIRATORY: Normal respiratory effort; lungs are clear to auscultation bilaterally  CARDIOVASCULAR: Regular rate and rhythm, normal S1 and S2, No lower extremity edema  ABDOMEN: Nontender to palpation, normoactive bowel sounds   MUSCLOSKELETAL: no joint swelling or tenderness to palpation  PSYCH: A+O to person, place, and time; affect appropriate  NEUROLOGY: CN 2-12 are intact and symmetric; no gross sensory deficits    SKIN: No rashes; R AVF     LABS:                        8.9    3.11  )-----------( 201      ( 01 Mar 2021 08:04 )             26.5     03-01    133<L>  |  88<L>  |  35.0<H>  ----------------------------<  77  5.1   |  23.0  |  5.31<H>    Ca    8.1<L>      01 Mar 2021 08:04                CAPILLARY BLOOD GLUCOSE      POCT Blood Glucose.: 88 mg/dL (01 Mar 2021 11:36)  POCT Blood Glucose.: 84 mg/dL (01 Mar 2021 08:09)  POCT Blood Glucose.: 78 mg/dL (28 Feb 2021 21:32)  POCT Blood Glucose.: 86 mg/dL (28 Feb 2021 17:35)    
NEPHROLOGY INTERVAL HPI/OVERNIGHT EVENTS:    Seen at HD   Feels well  No complaints   Has spot in [place] [facility]    MEDICATIONS  (STANDING):  aspirin enteric coated 81 milliGRAM(s) Oral daily  atorvastatin 20 milliGRAM(s) Oral at bedtime  cinacalcet 30 milliGRAM(s) Oral two times a day  clopidogrel Tablet 75 milliGRAM(s) Oral daily  dextrose 40% Gel 15 Gram(s) Oral once  dextrose 5%. 1000 milliLiter(s) (50 mL/Hr) IV Continuous <Continuous>  dextrose 5%. 1000 milliLiter(s) (100 mL/Hr) IV Continuous <Continuous>  dextrose 50% Injectable 25 Gram(s) IV Push once  dextrose 50% Injectable 12.5 Gram(s) IV Push once  dextrose 50% Injectable 25 Gram(s) IV Push once  epoetin kalpesh-epbx (RETACRIT) Injectable 69524 Unit(s) IV Push once  gemfibrozil 600 milliGRAM(s) Oral two times a day  glucagon  Injectable 1 milliGRAM(s) IntraMuscular once  heparin   Injectable 5000 Unit(s) SubCutaneous every 8 hours  influenza   Vaccine 0.5 milliLiter(s) IntraMuscular once  insulin lispro (ADMELOG) corrective regimen sliding scale   SubCutaneous three times a day before meals  loratadine 10 milliGRAM(s) Oral daily  pantoprazole    Tablet 40 milliGRAM(s) Oral before breakfast  ranolazine 500 milliGRAM(s) Oral two times a day  sevelamer carbonate 800 milliGRAM(s) Oral three times a day  ursodiol Tablet 500 milliGRAM(s) Oral three times a day    MEDICATIONS  (PRN):  acetaminophen   Tablet .. 650 milliGRAM(s) Oral every 6 hours PRN Temp greater or equal to 38C (100.4F), Mild Pain (1 - 3)  midodrine. 10 milliGRAM(s) Oral three times a day PRN hypotension      Allergies    No Known Allergies    Intolerances          Vital Signs Last 24 Hrs  T(C): 36.6 (02 Mar 2021 09:15), Max: 36.8 (01 Mar 2021 18:00)  T(F): 97.9 (02 Mar 2021 09:15), Max: 98.3 (01 Mar 2021 18:00)  HR: 77 (02 Mar 2021 09:15) (66 - 79)  BP: 137/79 (02 Mar 2021 09:15) (120/58 - 168/80)  BP(mean): --  RR: 18 (02 Mar 2021 09:15) (16 - 20)  SpO2: 98% (02 Mar 2021 09:15) (97% - 98%)  Daily     Daily Weight in k.6 (02 Mar 2021 09:15)  I&O's Detail    I&O's Summary      PHYSICAL EXAM:  · Physical Examination: General: well appearing, NAD  	Head: NC/AT  	Cardiac: RRR, no m/r/g  	Respiratory: CTABL, equal chest wall expansions, no conversational dyspnea  	Abdomen: soft, ND, NT  	Neuro: AAOx3,coordinated movement of all 4 extremities  	Psych: calm, cooperative, normal affect  R arm access with thrill     LABS:                        8.9    3.11  )-----------( 201      ( 01 Mar 2021 08:04 )             26.5     03-01    133<L>  |  88<L>  |  35.0<H>  ----------------------------<  77  5.1   |  23.0  |  5.31<H>    Ca    8.1<L>      01 Mar 2021 08:04                  RADIOLOGY & ADDITIONAL TESTS:  
[facility] Division of Hospital Medicine    Chief Complaint:  Missed HD     SUBJECTIVE / OVERNIGHT EVENTS:  Sinhala video  used for this encounter   Pt says he feels well   Had HD session yesterday   We discussed re-submitting paperwork so that we can arrange for HD in [place].     Patient denies chest pain, SOB, abd pain, N/V, fever, chills, dysuria or any other complaints. All remainder ROS negative.     MEDICATIONS  (STANDING):  aspirin enteric coated 81 milliGRAM(s) Oral daily  atorvastatin 20 milliGRAM(s) Oral at bedtime  cinacalcet 30 milliGRAM(s) Oral two times a day  clopidogrel Tablet 75 milliGRAM(s) Oral daily  dextrose 40% Gel 15 Gram(s) Oral once  dextrose 5%. 1000 milliLiter(s) (50 mL/Hr) IV Continuous <Continuous>  dextrose 5%. 1000 milliLiter(s) (100 mL/Hr) IV Continuous <Continuous>  dextrose 50% Injectable 25 Gram(s) IV Push once  dextrose 50% Injectable 12.5 Gram(s) IV Push once  dextrose 50% Injectable 25 Gram(s) IV Push once  gemfibrozil 600 milliGRAM(s) Oral two times a day  glucagon  Injectable 1 milliGRAM(s) IntraMuscular once  heparin   Injectable 5000 Unit(s) SubCutaneous every 8 hours  influenza   Vaccine 0.5 milliLiter(s) IntraMuscular once  insulin lispro (ADMELOG) corrective regimen sliding scale   SubCutaneous three times a day before meals  loratadine 10 milliGRAM(s) Oral daily  pantoprazole    Tablet 40 milliGRAM(s) Oral before breakfast  ranolazine 500 milliGRAM(s) Oral two times a day  sevelamer carbonate 800 milliGRAM(s) Oral three times a day  ursodiol Tablet 500 milliGRAM(s) Oral three times a day    MEDICATIONS  (PRN):  acetaminophen   Tablet .. 650 milliGRAM(s) Oral every 6 hours PRN Temp greater or equal to 38C (100.4F), Mild Pain (1 - 3)  midodrine. 10 milliGRAM(s) Oral three times a day PRN hypotension        I&O's Summary    27 Feb 2021 07:01  -  28 Feb 2021 07:00  --------------------------------------------------------  IN: 0 mL / OUT: 1500 mL / NET: -1500 mL        PHYSICAL EXAM:  Vital Signs Last 24 Hrs  T(C): 36.7 (28 Feb 2021 11:22), Max: 36.9 (28 Feb 2021 05:18)  T(F): 98.1 (28 Feb 2021 11:22), Max: 98.4 (28 Feb 2021 05:18)  HR: 81 (28 Feb 2021 11:22) (70 - 98)  BP: 153/90 (28 Feb 2021 11:22) (123/58 - 153/90)  BP(mean): --  RR: 18 (28 Feb 2021 11:22) (16 - 18)  SpO2: 96% (28 Feb 2021 11:22) (96% - 100%)      CONSTITUTIONAL: NAD, well-developed, lying in bed   ENMT: Moist oral mucosa, no pharyngeal injection or exudates   RESPIRATORY: Normal respiratory effort; lungs are clear to auscultation bilaterally  CARDIOVASCULAR: Regular rate and rhythm, normal S1 and S2, No lower extremity edema  ABDOMEN: Nontender to palpation, normoactive bowel sounds   MUSCLOSKELETAL: no joint swelling or tenderness to palpation  PSYCH: A+O to person, place, and time; affect appropriate  NEUROLOGY: CN 2-12 are intact and symmetric; no gross sensory deficits    SKIN: No rashes; R AVF     LABS:                        10.1   3.62  )-----------( 206      ( 28 Feb 2021 08:50 )             30.3     02-28    136  |  93<L>  |  19.0  ----------------------------<  86  5.4<H>   |  28.0  |  3.78<H>    Ca    8.7      28 Feb 2021 08:50    TPro  7.3  /  Alb  4.0  /  TBili  0.5  /  DBili  x   /  AST  21  /  ALT  7   /  AlkPhos  232<H>  02-27              CAPILLARY BLOOD GLUCOSE      POCT Blood Glucose.: 92 mg/dL (28 Feb 2021 11:58)  POCT Blood Glucose.: 85 mg/dL (28 Feb 2021 08:12)  POCT Blood Glucose.: 102 mg/dL (27 Feb 2021 21:22)  POCT Blood Glucose.: 106 mg/dL (27 Feb 2021 18:23)  
NEPHROLOGY INTERVAL HPI/OVERNIGHT EVENTS:    seen at HD   (-) 2.2 kg with HD   Feels well   Covid negative     MEDICATIONS  (STANDING):  aspirin enteric coated 81 milliGRAM(s) Oral daily  atorvastatin 20 milliGRAM(s) Oral at bedtime  cinacalcet 30 milliGRAM(s) Oral two times a day  clopidogrel Tablet 75 milliGRAM(s) Oral daily  dextrose 40% Gel 15 Gram(s) Oral once  dextrose 5%. 1000 milliLiter(s) (50 mL/Hr) IV Continuous <Continuous>  dextrose 5%. 1000 milliLiter(s) (100 mL/Hr) IV Continuous <Continuous>  dextrose 50% Injectable 25 Gram(s) IV Push once  dextrose 50% Injectable 12.5 Gram(s) IV Push once  dextrose 50% Injectable 25 Gram(s) IV Push once  gemfibrozil 600 milliGRAM(s) Oral two times a day  glucagon  Injectable 1 milliGRAM(s) IntraMuscular once  heparin   Injectable 5000 Unit(s) SubCutaneous every 8 hours  influenza   Vaccine 0.5 milliLiter(s) IntraMuscular once  insulin lispro (ADMELOG) corrective regimen sliding scale   SubCutaneous three times a day before meals  loratadine 10 milliGRAM(s) Oral daily  pantoprazole    Tablet 40 milliGRAM(s) Oral before breakfast  PPD  5 Tuberculin Unit(s) Injectable 5 Unit(s) IntraDermal once  ranolazine 500 milliGRAM(s) Oral two times a day  sevelamer carbonate 800 milliGRAM(s) Oral three times a day  ursodiol Tablet 500 milliGRAM(s) Oral three times a day    MEDICATIONS  (PRN):  acetaminophen   Tablet .. 650 milliGRAM(s) Oral every 6 hours PRN Temp greater or equal to 38C (100.4F), Mild Pain (1 - 3)  midodrine. 10 milliGRAM(s) Oral three times a day PRN hypotension      Allergies    No Known Allergies    Intolerances      Vital Signs Last 24 Hrs  T(C): 36.6 (2021 14:03), Max: 37.2 (2021 21:30)  T(F): 97.9 (2021 14:03), Max: 98.9 (2021 21:30)  HR: 80 (2021 14:03) (78 - 94)  BP: 148/81 (2021 14:03) (113/58 - 167/83)  BP(mean): 93 (2021 12:58) (76 - 108)  RR: 18 (2021 14:03) (16 - 18)  SpO2: 99% (2021 14:03) (96% - 100%)  Daily     Daily Weight in k.3 (2021 05:46)  I&O's Detail    2021 07:01  -  2021 07:00  --------------------------------------------------------  IN:    Oral Fluid: 480 mL  Total IN: 480 mL    OUT:    Other (mL): 2200 mL  Total OUT: 2200 mL    Total NET: -1720 mL        I&O's Summary    2021 07:01  -  2021 07:00  --------------------------------------------------------  IN: 480 mL / OUT: 2200 mL / NET: -1720 mL        · Physical Examination: General: well appearing, NAD  	Head: NC/AT  	Cardiac: RRR, no m/r/g  	Respiratory: CTABL, equal chest wall expansions, no conversational dyspnea  	Abdomen: soft, ND, NT  	Neuro: AAOx3,coordinated movement of all 4 extremities  	Psych: calm, cooperative, normal affect  R arm access with thrill       LABS:                        8.5    3.24  )-----------( 188      ( 2021 08:06 )             25.4     02-27    134<L>  |  93<L>  |  24.0<H>  ----------------------------<  80  4.4   |  27.0  |  4.42<H>    Ca    8.5<L>      2021 08:06  Mg     1.8         TPro  7.3  /  Alb  4.0  /  TBili  0.5  /  DBili  x   /  AST  21  /  ALT  7   /  AlkPhos  232<H>                  RADIOLOGY & ADDITIONAL TESTS:  
NEPHROLOGY INTERVAL HPI/OVERNIGHT EVENTS:    Feels well   No complaints     MEDICATIONS  (STANDING):  aspirin enteric coated 81 milliGRAM(s) Oral daily  atorvastatin 20 milliGRAM(s) Oral at bedtime  cinacalcet 30 milliGRAM(s) Oral two times a day  clopidogrel Tablet 75 milliGRAM(s) Oral daily  dextrose 40% Gel 15 Gram(s) Oral once  dextrose 5%. 1000 milliLiter(s) (50 mL/Hr) IV Continuous <Continuous>  dextrose 5%. 1000 milliLiter(s) (100 mL/Hr) IV Continuous <Continuous>  dextrose 50% Injectable 25 Gram(s) IV Push once  dextrose 50% Injectable 12.5 Gram(s) IV Push once  dextrose 50% Injectable 25 Gram(s) IV Push once  gemfibrozil 600 milliGRAM(s) Oral two times a day  glucagon  Injectable 1 milliGRAM(s) IntraMuscular once  heparin   Injectable 5000 Unit(s) SubCutaneous every 8 hours  influenza   Vaccine 0.5 milliLiter(s) IntraMuscular once  insulin lispro (ADMELOG) corrective regimen sliding scale   SubCutaneous three times a day before meals  loratadine 10 milliGRAM(s) Oral daily  pantoprazole    Tablet 40 milliGRAM(s) Oral before breakfast  ranolazine 500 milliGRAM(s) Oral two times a day  sevelamer carbonate 800 milliGRAM(s) Oral three times a day  ursodiol Tablet 500 milliGRAM(s) Oral three times a day    MEDICATIONS  (PRN):  acetaminophen   Tablet .. 650 milliGRAM(s) Oral every 6 hours PRN Temp greater or equal to 38C (100.4F), Mild Pain (1 - 3)  midodrine. 10 milliGRAM(s) Oral three times a day PRN hypotension      Allergies    No Known Allergies    Intolerances            Vital Signs Last 24 Hrs  T(C): 36.6 (28 Feb 2021 17:47), Max: 36.9 (28 Feb 2021 05:18)  T(F): 97.8 (28 Feb 2021 17:47), Max: 98.4 (28 Feb 2021 05:18)  HR: 68 (28 Feb 2021 17:47) (68 - 81)  BP: 146/79 (28 Feb 2021 17:47) (133/77 - 153/90)  BP(mean): --  RR: 18 (28 Feb 2021 17:47) (16 - 18)  SpO2: 100% (28 Feb 2021 17:47) (96% - 100%)  Daily     Daily   I&O's Detail    27 Feb 2021 07:01  -  28 Feb 2021 07:00  --------------------------------------------------------  IN:  Total IN: 0 mL    OUT:    Other (mL): 1500 mL  Total OUT: 1500 mL    Total NET: -1500 mL        I&O's Summary    27 Feb 2021 07:01  -  28 Feb 2021 07:00  --------------------------------------------------------  IN: 0 mL / OUT: 1500 mL / NET: -1500 mL        · Physical Examination: General: well appearing, NAD  	Head: NC/AT  	Cardiac: RRR, no m/r/g  	Respiratory: CTABL, equal chest wall expansions, no conversational dyspnea  	Abdomen: soft, ND, NT  	Neuro: AAOx3,coordinated movement of all 4 extremities  	Psych: calm, cooperative, normal affect  R arm access with thrill       LABS:                        10.1   3.62  )-----------( 206      ( 28 Feb 2021 08:50 )             30.3     02-28    136  |  93<L>  |  19.0  ----------------------------<  86  5.4<H>   |  28.0  |  3.78<H>    Ca    8.7      28 Feb 2021 08:50    TPro  7.3  /  Alb  4.0  /  TBili  0.5  /  DBili  x   /  AST  21  /  ALT  7   /  AlkPhos  232<H>  02-27                RADIOLOGY & ADDITIONAL TESTS:  
NEPHROLOGY INTERVAL HPI/OVERNIGHT EVENTS:    Feels well   No complaints   Awaits approval for outpt HD slot in [place]     MEDICATIONS  (STANDING):  aspirin enteric coated 81 milliGRAM(s) Oral daily  atorvastatin 20 milliGRAM(s) Oral at bedtime  cinacalcet 30 milliGRAM(s) Oral two times a day  clopidogrel Tablet 75 milliGRAM(s) Oral daily  dextrose 40% Gel 15 Gram(s) Oral once  dextrose 5%. 1000 milliLiter(s) (50 mL/Hr) IV Continuous <Continuous>  dextrose 5%. 1000 milliLiter(s) (100 mL/Hr) IV Continuous <Continuous>  dextrose 50% Injectable 25 Gram(s) IV Push once  dextrose 50% Injectable 12.5 Gram(s) IV Push once  dextrose 50% Injectable 25 Gram(s) IV Push once  gemfibrozil 600 milliGRAM(s) Oral two times a day  glucagon  Injectable 1 milliGRAM(s) IntraMuscular once  heparin   Injectable 5000 Unit(s) SubCutaneous every 8 hours  influenza   Vaccine 0.5 milliLiter(s) IntraMuscular once  insulin lispro (ADMELOG) corrective regimen sliding scale   SubCutaneous three times a day before meals  loratadine 10 milliGRAM(s) Oral daily  pantoprazole    Tablet 40 milliGRAM(s) Oral before breakfast  ranolazine 500 milliGRAM(s) Oral two times a day  sevelamer carbonate 800 milliGRAM(s) Oral three times a day  ursodiol Tablet 500 milliGRAM(s) Oral three times a day    MEDICATIONS  (PRN):  acetaminophen   Tablet .. 650 milliGRAM(s) Oral every 6 hours PRN Temp greater or equal to 38C (100.4F), Mild Pain (1 - 3)  midodrine. 10 milliGRAM(s) Oral three times a day PRN hypotension      Allergies    No Known Allergies    Intolerances            Vital Signs Last 24 Hrs  T(C): 36.5 (01 Mar 2021 10:21), Max: 36.6 (28 Feb 2021 17:47)  T(F): 97.7 (01 Mar 2021 10:21), Max: 97.8 (28 Feb 2021 17:47)  HR: 74 (01 Mar 2021 10:21) (68 - 81)  BP: 156/92 (01 Mar 2021 10:21) (143/85 - 156/92)  BP(mean): --  RR: 18 (01 Mar 2021 10:21) (18 - 18)  SpO2: 100% (01 Mar 2021 10:21) (100% - 100%)  Daily     Daily   I&O's Detail    I&O's Summary      PHYSICAL EXAM:  · Physical Examination: General: well appearing, NAD  	Head: NC/AT  	Cardiac: RRR, no m/r/g  	Respiratory: CTABL, equal chest wall expansions, no conversational dyspnea  	Abdomen: soft, ND, NT  	Neuro: AAOx3,coordinated movement of all 4 extremities  	Psych: calm, cooperative, normal affect  R arm access with thrill       LABS:                        8.9    3.11  )-----------( 201      ( 01 Mar 2021 08:04 )             26.5     03-01    133<L>  |  88<L>  |  35.0<H>  ----------------------------<  77  5.1   |  23.0  |  5.31<H>    Ca    8.1<L>      01 Mar 2021 08:04                  RADIOLOGY & ADDITIONAL TESTS:

## 2021-03-02 NOTE — DISCHARGE NOTE PROVIDER - NSDCCPCAREPLAN_GEN_ALL_CORE_FT
PRINCIPAL DISCHARGE DIAGNOSIS  Diagnosis: ESRD (end stage renal disease)  Assessment and Plan of Treatment: Continue current medications.   Follow up with nephrology.      SECONDARY DISCHARGE DIAGNOSES  Diagnosis: CAD (coronary artery disease)  Assessment and Plan of Treatment: Continue current medications as prescribed.  Follow up with primary doctor.    Diagnosis: Afib  Assessment and Plan of Treatment:

## 2021-03-02 NOTE — DISCHARGE NOTE PROVIDER - NSDCFUADDAPPT_GEN_ALL_CORE_FT
[facility] [address] [address]  [phone]    You are scheduled for your first session of dialysis on Wednesday March 3, 2021 at 2:00pm.   Your following dialysis days will be Monday, Wednesday and Friday at 2:45pm.

## 2021-03-02 NOTE — DISCHARGE NOTE PROVIDER - HOSPITAL COURSE
74 y/o M with PMH of DM, CAD, ESRD on HD (T/Th/Sat), Afib s/p watchman procedure (1/30/2017) presents for dialysis, patient recently returned from [facility] and needed to be evaluated prior to reinstatement of dialysis. Nephrology consulted and patient resumed HD inpatient.  Hep panel and PPD performed.  Social work consulted for HD placement outpatient.  Patient stable for discharge to home with outpatient follow up with primary doctor and nephrology.    Vital Signs Last 24 Hrs  T(C): 37 (02 Mar 2021 13:00), Max: 37 (02 Mar 2021 13:00)  T(F): 98.6 (02 Mar 2021 13:00), Max: 98.6 (02 Mar 2021 13:00)  HR: 84 (02 Mar 2021 13:00) (66 - 84)  BP: 148/72 (02 Mar 2021 13:00) (120/58 - 168/80)  BP(mean): --  RR: 18 (02 Mar 2021 13:00) (16 - 20)  SpO2: 97% (02 Mar 2021 13:00) (97% - 98%)    PHYSICAL EXAM:  GENERAL: NAD  HEAD:  Atraumatic, Normocephalic  NECK: Supple, No JVD, Normal thyroid  NERVOUS SYSTEM:  Alert & Oriented X3, Good concentration; Motor Strength 5/5 B/L upper and lower extremities  CHEST/LUNG: Clear to auscultation bilaterally; No rales, rhonchi, wheezing, or rubs  HEART: Regular rate and rhythm; No murmurs, rubs, or gallops  ABDOMEN: Soft, Nontender, Nondistended; Bowel sounds present  EXTREMITIES:  2+ Peripheral Pulses, No clubbing, cyanosis, or edema       74 y/o M with PMH of DM, CAD, ESRD on HD (T/Th/Sat), Afib s/p watchman procedure (1/30/2017) presents for dialysis, patient recently returned from [facility] and needed to be evaluated prior to reinstatement of dialysis. Nephrology consulted and patient resumed HD inpatient.  Hep panel and PPD performed.  Social work consulted for HD placement outpatient.  Patient stable for discharge to home with outpatient follow up with primary doctor and nephrology.    Vital Signs Last 24 Hrs  T(C): 37 (02 Mar 2021 13:00), Max: 37 (02 Mar 2021 13:00)  T(F): 98.6 (02 Mar 2021 13:00), Max: 98.6 (02 Mar 2021 13:00)  HR: 84 (02 Mar 2021 13:00) (66 - 84)  BP: 148/72 (02 Mar 2021 13:00) (120/58 - 168/80)  BP(mean): --  RR: 18 (02 Mar 2021 13:00) (16 - 20)  SpO2: 97% (02 Mar 2021 13:00) (97% - 98%)    PHYSICAL EXAM:  GENERAL: NAD, lying in bed   HEAD:  Atraumatic, Normocephalic  NECK: Supple, No JVD, Normal thyroid  NERVOUS SYSTEM:  Alert & Oriented X3, Good concentration; Motor Strength 5/5 B/L upper and lower extremities  CHEST/LUNG: Clear to auscultation bilaterally; No rales, rhonchi, wheezing, or rubs  HEART: Regular rate and rhythm; No murmurs, rubs, or gallops  ABDOMEN: Soft, Nontender, Nondistended; Bowel sounds present  EXTREMITIES:  2+ Peripheral Pulses, No clubbing, cyanosis, or edema  SKIN: R AVF     32 mins for discharge

## 2021-03-02 NOTE — DISCHARGE NOTE NURSING/CASE MANAGEMENT/SOCIAL WORK - PATIENT PORTAL LINK FT
You can access the FollowMyHealth Patient Portal offered by [facility] by registering at the following website: [email or web address]. By joining [facility]’s FollowMyHealth portal, you will also be able to view your health information using other applications (apps) compatible with our system.

## 2021-03-16 PROCEDURE — 86706 HEP B SURFACE ANTIBODY: CPT

## 2021-03-16 PROCEDURE — 99261: CPT

## 2021-03-16 PROCEDURE — 80048 BASIC METABOLIC PNL TOTAL CA: CPT

## 2021-03-16 PROCEDURE — 80074 ACUTE HEPATITIS PANEL: CPT

## 2021-03-16 PROCEDURE — 36000 PLACE NEEDLE IN VEIN: CPT

## 2021-03-16 PROCEDURE — 99285 EMERGENCY DEPT VISIT HI MDM: CPT | Mod: 25

## 2021-03-16 PROCEDURE — 83735 ASSAY OF MAGNESIUM: CPT

## 2021-03-16 PROCEDURE — 80053 COMPREHEN METABOLIC PANEL: CPT

## 2021-03-16 PROCEDURE — U0005: CPT

## 2021-03-16 PROCEDURE — 71045 X-RAY EXAM CHEST 1 VIEW: CPT

## 2021-03-16 PROCEDURE — 93005 ELECTROCARDIOGRAM TRACING: CPT

## 2021-03-16 PROCEDURE — 82962 GLUCOSE BLOOD TEST: CPT

## 2021-03-16 PROCEDURE — 86769 SARS-COV-2 COVID-19 ANTIBODY: CPT

## 2021-03-16 PROCEDURE — 83036 HEMOGLOBIN GLYCOSYLATED A1C: CPT

## 2021-03-16 PROCEDURE — 36415 COLL VENOUS BLD VENIPUNCTURE: CPT

## 2021-03-16 PROCEDURE — U0003: CPT

## 2021-03-16 PROCEDURE — 85025 COMPLETE CBC W/AUTO DIFF WBC: CPT

## 2021-04-27 ENCOUNTER — NON-APPOINTMENT (OUTPATIENT)
Age: 76
End: 2021-04-27

## 2021-04-27 ENCOUNTER — APPOINTMENT (OUTPATIENT)
Dept: CARDIOLOGY | Facility: CLINIC | Age: 76
End: 2021-04-27
Payer: MEDICARE

## 2021-04-27 VITALS
BODY MASS INDEX: 31.99 KG/M2 | OXYGEN SATURATION: 100 % | TEMPERATURE: 97.9 F | HEIGHT: 65 IN | WEIGHT: 192 LBS | SYSTOLIC BLOOD PRESSURE: 123 MMHG | HEART RATE: 98 BPM | DIASTOLIC BLOOD PRESSURE: 66 MMHG

## 2021-04-27 DIAGNOSIS — I20.9 ANGINA PECTORIS, UNSPECIFIED: ICD-10-CM

## 2021-04-27 DIAGNOSIS — I73.9 PERIPHERAL VASCULAR DISEASE, UNSPECIFIED: ICD-10-CM

## 2021-04-27 PROCEDURE — 99215 OFFICE O/P EST HI 40 MIN: CPT

## 2021-04-27 PROCEDURE — 93000 ELECTROCARDIOGRAM COMPLETE: CPT

## 2021-04-27 RX ORDER — AMOXICILLIN AND CLAVULANATE POTASSIUM 875; 125 MG/1; MG/1
875-125 TABLET, COATED ORAL
Qty: 28 | Refills: 0 | Status: COMPLETED | COMMUNITY
Start: 2021-03-05

## 2021-04-27 RX ORDER — FOLIC ACID/VIT B COMPLEX AND C 0.8 MG
TABLET ORAL
Qty: 90 | Refills: 0 | Status: ACTIVE | COMMUNITY
Start: 2020-06-03

## 2021-04-27 RX ORDER — ESOMEPRAZOLE MAGNESIUM 40 MG/1
40 CAPSULE, DELAYED RELEASE ORAL DAILY
Refills: 0 | Status: DISCONTINUED | COMMUNITY
End: 2021-04-27

## 2021-04-27 RX ORDER — LEVETIRACETAM 500 MG/1
500 TABLET, FILM COATED ORAL
Refills: 0 | Status: DISCONTINUED | COMMUNITY
End: 2021-04-27

## 2021-04-27 RX ORDER — METHYLPREDNISOLONE 4 MG/1
4 TABLET ORAL
Qty: 21 | Refills: 0 | Status: COMPLETED | COMMUNITY
Start: 2021-03-05

## 2021-04-27 RX ORDER — TRAMADOL HYDROCHLORIDE 50 MG/1
50 TABLET, COATED ORAL
Qty: 60 | Refills: 0 | Status: ACTIVE | COMMUNITY
Start: 2021-04-06

## 2021-04-27 NOTE — HISTORY OF PRESENT ILLNESS
[FreeTextEntry1] : chest tightness, dyspnea, palpitations, atrial fibrillation\par \par \par HPI for today: :  he is feeling tired and weak.  he has been feeling like this for  many days. \par he is also feeling shortness of breathe . he has also been feelign shortness of breathe when he lays flat. no LE edema. \par + palpitations. he was given monitor last visit. never got it done.  recently got a monitor. [place] , his [place] was [place]. \par he was in hospital in feb 2021. his hb 8.5 .  he is going for regular hemodialyses  he also complains of right leg pain. he had  of the right leg. we had deferred it last time.  \par \par old note:  he is feeling better. he got intervention done on his left leg. : \par \par Old note:  i am feeling bit tired.  palpitations. + several months. \par pain in the legs when he walks. compliant iwht meds.\par \par old note: feels tired. when he walks. No cehst pain,. no dyspnea. no palpitations.  patient was recently in the hospital \par \par old note: feels tired. fatigue and tired. \par getting hemodialyses regularly.  dizziness. Lightheadedness. \par discharge summaryL FEB 2019:  Pt is a 72 yo M presenting w/ chest pain for atleast 5 days duration. PMH A fib \par s/p Watchman, recent Subarrachonoid hemorrhage, ESRD on HD, DM2, R iliac \par stenosis, post traumatic seizure, L nephrectomy, CAD. Pt has been c/o chest stephania/pressure for atleast 5 days now, L anterior sternum, pain is worse with coughing, denies exertional component, no associated SOB currently, but does get SOB at times, denies any radiation of the pain, no associated diaphoresis. He has a hx of chronic chest pain and had a stress test in May 2018, lexiscan which showed mild ischemia in RCA, he had a cardiac cath in July 2018 LAD 40% and RPLS 75% stenosis. He has no other complaints. Of note he had a fall with a resultant SAH and was just discharged from the hospital last week. He has not restarted anti-platlet medications (ASA or Plavix), he is NOT on coumadin. He had a watchman procedure. \par \par \par old note/l :  c./c: here for pain management cleerance.\par here for Pre-operative cardiovascular risk evaluation and management for epidurakl injection for pain management, \par No chest stephania. no dyspnea. no headaches. No syncope. No paltpiations. \par \par old note: \par No chest pain. no dyspnea. 0.5 block activity.  no dyspnea. no dizzines. no syncope. \par \par old note: no chest pain. no dyspnea. no lightheadness. \par here for epidual injection  want to know if ok to hold aspirin and plavix. \par no chest pain. s/p watchman. 45 day post wathcmn :3.5 mm leak. 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  Anuric, AV graft/fistula right arm.\par Here for preoperative cardiovascular risk evaluation and management.

## 2021-04-27 NOTE — REASON FOR VISIT
[Follow-Up - Clinic] : a clinic follow-up of [Source: ______] : History obtained from [unfilled] [FreeTextEntry1] : chest tightness, dyspnea, palpitations, atrial fibrillation [FreeTextEntry2] : chest tightness, dyspnea, palpitations, atrial fibrillation

## 2021-04-27 NOTE — CARDIOLOGY SUMMARY
[___] : [unfilled] [LVEF ___%] : LVEF [unfilled]% [Moderate] : moderate pulmonary hypertension [Enlarged] : enlarged LA size [None] : no mitral regurgitation [Today's Date] : [unfilled] [FreeTextEntry1] : Atrial fibrillation \par -  Diffuse nonspecific T-abnormality. \par  Low voltage -possible pulmonary disease. \par \par ABNORMAL \par  [de-identified] : feb 2019: Pause : 2 sec pause. afib.

## 2021-05-05 ENCOUNTER — NON-APPOINTMENT (OUTPATIENT)
Age: 76
End: 2021-05-05

## 2021-05-05 LAB
25(OH)D3 SERPL-MCNC: 88.7 NG/ML
ALBUMIN SERPL ELPH-MCNC: 4.5 G/DL
ALP BLD-CCNC: 245 U/L
ALT SERPL-CCNC: 9 U/L
ANION GAP SERPL CALC-SCNC: 16 MMOL/L
AST SERPL-CCNC: 20 U/L
BASOPHILS # BLD AUTO: 0.04 K/UL
BASOPHILS NFR BLD AUTO: 1 %
BILIRUB SERPL-MCNC: 0.4 MG/DL
BUN SERPL-MCNC: 30 MG/DL
CALCIUM SERPL-MCNC: 8.9 MG/DL
CHLORIDE SERPL-SCNC: 96 MMOL/L
CHOLEST SERPL-MCNC: 150 MG/DL
CO2 SERPL-SCNC: 29 MMOL/L
CREAT SERPL-MCNC: 4.5 MG/DL
EOSINOPHIL # BLD AUTO: 0.29 K/UL
EOSINOPHIL NFR BLD AUTO: 7.1 %
ESTIMATED AVERAGE GLUCOSE: 94 MG/DL
GLUCOSE SERPL-MCNC: 86 MG/DL
HBA1C MFR BLD HPLC: 4.9 %
HCT VFR BLD CALC: 35.3 %
HDLC SERPL-MCNC: 42 MG/DL
HGB BLD-MCNC: 11.2 G/DL
IMM GRANULOCYTES NFR BLD AUTO: 0.2 %
LDLC SERPL CALC-MCNC: 79 MG/DL
LYMPHOCYTES # BLD AUTO: 0.63 K/UL
LYMPHOCYTES NFR BLD AUTO: 15.5 %
MAN DIFF?: NORMAL
MCHC RBC-ENTMCNC: 31.7 GM/DL
MCHC RBC-ENTMCNC: 34.9 PG
MCV RBC AUTO: 110 FL
MONOCYTES # BLD AUTO: 0.5 K/UL
MONOCYTES NFR BLD AUTO: 12.3 %
NEUTROPHILS # BLD AUTO: 2.59 K/UL
NEUTROPHILS NFR BLD AUTO: 63.9 %
NONHDLC SERPL-MCNC: 108 MG/DL
PLATELET # BLD AUTO: 177 K/UL
POTASSIUM SERPL-SCNC: 4.8 MMOL/L
PROT SERPL-MCNC: 7.7 G/DL
RBC # BLD: 3.21 M/UL
RBC # FLD: 15.3 %
SODIUM SERPL-SCNC: 142 MMOL/L
TRIGL SERPL-MCNC: 147 MG/DL
WBC # FLD AUTO: 4.06 K/UL

## 2021-05-05 NOTE — H&P PST ADULT - GENITOURINARY
Patient's form signed, dated, and placed in TC basket.    Bradley Hopkins DO  5/5/2021 11:03 AM       negative

## 2021-05-18 ENCOUNTER — NON-APPOINTMENT (OUTPATIENT)
Age: 76
End: 2021-05-18

## 2021-05-18 DIAGNOSIS — I47.2 VENTRICULAR TACHYCARDIA: ICD-10-CM

## 2021-06-03 ENCOUNTER — APPOINTMENT (OUTPATIENT)
Dept: CARDIOLOGY | Facility: CLINIC | Age: 76
End: 2021-06-03
Payer: MEDICARE

## 2021-06-03 PROCEDURE — 78452 HT MUSCLE IMAGE SPECT MULT: CPT

## 2021-06-03 PROCEDURE — 93015 CV STRESS TEST SUPVJ I&R: CPT

## 2021-06-03 PROCEDURE — A9500: CPT

## 2021-06-17 ENCOUNTER — APPOINTMENT (OUTPATIENT)
Dept: CARDIOLOGY | Facility: CLINIC | Age: 76
End: 2021-06-17
Payer: MEDICARE

## 2021-06-17 PROCEDURE — 93923 UPR/LXTR ART STDY 3+ LVLS: CPT

## 2021-07-13 ENCOUNTER — APPOINTMENT (OUTPATIENT)
Dept: CARDIOLOGY | Facility: CLINIC | Age: 76
End: 2021-07-13

## 2021-07-23 ENCOUNTER — NON-APPOINTMENT (OUTPATIENT)
Age: 76
End: 2021-07-23

## 2021-08-17 NOTE — ED ADULT NURSE NOTE - NURSING MUSC ROM
full range of motion in all extremities Yes-Patient/Caregiver accepts free interpretation services...

## 2021-10-06 NOTE — DISCHARGE NOTE ADULT - PROVIDER RX CONTACT NUMBER
10/15/2021      Last Hernandez  [address]  [place] [address]      Patient:  Last Hernandez : 1941  MR#: [id number]      Dear Last Hernandez    We have been unable to reach you by phone.  Please contact the office at your earliest convenience to discuss. Javier reviewed the results of your recent testing from cardiology and had additional questions for you.  This would determine additional recommendations from a GI standpoint.        Sincerely,        [place] [facility]  [address] [place] [address] [address]  [place] [address]  [phone]                  
[phone]

## 2021-10-13 NOTE — END OF VISIT
Negative
[>50% of Time Spent on Counseling and Coordination of Care for  ___] : Greater than 50% of the encounter time was spent on counseling and coordination of care for [unfilled]

## 2021-10-28 NOTE — ASU PATIENT PROFILE, ADULT - AS SC BRADEN [facility]RICTION
Date of Service:  10/28/2021    Primary Care Provider: Evette Velazco PA-C    ENDOSCOPIST:  Dr. Cottrell     ASSISTANT:  none     PROCEDURE:  Colonoscopy     INSTRUMENT:  Olympus video colonoscope.     EXTENT OF EXAMINATION:  cecum, which was identified by IC valve and appendiceal orifice     MEDICATIONS:   MAC sedation was administered.  Please see anesthesiologists note for details.      PREOPERATIVE DIAGNOSIS:  Family history of colon cancer and change in bowel habits    POSTOPERATIVE DIAGNOSIS:   1. Left-sided diverticulosis  2. No polyps or mass lesions identified otherwise     DESCRIPTION OF PROCEDURE:   After explaining risks, benefits and alternatives to the patient including risk of perforation, bleeding, sedation and risk of missing polyps and mass lesions, a written consent was obtained.  The patient was placed in left lateral decubitus position and appropriate sedation was given.  Digital rectal examination was done which showed normal tone and no mass.  Scope was inserted in the rectum and all the way to the cecum with no difficulties.  Cecum was identified by the ileocecal valve and the appendiceal orifice.  Cecum, ascending, transverse, descending and sigmoid colon were examined very carefully upon inserting and withdrawal of the scope where no significant pathology was identified EXCEPT as above.  Retroflexion in the rectum demonstrated no significant pathology.  The procedure was tolerated well and terminated with no complications.    Scope Events:   Scope In: 1142    At Cecum: 1155   Scope Out: 1202   Scope Withdrawal Time: 7        SPECIMEN:  None    ESTIMATED BLOOD LOSS:  Minimal     COMPLICATIONS:  None.     DIAGNOSIS AND IMPRESSION:    1. Colonoscopy for family history of colon cancer in her mother and a change in bowel habits with no significant mucosal findings of polyps or mass lesions.  Notable that patient did have severe left-sided diverticulosis which could account for difficulty with bowel  movements.  She is recommended to have a high-fiber diet and continue MiraLax.      DISPOSITION:  The patient to be allowed to recover in the recovery area per protocols.      Shae Cottrell MD   10/28/2021 12:23 PM       (3) no apparent problem

## 2021-12-19 NOTE — PROGRESS NOTE ADULT - SUBJECTIVE AND OBJECTIVE BOX
Vital Signs Last 24 Hrs,    T(C): 36.7 (2020 11:15), Max: 36.8 (2020 20:59)  T(F): 98 (2020 11:15), Max: 98.3 (2020 20:59)  HR: 60 (2020 11:15) (60 - 80)  BP: 117/74 (2020 11:15) (111/71 - 143/86)  RR: 18 (2020 11:15) (16 - 18)  SpO2: 100% (2020 11:15) (98% - 100%)    132<L>  |  90<L>  |  39.0<H>  ----------------------------<  75     Ca:8.6   (2020 08:53)  5.7<H>   |  24.0   |  5.83<H>    eGFR if Non : 9 <L>  eGFR if : 10 <L>    TPro  7.5    /  Alb  4.0    /  TBili  0.5    /  DBili  x      /  AST  24     /  ALT  9      /  AlkPhos  219<H>  2020 08:53                            10.2<L>  3.02<L> )-----------( 153      ( 2020 08:53 )                30.9<L>    M.9 mg/dL  ( @ 08:53)    Patient is a 74y old  Male who presents with a chief complaint of  Need for HD,     HPI: Returns for [facility],     PAST MEDICAL & SURGICAL HISTORY:    Persistent atrial fibrillation  Carotid artery disease: mild  Leg pain, bilateral: R&gt;L at rest and with short distant ambulation  Obesity (BMI 30.0-34.9)  Claudication in peripheral vascular disease: R&gt;L  Right common iliac or ostial external iliac per 2018 U/S  Left popliteal 50-75 and EDUIN stenosis  SUSANA 0.8  Kidney problem: spontaneous renal bleed while on coumadin  c/b  renal  hematoma   with  LT  nephrectomy  Hypotension: treated  with Midodrine  ESRD (end stage renal disease): on  hemodialysis  3  x  weekly.,  H/O  left  nephrectomy after  renal  bleed  DM (diabetes mellitus)    Renal transplant recipient  AV fistula: right lower arm  Abnormality of left atrial appendage: WATCHMAN  LEFT ATRIAL APPENDAGE CLOSURE   2017  History of Left  nephrectomy    FAMILY HISTORY:  No pertinent family history in first degree relatives    Social History: Non Smoker,     MEDICATIONS  (STANDING):  aspirin  chewable 81 milliGRAM(s) Oral daily  atorvastatin 20 milliGRAM(s) Oral at bedtime  cinacalcet 30 milliGRAM(s) Oral two times a day  clopidogrel Tablet 75 milliGRAM(s) Oral daily  magnesium sulfate  IVPB 2 Gram(s) IV Intermittent once  metoprolol tartrate 12.5 milliGRAM(s) Oral two times a day  ranolazine 500 milliGRAM(s) Oral two times a day  sevelamer carbonate 800 milliGRAM(s) Oral three times a day    Allergies    No Known Allergies    REVIEW OF SYSTEMS:    CONSTITUTIONAL: No fever, weight loss, or fatigue  EYES: No eye pain, visual disturbances, or discharge  ENMT:  No difficulty hearing, tinnitus, vertigo; No sinus or throat pain  NECK: No pain or stiffness  RESPIRATORY: No cough, wheezing, chills or hemoptysis; No shortness of breath  CARDIOVASCULAR: No chest pain, palpitations, dizziness, or leg swelling  GASTROINTESTINAL: No abdominal or epigastric pain. No nausea, vomiting, or hematemesis; No diarrhea or constipation. No melena or hematochezia.  GENITOURINARY: No dysuria, frequency, hematuria, or incontinence  NEUROLOGICAL: No headaches, memory loss, loss of strength, numbness, or tremors  SKIN: No itching, burning, rashes, or lesions   LYMPH NODES: No enlarged glands  ENDOCRINE: No heat or cold intolerance; No hair loss  MUSCULOSKELETAL: No joint pain or swelling; No muscle, back, or extremity pain  PSYCHIATRIC: No depression, anxiety, mood swings, or difficulty sleeping  HEME/LYMPH: No easy bruising, or bleeding gums  ALLERGY AND IMMUNOLOGIC: No hives or eczema    Vital Signs Last 24 Hrs  T(C): 36.4 (2020 12:21), Max: 36.6 (2020 08:52)  T(F): 97.5 (2020 12:21), Max: 97.9 (2020 08:52)  HR: 65 (2020 12:21) (65 - 77)  BP: 149/76 (2020 12:15) (149/76 - 152/78)    RR: 16 (2020 12:21) (16 - 18)  SpO2: 100% (2020 12:21) (100% - 100%)    PHYSICAL EXAM:    GENERAL: NAD, well-groomed, well-developed  HEAD:  Atraumatic, Normocephalic  EYES: EOMI, PERRLA, conjunctiva and sclera clear  ENMT: Moist mucous membranes,   NECK: Supple, No JVD,   NERVOUS SYSTEM:  Alert & Oriented X 3,   CHEST/LUNG: Clear to percussion bilaterally; No rales, rhonchi, wheezing, or rubs  HEART: Regular rate and rhythm; No murmurs, rubs, or gallops  ABDOMEN: Soft, Nontender, Nondistended; Bowel sounds present  EXTREMITIES:  2+ Peripheral Pulses, No clubbing, cyanosis, or edema  LYMPH: No lymphadenopathy noted  SKIN: No rashes or lesions    LABS:                        10.2   4.48  )-----------( 171      ( 2020 09:23 )             30.8         139  |  96<L>  |  67.0<H>  ----------------------------<  111<H>  5.1   |  22.0  |  8.25<H>    Ca    8.9      2020 09:23  Phos  4.2       Mg     1.5         TPro  7.7  /  Alb  4.3  /  TBili  0.5  /  DBili  x   /  AST  22  /  ALT  8   /  AlkPhos  228<H>      Magnesium, Serum: 1.5 mg/dL ( @ 09:23)  Phosphorus Level, Serum: 4.2 mg/dL ( @ 09:23)    RADIOLOGY & ADDITIONAL TESTS:    EXAM:  XR CHEST AP OR PA 1V                          PROCEDURE DATE:  2020      INTERPRETATION:  Chest one view    HISTORY: Predialysis evaluation    COMPARISON STUDY: 8/10/2019    Frontal expiratory view of the chest shows the heart to be similarly enlarged in size. The lungs show mild pulmonary congestion and there is no evidence of pneumothorax nor pleural effusion. Right subclavian stents are again noted.    IMPRESSION:  Mild pulmonary congestion.    Thank you for the courtesy ofthis referral.      MARTHA SWIFT M.D., ATTENDING RADIOLOGIST  This document has been electronically signed. 2020  9:58AM Dr. Chicas

## 2022-01-01 ENCOUNTER — NON-APPOINTMENT (OUTPATIENT)
Age: 77
End: 2022-01-01

## 2022-01-01 ENCOUNTER — APPOINTMENT (OUTPATIENT)
Dept: CARDIOLOGY | Facility: CLINIC | Age: 77
End: 2022-01-01

## 2022-01-01 VITALS
WEIGHT: 180 LBS | BODY MASS INDEX: 29.99 KG/M2 | OXYGEN SATURATION: 99 % | HEART RATE: 87 BPM | SYSTOLIC BLOOD PRESSURE: 143 MMHG | DIASTOLIC BLOOD PRESSURE: 80 MMHG | TEMPERATURE: 97.8 F | HEIGHT: 65 IN

## 2022-01-01 VITALS
BODY MASS INDEX: 29.99 KG/M2 | HEIGHT: 65 IN | DIASTOLIC BLOOD PRESSURE: 80 MMHG | HEART RATE: 80 BPM | SYSTOLIC BLOOD PRESSURE: 142 MMHG | OXYGEN SATURATION: 97 % | WEIGHT: 180 LBS

## 2022-01-01 DIAGNOSIS — Z91.81 HISTORY OF FALLING: ICD-10-CM

## 2022-01-01 DIAGNOSIS — Z86.79 PERSONAL HISTORY OF OTHER DISEASES OF THE CIRCULATORY SYSTEM: ICD-10-CM

## 2022-01-01 DIAGNOSIS — R53.82 CHRONIC FATIGUE, UNSPECIFIED: ICD-10-CM

## 2022-01-01 DIAGNOSIS — M19.90 UNSPECIFIED OSTEOARTHRITIS, UNSPECIFIED SITE: ICD-10-CM

## 2022-01-01 DIAGNOSIS — R00.2 PALPITATIONS: ICD-10-CM

## 2022-01-01 DIAGNOSIS — R06.00 DYSPNEA, UNSPECIFIED: ICD-10-CM

## 2022-01-01 PROCEDURE — 93000 ELECTROCARDIOGRAM COMPLETE: CPT

## 2022-01-01 PROCEDURE — 99215 OFFICE O/P EST HI 40 MIN: CPT

## 2022-01-01 RX ORDER — SEVELAMER CARBONATE 800 MG/1
800 TABLET, FILM COATED ORAL
Refills: 0 | Status: ACTIVE | COMMUNITY

## 2022-01-01 RX ORDER — PREDNISOLONE ACETATE 10 MG/ML
1 SUSPENSION/ DROPS OPHTHALMIC
Qty: 5 | Refills: 0 | Status: ACTIVE | COMMUNITY
Start: 2022-08-03

## 2022-01-01 RX ORDER — URSODIOL 500 MG/1
500 TABLET ORAL
Refills: 0 | Status: ACTIVE | COMMUNITY

## 2022-01-01 RX ORDER — METOPROLOL SUCCINATE 25 MG/1
25 TABLET, EXTENDED RELEASE ORAL DAILY
Qty: 30 | Refills: 6 | Status: ACTIVE | COMMUNITY
Start: 2021-05-18

## 2022-01-24 NOTE — PATIENT PROFILE ADULT - NSPROPOAURINARYCATHETER_GEN_A_NUR
REVIEW OF SYSTEMS:  CONSTITUTIONAL: No fever, weight loss, or fatigue  EYES: No eye pain, visual disturbances, or discharge  ENMT:  No difficulty hearing, tinnitus, vertigo; No sinus or throat pain  NECK: No pain or stiffness  BREASTS: No pain, masses, or nipple discharge  RESPIRATORY: No cough, wheezing, chills or hemoptysis; +shortness of breath  CARDIOVASCULAR: +chest pain, palpitations, dizziness, or leg swelling  GASTROINTESTINAL: No abdominal or epigastric pain. No nausea, vomiting, or hematemesis; No diarrhea or constipation. No melena or hematochezia.  GENITOURINARY: No dysuria, frequency, hematuria, or incontinence  NEUROLOGICAL: No headaches, memory loss, loss of strength, numbness, or tremors  SKIN: No itching, burning, rashes, or lesions   LYMPH NODES: No enlarged glands  ENDOCRINE: No heat or cold intolerance; No hair loss  MUSCULOSKELETAL: No joint pain or swelling; No muscle, back, or extremity pain  PSYCHIATRIC: No depression, anxiety, mood swings, or difficulty sleeping  HEME/LYMPH: No easy bruising, or bleeding gums  ALLERGY AND IMMUNOLOGIC: No hives or eczema    ALL ROS REVIEWED AND NORMAL EXCEPT AS STATED ABOVE no

## 2022-06-26 NOTE — PATIENT PROFILE ADULT - NSPROHMSYMPCOND_GEN_A_NUR
on the discharge service for the patient. I have reviewed and made amendments to the documentation where necessary. cancer/cardiovascular/genitourinary

## 2022-08-02 ENCOUNTER — APPOINTMENT (OUTPATIENT)
Dept: CARDIOLOGY | Facility: CLINIC | Age: 77
End: 2022-08-02

## 2022-08-02 ENCOUNTER — NON-APPOINTMENT (OUTPATIENT)
Age: 77
End: 2022-08-02

## 2022-08-02 VITALS
WEIGHT: 190 LBS | HEIGHT: 65 IN | SYSTOLIC BLOOD PRESSURE: 129 MMHG | HEART RATE: 75 BPM | DIASTOLIC BLOOD PRESSURE: 66 MMHG | TEMPERATURE: 97.7 F | OXYGEN SATURATION: 97 % | BODY MASS INDEX: 31.65 KG/M2

## 2022-08-02 DIAGNOSIS — E78.5 HYPERLIPIDEMIA, UNSPECIFIED: ICD-10-CM

## 2022-08-02 DIAGNOSIS — E11.9 TYPE 2 DIABETES MELLITUS W/OUT COMPLICATIONS: ICD-10-CM

## 2022-08-02 DIAGNOSIS — Z01.810 ENCOUNTER FOR PREPROCEDURAL CARDIOVASCULAR EXAMINATION: ICD-10-CM

## 2022-08-02 DIAGNOSIS — R07.9 CHEST PAIN, UNSPECIFIED: ICD-10-CM

## 2022-08-02 PROCEDURE — 99215 OFFICE O/P EST HI 40 MIN: CPT

## 2022-08-02 PROCEDURE — 93000 ELECTROCARDIOGRAM COMPLETE: CPT

## 2022-08-02 RX ORDER — CINACALCET HYDROCHLORIDE 30 MG/1
30 TABLET, COATED ORAL TWICE DAILY
Refills: 0 | Status: DISCONTINUED | COMMUNITY
End: 2022-08-02

## 2022-08-02 RX ORDER — LEVOFLOXACIN 500 MG/1
500 TABLET, FILM COATED ORAL
Qty: 14 | Refills: 0 | Status: DISCONTINUED | COMMUNITY
Start: 2021-04-15 | End: 2022-08-02

## 2022-08-02 RX ORDER — FLUTICASONE PROPIONATE 50 UG/1
50 SPRAY, METERED NASAL
Qty: 16 | Refills: 0 | Status: DISCONTINUED | COMMUNITY
Start: 2021-03-05 | End: 2022-08-02

## 2022-08-02 RX ORDER — SEVELAMER CARBONATE 800 MG/1
800 TABLET, FILM COATED ORAL 3 TIMES DAILY
Refills: 0 | Status: DISCONTINUED | COMMUNITY
End: 2022-08-02

## 2022-08-02 RX ORDER — CLINDAMYCIN HYDROCHLORIDE 300 MG/1
300 CAPSULE ORAL
Qty: 30 | Refills: 0 | Status: DISCONTINUED | COMMUNITY
Start: 2022-06-16

## 2022-08-02 NOTE — CARDIOLOGY SUMMARY
[___] : [unfilled] [LVEF ___%] : LVEF [unfilled]% [Moderate] : moderate pulmonary hypertension [Enlarged] : enlarged LA size [None] : no mitral regurgitation [Today's Date] : [unfilled] [de-identified] : 8 2 2022    Atrial fibrillation \par Low voltage in precordial leads. \par  -  Diffuse nonspecific T-abnormality. \par \par ABNORMAL \par  [de-identified] : may 2021:   afib hr 70. no significant events  [de-identified] : june 2021:  Nuclear stress :   Mild ischemic study. Rca terrtirory. LVEf 57%  [de-identified] : KAREEM : june 2021  Abnormal PVR : right thigh. Left: normal AB.  [FreeTextEntry1] : Atrial fibrillation \par -  Diffuse nonspecific T-abnormality. \par  Low voltage -possible pulmonary disease. \par \par ABNORMAL \par  [de-identified] : feb 2019: Pause : 2 sec pause. afib.

## 2022-08-02 NOTE — REASON FOR VISIT
[Source: ______] : History obtained from [unfilled] [Follow-Up - Clinic] : a clinic follow-up of [FreeTextEntry1] : chest tightness, dyspnea, palpitations, atrial fibrillation [FreeTextEntry2] : chest tightness, dyspnea, palpitations, atrial fibrillation

## 2022-08-02 NOTE — HISTORY OF PRESENT ILLNESS
[Preoperative Visit] : for a medical evaluation prior to surgery [Scheduled Procedure ___] : a [unfilled] [Date of Surgery ___] : on [unfilled] [Surgeon Name ___] : surgeon: [unfilled] [Good] : Good [Fatigue] : fatigue [Dyspnea] : dyspnea [Poor Exercise Tolerance] : poor exercise tolerance [Anti-Platelet Agents] : anti-platelet agents [Renal Disease] : renal disease [Prior Anesthesia] : Prior anesthesia [Electrocardiogram] : ~T an ECG ~C was performed [Echocardiogram] : ~T an echocardiogram ~C was performed [Metabolic Capacity ___Mets%] : The patient has a metabolic capacity of [unfilled] Mets%  [Poor] : Poor [Fever] : no fever [Chills] : no chills [Chest Pain] : no chest pain [Cough] : no cough [Dysuria] : no dysuria [Urinary Frequency] : no urinary frequency [Nausea] : no nausea [Vomiting] : no vomiting [Diarrhea] : no diarrhea [Abdominal Pain] : no abdominal pain [Easy Bruising] : no easy bruising [Lower Extremity Swelling] : no lower extremity swelling [Diabetes] : no diabetes [Cardiovascular Disease] : no cardiovascular disease [Pulmonary Disease] : no pulmonary disease [Nicotine Dependence] : no nicotine dependence [GI Disease] : no gastrointestinal disease [Sleep Apnea] : no sleep apnea [Thromboembolic Problems] : no thromboembolic problems [Frequent use of NSAIDs] : no use of NSAIDs [Transfusion Reaction] : no transfusion reaction [Impaired Immunity] : no impaired immunity [Steroid Use in Last 6 Months] : no steroid use in the last six months [Frequent Aspirin Use] : no frequent aspirin use [Prev Anesthesia Reaction] : no previous anesthesia reaction [Anesthesia Reaction] : no anesthesia reaction [Sudden Death] : no sudden death [Clotting Disorder] : no clotting disorder [Bleeding Disorder] : no bleeding disorder [de-identified] : [facility] [de-identified] : Uses crutchest for arthrtiis. cannot exercise. fatigue and tiredness no chest pain poot exercise capacity  [FreeTextEntry1] : chest tightness, dyspnea, palpitations, atrial fibrillation\par \par \par HPI for today: :  he is feeling tired and weak.  he has been feeling like this for  many days. \par he is also feeling shortness of breathe . he has also been feelign shortness of breathe when he lays flat. no LE edema. \par + palpitations. he was given monitor last visit. never got it done.  recently got a monitor. [place] , [place] was [place]. \par he was in hospital in feb 2021. his hb 8.5 .  he is going for regular hemodialyses  he also complains of right leg pain. he had  of the right leg. we had deferred it last time.  \par \par old note:  he is feeling better. he got intervention done on his left leg. : \par \par Old note:  i am feeling bit tired.  palpitations. + several months. \par pain in the legs when he walks. compliant iwht meds.\par \par old note: feels tired. when he walks. No cehst pain,. no dyspnea. no palpitations.  patient was recently in the hospital \par \par old note: feels tired. fatigue and tired. \par getting hemodialyses regularly.  dizziness. Lightheadedness. \par discharge summaryL FEB 2019:  Pt is a 72 yo M presenting w/ chest pain for atleast 5 days duration. PMH A fib \par s/p Watchman, recent Subarrachonoid hemorrhage, ESRD on HD, DM2, R iliac \par stenosis, post traumatic seizure, L nephrectomy, CAD. Pt has been c/o chest stephania/pressure for atleast 5 days now, L anterior sternum, pain is worse with coughing, denies exertional component, no associated SOB currently, but does get SOB at times, denies any radiation of the pain, no associated diaphoresis. He has a hx of chronic chest pain and had a stress test in May 2018, lexiscan which showed mild ischemia in RCA, he had a cardiac cath in July 2018 LAD 40% and RPLS 75% stenosis. He has no other complaints. Of note he had a fall with a resultant SAH and was just discharged from the hospital last week. He has not restarted anti-platlet medications (ASA or Plavix), he is NOT on coumadin. He had a watchman procedure. \par \par \par old note/l :  c./c: here for pain management cleerance.\par here for Pre-operative cardiovascular risk evaluation and management for epidurakl injection for pain management, \par No chest stephania. no dyspnea. no headaches. No syncope. No paltpiations. \par \par old note: \par No chest pain. no dyspnea. 0.5 block activity.  no dyspnea. no dizzines. no syncope. \par \par old note: no chest pain. no dyspnea. no lightheadness. \par here for epidual injection  want to know if ok to hold aspirin and plavix. \par no chest pain. s/p watchman. 45 day post wathcmn :3.5 mm leak. 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  Anuric, AV graft/fistula right arm.\par Here for preoperative cardiovascular risk evaluation and management.

## 2022-08-02 NOTE — DISCUSSION/SUMMARY
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 if only mild sichemia. will continue medical therapy and optimized for epidural injection. if worsening ischemai will address  obstructive coronary artery disease first\par 2) fatigue and tiredness. : prior coronary artery disease .  prior bradycardia. no significant peace on event monitor. \par  \par  2) Leg pain: claudication. recent intervention on the left leg. Successful intervention via left brachial to the leftpopliteal artery. Failed Angioplasty of right common iliac .  Will have  intervention of the Right common iliac.  will sent for cath lab procedure booking if KAREEM and US arterial duplex very abnormal. \par  stable. symptoms. Peripheral vascular disease./    . Dual antiplatelet therapy \par 3) Atrial fibrillation: left renal hematoma and bleeding. s/p watchman.  1 year CANDIDA- no leak.   asa 81  .   4 weeks emcot monitor result to be reviewed.   if higher heart rates high then add toprol xl. \par 4) CAD: moderate  LAD diseae and severe RPKL disease. Dual antiplatelet therapy Ct statins. lipitor 20 mg daily. : stress test: mild RcA ischemia in the past.  medical management.  repeat stress test  \par 5) Hypotension: On midodrine. Orthostatic.  midodrine  15 mg Q8. Compression stiockings \par 5 ) Mild carotid atherosclerosis. '6) SAH: due to fall. \par Will order and review ECG for the above mentioned diagnosis/condition/symptoms

## 2022-08-11 ENCOUNTER — APPOINTMENT (OUTPATIENT)
Dept: CARDIOLOGY | Facility: CLINIC | Age: 77
End: 2022-08-11

## 2022-08-11 PROCEDURE — 93306 TTE W/DOPPLER COMPLETE: CPT

## 2022-08-13 ENCOUNTER — INPATIENT (INPATIENT)
Facility: HOSPITAL | Age: 77
LOS: 2 days | Discharge: NURSING, REHAB OR LONG-TERM CARE | DRG: 562 | End: 2022-08-16
Attending: STUDENT IN AN ORGANIZED HEALTH CARE EDUCATION/TRAINING PROGRAM | Admitting: INTERNAL MEDICINE
Payer: MEDICARE

## 2022-08-13 ENCOUNTER — TRANSCRIPTION ENCOUNTER (OUTPATIENT)
Age: 77
End: 2022-08-13

## 2022-08-13 VITALS
HEART RATE: 63 BPM | OXYGEN SATURATION: 99 % | DIASTOLIC BLOOD PRESSURE: 56 MMHG | TEMPERATURE: 98 F | SYSTOLIC BLOOD PRESSURE: 104 MMHG | RESPIRATION RATE: 17 BRPM | HEIGHT: 66 IN

## 2022-08-13 DIAGNOSIS — S82.209A UNSPECIFIED FRACTURE OF SHAFT OF UNSPECIFIED TIBIA, INITIAL ENCOUNTER FOR CLOSED FRACTURE: ICD-10-CM

## 2022-08-13 DIAGNOSIS — Z90.5 ACQUIRED ABSENCE OF KIDNEY: Chronic | ICD-10-CM

## 2022-08-13 DIAGNOSIS — Q20.8 OTHER CONGENITAL MALFORMATIONS OF CARDIAC CHAMBERS AND CONNECTIONS: Chronic | ICD-10-CM

## 2022-08-13 DIAGNOSIS — Z94.0 KIDNEY TRANSPLANT STATUS: Chronic | ICD-10-CM

## 2022-08-13 DIAGNOSIS — I77.0 ARTERIOVENOUS FISTULA, ACQUIRED: Chronic | ICD-10-CM

## 2022-08-13 LAB
ALBUMIN SERPL ELPH-MCNC: 3.7 G/DL — SIGNIFICANT CHANGE UP (ref 3.3–5.2)
ALP SERPL-CCNC: 92 U/L — SIGNIFICANT CHANGE UP (ref 40–120)
ALT FLD-CCNC: 19 U/L — SIGNIFICANT CHANGE UP
ANION GAP SERPL CALC-SCNC: 19 MMOL/L — HIGH (ref 5–17)
ANISOCYTOSIS BLD QL: SLIGHT — SIGNIFICANT CHANGE UP
AST SERPL-CCNC: 67 U/L — HIGH
BASOPHILS # BLD AUTO: 0 K/UL — SIGNIFICANT CHANGE UP (ref 0–0.2)
BASOPHILS NFR BLD AUTO: 0 % — SIGNIFICANT CHANGE UP (ref 0–2)
BILIRUB SERPL-MCNC: 0.3 MG/DL — LOW (ref 0.4–2)
BUN SERPL-MCNC: 24.1 MG/DL — HIGH (ref 8–20)
CALCIUM SERPL-MCNC: 9.1 MG/DL — SIGNIFICANT CHANGE UP (ref 8.4–10.5)
CHLORIDE SERPL-SCNC: 94 MMOL/L — LOW (ref 98–107)
CO2 SERPL-SCNC: 24 MMOL/L — SIGNIFICANT CHANGE UP (ref 22–29)
CREAT SERPL-MCNC: 4.52 MG/DL — HIGH (ref 0.5–1.3)
EGFR: 13 ML/MIN/1.73M2 — LOW
EOSINOPHIL # BLD AUTO: 0.31 K/UL — SIGNIFICANT CHANGE UP (ref 0–0.5)
EOSINOPHIL NFR BLD AUTO: 5.2 % — SIGNIFICANT CHANGE UP (ref 0–6)
GIANT PLATELETS BLD QL SMEAR: PRESENT — SIGNIFICANT CHANGE UP
GLUCOSE SERPL-MCNC: 127 MG/DL — HIGH (ref 70–99)
HCT VFR BLD CALC: 31.3 % — LOW (ref 39–50)
HGB BLD-MCNC: 10.2 G/DL — LOW (ref 13–17)
LYMPHOCYTES # BLD AUTO: 0.31 K/UL — LOW (ref 1–3.3)
LYMPHOCYTES # BLD AUTO: 5.2 % — LOW (ref 13–44)
MACROCYTES BLD QL: SLIGHT — SIGNIFICANT CHANGE UP
MANUAL SMEAR VERIFICATION: SIGNIFICANT CHANGE UP
MCHC RBC-ENTMCNC: 32.6 GM/DL — SIGNIFICANT CHANGE UP (ref 32–36)
MCHC RBC-ENTMCNC: 35.5 PG — HIGH (ref 27–34)
MCV RBC AUTO: 109.1 FL — HIGH (ref 80–100)
MONOCYTES # BLD AUTO: 0.26 K/UL — SIGNIFICANT CHANGE UP (ref 0–0.9)
MONOCYTES NFR BLD AUTO: 4.4 % — SIGNIFICANT CHANGE UP (ref 2–14)
NEUTROPHILS # BLD AUTO: 4.9 K/UL — SIGNIFICANT CHANGE UP (ref 1.8–7.4)
NEUTROPHILS NFR BLD AUTO: 82.6 % — HIGH (ref 43–77)
PLAT MORPH BLD: NORMAL — SIGNIFICANT CHANGE UP
PLATELET # BLD AUTO: 154 K/UL — SIGNIFICANT CHANGE UP (ref 150–400)
POLYCHROMASIA BLD QL SMEAR: SLIGHT — SIGNIFICANT CHANGE UP
POTASSIUM SERPL-MCNC: 4.7 MMOL/L — SIGNIFICANT CHANGE UP (ref 3.5–5.3)
POTASSIUM SERPL-SCNC: 4.7 MMOL/L — SIGNIFICANT CHANGE UP (ref 3.5–5.3)
PROT SERPL-MCNC: 7.4 G/DL — SIGNIFICANT CHANGE UP (ref 6.6–8.7)
RBC # BLD: 2.87 M/UL — LOW (ref 4.2–5.8)
RBC # FLD: 14.8 % — HIGH (ref 10.3–14.5)
RBC BLD AUTO: NORMAL — SIGNIFICANT CHANGE UP
SARS-COV-2 RNA SPEC QL NAA+PROBE: DETECTED
SODIUM SERPL-SCNC: 137 MMOL/L — SIGNIFICANT CHANGE UP (ref 135–145)
VARIANT LYMPHS # BLD: 2.6 % — SIGNIFICANT CHANGE UP (ref 0–6)
WBC # BLD: 5.93 K/UL — SIGNIFICANT CHANGE UP (ref 3.8–10.5)
WBC # FLD AUTO: 5.93 K/UL — SIGNIFICANT CHANGE UP (ref 3.8–10.5)

## 2022-08-13 PROCEDURE — 27530 TREAT KNEE FRACTURE: CPT | Mod: LT

## 2022-08-13 PROCEDURE — 73502 X-RAY EXAM HIP UNI 2-3 VIEWS: CPT | Mod: 26,LT

## 2022-08-13 PROCEDURE — 73564 X-RAY EXAM KNEE 4 OR MORE: CPT | Mod: 26,LT

## 2022-08-13 PROCEDURE — 99285 EMERGENCY DEPT VISIT HI MDM: CPT | Mod: CS

## 2022-08-13 PROCEDURE — 99223 1ST HOSP IP/OBS HIGH 75: CPT

## 2022-08-13 PROCEDURE — 99223 1ST HOSP IP/OBS HIGH 75: CPT | Mod: 57

## 2022-08-13 PROCEDURE — G1004: CPT

## 2022-08-13 PROCEDURE — 73700 CT LOWER EXTREMITY W/O DYE: CPT | Mod: 26,LT,MG

## 2022-08-13 RX ORDER — RANOLAZINE 500 MG/1
500 TABLET, FILM COATED, EXTENDED RELEASE ORAL
Refills: 0 | Status: DISCONTINUED | OUTPATIENT
Start: 2022-08-13 | End: 2022-08-16

## 2022-08-13 RX ORDER — DEXTROSE 50 % IN WATER 50 %
25 SYRINGE (ML) INTRAVENOUS ONCE
Refills: 0 | Status: DISCONTINUED | OUTPATIENT
Start: 2022-08-13 | End: 2022-08-16

## 2022-08-13 RX ORDER — GLUCAGON INJECTION, SOLUTION 0.5 MG/.1ML
1 INJECTION, SOLUTION SUBCUTANEOUS ONCE
Refills: 0 | Status: DISCONTINUED | OUTPATIENT
Start: 2022-08-13 | End: 2022-08-16

## 2022-08-13 RX ORDER — DEXTROSE 50 % IN WATER 50 %
15 SYRINGE (ML) INTRAVENOUS ONCE
Refills: 0 | Status: DISCONTINUED | OUTPATIENT
Start: 2022-08-13 | End: 2022-08-16

## 2022-08-13 RX ORDER — HYDROMORPHONE HYDROCHLORIDE 2 MG/ML
2 INJECTION INTRAMUSCULAR; INTRAVENOUS; SUBCUTANEOUS EVERY 4 HOURS
Refills: 0 | Status: DISCONTINUED | OUTPATIENT
Start: 2022-08-13 | End: 2022-08-16

## 2022-08-13 RX ORDER — OXYCODONE AND ACETAMINOPHEN 5; 325 MG/1; MG/1
1 TABLET ORAL ONCE
Refills: 0 | Status: DISCONTINUED | OUTPATIENT
Start: 2022-08-13 | End: 2022-08-13

## 2022-08-13 RX ORDER — HYDROMORPHONE HYDROCHLORIDE 2 MG/ML
1 INJECTION INTRAMUSCULAR; INTRAVENOUS; SUBCUTANEOUS EVERY 4 HOURS
Refills: 0 | Status: DISCONTINUED | OUTPATIENT
Start: 2022-08-13 | End: 2022-08-16

## 2022-08-13 RX ORDER — SODIUM CHLORIDE 9 MG/ML
1000 INJECTION, SOLUTION INTRAVENOUS
Refills: 0 | Status: DISCONTINUED | OUTPATIENT
Start: 2022-08-13 | End: 2022-08-16

## 2022-08-13 RX ORDER — ACETAMINOPHEN 500 MG
975 TABLET ORAL EVERY 12 HOURS
Refills: 0 | Status: DISCONTINUED | OUTPATIENT
Start: 2022-08-13 | End: 2022-08-16

## 2022-08-13 RX ORDER — POLYETHYLENE GLYCOL 3350 17 G/17G
17 POWDER, FOR SOLUTION ORAL DAILY
Refills: 0 | Status: DISCONTINUED | OUTPATIENT
Start: 2022-08-13 | End: 2022-08-16

## 2022-08-13 RX ORDER — DOCUSATE SODIUM 100 MG
0 CAPSULE ORAL
Qty: 0 | Refills: 0 | DISCHARGE

## 2022-08-13 RX ORDER — PANTOPRAZOLE SODIUM 20 MG/1
40 TABLET, DELAYED RELEASE ORAL
Refills: 0 | Status: DISCONTINUED | OUTPATIENT
Start: 2022-08-13 | End: 2022-08-16

## 2022-08-13 RX ORDER — INSULIN LISPRO 100/ML
VIAL (ML) SUBCUTANEOUS
Refills: 0 | Status: DISCONTINUED | OUTPATIENT
Start: 2022-08-13 | End: 2022-08-16

## 2022-08-13 RX ORDER — SENNA PLUS 8.6 MG/1
2 TABLET ORAL AT BEDTIME
Refills: 0 | Status: DISCONTINUED | OUTPATIENT
Start: 2022-08-13 | End: 2022-08-16

## 2022-08-13 RX ORDER — MIDODRINE HYDROCHLORIDE 2.5 MG/1
10 TABLET ORAL THREE TIMES A DAY
Refills: 0 | Status: DISCONTINUED | OUTPATIENT
Start: 2022-08-13 | End: 2022-08-14

## 2022-08-13 RX ORDER — INSULIN LISPRO 100/ML
VIAL (ML) SUBCUTANEOUS AT BEDTIME
Refills: 0 | Status: DISCONTINUED | OUTPATIENT
Start: 2022-08-13 | End: 2022-08-16

## 2022-08-13 RX ORDER — DEXTROSE 50 % IN WATER 50 %
12.5 SYRINGE (ML) INTRAVENOUS ONCE
Refills: 0 | Status: DISCONTINUED | OUTPATIENT
Start: 2022-08-13 | End: 2022-08-16

## 2022-08-13 RX ORDER — METOPROLOL TARTRATE 50 MG
25 TABLET ORAL
Refills: 0 | Status: DISCONTINUED | OUTPATIENT
Start: 2022-08-13 | End: 2022-08-14

## 2022-08-13 RX ORDER — ASPIRIN/CALCIUM CARB/MAGNESIUM 324 MG
81 TABLET ORAL DAILY
Refills: 0 | Status: DISCONTINUED | OUTPATIENT
Start: 2022-08-13 | End: 2022-08-16

## 2022-08-13 RX ORDER — SEVELAMER CARBONATE 2400 MG/1
1 POWDER, FOR SUSPENSION ORAL
Qty: 0 | Refills: 0 | DISCHARGE

## 2022-08-13 RX ORDER — GEMFIBROZIL 600 MG
600 TABLET ORAL
Refills: 0 | Status: DISCONTINUED | OUTPATIENT
Start: 2022-08-13 | End: 2022-08-16

## 2022-08-13 RX ORDER — ATORVASTATIN CALCIUM 80 MG/1
1 TABLET, FILM COATED ORAL
Qty: 0 | Refills: 0 | DISCHARGE

## 2022-08-13 RX ORDER — URSODIOL 250 MG/1
500 TABLET, FILM COATED ORAL THREE TIMES A DAY
Refills: 0 | Status: DISCONTINUED | OUTPATIENT
Start: 2022-08-13 | End: 2022-08-16

## 2022-08-13 RX ADMIN — Medication 25 MILLIGRAM(S): at 18:06

## 2022-08-13 RX ADMIN — Medication 600 MILLIGRAM(S): at 18:06

## 2022-08-13 RX ADMIN — Medication 975 MILLIGRAM(S): at 19:03

## 2022-08-13 RX ADMIN — OXYCODONE AND ACETAMINOPHEN 1 TABLET(S): 5; 325 TABLET ORAL at 12:26

## 2022-08-13 RX ADMIN — MIDODRINE HYDROCHLORIDE 10 MILLIGRAM(S): 2.5 TABLET ORAL at 18:06

## 2022-08-13 RX ADMIN — OXYCODONE AND ACETAMINOPHEN 1 TABLET(S): 5; 325 TABLET ORAL at 10:42

## 2022-08-13 RX ADMIN — Medication 975 MILLIGRAM(S): at 18:06

## 2022-08-13 RX ADMIN — URSODIOL 500 MILLIGRAM(S): 250 TABLET, FILM COATED ORAL at 21:14

## 2022-08-13 RX ADMIN — HYDROMORPHONE HYDROCHLORIDE 2 MILLIGRAM(S): 2 INJECTION INTRAMUSCULAR; INTRAVENOUS; SUBCUTANEOUS at 23:58

## 2022-08-13 RX ADMIN — RANOLAZINE 500 MILLIGRAM(S): 500 TABLET, FILM COATED, EXTENDED RELEASE ORAL at 18:06

## 2022-08-13 RX ADMIN — SENNA PLUS 2 TABLET(S): 8.6 TABLET ORAL at 21:14

## 2022-08-13 NOTE — PHYSICAL THERAPY INITIAL EVALUATION ADULT - PERTINENT HX OF CURRENT PROBLEM, REHAB EVAL
pt was brought into hospital s/p trip and fall at home. as per EMR, pt tripped over his own crutches. pt sustained a comminuted mildly displaced lateral tibial plateau fx and comminuted displaced fx of proximal fibular head/neck. pt has ESRD, on HD M/W/F

## 2022-08-13 NOTE — ED PROVIDER NOTE - OBJECTIVE STATEMENT
76 y/o M with PMH of DM, CAD, ESRD on HD (M/W/F) presenting with left knee pain. he notes he uses crutches to get around 2/2 arthritis and tripped on them this morning landing on his left knee. Now with left knee and hip pian. No head injury. No chest pain, neck pain, back pain, abd pain. No syncope. No focal weakness or numbness. No meds for symptoms. No other complaints.

## 2022-08-13 NOTE — PHYSICAL THERAPY INITIAL EVALUATION ADULT - NSPTDISCHREC_GEN_A_CORE
FARRUKH for strength, transfers, balance, endurance and ambulation/stair training pending progress and pending confirmation of family's ability/willingness to provide assistance upon d/c home, as pt is an increased falls risk, deeming pt unsafe to return home at this time.

## 2022-08-13 NOTE — ED ADULT NURSE NOTE - TEMPLATE
POST COLONOSCOPY CARE    During your procedure today we found: No polyps seen. You have some hemorrhoids and a small slit in the skin (anal fissure) that is healing.       You may experience abdominal bloating or cramps due to air used to inflate the colon during the procedure.  This is common and can be relieved by walking, lying on your left side with knees bent, and passing gas.  A small amount of rectal bleeding may occur, especially if polyps or biopsies were removed. Do not use laxatives or enemas for one week following your procedure.      If any of the following problems occur, call us at [phone].    Severe pain/ discomfort in abdomen  Nausea and/or vomiting  Temperature above 101 degrees F  New/ increased bleeding from mouth or rectum, or black, tarry stools  Abdominal bloating not relieved by belching or passing gas  Chest pain or shortness of breath    Follow Up Plan:  1. Resume your regular diet  2. Increase fiber in your diet, may take Citracel as needed. Also fruits and veggies.  3. Don't strain while having a bowel movement.   4. Call Dr. Myers is the bleeding continues.    Due to sedation you received, you may not remember the procedure or the doctor’s explanation afterward.  Our medications sometimes cause dizziness, drowsiness and impaired judgment.  Therefore, please follow these recommendations for the next 24 hours.    Do not drive a car or operate any machinery. Have a responsible adult drive you home.  Do not make critical decisions or sign any legal documents.  Do not drink alcohol  Do not return to work, or perform any tasks that require coordinated activity    Patient and patient representative have verbalized  understanding of these instructions and have  received a copy.     We would like to take this opportunity to thank you for choosing [facility]. Because your confidence in your caregivers is very important to us, it is our commitment to always treat you and  your family with courtesy and respect. Our goal is to always answer any questions or worries or concerns you may have about the care you received If you have any suggestions for improvement or worries or concerns please do not hesitate to let us know.                                                                         Signature of  Patient Representative:             Signature of Discharge Nurse :     Date:    Time:          Fall

## 2022-08-13 NOTE — PROVIDER CONTACT NOTE (OTHER) - BACKGROUND
Pt is a 76 y.o male s/p fall at home with resultant left tibial plateau and proximal fibular head/neck fx.

## 2022-08-13 NOTE — PATIENT PROFILE ADULT - FALL HARM RISK - RISK INTERVENTIONS

## 2022-08-13 NOTE — DISCHARGE NOTE PROVIDER - NSDCCPCAREPLAN_GEN_ALL_CORE_FT
PRINCIPAL DISCHARGE DIAGNOSIS  Diagnosis: Closed fracture of tibia  Assessment and Plan of Treatment: You were found to have tibia plateau/fibula head/neck fracture after a mechanical fall. You were seen and evaluated by orthopedics surgery. Please c/w non-weight bearing of left leg restriction and continue with knee immobiolizer at all times.      SECONDARY DISCHARGE DIAGNOSES  Diagnosis: PAD (peripheral artery disease)  Assessment and Plan of Treatment: Please continue taking plavix.    Diagnosis: ESRD on dialysis  Assessment and Plan of Treatment: Please continue with HD on Mon/Wed/Fri.

## 2022-08-13 NOTE — DISCHARGE NOTE PROVIDER - NSDCFUADDINST_GEN_ALL_CORE_FT
Orthopedic discharge instructions   Wear knee immobilizer at all times   NWB LLE   Follow up in office within 1 week of discharge

## 2022-08-13 NOTE — PHYSICAL THERAPY INITIAL EVALUATION ADULT - PHYSICAL ASSIST/NONPHYSICAL ASSIST: STAND/SIT, REHAB EVAL
required increased assistance  to help safely lower to a sitting position + verbal cues for proper hand placement. verbal cues for Left LE NWB precautions/1 person assist

## 2022-08-13 NOTE — ED ADULT TRIAGE NOTE - CHIEF COMPLAINT QUOTE
biba from home ; c/o left knee pain  s/p trip/fall "tripped over crutches landing on the bed" this AM. denies loc/head trauma. on plavix. right arm AV fistula

## 2022-08-13 NOTE — DISCHARGE NOTE PROVIDER - DISCHARGE SERVICE FOR PATIENT
on the discharge service for the patient. I have reviewed and made amendments to the documentation where necessary.
English

## 2022-08-13 NOTE — DISCHARGE NOTE PROVIDER - NSDCMRMEDTOKEN_GEN_ALL_CORE_FT
aspirin 81 mg oral tablet: 1 tab(s) orally once a day   atorvastatin 20 mg oral tablet: 1 tab(s) orally once a day  clopidogrel 75 mg oral tablet: 1 tab(s) orally once a day  docusate sodium 100 mg oral tablet:   gemfibrozil 600 mg oral tablet: 1 tab(s) orally 2 times a day  levocetirizine 5 mg oral tablet: 1 tab(s) orally once a day (in the evening)  midodrine 10 mg oral tablet: 1 tab(s) orally 3 times a day, As Needed  for hypotension   omeprazole 40 mg oral delayed release capsule: 1 cap(s) orally once a day  ranolazine 500 mg oral tablet, extended release: 1 tab(s) orally 2 times a day  Renvela 800 mg oral tablet: 1 tab(s) orally 3 times a day  Sensipar 30 mg oral tablet: 1 tab(s) orally 2 times a day  ursodiol 500 mg oral tablet: 1 tab(s) orally 3 times a day   clopidogrel 75 mg oral tablet: 1 tab(s) orally once a day  ferrous sulfate 325 mg (65 mg elemental iron) oral tablet: 1 tab(s) orally once a day  gemfibrozil 600 mg oral tablet: 1 tab(s) orally 2 times a day  glimepiride 1 mg oral tablet: 1 tab(s) orally once a day  levocetirizine 5 mg oral tablet: 1 tab(s) orally once a day (in the evening)  metoprolol tartrate 25 mg oral tablet: 1 tab(s) orally every 12 hours  midodrine 10 mg oral tablet: 1 tab(s) orally once, As needed, on dialysis for BP &lt; 90  omeprazole 40 mg oral delayed release capsule: 1 cap(s) orally once a day  ranolazine 500 mg oral tablet, extended release: 1 tab(s) orally 2 times a day  Sensipar 30 mg oral tablet: 1 tab(s) orally 2 times a day  ursodiol 500 mg oral tablet: 1 tab(s) orally 3 times a day

## 2022-08-13 NOTE — DISCHARGE NOTE PROVIDER - CARE PROVIDER_API CALL
Magdiel Hamilton)  Orthopaedic Surgery  [address] [place] [address]  Phone: [phone]  Fax: [phone]  Follow Up Time:

## 2022-08-13 NOTE — DISCHARGE NOTE PROVIDER - NSDCFUSCHEDAPPT_GEN_ALL_CORE_FT
[facility] Physician [facility]  CARDIOLOGY [address]  Scheduled Appointment: 08/16/2022

## 2022-08-13 NOTE — PROVIDER CONTACT NOTE (OTHER) - SITUATION
chart reviewed, contents noted, orders received and eval completed. pt received/returned in ED, Dr. Hugo moore'ed pt for PT. pt is primarily Panamanian speaking, treating PT spoke Panamanian

## 2022-08-13 NOTE — ED PROVIDER NOTE - MUSCULOSKELETAL, MLM
No spinal ttp. Np pelvic ttp. LEFT LEG: +small effusion with pain with ROM. no focal ttp. no lesions, no calf swelling or ttp. NVI. able to range left hip

## 2022-08-13 NOTE — ED ADULT NURSE NOTE - OBJECTIVE STATEMENT
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  On Immunosupressants  Cellcept    Endometrial cancer dx: 8/2015:  High grade Endometroid Adenocarcinoma    History of osteoarthritis     Hyperlipidemia     Hypertension     Hypothyroidism     Morbid obesity BMI:  53.6    Type 2 diabetes mellitus     Vitamin D deficiency      patient tripped on crutches this am and fell on bed hitting right knee, on plavix as claimed

## 2022-08-13 NOTE — CONSULT NOTE ADULT - SUBJECTIVE AND OBJECTIVE BOX
Pt Name: AMIE ALFONSO    MRN: [id number]      Patient is a 76y Male presenting to the emergency department with a chief complaint of left knee pain s/p fall. patient was using crutches due to pain from OA in leg when he tripped. patient found to have tibial plateau fx with extension to proximal tibia      REVIEW OF SYSTEMS    General: Alert, responsive, in NAD    Skin/Breast: No rashes, no pruritis   	  Ophthalmologic: No visual changes. No redness.   	  ENMT:	No discharge. No swelling.    Respiratory and Thorax: No difficulty breathing. No cough.  	   Cardiovascular:	No chest pain. No palpitations.    Gastrointestinal:	 No abdominal pain. No diarrhea.     Genitourinary: No dysuria. No bleeding.    Musculoskeletal: SEE HPI.    Neurological: No sensory or motor changes.     Psychiatric: No anxiety or depression.    Hematology/Lymphatics: No swelling.    Endocrine: No Hx of diabetes.    ROS is otherwise negative.    PAST MEDICAL & SURGICAL HISTORY:  PAST MEDICAL & SURGICAL HISTORY:  Daytime sleepiness      DM (diabetes mellitus)      Dizziness      Dyslipidemia      ESRD (end stage renal disease)  on  hemodialysis  3  x  weekly.,  H/O  left  nephrectomy after  renal  bleed      Hypercholesteremia      Renal hematoma, left      Tiredness      Hypotension  treated  with Midodrine      Kidney problem  spontanious renal bleed while on coumadin  c/b  renal  hematome  with  LT  nephrectomy      Claudication in peripheral vascular disease  R&gt;L  Right common iliac or ostial external iliac per June 2018 U/S  Left popliteal 50-75 and EDUIN stenosis  SUSANA 0.8      Obesity (BMI 30.0-34.9)      Imbalance  secondary to PVD Uses cane      Leg pain, bilateral  R&gt;L at rest and with short distant ambulation      Palpitations      Low glucose level  Patient has h/o low blood sugars at times      GERD (gastroesophageal reflux disease)      Carotid artery disease  mild      History of unilateral nephrectomy  left  nephrectomy      Abnormality of left atrial appendage  WATCHMAN  LEFT ATRIAL APPENDAGE CLOSUIRE   Jan. 30 2017      AV fistula  right lower arm      Renal transplant recipient          Allergies: No Known Allergies      Medications:     FAMILY HISTORY:  FH: hypertension  father    : non-contributory    Social History:     Ambulation: Walking independently With Crutches      Vital Signs Last 24 Hrs  T(C): 36.9 (13 Aug 2022 11:13), Max: 36.9 (13 Aug 2022 11:13)  T(F): 98.4 (13 Aug 2022 11:13), Max: 98.4 (13 Aug 2022 11:13)  HR: 72 (13 Aug 2022 11:13) (63 - 72)  BP: 126/73 (13 Aug 2022 11:13) (104/56 - 126/73)  BP(mean): --  RR: 18 (13 Aug 2022 11:13) (17 - 18)  SpO2: 97% (13 Aug 2022 11:13) (97% - 99%)    Parameters below as of 13 Aug 2022 11:13  Patient On (Oxygen Delivery Method): room air        Daily Height in cm: 167.64 (13 Aug 2022 09:38)    Daily       PHYSICAL EXAM:      Appearance: Alert, responsive, in no acute distress.    Neurological: Sensation is grossly intact to light touch. 5/5 motor function of all extremities. No focal deficits or weaknesses found.    Skin: no rash on visible skin. Skin is clean, dry and intact. No bleeding. No abrasions. No ulcerations.    Vascular: 2+ distal pulses. Cap refill < 2 sec. No signs of venous insufficiency or stasis. No extremity ulcerations. No cyanosis.    Musculoskeletal:         Left Lower Extremity:  positive tenderness to proximal tibia,         Imaging Studies:  3 view left knee reveals positive tibial plateau fx with extension to proximal tibia    A/P:  Pt is a 76y Male with found to have left tibial plateau fx     PLAN:   * NWB LLE  Knee immobilizer at all times   pain control   patient can follow up in office   no acute orthopedic intervention     Case discussed with Dr. Hamilton who agrees with plan  Pt Name: AMIE ALFONSO    MRN: [id number]      Patient is a 76y Male presenting to the emergency department with a chief complaint of left knee pain s/p fall. patient was using crutches due to pain from OA in leg when he tripped. patient found to have tibial plateau fx with extension to proximal tibia      REVIEW OF SYSTEMS    General: Alert, responsive, in NAD    Skin/Breast: No rashes, no pruritis   	  Ophthalmologic: No visual changes. No redness.   	  ENMT:	No discharge. No swelling.    Respiratory and Thorax: No difficulty breathing. No cough.  	   Cardiovascular:	No chest pain. No palpitations.    Gastrointestinal:	 No abdominal pain. No diarrhea.     Genitourinary: No dysuria. No bleeding.    Musculoskeletal: SEE HPI.    Neurological: No sensory or motor changes.     Psychiatric: No anxiety or depression.    Hematology/Lymphatics: No swelling.    Endocrine: No Hx of diabetes.    ROS is otherwise negative.    PAST MEDICAL & SURGICAL HISTORY:  PAST MEDICAL & SURGICAL HISTORY:  Daytime sleepiness      DM (diabetes mellitus)      Dizziness      Dyslipidemia      ESRD (end stage renal disease)  on  hemodialysis  3  x  weekly.,  H/O  left  nephrectomy after  renal  bleed      Hypercholesteremia      Renal hematoma, left      Tiredness      Hypotension  treated  with Midodrine      Kidney problem  spontanious renal bleed while on coumadin  c/b  renal  hematome  with  LT  nephrectomy      Claudication in peripheral vascular disease  R&gt;L  Right common iliac or ostial external iliac per June 2018 U/S  Left popliteal 50-75 and EDUIN stenosis  SUSANA 0.8      Obesity (BMI 30.0-34.9)      Imbalance  secondary to PVD Uses cane      Leg pain, bilateral  R&gt;L at rest and with short distant ambulation      Palpitations      Low glucose level  Patient has h/o low blood sugars at times      GERD (gastroesophageal reflux disease)      Carotid artery disease  mild      History of unilateral nephrectomy  left  nephrectomy      Abnormality of left atrial appendage  WATCHMAN  LEFT ATRIAL APPENDAGE CLOSUIRE   Jan. 30 2017      AV fistula  right lower arm      Renal transplant recipient          Allergies: No Known Allergies      Medications:     FAMILY HISTORY:  FH: hypertension  father    : non-contributory    Social History:     Ambulation: Walking independently With Crutches      Vital Signs Last 24 Hrs  T(C): 36.9 (13 Aug 2022 11:13), Max: 36.9 (13 Aug 2022 11:13)  T(F): 98.4 (13 Aug 2022 11:13), Max: 98.4 (13 Aug 2022 11:13)  HR: 72 (13 Aug 2022 11:13) (63 - 72)  BP: 126/73 (13 Aug 2022 11:13) (104/56 - 126/73)  BP(mean): --  RR: 18 (13 Aug 2022 11:13) (17 - 18)  SpO2: 97% (13 Aug 2022 11:13) (97% - 99%)    Parameters below as of 13 Aug 2022 11:13  Patient On (Oxygen Delivery Method): room air        Daily Height in cm: 167.64 (13 Aug 2022 09:38)    Daily       PHYSICAL EXAM:      Appearance: Alert, responsive, in no acute distress.    Neurological: Sensation is grossly intact to light touch. 5/5 motor function of all extremities. No focal deficits or weaknesses found.    Skin: no rash on visible skin. Skin is clean, dry and intact. No bleeding. No abrasions. No ulcerations.    Vascular: 2+ distal pulses. Cap refill < 2 sec. No signs of venous insufficiency or stasis. No extremity ulcerations. No cyanosis.    Musculoskeletal:         Left Lower Extremity:  positive tenderness to proximal tibia  large knee effusion,   Pain on passive ROM   no tenderness to ankle or hip currently   no tenderness to right leg         Imaging Studies:  3 view left knee reveals positive tibial plateau fx with extension to proximal tibia    A/P:  Pt is a 76y Male with found to have left tibial plateau fx     PLAN:   * NWB LLE  Knee immobilizer at all times   pain control   patient can follow up in office   no acute orthopedic intervention     Case discussed with Dr. Hamilton who agrees with plan

## 2022-08-13 NOTE — H&P ADULT - ASSESSMENT
77 yo male with history of a-fib with watchman placed, non-obstructive CAD (cath in 2018), hypotension on midodrine, SAH from a fall (2019; complicated with post-traumatic seizure), renal hematoma resulting in L nephrectomy, ESRD on HD (M/W/F; 24 yo left AVF), h/o of renal transplant, history of osteoarthritis who presents from home with fall after tripping with his crutches. Patient landed on his left knee and presented to the ED. Was found to have a left comminuted and mildly displaced lateral tibial plateau fracture and proximal fibular head/neck associated with a large knee lipohemarthrosis with joint bodies and small popliteal cyst. Additionally noted was tricompartmental knee osteoarthritis with areas of chondrocalcinosis within the medial and lateral compartments. Patient to be admitted to medicine likely for FARRUKH (per ED since on ESRD anticipate will be waiting past observation time for placement).    #fall with comminuted, minimally displaced fracture of tibia plateau/fibula head/neck after a mechanical fall  - admit to medicine  - pain control - dilaudid PRN  - senna standing  - miralax PRN  -  77 yo male with history of a-fib with watchman placed, non-obstructive CAD (cath in 2018), hypotension on midodrine, SAH from a fall (2019; complicated with post-traumatic seizure), renal hematoma resulting in L nephrectomy, ESRD on HD (M/W/F; 26 yo left AVF), h/o of renal transplant, history of osteoarthritis who presents from home with fall after tripping with his crutches. Patient landed on his left knee and presented to the ED. Was found to have a left comminuted and mildly displaced lateral tibial plateau fracture and proximal fibular head/neck associated with a large knee lipohemarthrosis with joint bodies and small popliteal cyst. Additionally noted was tricompartmental knee osteoarthritis with areas of chondrocalcinosis within the medial and lateral compartments. Patient to be admitted to medicine likely for FARRUKH (per ED since on ESRD anticipate will be waiting past observation time for placement).    #fall with comminuted, minimally displaced fracture of tibia plateau/fibula head/neck after a mechanical fall  - admit to medicine  - pain control - dilaudid PRN  - senna standing  - miralax PRN  - ambulate as tolerated - non-weight bearing on the left leg  - PT to see    #ESRD on HD  - consulted nephrology  - HD M/W/F    #CAD  - continue aspirin  - hold plavix    #hypotension  - continue midodrine    #hx of osteoarthritis  - pain control as above  - eventually outpatient follow up with orthopedics    #DVT ppx  - hold ac for now  - encourage ambulation  - baby aspirin 75 yo male with history of a-fib with watchman placed, non-obstructive CAD (cath in 2018), hypotension on midodrine, SAH from a fall (2019; complicated with post-traumatic seizure), renal hematoma resulting in L nephrectomy, ESRD on HD (M/W/F; 26 yo left AVF), h/o of renal transplant, history of osteoarthritis who presents from home with fall after tripping with his crutches. Patient landed on his left knee and presented to the ED. Was found to have a left comminuted and mildly displaced lateral tibial plateau fracture and proximal fibular head/neck associated with a large knee lipohemarthrosis with joint bodies and small popliteal cyst. Additionally noted was tricompartmental knee osteoarthritis with areas of chondrocalcinosis within the medial and lateral compartments. Patient to be admitted to medicine likely for FARRUKH (per ED since on ESRD anticipate will be waiting past observation time for placement).    #fall with comminuted, minimally displaced fracture of tibia plateau/fibula head/neck after a mechanical fall  - admit to medicine  - pain control - dilaudid PRN  - senna standing  - miralax PRN  - ambulate as tolerated - non-weight bearing on the left leg  - PT to see    #ESRD on HD  - consulted nephrology  - HD M/W/F    #CAD  - continue aspirin  - hold plavix    #hypotension  - continue midodrine    #hx of osteoarthritis  - pain control as above  - eventually outpatient follow up with orthopedics    #DVT ppx  - hold ac for now  - encourage ambulation  - baby aspirin    **discussed with ortho about dvt ppx - they are ok with sq heparin produvts; will order q12 heparin for now; will consult with cardiology if patient needs to be on DAPT or if can be on aspirin alone (follows with [facility])

## 2022-08-13 NOTE — PHYSICAL THERAPY INITIAL EVALUATION ADULT - GENERAL OBSERVATIONS, REHAB EVAL
Pt received in ED, pt ok for PT by Dr. Arias. pt's spouse present at bedside t/o eval. pt is primarily [place] of [place] speaking, treating PT spoke [place]. pt observed semi-boles in stretcher with IV lock, right UE AVF, left knee immobilizer, pleasant, cooperative, A&O and c/o 2/10 Left LE pain t/o eval.

## 2022-08-13 NOTE — DISCHARGE NOTE PROVIDER - ATTENDING DISCHARGE PHYSICAL EXAMINATION:
Vital Signs Last 24 Hrs  T(F): 98.2 (16 Aug 2022 04:31), Max: 98.7 (15 Aug 2022 17:39)  HR: 84 (16 Aug 2022 04:31) (65 - 106)  BP: 114/60 (16 Aug 2022 04:31) (114/60 - 146/75)  RR: 18 (16 Aug 2022 04:31) (16 - 18)  SpO2: 98% (16 Aug 2022 04:31) (96% - 100%)    Physical Exam:  General: in no acute distress  Respiratory: No wheezes, rales or rhonchi  Cardiovascular: Regular rate and rhythm. S1 and S2 Normal. RT AVF  Gastrointestinal: Soft non-tender non-distended; Normal bowel sounds  Extremities: no edema; LT knee tender to palpation, limited ROM 2/2 pain, left knee in immobilizer  Vascular: Peripheral pulses palpable 2+ bilaterally  Neurological: Alert and oriented x4  Psychiatric: Cooperative and appropriate

## 2022-08-13 NOTE — DISCHARGE NOTE PROVIDER - HOSPITAL COURSE
77 yo male with history of a-fib with watchman placed, non-obstructive CAD (cath in 2018), hypotension on midodrine, SAH from a fall (2019; complicated with post-traumatic seizure), renal hematoma resulting in L nephrectomy, ESRD on HD (M/W/F; 26 yo left AVF), h/o of renal transplant, history of osteoarthritis who presents from home with fall after tripping with his crutches. Patient landed on his left knee and presented to the ED. Was found to have a left comminuted and mildly displaced lateral tibial plateau fracture and proximal fibular head/neck associated with a large knee lipohemarthrosis with joint bodies and small popliteal cyst. Additionally noted was tricompartmental knee osteoarthritis with areas of chondrocalcinosis within the medial and lateral compartments. Patient was seen and evaluated by orthopedics and was recommended to have knee immobilizer at all times. Patient is medically optimized for discharge to [facility] and will need outpatient follow up with orthopedics.

## 2022-08-13 NOTE — H&P ADULT - HISTORY OF PRESENT ILLNESS
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 Patient landed on his left knee and presented to the ED. Was found to have a left comminuted and mildly displaced lateral tibial plateau fracture and proximal fibular head/neck associated with a large knee lipohemarthrosis with joint bodies and small popliteal cyst. Additionally noted was tricompartmental knee osteoarthritis with areas of chondrocalcinosis within the medial and lateral compartments.     Patient seen in the ED by ortho. Recommend non-surgical management. Patient to be admitted to medicine likely for FARRUKH (per ED since on ESRD anticipate will be waiting past observation time for placement). Patient has had no improvement in pain since oxycodone and IV morphine (though dont see that it was ordered).

## 2022-08-13 NOTE — PHYSICAL THERAPY INITIAL EVALUATION ADULT - PATIENT/FAMILY/SIGNIFICANT OTHER GOALS STATEMENT, PT EVAL
pt and pt's spouse agreeable to rehab placement, understanding that home is not safe if pt can not walk.

## 2022-08-13 NOTE — H&P ADULT - NSHPREVIEWOFSYSTEMS_GEN_ALL_CORE
REVIEW OF SYSTEMS  General: denies weakness, malaise  Skin/Breast: no new rash  Ophthalmologic: no change in vision  ENMT: no dysphagia, throat pain or change in hearing  Respiratory and Thorax: no difficulty breathing or chest pain  Cardiovascular: no palpitations or PND, orthopnea  Gastrointestinal: no abdominal pain, normal bowel movement, no dark stools  Genitourinary: no difficulty urinating, no burning urination  Musculoskeletal: no myalgia/arthrlagia  Neurological: no weakness, numbness, change in gait  Psychiatric: no depression, anxiety  Hematology/Lymphatics: denies easy bruising or bleeding  Endocrine: no polyuria, polydipsia REVIEW OF SYSTEMS  General: denies weakness, malaise  Skin/Breast: no new rash  Ophthalmologic: no change in vision  ENMT: no dysphagia, throat pain or change in hearing  Respiratory and Thorax: no difficulty breathing or chest pain  Cardiovascular: no palpitations or PND, orthopnea  Gastrointestinal: no abdominal pain, normal bowel movement, no dark stools  Genitourinary: no difficulty urinating, no burning urination  Musculoskeletal: severe bilateral knee arthralgia  Neurological: no weakness, numbness, change in gait  Psychiatric: no depression, anxiety  Hematology/Lymphatics: denies easy bruising or bleeding  Endocrine: no polyuria, polydipsia

## 2022-08-13 NOTE — PHYSICAL THERAPY INITIAL EVALUATION ADULT - RANGE OF MOTION EXAMINATION, REHAB EVAL
Left LE n/a 2/2 in knee immobilizer/bilateral upper extremity ROM was WNL (within normal limits)/Right LE ROM was WNL (within normal limits)

## 2022-08-13 NOTE — PHYSICAL THERAPY INITIAL EVALUATION ADULT - PHYSICAL ASSIST/NONPHYSICAL ASSIST: SIT/STAND, REHAB EVAL
pt's spouse present at bedside and impromptu provided 2nd person assist. required increased assistance  to help rise to a full standing position +verbal cues for proper hand placement. verbal cues for Left LE NWB precautions/2 person assist

## 2022-08-13 NOTE — PHYSICAL THERAPY INITIAL EVALUATION ADULT - ADDITIONAL COMMENTS
Pt lives with spouse in a private home with 10 JANA with handrails and bed&bath on ground level. Pt's PLOF was independent in all ADLs/ambulation with crutches and (+) driving PTA. Pt has crutches DME at home.

## 2022-08-13 NOTE — ED PROVIDER NOTE - CLINICAL SUMMARY MEDICAL DECISION MAKING FREE TEXT BOX
pt with knee and hip pain s/p fall. NVI no deformity will obtain xrays of knee and hip, pain meds. dc if neg

## 2022-08-14 DIAGNOSIS — N18.6 END STAGE RENAL DISEASE: ICD-10-CM

## 2022-08-14 DIAGNOSIS — Z71.89 OTHER SPECIFIED COUNSELING: ICD-10-CM

## 2022-08-14 DIAGNOSIS — I25.10 ATHEROSCLEROTIC HEART DISEASE OF NATIVE CORONARY ARTERY WITHOUT ANGINA PECTORIS: ICD-10-CM

## 2022-08-14 LAB
A1C WITH ESTIMATED AVERAGE GLUCOSE RESULT: 4.8 % — SIGNIFICANT CHANGE UP (ref 4–5.6)
ANION GAP SERPL CALC-SCNC: 20 MMOL/L — HIGH (ref 5–17)
BASOPHILS # BLD AUTO: 0.02 K/UL — SIGNIFICANT CHANGE UP (ref 0–0.2)
BASOPHILS NFR BLD AUTO: 0.3 % — SIGNIFICANT CHANGE UP (ref 0–2)
BUN SERPL-MCNC: 32.6 MG/DL — HIGH (ref 8–20)
CALCIUM SERPL-MCNC: 9.2 MG/DL — SIGNIFICANT CHANGE UP (ref 8.4–10.5)
CHLORIDE SERPL-SCNC: 91 MMOL/L — LOW (ref 98–107)
CO2 SERPL-SCNC: 26 MMOL/L — SIGNIFICANT CHANGE UP (ref 22–29)
CREAT SERPL-MCNC: 5.79 MG/DL — HIGH (ref 0.5–1.3)
EGFR: 9 ML/MIN/1.73M2 — LOW
EOSINOPHIL # BLD AUTO: 0.05 K/UL — SIGNIFICANT CHANGE UP (ref 0–0.5)
EOSINOPHIL NFR BLD AUTO: 0.6 % — SIGNIFICANT CHANGE UP (ref 0–6)
ESTIMATED AVERAGE GLUCOSE: 91 MG/DL — SIGNIFICANT CHANGE UP (ref 68–114)
GLUCOSE BLDC GLUCOMTR-MCNC: 109 MG/DL — HIGH (ref 70–99)
GLUCOSE BLDC GLUCOMTR-MCNC: 119 MG/DL — HIGH (ref 70–99)
GLUCOSE BLDC GLUCOMTR-MCNC: 121 MG/DL — HIGH (ref 70–99)
GLUCOSE SERPL-MCNC: 108 MG/DL — HIGH (ref 70–99)
HAV IGM SER-ACNC: SIGNIFICANT CHANGE UP
HBV CORE IGM SER-ACNC: SIGNIFICANT CHANGE UP
HBV SURFACE AG SER-ACNC: SIGNIFICANT CHANGE UP
HCT VFR BLD CALC: 30.3 % — LOW (ref 39–50)
HCV AB S/CO SERPL IA: 0.49 S/CO — SIGNIFICANT CHANGE UP (ref 0–0.99)
HCV AB SERPL-IMP: SIGNIFICANT CHANGE UP
HGB BLD-MCNC: 10 G/DL — LOW (ref 13–17)
IMM GRANULOCYTES NFR BLD AUTO: 0.6 % — SIGNIFICANT CHANGE UP (ref 0–1.5)
LYMPHOCYTES # BLD AUTO: 0.6 K/UL — LOW (ref 1–3.3)
LYMPHOCYTES # BLD AUTO: 7.8 % — LOW (ref 13–44)
MCHC RBC-ENTMCNC: 33 GM/DL — SIGNIFICANT CHANGE UP (ref 32–36)
MCHC RBC-ENTMCNC: 35.8 PG — HIGH (ref 27–34)
MCV RBC AUTO: 108.6 FL — HIGH (ref 80–100)
MONOCYTES # BLD AUTO: 0.59 K/UL — SIGNIFICANT CHANGE UP (ref 0–0.9)
MONOCYTES NFR BLD AUTO: 7.7 % — SIGNIFICANT CHANGE UP (ref 2–14)
NEUTROPHILS # BLD AUTO: 6.4 K/UL — SIGNIFICANT CHANGE UP (ref 1.8–7.4)
NEUTROPHILS NFR BLD AUTO: 83 % — HIGH (ref 43–77)
PLATELET # BLD AUTO: 144 K/UL — LOW (ref 150–400)
POTASSIUM SERPL-MCNC: 4.4 MMOL/L — SIGNIFICANT CHANGE UP (ref 3.5–5.3)
POTASSIUM SERPL-SCNC: 4.4 MMOL/L — SIGNIFICANT CHANGE UP (ref 3.5–5.3)
RBC # BLD: 2.79 M/UL — LOW (ref 4.2–5.8)
RBC # FLD: 14.6 % — HIGH (ref 10.3–14.5)
SODIUM SERPL-SCNC: 137 MMOL/L — SIGNIFICANT CHANGE UP (ref 135–145)
VIT B12 SERPL-MCNC: 793 PG/ML — SIGNIFICANT CHANGE UP (ref 232–1245)
WBC # BLD: 7.71 K/UL — SIGNIFICANT CHANGE UP (ref 3.8–10.5)
WBC # FLD AUTO: 7.71 K/UL — SIGNIFICANT CHANGE UP (ref 3.8–10.5)

## 2022-08-14 PROCEDURE — 99222 1ST HOSP IP/OBS MODERATE 55: CPT | Mod: 25

## 2022-08-14 PROCEDURE — 99233 SBSQ HOSP IP/OBS HIGH 50: CPT

## 2022-08-14 RX ORDER — HEPARIN SODIUM 5000 [USP'U]/ML
5000 INJECTION INTRAVENOUS; SUBCUTANEOUS EVERY 12 HOURS
Refills: 0 | Status: DISCONTINUED | OUTPATIENT
Start: 2022-08-14 | End: 2022-08-16

## 2022-08-14 RX ORDER — METOPROLOL TARTRATE 50 MG
25 TABLET ORAL EVERY 12 HOURS
Refills: 0 | Status: DISCONTINUED | OUTPATIENT
Start: 2022-08-14 | End: 2022-08-16

## 2022-08-14 RX ORDER — MIDODRINE HYDROCHLORIDE 2.5 MG/1
10 TABLET ORAL ONCE
Refills: 0 | Status: DISCONTINUED | OUTPATIENT
Start: 2022-08-14 | End: 2022-08-16

## 2022-08-14 RX ORDER — HYDROMORPHONE HYDROCHLORIDE 2 MG/ML
1 INJECTION INTRAMUSCULAR; INTRAVENOUS; SUBCUTANEOUS EVERY 4 HOURS
Refills: 0 | Status: DISCONTINUED | OUTPATIENT
Start: 2022-08-14 | End: 2022-08-16

## 2022-08-14 RX ADMIN — Medication 25 MILLIGRAM(S): at 17:53

## 2022-08-14 RX ADMIN — Medication 975 MILLIGRAM(S): at 05:42

## 2022-08-14 RX ADMIN — Medication 600 MILLIGRAM(S): at 05:42

## 2022-08-14 RX ADMIN — URSODIOL 500 MILLIGRAM(S): 250 TABLET, FILM COATED ORAL at 11:08

## 2022-08-14 RX ADMIN — HYDROMORPHONE HYDROCHLORIDE 2 MILLIGRAM(S): 2 INJECTION INTRAMUSCULAR; INTRAVENOUS; SUBCUTANEOUS at 10:40

## 2022-08-14 RX ADMIN — MIDODRINE HYDROCHLORIDE 10 MILLIGRAM(S): 2.5 TABLET ORAL at 05:42

## 2022-08-14 RX ADMIN — Medication 600 MILLIGRAM(S): at 17:53

## 2022-08-14 RX ADMIN — Medication 975 MILLIGRAM(S): at 18:30

## 2022-08-14 RX ADMIN — Medication 81 MILLIGRAM(S): at 11:09

## 2022-08-14 RX ADMIN — HYDROMORPHONE HYDROCHLORIDE 2 MILLIGRAM(S): 2 INJECTION INTRAMUSCULAR; INTRAVENOUS; SUBCUTANEOUS at 00:58

## 2022-08-14 RX ADMIN — RANOLAZINE 500 MILLIGRAM(S): 500 TABLET, FILM COATED, EXTENDED RELEASE ORAL at 17:52

## 2022-08-14 RX ADMIN — URSODIOL 500 MILLIGRAM(S): 250 TABLET, FILM COATED ORAL at 05:42

## 2022-08-14 RX ADMIN — HEPARIN SODIUM 5000 UNIT(S): 5000 INJECTION INTRAVENOUS; SUBCUTANEOUS at 17:53

## 2022-08-14 RX ADMIN — Medication 975 MILLIGRAM(S): at 17:52

## 2022-08-14 RX ADMIN — URSODIOL 500 MILLIGRAM(S): 250 TABLET, FILM COATED ORAL at 21:03

## 2022-08-14 RX ADMIN — Medication 25 MILLIGRAM(S): at 05:42

## 2022-08-14 RX ADMIN — HYDROMORPHONE HYDROCHLORIDE 2 MILLIGRAM(S): 2 INJECTION INTRAMUSCULAR; INTRAVENOUS; SUBCUTANEOUS at 09:57

## 2022-08-14 RX ADMIN — PANTOPRAZOLE SODIUM 40 MILLIGRAM(S): 20 TABLET, DELAYED RELEASE ORAL at 05:42

## 2022-08-14 RX ADMIN — RANOLAZINE 500 MILLIGRAM(S): 500 TABLET, FILM COATED, EXTENDED RELEASE ORAL at 05:42

## 2022-08-14 RX ADMIN — MIDODRINE HYDROCHLORIDE 10 MILLIGRAM(S): 2.5 TABLET ORAL at 11:08

## 2022-08-14 NOTE — CHART NOTE - NSCHARTNOTEFT_GEN_A_CORE
Vascular surgery called for evaluation of DAPT in the setting of bone fx with large LEFT knee lipohemarthrosis.   Per asking the patient he states, ASA and plavix has been prescribed per his cardiology physician     DAPT use to be determined by primary team to outweigh benefit and risks     Please F/U cardiology recs     Please reconsult again if needed

## 2022-08-14 NOTE — CONSULT NOTE ADULT - PROBLEM SELECTOR RECOMMENDATION 9
.  - on DAPT ASA/Plavix, unclear if reason is due to PVD vs CAD, likely combination of both  - now with LE fracture with lipohemarthrosis  - can d/c Plavix  - continue ASA  - patient should f/u with Dr. Jerome as OP for re-evaluation of DAPT within 2 weeks

## 2022-08-14 NOTE — CONSULT NOTE ADULT - NS ATTEND AMEND GEN_ALL_CORE FT
Likely non-op but will re-evaluate in the office with additional films    Magdiel Hamilton M.D.  Orthopaedic Trauma Surgery
Consult note reviewed.  May d/c Plavix.    Dr Jerome will follow him on 8/15/2022

## 2022-08-14 NOTE — CONSULT NOTE ADULT - SUBJECTIVE AND OBJECTIVE BOX
[facility]                                              CARDIOLOGY AT [place] [address]                                             Telephone: [phone]. Fax:[phone]                                                       CARDIOLOGY CONSULTATION NOTE                                                                                             History obtained by: Patient and medical record  Community Cardiologist: Dr. Jerome   obtained: Yes [  ] No [  ]  Reason for Consultation: DAPT in the setting of bone fracture  Available out pt records reviewed: Yes [x  ] No [  ]    Chief complaint:    Patient is a 76y old  Male who presents with a chief complaint of     HPI:  77 yo male with history of a-fib with watchman placed, non-obstructive CAD (cath in 2018), hypotension on midodrine, SAH from a fall (2019; complicated with post-traumatic seizure), renal hematoma resulting in L nephrectomy, ESRD on HD (M/W/F; 26 yo left AVF), h/o of renal transplant, osteoarthritis, PVD (successful intervention of L popliteal artery, failed intervention of right common iliac , on DAPT)  who presents from home with fall after tripping with his crutches and was found to have  a left comminuted and mildly displaced lateral tibial plateau fracture and proximal fibular head/neck associated with a large knee lipohemarthrosis with joint bodies and small popliteal cyst. Additionally noted was tricompartmental knee osteoarthritis with areas of chondrocalcinosis within the medial and lateral compartments. Per ortho, non surgical management. Cardiology called for evaluation of DAPT in the setting of bone fx with large knee lipohemarthrosis.           CARDIAC TESTING   ECHO:  < from: TTE Echo Limited or F/U (02.21.19 @ 15:42) >  PHYSICIAN INTERPRETATION:  Left Ventricle: The left ventricular internal cavity size is normal.  Global LV systolic function was normal. Left ventricular ejection   fraction, by visual estimation, is 60 to 65%. The mitral in-flow pattern   reveals no discernable A-wave, therefore no comment on diastolic function   can be made.  Right Ventricle: The right ventricular size is mildly enlarged. RV   systolic function is mildly reduced. TV S' 1.0 m/s.  Left Atrium: Intra-atrial septal aneurysm.  Right Atrium: Severely enlarged right atrium.  Pericardium: Trivial pericardial effusion is present. The pericardial   effusion is anterior to the right ventricle.  Mitral Valve: Thickening of the anterior and posterior mitral valve   leaflets. Mildto moderate mitral valve regurgitation is seen.  Tricuspid Valve: Moderate tricuspid regurgitation is visualized.   Estimated pulmonary artery systolic pressure is 65.4 mmHg assuming a   right atrial pressure of 3 mmHg, which is consistent with severe   pulmonary hypertension.  Aortic Valve: The aortic valve is trileaflet. Mild to moderate aortic   valve regurgitation is seen.  Pulmonic Valve: The pulmonic valve was not well visualized. Trace   pulmonic valve regurgitation.  Aorta: The aortic root is normal in size and structure.  Pulmonary Artery: The pulmonary artery is not well seen.  Venous: A systolic blunting flow pattern is recorded from the right upper   pulmonary vein. The inferior vena cava was normal sized, with respiratory   size variation greater than 50%.  In comparison to the previous echocardiogram(s): Prior examinations are   available and were reviewed for comparison purposes. A prior echo from   2/12/2019 is available for comparison purposes.       Summary:   1. Left ventricular ejection fraction, by visual estimation, is 60 to   65%.   2. Normal global left ventricular systolic function.   3. LV Ejection Fraction by Theodore's Method with a biplane EF of 63 %.   4. Normal left ventricular internal cavity size.   5. The mitral in-flow pattern reveals no discernable A-wave, therefore   no comment on diastolic function can be made.   6. Thickening of the anterior and posterior mitral valve leaflets.   7. Moderate tricuspid regurgitation.   8. Mild to moderate aortic regurgitation.   9. Trace pulmonic valve regurgitation.  10. Estimated pulmonary artery systolic pressure is 65.4 mmHg assuming a   right atrial pressure of 3 mmHg, which is consistent with severe   pulmonary hypertension.  11. A prior echo from 2/12/2019 is available for comparison purposes.  12. Intra-atrial septal aneurysm.    LV Ejection Fraction by Theodore's Method with a biplane EF of 63 %.     [id number] Quinn Lyons MD, Electronically signed on 2/21/2019 at 4:45:10 PM    < end of copied text >    Stress Test: june 2021: Nuclear stress : Mild ischemic study. Rca territory LVEf 57% STRESS:  Cardiac Cath: july 2018 , LAD 40%. RPLS- 75% stenosis.     ELECTROPHYSIOLOGY:   Remote/Ambulatory Rhythm Monitoring: may 2021: afib hr 70. no significant events     PAST MEDICAL HISTORY  AF (atrial fibrillation)  Daytime sleepiness  DM (diabetes mellitus)  Dizziness  Dyslipidemia  ESRD (end stage renal disease)  Hypercholesteremia  Renal hematoma, left  Tiredness  Hypotension  Kidney problem  Claudication in peripheral vascular disease  Obesity (BMI 30.0-34.9)  Imbalance  Leg pain, bilateral  Palpitations  Low glucose level  GERD (gastroesophageal reflux disease)  Carotid artery disease  Persistent atrial fibrillation      PAST SURGICAL HISTORY  History of unilateral nephrectomy  Abnormality of left atrial appendage  H/O kidney transplant  AV fistula  Renal transplant recipient  Abnormality of left atrial appendage      SOCIAL HISTORY:  Denies smoking/alcohol/drugs  CIGARETTES:     ALCOHOL:  DRUGS:    FAMILY HISTORY:  FH: hypertension  father      Family History of Cardiovascular Disease:  Yes [x  ] No [  ]  Coronary Artery Disease in first degree relative: Yes [  ] No [ x ]  Sudden Cardiac Death in First degree relative: Yes [  ] No [x  ]    HOME MEDICATIONS:  clopidogrel 75 mg oral tablet: 1 tab(s) orally once a day (26 Feb 2021 09:39)  gemfibrozil 600 mg oral tablet: 1 tab(s) orally 2 times a day (26 Feb 2021 09:39)  glimepiride 1 mg oral tablet: 1 tab(s) orally once a day (13 Aug 2022 16:54)  levocetirizine 5 mg oral tablet: 1 tab(s) orally once a day (in the evening) (26 Feb 2021 09:39)  metoprolol tartrate 25 mg oral tablet: 1 tab(s) orally 2 times a day (13 Aug 2022 16:55)  midodrine 10 mg oral tablet: 1 tab(s) orally 3 times a day, As Needed  for hypotension  (26 Feb 2021 09:39)  omeprazole 40 mg oral delayed release capsule: 1 cap(s) orally once a day (26 Feb 2021 09:39)  Sensipar 30 mg oral tablet: 1 tab(s) orally 2 times a day (26 Feb 2021 09:39)  traMADol 50 mg oral tablet:  (13 Aug 2022 16:55)  ursodiol 500 mg oral tablet: 1 tab(s) orally 3 times a day (26 Feb 2021 09:39)      CURRENT CARDIAC MEDICATIONS:  metoprolol tartrate 25 milliGRAM(s) Oral two times a day  midodrine. 10 milliGRAM(s) Oral three times a day  ranolazine 500 milliGRAM(s) Oral two times a day      CURRENT OTHER MEDICATIONS:  acetaminophen     Tablet .. 975 milliGRAM(s) Oral every 12 hours  HYDROmorphone   Tablet 1 milliGRAM(s) Oral every 4 hours PRN Moderate Pain (4 - 6)  HYDROmorphone   Tablet 2 milliGRAM(s) Oral every 4 hours PRN Severe Pain (7 - 10)  pantoprazole    Tablet 40 milliGRAM(s) Oral before breakfast  polyethylene glycol 3350 17 Gram(s) Oral daily PRN Constipation  senna 2 Tablet(s) Oral at bedtime  ursodiol Tablet 500 milliGRAM(s) Oral three times a day  aspirin  chewable 81 milliGRAM(s) Oral daily  dextrose 5%. 1000 milliLiter(s) (100 mL/Hr) IV Continuous <Continuous>  dextrose 5%. 1000 milliLiter(s) (50 mL/Hr) IV Continuous <Continuous>  dextrose 50% Injectable 25 Gram(s) IV Push once, Stop order after: 1 Doses  dextrose 50% Injectable 12.5 Gram(s) IV Push once, Stop order after: 1 Doses  dextrose 50% Injectable 25 Gram(s) IV Push once, Stop order after: 1 Doses  dextrose Oral Gel 15 Gram(s) Oral once, Stop order after: 1 Doses PRN Blood Glucose LESS THAN 70 milliGRAM(s)/deciliter  gemfibrozil 600 milliGRAM(s) Oral two times a day  glucagon  Injectable 1 milliGRAM(s) IntraMuscular once, Stop order after: 1 Doses  heparin   Injectable 5000 Unit(s) SubCutaneous every 12 hours  insulin lispro (ADMELOG) corrective regimen sliding scale   SubCutaneous three times a day before meals  insulin lispro (ADMELOG) corrective regimen sliding scale   SubCutaneous at bedtime      ALLERGIES:   No Known Allergies      REVIEW OF SYMPTOMS:   CONSTITUTIONAL: No fever, no chills, no weight loss, no weight gain, no fatigue   ENMT:  No vertigo; No sinus or throat pain  NECK: No pain or stiffness  CARDIOVASCULAR: No chest pain, no dyspnea, no syncope/presyncope, no palpitations, no dizziness, no Orthopnea, no Paroxsymal nocturnal dyspnea  RESPIRATORY: no Shortness of breath, no cough, no wheezing  : No dysuria, no hematuria   GI: No dark color stool, no nausea, no diarrhea, no constipation, no abdominal pain   NEURO: No headache, no slurred speech   MUSCULOSKELETAL: No joint pain or swelling; No muscle, back, or extremity pain  PSYCH: No agitation, no anxiety.    ALL OTHER REVIEW OF SYSTEMS ARE NEGATIVE.    VITAL SIGNS:  T(C): 36.7 (08-14-22 @ 05:02), Max: 37 (08-14-22 @ 01:09)  T(F): 98.1 (08-14-22 @ 05:02), Max: 98.6 (08-14-22 @ 01:09)  HR: 71 (08-14-22 @ 05:02) (63 - 75)  BP: 160/84 (08-14-22 @ 05:02) (104/56 - 160/84)  RR: 18 (08-14-22 @ 05:02) (16 - 18)  SpO2: 98% (08-14-22 @ 05:02) (97% - 99%)    INTAKE AND OUTPUT:       PHYSICAL EXAM:  Constitutional: Comfortable . No acute distress.   HEENT: Atraumatic and normocephalic , neck is supple . no JVD. No carotid bruit.  CNS: A&Ox3. No focal deficits.   Respiratory: CTAB, unlabored   Cardiovascular: RRR normal s1 s2. No murmur. No rubs or gallop.  Gastrointestinal: Soft, non-tender. +Bowel sounds.   Extremities: 2+ Peripheral Pulses, No clubbing, cyanosis, or edema  Psychiatric: Calm . no agitation.   Skin: Warm and dry, no ulcers on extremities     LABS:                            10.0   7.71  )-----------( 144      ( 14 Aug 2022 05:15 )             30.3     08-14    137  |  91<L>  |  32.6<H>  ----------------------------<  108<H>  4.4   |  26.0  |  5.79<H>    Ca    9.2      14 Aug 2022 05:15    TPro  7.4  /  Alb  3.7  /  TBili  0.3<L>  /  DBili  x   /  AST  67<H>  /  ALT  19  /  AlkPhos  92  08-13                INTERPRETATION OF TELEMETRY:     ECG: Afib rate controlled (ECG from 8/2/2022)  Prior ECG: Yes [ x ] No [  ]    RADIOLOGY & ADDITIONAL STUDIES:   Carotid/Aorta/Peripheral Vascular: KAREEM : june 2021   Abnormal PVR : right thigh. Left: normal AB.

## 2022-08-14 NOTE — CONSULT NOTE ADULT - ASSESSMENT
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 Patient landed on his left knee and presented to the ED. Was found to have a left comminuted and mildly displaced lateral tibial plateau fracture and proximal fibular head/neck associated with a large knee lipohemarthrosis with joint bodies and small popliteal cyst. Additionally noted was tricompartmental knee osteoarthritis with areas of chondrocalcinosis within the medial and lateral compartments. Patient to be admitted to medicine likely for FARRUKH     ESRD - HD MWF ; Consent obtained and arrange for HD  No Heparin    Covid +    CAD    Hx hypotension - MIdodrine PRN
75 yo male with history of a-fib with watchman placed, non-obstructive CAD (cath in 2018), hypotension on midodrine, SAH from a fall (2019; complicated with post-traumatic seizure), renal hematoma resulting in L nephrectomy, ESRD on HD (M/W/F; 26 yo left AVF), h/o of renal transplant, osteoarthritis, PVD (successful intervention of L popliteal artery, failed intervention of right common iliac , on DAPT)  who presents from home with fall after tripping with his crutches and was found to have  a left comminuted and mildly displaced lateral tibial plateau fracture and proximal fibular head/neck associated with a large knee lipohemarthrosis with joint bodies and small popliteal cyst. Additionally noted was tricompartmental knee osteoarthritis with areas of chondrocalcinosis within the medial and lateral compartments. Per ortho, non surgical management. Cardiology called for evaluation of DAPT in the setting of bone fx with large knee lipohemarthrosis.   
77 yo male with history of a-fib with watchman placed, non-obstructive CAD (cath in 2018), hypotension on midodrine, SAH from a fall (2019; complicated with post-traumatic seizure), renal hematoma resulting in L nephrectomy, ESRD on HD (M/W/F; 26 yo left AVF), h/o of renal transplant, osteoarthritis, PVD (successful intervention of L popliteal artery, failed intervention of right common iliac , on DAPT)  who presents from home with fall after tripping with his crutches and was found to have  a left comminuted and mildly displaced lateral tibial plateau fracture and proximal fibular head/neck associated with a large knee lipohemarthrosis with joint bodies and small popliteal cyst. Additionally noted was tricompartmental knee osteoarthritis with areas of chondrocalcinosis within the medial and lateral compartments. Per ortho, non surgical management. Cardiology called for evaluation of DAPT in the setting of bone fx with large knee lipohemarthrosis.

## 2022-08-14 NOTE — CONSULT NOTE ADULT - SUBJECTIVE AND OBJECTIVE BOX
Patient is a 76y old  Male who presents with a chief complaint of pain LKJ s/p fall> LK communited Fx   has ESRD - HD MWF    HPI:  77 yo male with history of a-fib with watchman placed, non-obstructive CAD (cath in 2018), hypotension on midodrine, SAH from a fall (2019; complicated with post-traumatic seizure), renal hematoma resulting in L nephrectomy, ESRD on HD (M/W/F; 26 yo left AVF), h/o of renal transplant, history of osteoarthritis who presents from home with fall after tripping with his crutches. Patient landed on his left knee and presented to the ED. Was found to have a left comminuted and mildly displaced lateral tibial plateau fracture and proximal fibular head/neck associated with a large knee lipohemarthrosis with joint bodies and small popliteal cyst. Additionally noted was tricompartmental knee osteoarthritis with areas of chondrocalcinosis within the medial and lateral compartments.     Patient seen in the ED by ortho. Recommend non-surgical management. Patient to be admitted to medicine likely for FARRUKH (per ED since on ESRD anticipate will be waiting past observation time for placement). Patient has had no improvement in pain since oxycodone and IV morphine (though dont see that it was ordered). (13 Aug 2022 16:03)    Found Covid +      PAST MEDICAL & SURGICAL HISTORY:  Daytime sleepiness    DM (diabetes mellitus)    Dizziness    Dyslipidemia    ESRD (end stage renal disease)  on  hemodialysis  3  x  weekly.,      H/O  left  nephrectomy after  renal  bleed    Hypercholesteremia    Renal hematoma, left    Tiredness    Hypotension  treated  with Midodrine    Claudication in peripheral vascular disease  R&gt;L  Right common iliac or ostial external iliac per June 2018 U/S  Left popliteal 50-75 and EDUIN stenosis  SUSANA 0.8    Obesity (BMI 30.0-34.9)    Imbalance  secondary to PVD Uses cane    Leg pain, bilateral  R&gt;L at rest and with short distant ambulation    Low glucose level  Patient has h/o low blood sugars at times    GERD (gastroesophageal reflux disease)    Carotid artery disease  mild    Abnormality of left atrial appendage  WATCHMAN  LEFT ATRIAL APPENDAGE CLOSUIRE   Jan. 30 2017    AV fistula right lower arm    Renal transplant recipient    FAMILY HISTORY:  FH: hypertension  father        REVIEW OF SYSTEMS:  CONSTITUTIONAL: No fever, weight loss, or fatigue  EYES: No eye pain, No visual disturbances,   RESPIRATORY: No cough, hemoptysis; - SOB  CARDIOVASCULAR: No chest pain, No palpitations,   +leg swelling  GASTROINTESTINAL: No abdominal , NVD or GIB  GENITOURINARY: No UTI sx/hematuria  NEUROLOGICAL: No HA/wk/numbness  MUSCULOSKELETAL: LKJ painful/swollen; LKJLE in brace      Vital Signs Last 24 Hrs  T(C): 36.9 (14 Aug 2022 16:25), Max: 37 (14 Aug 2022 01:09)  T(F): 98.4 (14 Aug 2022 16:25), Max: 98.6 (14 Aug 2022 01:09)  HR: 69 (14 Aug 2022 16:25) (60 - 75)  BP: 156/82 (14 Aug 2022 16:25) (134/69 - 160/84)  BP(mean): 93 (14 Aug 2022 01:09) (86 - 93)  RR: 17 (14 Aug 2022 16:25) (16 - 18)  SpO2: 95% (14 Aug 2022 16:25) (95% - 98%)    Parameters below as of 14 Aug 2022 16:25  Patient On (Oxygen Delivery Method): room air        PHYSICAL EXAM:    GENERAL: NAD,   EYES:  conjunctiva and sclera clear  NECK: Supple, No JVD/Carotid Bruit -ve   NERVOUS SYSTEM:  A/O x3,   Lungs : No rales, No rhonchi,   HEART:  No murmur,  No rub, No gallops  ABDOMEN: Soft, NT/ND BS+  EXTREMITIES:  + Peripheral Pulses, + edema  LLE in brace  SKIN: No rashes or lesions      LABS:                        10.0   7.71  )-----------( 144      ( 14 Aug 2022 05:15 )             30.3     08-14    137  |  91<L>  |  32.6<H>  ----------------------------<  108<H>  4.4   |  26.0  |  5.79<H>    Ca    9.2      14 Aug 2022 05:15    TPro  7.4  /  Alb  3.7  /  TBili  0.3<L>  /  DBili  x   /  AST  67<H>  /  ALT  19  /  AlkPhos  92  08-13            RADIOLOGY & ADDITIONAL TESTS:    MEDICATIONS  (STANDING):  acetaminophen     Tablet ..  aspirin  chewable  dextrose 5%.  dextrose 5%.  dextrose 50% Injectable  dextrose 50% Injectable  dextrose 50% Injectable  dextrose Oral Gel PRN  gemfibrozil  glucagon  Injectable  heparin   Injectable  HYDROmorphone   Tablet PRN  HYDROmorphone   Tablet PRN  HYDROmorphone  Injectable PRN  insulin lispro (ADMELOG) corrective regimen sliding scale  insulin lispro (ADMELOG) corrective regimen sliding scale  metoprolol tartrate  pantoprazole    Tablet  polyethylene glycol 3350 PRN  ranolazine  senna  ursodiol Tablet

## 2022-08-14 NOTE — CONSULT NOTE ADULT - SUBJECTIVE AND OBJECTIVE BOX
[facility]                                              CARDIOLOGY AT [place] [address]                                             Telephone: [phone]. Fax:[phone]                                                       CARDIOLOGY CONSULTATION NOTE                                                              History obtained by: Patient and medical record  Community Cardiologist: Dr. Jerome   obtained: Yes [  ] No [  ]  Reason for Consultation: DAPT in the setting of bone fracture  Available out pt records reviewed: Yes [x  ] No [  ]    Chief complaint:    Patient is a 76y old  Male who presents with a chief complaint of     HPI:  75 yo male with history of a-fib with watchman placed, non-obstructive CAD (cath in 2018), hypotension on midodrine, SAH from a fall (2019; complicated with post-traumatic seizure), renal hematoma resulting in L nephrectomy, ESRD on HD (M/W/F; 24 yo left AVF), h/o of renal transplant, osteoarthritis, PVD (successful intervention of L popliteal artery, failed intervention of right common iliac , on DAPT)  who presents from home with fall after tripping with his crutches and was found to have  a left comminuted and mildly displaced lateral tibial plateau fracture and proximal fibular head/neck associated with a large knee lipohemarthrosis with joint bodies and small popliteal cyst. Additionally noted was tricompartmental knee osteoarthritis with areas of chondrocalcinosis within the medial and lateral compartments. Per ortho, non surgical management. Cardiology called for evaluation of DAPT in the setting of bone fx with large knee lipohemarthrosis.           CARDIAC TESTING   ECHO:  < from: TTE Echo Limited or F/U (02.21.19 @ 15:42) >  PHYSICIAN INTERPRETATION:  Left Ventricle: The left ventricular internal cavity size is normal.  Global LV systolic function was normal. Left ventricular ejection   fraction, by visual estimation, is 60 to 65%. The mitral in-flow pattern   reveals no discernable A-wave, therefore no comment on diastolic function   can be made.  Right Ventricle: The right ventricular size is mildly enlarged. RV   systolic function is mildly reduced. TV S' 1.0 m/s.  Left Atrium: Intra-atrial septal aneurysm.  Right Atrium: Severely enlarged right atrium.  Pericardium: Trivial pericardial effusion is present. The pericardial   effusion is anterior to the right ventricle.  Mitral Valve: Thickening of the anterior and posterior mitral valve   leaflets. Mildto moderate mitral valve regurgitation is seen.  Tricuspid Valve: Moderate tricuspid regurgitation is visualized.   Estimated pulmonary artery systolic pressure is 65.4 mmHg assuming a   right atrial pressure of 3 mmHg, which is consistent with severe   pulmonary hypertension.  Aortic Valve: The aortic valve is trileaflet. Mild to moderate aortic   valve regurgitation is seen.  Pulmonic Valve: The pulmonic valve was not well visualized. Trace   pulmonic valve regurgitation.  Aorta: The aortic root is normal in size and structure.  Pulmonary Artery: The pulmonary artery is not well seen.  Venous: A systolic blunting flow pattern is recorded from the right upper   pulmonary vein. The inferior vena cava was normal sized, with respiratory   size variation greater than 50%.  In comparison to the previous echocardiogram(s): Prior examinations are   available and were reviewed for comparison purposes. A prior echo from   2/12/2019 is available for comparison purposes.       Summary:   1. Left ventricular ejection fraction, by visual estimation, is 60 to   65%.   2. Normal global left ventricular systolic function.   3. LV Ejection Fraction by Theodore's Method with a biplane EF of 63 %.   4. Normal left ventricular internal cavity size.   5. The mitral in-flow pattern reveals no discernable A-wave, therefore   no comment on diastolic function can be made.   6. Thickening of the anterior and posterior mitral valve leaflets.   7. Moderate tricuspid regurgitation.   8. Mild to moderate aortic regurgitation.   9. Trace pulmonic valve regurgitation.  10. Estimated pulmonary artery systolic pressure is 65.4 mmHg assuming a   right atrial pressure of 3 mmHg, which is consistent with severe   pulmonary hypertension.  11. A prior echo from 2/12/2019 is available for comparison purposes.  12. Intra-atrial septal aneurysm.    LV Ejection Fraction by Theodore's Method with a biplane EF of 63 %.     [id number] Quinn Lyons MD, Electronically signed on 2/21/2019 at 4:45:10 PM    < end of copied text >    Stress Test: june 2021: Nuclear stress : Mild ischemic study. Rca territory LVEf 57% STRESS:  Cardiac Cath: july 2018 , LAD 40%. RPLS- 75% stenosis.     ELECTROPHYSIOLOGY:   Remote/Ambulatory Rhythm Monitoring: may 2021: afib hr 70. no significant events     PAST MEDICAL HISTORY  AF (atrial fibrillation)  Daytime sleepiness  DM (diabetes mellitus)  Dizziness  Dyslipidemia  ESRD (end stage renal disease)  Hypercholesteremia  Renal hematoma, left  Tiredness  Hypotension  Kidney problem  Claudication in peripheral vascular disease  Obesity (BMI 30.0-34.9)  Imbalance  Leg pain, bilateral  Palpitations  Low glucose level  GERD (gastroesophageal reflux disease)  Carotid artery disease  Persistent atrial fibrillation      PAST SURGICAL HISTORY  History of unilateral nephrectomy  Abnormality of left atrial appendage  H/O kidney transplant  AV fistula  Renal transplant recipient  Abnormality of left atrial appendage      SOCIAL HISTORY:  Denies smoking/alcohol/drugs  CIGARETTES:     ALCOHOL:  DRUGS:    FAMILY HISTORY:  FH: hypertension  father      Family History of Cardiovascular Disease:  Yes [x  ] No [  ]  Coronary Artery Disease in first degree relative: Yes [  ] No [ x ]  Sudden Cardiac Death in First degree relative: Yes [  ] No [x  ]    HOME MEDICATIONS:  clopidogrel 75 mg oral tablet: 1 tab(s) orally once a day (26 Feb 2021 09:39)  gemfibrozil 600 mg oral tablet: 1 tab(s) orally 2 times a day (26 Feb 2021 09:39)  glimepiride 1 mg oral tablet: 1 tab(s) orally once a day (13 Aug 2022 16:54)  levocetirizine 5 mg oral tablet: 1 tab(s) orally once a day (in the evening) (26 Feb 2021 09:39)  metoprolol tartrate 25 mg oral tablet: 1 tab(s) orally 2 times a day (13 Aug 2022 16:55)  midodrine 10 mg oral tablet: 1 tab(s) orally 3 times a day, As Needed  for hypotension  (26 Feb 2021 09:39)  omeprazole 40 mg oral delayed release capsule: 1 cap(s) orally once a day (26 Feb 2021 09:39)  Sensipar 30 mg oral tablet: 1 tab(s) orally 2 times a day (26 Feb 2021 09:39)  traMADol 50 mg oral tablet:  (13 Aug 2022 16:55)  ursodiol 500 mg oral tablet: 1 tab(s) orally 3 times a day (26 Feb 2021 09:39)      CURRENT CARDIAC MEDICATIONS:  metoprolol tartrate 25 milliGRAM(s) Oral two times a day  midodrine. 10 milliGRAM(s) Oral three times a day  ranolazine 500 milliGRAM(s) Oral two times a day      CURRENT OTHER MEDICATIONS:  acetaminophen     Tablet .. 975 milliGRAM(s) Oral every 12 hours  HYDROmorphone   Tablet 1 milliGRAM(s) Oral every 4 hours PRN Moderate Pain (4 - 6)  HYDROmorphone   Tablet 2 milliGRAM(s) Oral every 4 hours PRN Severe Pain (7 - 10)  pantoprazole    Tablet 40 milliGRAM(s) Oral before breakfast  polyethylene glycol 3350 17 Gram(s) Oral daily PRN Constipation  senna 2 Tablet(s) Oral at bedtime  ursodiol Tablet 500 milliGRAM(s) Oral three times a day  aspirin  chewable 81 milliGRAM(s) Oral daily  dextrose 5%. 1000 milliLiter(s) (100 mL/Hr) IV Continuous <Continuous>  dextrose 5%. 1000 milliLiter(s) (50 mL/Hr) IV Continuous <Continuous>  dextrose 50% Injectable 25 Gram(s) IV Push once, Stop order after: 1 Doses  dextrose 50% Injectable 12.5 Gram(s) IV Push once, Stop order after: 1 Doses  dextrose 50% Injectable 25 Gram(s) IV Push once, Stop order after: 1 Doses  dextrose Oral Gel 15 Gram(s) Oral once, Stop order after: 1 Doses PRN Blood Glucose LESS THAN 70 milliGRAM(s)/deciliter  gemfibrozil 600 milliGRAM(s) Oral two times a day  glucagon  Injectable 1 milliGRAM(s) IntraMuscular once, Stop order after: 1 Doses  heparin   Injectable 5000 Unit(s) SubCutaneous every 12 hours  insulin lispro (ADMELOG) corrective regimen sliding scale   SubCutaneous three times a day before meals  insulin lispro (ADMELOG) corrective regimen sliding scale   SubCutaneous at bedtime      ALLERGIES:   No Known Allergies      REVIEW OF SYMPTOMS:   CONSTITUTIONAL: No fever, no chills, no weight loss, no weight gain, no fatigue   ENMT:  No vertigo; No sinus or throat pain  NECK: No pain or stiffness  CARDIOVASCULAR: No chest pain, no dyspnea, no syncope/presyncope, no palpitations, no dizziness, no Orthopnea, no Paroxsymal nocturnal dyspnea  RESPIRATORY: no Shortness of breath, no cough, no wheezing  : No dysuria, no hematuria   GI: No dark color stool, no nausea, no diarrhea, no constipation, no abdominal pain   NEURO: No headache, no slurred speech   MUSCULOSKELETAL: No joint pain or swelling; No muscle, back, +extremity pain  PSYCH: No agitation, no anxiety.    ALL OTHER REVIEW OF SYSTEMS ARE NEGATIVE.    VITAL SIGNS:  T(C): 36.7 (08-14-22 @ 05:02), Max: 37 (08-14-22 @ 01:09)  T(F): 98.1 (08-14-22 @ 05:02), Max: 98.6 (08-14-22 @ 01:09)  HR: 71 (08-14-22 @ 05:02) (63 - 75)  BP: 160/84 (08-14-22 @ 05:02) (104/56 - 160/84)  RR: 18 (08-14-22 @ 05:02) (16 - 18)  SpO2: 98% (08-14-22 @ 05:02) (97% - 99%)    INTAKE AND OUTPUT:       PHYSICAL EXAM:  Constitutional: Comfortable . No acute distress.   HEENT: Atraumatic and normocephalic , neck is supple . no JVD. No carotid bruit.  CNS: A&Ox3. No focal deficits.   Respiratory: CTAB, unlabored   Cardiovascular: RRR normal s1 s2. No murmur. No rubs or gallop.  Gastrointestinal: Soft, non-tender. +Bowel sounds.   Extremities: 2+ Peripheral Pulses, No clubbing, cyanosis, or edema  Psychiatric: Calm . no agitation.   Skin: Warm and dry, no ulcers on extremities     LABS:                            10.0   7.71  )-----------( 144      ( 14 Aug 2022 05:15 )             30.3     08-14    137  |  91<L>  |  32.6<H>  ----------------------------<  108<H>  4.4   |  26.0  |  5.79<H>    Ca    9.2      14 Aug 2022 05:15    TPro  7.4  /  Alb  3.7  /  TBili  0.3<L>  /  DBili  x   /  AST  67<H>  /  ALT  19  /  AlkPhos  92  08-13                INTERPRETATION OF TELEMETRY:     ECG: Afib rate controlled (ECG from 8/2/2022)  Prior ECG: Yes [ x ] No [  ]    RADIOLOGY & ADDITIONAL STUDIES:   Carotid/Aorta/Peripheral Vascular: KAREEM : june 2021   Abnormal PVR : right thigh. Left: normal AB.                                                       [facility]                                              CARDIOLOGY AT [place] [address]                                             Telephone: [phone]. Fax:[phone]                                                       CARDIOLOGY CONSULTATION NOTE                                                              History obtained by: Patient and medical record  Community Cardiologist: Dr. Jerome   obtained: Yes [  ] No [  ]  Reason for Consultation: DAPT in the setting of bone fracture  Available out pt records reviewed: Yes [x  ] No [  ]    Chief complaint:    Patient is a 76y old  Male who presents with a chief complaint of     HPI:  75 yo male with history of a-fib with watchman placed, non-obstructive CAD (cath in 2018), hypotension on midodrine, SAH from a fall (2019; complicated with post-traumatic seizure), renal hematoma resulting in L nephrectomy, ESRD on HD (M/W/F; 26 yo left AVF), h/o of renal transplant, osteoarthritis, PVD (successful intervention of L popliteal artery, failed intervention of right common iliac , on DAPT)  who presents from home with fall after tripping with his crutches and was found to have  a left comminuted and mildly displaced lateral tibial plateau fracture and proximal fibular head/neck associated with a large knee lipohemarthrosis with joint bodies and small popliteal cyst. Additionally noted was tricompartmental knee osteoarthritis with areas of chondrocalcinosis within the medial and lateral compartments. Per ortho, non surgical management. Cardiology called for evaluation of DAPT in the setting of bone fx with large knee lipohemarthrosis.           CARDIAC TESTING   ECHO:  < from: TTE Echo Limited or F/U (02.21.19 @ 15:42) >  PHYSICIAN INTERPRETATION:  Left Ventricle: The left ventricular internal cavity size is normal.  Global LV systolic function was normal. Left ventricular ejection   fraction, by visual estimation, is 60 to 65%. The mitral in-flow pattern   reveals no discernable A-wave, therefore no comment on diastolic function   can be made.  Right Ventricle: The right ventricular size is mildly enlarged. RV   systolic function is mildly reduced. TV S' 1.0 m/s.  Left Atrium: Intra-atrial septal aneurysm.  Right Atrium: Severely enlarged right atrium.  Pericardium: Trivial pericardial effusion is present. The pericardial   effusion is anterior to the right ventricle.  Mitral Valve: Thickening of the anterior and posterior mitral valve   leaflets. Mildto moderate mitral valve regurgitation is seen.  Tricuspid Valve: Moderate tricuspid regurgitation is visualized.   Estimated pulmonary artery systolic pressure is 65.4 mmHg assuming a   right atrial pressure of 3 mmHg, which is consistent with severe   pulmonary hypertension.  Aortic Valve: The aortic valve is trileaflet. Mild to moderate aortic   valve regurgitation is seen.  Pulmonic Valve: The pulmonic valve was not well visualized. Trace   pulmonic valve regurgitation.  Aorta: The aortic root is normal in size and structure.  Pulmonary Artery: The pulmonary artery is not well seen.  Venous: A systolic blunting flow pattern is recorded from the right upper   pulmonary vein. The inferior vena cava was normal sized, with respiratory   size variation greater than 50%.  In comparison to the previous echocardiogram(s): Prior examinations are   available and were reviewed for comparison purposes. A prior echo from   2/12/2019 is available for comparison purposes.       Summary:   1. Left ventricular ejection fraction, by visual estimation, is 60 to   65%.   2. Normal global left ventricular systolic function.   3. LV Ejection Fraction by Theodore's Method with a biplane EF of 63 %.   4. Normal left ventricular internal cavity size.   5. The mitral in-flow pattern reveals no discernable A-wave, therefore   no comment on diastolic function can be made.   6. Thickening of the anterior and posterior mitral valve leaflets.   7. Moderate tricuspid regurgitation.   8. Mild to moderate aortic regurgitation.   9. Trace pulmonic valve regurgitation.  10. Estimated pulmonary artery systolic pressure is 65.4 mmHg assuming a   right atrial pressure of 3 mmHg, which is consistent with severe   pulmonary hypertension.  11. A prior echo from 2/12/2019 is available for comparison purposes.  12. Intra-atrial septal aneurysm.    LV Ejection Fraction by Theodore's Method with a biplane EF of 63 %.     [id number] Quinn Lyons MD, Electronically signed on 2/21/2019 at 4:45:10 PM    < end of copied text >    Stress Test: june 2021: Nuclear stress : Mild ischemic study. Rca territory LVEf 57% STRESS:  Cardiac Cath: july 2018 , LAD 40%. RPLS- 75% stenosis.     ELECTROPHYSIOLOGY:   Remote/Ambulatory Rhythm Monitoring: may 2021: afib hr 70. no significant events     PAST MEDICAL HISTORY  AF (atrial fibrillation)  Daytime sleepiness  DM (diabetes mellitus)  Dizziness  Dyslipidemia  ESRD (end stage renal disease)  Hypercholesteremia  Renal hematoma, left  Tiredness  Hypotension  Kidney problem  Claudication in peripheral vascular disease  Obesity (BMI 30.0-34.9)  Imbalance  Leg pain, bilateral  Palpitations  Low glucose level  GERD (gastroesophageal reflux disease)  Carotid artery disease  Persistent atrial fibrillation      PAST SURGICAL HISTORY  History of unilateral nephrectomy  Abnormality of left atrial appendage  H/O kidney transplant  AV fistula  Renal transplant recipient  Abnormality of left atrial appendage      SOCIAL HISTORY:  Denies smoking/alcohol/drugs  CIGARETTES:     ALCOHOL:  DRUGS:    FAMILY HISTORY:  FH: hypertension  father      Family History of Cardiovascular Disease:  Yes [x  ] No [  ]  Coronary Artery Disease in first degree relative: Yes [  ] No [ x ]  Sudden Cardiac Death in First degree relative: Yes [  ] No [x  ]    HOME MEDICATIONS:  clopidogrel 75 mg oral tablet: 1 tab(s) orally once a day (26 Feb 2021 09:39)  gemfibrozil 600 mg oral tablet: 1 tab(s) orally 2 times a day (26 Feb 2021 09:39)  glimepiride 1 mg oral tablet: 1 tab(s) orally once a day (13 Aug 2022 16:54)  levocetirizine 5 mg oral tablet: 1 tab(s) orally once a day (in the evening) (26 Feb 2021 09:39)  metoprolol tartrate 25 mg oral tablet: 1 tab(s) orally 2 times a day (13 Aug 2022 16:55)  midodrine 10 mg oral tablet: 1 tab(s) orally 3 times a day, As Needed  for hypotension  (26 Feb 2021 09:39)  omeprazole 40 mg oral delayed release capsule: 1 cap(s) orally once a day (26 Feb 2021 09:39)  Sensipar 30 mg oral tablet: 1 tab(s) orally 2 times a day (26 Feb 2021 09:39)  traMADol 50 mg oral tablet:  (13 Aug 2022 16:55)  ursodiol 500 mg oral tablet: 1 tab(s) orally 3 times a day (26 Feb 2021 09:39)      CURRENT CARDIAC MEDICATIONS:  metoprolol tartrate 25 milliGRAM(s) Oral two times a day  midodrine. 10 milliGRAM(s) Oral three times a day  ranolazine 500 milliGRAM(s) Oral two times a day      CURRENT OTHER MEDICATIONS:  acetaminophen     Tablet .. 975 milliGRAM(s) Oral every 12 hours  HYDROmorphone   Tablet 1 milliGRAM(s) Oral every 4 hours PRN Moderate Pain (4 - 6)  HYDROmorphone   Tablet 2 milliGRAM(s) Oral every 4 hours PRN Severe Pain (7 - 10)  pantoprazole    Tablet 40 milliGRAM(s) Oral before breakfast  polyethylene glycol 3350 17 Gram(s) Oral daily PRN Constipation  senna 2 Tablet(s) Oral at bedtime  ursodiol Tablet 500 milliGRAM(s) Oral three times a day  aspirin  chewable 81 milliGRAM(s) Oral daily  dextrose 5%. 1000 milliLiter(s) (100 mL/Hr) IV Continuous <Continuous>  dextrose 5%. 1000 milliLiter(s) (50 mL/Hr) IV Continuous <Continuous>  dextrose 50% Injectable 25 Gram(s) IV Push once, Stop order after: 1 Doses  dextrose 50% Injectable 12.5 Gram(s) IV Push once, Stop order after: 1 Doses  dextrose 50% Injectable 25 Gram(s) IV Push once, Stop order after: 1 Doses  dextrose Oral Gel 15 Gram(s) Oral once, Stop order after: 1 Doses PRN Blood Glucose LESS THAN 70 milliGRAM(s)/deciliter  gemfibrozil 600 milliGRAM(s) Oral two times a day  glucagon  Injectable 1 milliGRAM(s) IntraMuscular once, Stop order after: 1 Doses  heparin   Injectable 5000 Unit(s) SubCutaneous every 12 hours  insulin lispro (ADMELOG) corrective regimen sliding scale   SubCutaneous three times a day before meals  insulin lispro (ADMELOG) corrective regimen sliding scale   SubCutaneous at bedtime      ALLERGIES:   No Known Allergies      REVIEW OF SYMPTOMS:   CONSTITUTIONAL: No fever, no chills, no weight loss, no weight gain, no fatigue   ENMT:  No vertigo; No sinus or throat pain  NECK: No pain or stiffness  CARDIOVASCULAR: No chest pain, no dyspnea, no syncope/presyncope, no palpitations, no dizziness, no Orthopnea, no Paroxsymal nocturnal dyspnea  RESPIRATORY: no Shortness of breath, no cough, no wheezing  : No dysuria, no hematuria   GI: No dark color stool, no nausea, no diarrhea, no constipation, no abdominal pain   NEURO: No headache, no slurred speech   MUSCULOSKELETAL: No joint pain or swelling; No muscle, back, +extremity pain  PSYCH: No agitation, no anxiety.    ALL OTHER REVIEW OF SYSTEMS ARE NEGATIVE.    VITAL SIGNS:  T(C): 36.7 (08-14-22 @ 05:02), Max: 37 (08-14-22 @ 01:09)  T(F): 98.1 (08-14-22 @ 05:02), Max: 98.6 (08-14-22 @ 01:09)  HR: 71 (08-14-22 @ 05:02) (63 - 75)  BP: 160/84 (08-14-22 @ 05:02) (104/56 - 160/84)  RR: 18 (08-14-22 @ 05:02) (16 - 18)  SpO2: 98% (08-14-22 @ 05:02) (97% - 99%)    INTAKE AND OUTPUT:       PHYSICAL EXAM:  Patient not seen by me due to COVID19 infection,  Exam by the hospitalist reviwed    LABS:                            10.0   7.71  )-----------( 144      ( 14 Aug 2022 05:15 )             30.3     08-14    137  |  91<L>  |  32.6<H>  ----------------------------<  108<H>  4.4   |  26.0  |  5.79<H>    Ca    9.2      14 Aug 2022 05:15    TPro  7.4  /  Alb  3.7  /  TBili  0.3<L>  /  DBili  x   /  AST  67<H>  /  ALT  19  /  AlkPhos  92  08-13                INTERPRETATION OF TELEMETRY:     ECG: Afib rate controlled (ECG from 8/2/2022)  Prior ECG: Yes [ x ] No [  ]    RADIOLOGY & ADDITIONAL STUDIES:   Carotid/Aorta/Peripheral Vascular: KAREEM : june 2021   Abnormal PVR : right thigh. Left: normal AB.

## 2022-08-14 NOTE — CHART NOTE - NSCHARTNOTEFT_GEN_A_CORE
Cardiology consult called for evaluation of DAPT in the setting of bone fx with large LEFT knee lipohemarthrosis.   Upon chart review, patient is on DAPT with ASA/Plavix due to peripheral vascular disease. Patient has no coronary artery stents for which DAPT is required.    Discussed with Dr. VICK Alvarez, recommends consult vascular for evaluation of DAPT as this patient is on for peripheral vascular disease.    Discussed with Hospitalist Dr. Shiva Alvarez

## 2022-08-14 NOTE — CONSULT NOTE ADULT - PROBLEM SELECTOR RECOMMENDATION 2
.  - Cath from 7/2018-  RPLS: There was a tubular 75 % stenosis., Mid LAD: There was a diffuse 40 % stenosis.  GDMT:        DAPT: ASA / Plavix on hold due to lipohemarthrosis       Statin: gemfibrozil       BetaBlocker: metoprolol 25mg with hold parameters       Other Antianginals: Ranexa       Aggressive cardiovascular risk reduction and lifestyle modification.  - patient is normotensive, midodrine d/c

## 2022-08-15 LAB
ALBUMIN SERPL ELPH-MCNC: 3.5 G/DL — SIGNIFICANT CHANGE UP (ref 3.3–5.2)
ALP SERPL-CCNC: 93 U/L — SIGNIFICANT CHANGE UP (ref 40–120)
ALT FLD-CCNC: 12 U/L — SIGNIFICANT CHANGE UP
ANION GAP SERPL CALC-SCNC: 21 MMOL/L — HIGH (ref 5–17)
AST SERPL-CCNC: 27 U/L — SIGNIFICANT CHANGE UP
BILIRUB SERPL-MCNC: 0.4 MG/DL — SIGNIFICANT CHANGE UP (ref 0.4–2)
BUN SERPL-MCNC: 49.3 MG/DL — HIGH (ref 8–20)
CALCIUM SERPL-MCNC: 8.6 MG/DL — SIGNIFICANT CHANGE UP (ref 8.4–10.5)
CHLORIDE SERPL-SCNC: 88 MMOL/L — LOW (ref 98–107)
CO2 SERPL-SCNC: 23 MMOL/L — SIGNIFICANT CHANGE UP (ref 22–29)
CREAT SERPL-MCNC: 7.12 MG/DL — HIGH (ref 0.5–1.3)
EGFR: 7 ML/MIN/1.73M2 — LOW
FERRITIN SERPL-MCNC: 1710 NG/ML — HIGH (ref 30–400)
GLUCOSE BLDC GLUCOMTR-MCNC: 122 MG/DL — HIGH (ref 70–99)
GLUCOSE BLDC GLUCOMTR-MCNC: 141 MG/DL — HIGH (ref 70–99)
GLUCOSE BLDC GLUCOMTR-MCNC: 77 MG/DL — SIGNIFICANT CHANGE UP (ref 70–99)
GLUCOSE SERPL-MCNC: 90 MG/DL — SIGNIFICANT CHANGE UP (ref 70–99)
HCT VFR BLD CALC: 25.4 % — LOW (ref 39–50)
HGB BLD-MCNC: 8.6 G/DL — LOW (ref 13–17)
IRON SATN MFR SERPL: 13 % — LOW (ref 16–55)
IRON SATN MFR SERPL: 29 UG/DL — LOW (ref 59–158)
MCHC RBC-ENTMCNC: 33.9 GM/DL — SIGNIFICANT CHANGE UP (ref 32–36)
MCHC RBC-ENTMCNC: 36.1 PG — HIGH (ref 27–34)
MCV RBC AUTO: 106.7 FL — HIGH (ref 80–100)
MRSA PCR RESULT.: SIGNIFICANT CHANGE UP
PLATELET # BLD AUTO: 122 K/UL — LOW (ref 150–400)
POTASSIUM SERPL-MCNC: 4.1 MMOL/L — SIGNIFICANT CHANGE UP (ref 3.5–5.3)
POTASSIUM SERPL-SCNC: 4.1 MMOL/L — SIGNIFICANT CHANGE UP (ref 3.5–5.3)
PROT SERPL-MCNC: 7.2 G/DL — SIGNIFICANT CHANGE UP (ref 6.6–8.7)
RBC # BLD: 2.38 M/UL — LOW (ref 4.2–5.8)
RBC # FLD: 14.6 % — HIGH (ref 10.3–14.5)
S AUREUS DNA NOSE QL NAA+PROBE: SIGNIFICANT CHANGE UP
SODIUM SERPL-SCNC: 132 MMOL/L — LOW (ref 135–145)
TIBC SERPL-MCNC: 225 UG/DL — SIGNIFICANT CHANGE UP (ref 220–430)
TRANSFERRIN SERPL-MCNC: 157 MG/DL — LOW (ref 180–329)
TRANSFERRIN SERPL-MCNC: 167 MG/DL — LOW (ref 180–329)
WBC # BLD: 7.48 K/UL — SIGNIFICANT CHANGE UP (ref 3.8–10.5)
WBC # FLD AUTO: 7.48 K/UL — SIGNIFICANT CHANGE UP (ref 3.8–10.5)

## 2022-08-15 PROCEDURE — 99233 SBSQ HOSP IP/OBS HIGH 50: CPT

## 2022-08-15 RX ORDER — FERROUS SULFATE 325(65) MG
325 TABLET ORAL DAILY
Refills: 0 | Status: DISCONTINUED | OUTPATIENT
Start: 2022-08-15 | End: 2022-08-16

## 2022-08-15 RX ORDER — CHLORHEXIDINE GLUCONATE 213 G/1000ML
1 SOLUTION TOPICAL
Refills: 0 | Status: DISCONTINUED | OUTPATIENT
Start: 2022-08-15 | End: 2022-08-16

## 2022-08-15 RX ORDER — ERYTHROPOIETIN 10000 [IU]/ML
6000 INJECTION, SOLUTION INTRAVENOUS; SUBCUTANEOUS
Refills: 0 | Status: DISCONTINUED | OUTPATIENT
Start: 2022-08-15 | End: 2022-08-16

## 2022-08-15 RX ADMIN — Medication 975 MILLIGRAM(S): at 06:30

## 2022-08-15 RX ADMIN — URSODIOL 500 MILLIGRAM(S): 250 TABLET, FILM COATED ORAL at 05:19

## 2022-08-15 RX ADMIN — Medication 25 MILLIGRAM(S): at 05:19

## 2022-08-15 RX ADMIN — RANOLAZINE 500 MILLIGRAM(S): 500 TABLET, FILM COATED, EXTENDED RELEASE ORAL at 20:20

## 2022-08-15 RX ADMIN — Medication 25 MILLIGRAM(S): at 20:20

## 2022-08-15 RX ADMIN — HEPARIN SODIUM 5000 UNIT(S): 5000 INJECTION INTRAVENOUS; SUBCUTANEOUS at 05:19

## 2022-08-15 RX ADMIN — Medication 600 MILLIGRAM(S): at 20:20

## 2022-08-15 RX ADMIN — PANTOPRAZOLE SODIUM 40 MILLIGRAM(S): 20 TABLET, DELAYED RELEASE ORAL at 05:20

## 2022-08-15 RX ADMIN — Medication 975 MILLIGRAM(S): at 20:20

## 2022-08-15 RX ADMIN — SENNA PLUS 2 TABLET(S): 8.6 TABLET ORAL at 22:02

## 2022-08-15 RX ADMIN — Medication 975 MILLIGRAM(S): at 21:53

## 2022-08-15 RX ADMIN — CHLORHEXIDINE GLUCONATE 1 APPLICATION(S): 213 SOLUTION TOPICAL at 11:28

## 2022-08-15 RX ADMIN — RANOLAZINE 500 MILLIGRAM(S): 500 TABLET, FILM COATED, EXTENDED RELEASE ORAL at 05:19

## 2022-08-15 RX ADMIN — Medication 975 MILLIGRAM(S): at 05:19

## 2022-08-15 RX ADMIN — ERYTHROPOIETIN 6000 UNIT(S): 10000 INJECTION, SOLUTION INTRAVENOUS; SUBCUTANEOUS at 15:31

## 2022-08-15 RX ADMIN — HEPARIN SODIUM 5000 UNIT(S): 5000 INJECTION INTRAVENOUS; SUBCUTANEOUS at 20:20

## 2022-08-15 RX ADMIN — URSODIOL 500 MILLIGRAM(S): 250 TABLET, FILM COATED ORAL at 22:02

## 2022-08-15 RX ADMIN — Medication 600 MILLIGRAM(S): at 05:19

## 2022-08-15 RX ADMIN — URSODIOL 500 MILLIGRAM(S): 250 TABLET, FILM COATED ORAL at 13:00

## 2022-08-15 RX ADMIN — Medication 81 MILLIGRAM(S): at 11:28

## 2022-08-15 NOTE — PROGRESS NOTE ADULT - ASSESSMENT
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 Patient landed on his left knee and presented to the ED. Was found to have a left comminuted and mildly displaced lateral tibial plateau fracture and proximal fibular head/neck associated with a large knee lipohemarthrosis with joint bodies and small popliteal cyst. Additionally noted was tricompartmental knee osteoarthritis with areas of chondrocalcinosis within the medial and lateral compartments. Patient to be admitted to medicine likely for FARRUKH (per ED since on ESRD anticipate will be waiting past observation time for placement).    #fall with comminuted, minimally displaced fracture of tibia plateau/fibula head/neck after a mechanical fall  - admit to medicine  - X-Ray LT knee- Comminuted lateral tibial plateau fracture. Moderate-sized supra-patellar joint effusion noted.  - pain control- Dilaudid PRN and added breakthrough pain  - Vascular surgery c/s placed   - senna standing  - miralax PRN  - ambulate as tolerated - non-weight bearing on the left leg    #Peripheral vascular disease  - Patient on ASA/plavix fo RLE    - Restarted plavix    #ESRD on HD  - consulted nephrology  - HD M/W/F    #CAD  - continue aspirin  - hold plavix    #hypotension  - continue midodrine    #hx of osteoarthritis  - pain control as above  - eventually outpatient follow up with orthopedics    #DVT ppx- heparin ppx  - encourage ambulation    Dispo- pending FARRUKH. Started MARK on 8/15.  Unable to reach Nabeel ([phone]) on 8/15. 
75 yo male with history of a-fib with watchman placed, non-obstructive CAD (cath in 2018), hypotension on midodrine, SAH from a fall (2019; complicated with post-traumatic seizure), renal hematoma resulting in L nephrectomy, ESRD on HD (M/W/F; 26 yo left AVF), h/o of renal transplant, history of osteoarthritis who presents from home with fall after tripping with his crutches. Patient landed on his left knee and presented to the ED. Was found to have a left comminuted and mildly displaced lateral tibial plateau fracture and proximal fibular head/neck associated with a large knee lipohemarthrosis with joint bodies and small popliteal cyst. Additionally noted was tricompartmental knee osteoarthritis with areas of chondrocalcinosis within the medial and lateral compartments. Patient to be admitted to medicine likely for FARRUKH     ESRD - HD MWF ; Consent obtained and arrange for HD - to undergo now  No Heparin    Anemia - Hb 8.6 ; Check iron panel and add Retacrit    Covid +    CAD    Hx hypotension - MIdodrine PRN
77 yo male with history of a-fib with watchman placed, non-obstructive CAD (cath in 2018), hypotension on midodrine, SAH from a fall (2019; complicated with post-traumatic seizure), renal hematoma resulting in L nephrectomy, ESRD on HD (M/W/F; 24 yo left AVF), h/o of renal transplant, history of osteoarthritis who presents from home with fall after tripping with his crutches. Patient landed on his left knee and presented to the ED. Was found to have a left comminuted and mildly displaced lateral tibial plateau fracture and proximal fibular head/neck associated with a large knee lipohemarthrosis with joint bodies and small popliteal cyst. Additionally noted was tricompartmental knee osteoarthritis with areas of chondrocalcinosis within the medial and lateral compartments. Patient to be admitted to medicine likely for FARRUKH (per ED since on ESRD anticipate will be waiting past observation time for placement).    #fall with comminuted, minimally displaced fracture of tibia plateau/fibula head/neck after a mechanical fall  - admit to medicine  - X-Ray LT knee- Comminuted lateral tibial plateau fracture. Moderate-sized supra-patellar joint effusion noted.  - pain control- Dilaudid PRN and added breakthrough pain  - Vascular surgery c/s placed   - senna standing  - miralax PRN  - ambulate as tolerated - non-weight bearing on the left leg  - PT to see    #Peripheral vascular disease  - Patient on ASA/plavix fo RLE    - Will reach out to vascular surgery in regards to restarting plavix    #ESRD on HD  - consulted nephrology  - HD M/W/F    #CAD  - continue aspirin  - hold plavix    #hypotension  - continue midodrine    #hx of osteoarthritis  - pain control as above  - eventually outpatient follow up with orthopedics    #DVT ppx- heparin ppx  - encourage ambulation    Unable to reach Nabeel ([phone]) on 8/14. 
75 yo male with history of a-fib with watchman placed, non-obstructive CAD (cath in 2018), hypotension on midodrine, SAH from a fall (2019; complicated with post-traumatic seizure), renal hematoma resulting in L nephrectomy, ESRD on HD (M/W/F; 26 yo left AVF), h/o of renal transplant, osteoarthritis, PVD (successful intervention of L popliteal artery, failed intervention of right common iliac , on DAPT)  who presents from home with fall after tripping with his crutches and was found to have  a left comminuted and mildly displaced lateral tibial plateau fracture and proximal fibular head/neck associated with a large knee lipohemarthrosis with joint bodies and small popliteal cyst. Additionally noted was tricompartmental knee osteoarthritis with areas of chondrocalcinosis within the medial and lateral compartments. Per ortho, non surgical management. Cardiology called for evaluation of DAPT in the setting of bone fx with large knee lipohemarthrosis.

## 2022-08-15 NOTE — PROGRESS NOTE ADULT - PROBLEM SELECTOR PLAN 2
ct current [facility] meds execept plavix. ct aspirin 81 ,  Deep venous thrombosis prophylaxis:  dose as per orthopedic . recommend hep sq intermittent sequential compression devices    No further in-patient cardiac work-up/management is needed.  Follow-up in cardiology office in 2 weeks.

## 2022-08-15 NOTE — PROGRESS NOTE ADULT - PROBLEM SELECTOR PLAN 1
Not on anticoagulation . s/p watchman for afibn.  On dual antiplatelet therapy . we can continue aspiirn 81 mg.  hold plavix glenys now.  no further cardaic work up

## 2022-08-16 ENCOUNTER — TRANSCRIPTION ENCOUNTER (OUTPATIENT)
Age: 77
End: 2022-08-16

## 2022-08-16 ENCOUNTER — APPOINTMENT (OUTPATIENT)
Dept: CARDIOLOGY | Facility: CLINIC | Age: 77
End: 2022-08-16

## 2022-08-16 VITALS
RESPIRATION RATE: 20 BRPM | HEART RATE: 63 BPM | DIASTOLIC BLOOD PRESSURE: 52 MMHG | OXYGEN SATURATION: 97 % | SYSTOLIC BLOOD PRESSURE: 115 MMHG | TEMPERATURE: 98 F

## 2022-08-16 LAB
ALBUMIN SERPL ELPH-MCNC: 3.5 G/DL — SIGNIFICANT CHANGE UP (ref 3.3–5.2)
ALP SERPL-CCNC: 93 U/L — SIGNIFICANT CHANGE UP (ref 40–120)
ALT FLD-CCNC: 12 U/L — SIGNIFICANT CHANGE UP
ANION GAP SERPL CALC-SCNC: 16 MMOL/L — SIGNIFICANT CHANGE UP (ref 5–17)
AST SERPL-CCNC: 30 U/L — SIGNIFICANT CHANGE UP
BILIRUB SERPL-MCNC: 0.4 MG/DL — SIGNIFICANT CHANGE UP (ref 0.4–2)
BUN SERPL-MCNC: 27 MG/DL — HIGH (ref 8–20)
CALCIUM SERPL-MCNC: 9.2 MG/DL — SIGNIFICANT CHANGE UP (ref 8.4–10.5)
CHLORIDE SERPL-SCNC: 92 MMOL/L — LOW (ref 98–107)
CO2 SERPL-SCNC: 28 MMOL/L — SIGNIFICANT CHANGE UP (ref 22–29)
CREAT SERPL-MCNC: 4.51 MG/DL — HIGH (ref 0.5–1.3)
EGFR: 13 ML/MIN/1.73M2 — LOW
GLUCOSE BLDC GLUCOMTR-MCNC: 124 MG/DL — HIGH (ref 70–99)
GLUCOSE BLDC GLUCOMTR-MCNC: 83 MG/DL — SIGNIFICANT CHANGE UP (ref 70–99)
GLUCOSE SERPL-MCNC: 95 MG/DL — SIGNIFICANT CHANGE UP (ref 70–99)
HCT VFR BLD CALC: 27 % — LOW (ref 39–50)
HGB BLD-MCNC: 8.9 G/DL — LOW (ref 13–17)
MCHC RBC-ENTMCNC: 33 GM/DL — SIGNIFICANT CHANGE UP (ref 32–36)
MCHC RBC-ENTMCNC: 35.9 PG — HIGH (ref 27–34)
MCV RBC AUTO: 108.9 FL — HIGH (ref 80–100)
PLATELET # BLD AUTO: 146 K/UL — LOW (ref 150–400)
POTASSIUM SERPL-MCNC: 3.9 MMOL/L — SIGNIFICANT CHANGE UP (ref 3.5–5.3)
POTASSIUM SERPL-SCNC: 3.9 MMOL/L — SIGNIFICANT CHANGE UP (ref 3.5–5.3)
PROT SERPL-MCNC: 7.4 G/DL — SIGNIFICANT CHANGE UP (ref 6.6–8.7)
RBC # BLD: 2.48 M/UL — LOW (ref 4.2–5.8)
RBC # FLD: 14.6 % — HIGH (ref 10.3–14.5)
SARS-COV-2 RNA SPEC QL NAA+PROBE: DETECTED
SODIUM SERPL-SCNC: 135 MMOL/L — SIGNIFICANT CHANGE UP (ref 135–145)
WBC # BLD: 6.4 K/UL — SIGNIFICANT CHANGE UP (ref 3.8–10.5)
WBC # FLD AUTO: 6.4 K/UL — SIGNIFICANT CHANGE UP (ref 3.8–10.5)

## 2022-08-16 PROCEDURE — 85025 COMPLETE CBC W/AUTO DIFF WBC: CPT

## 2022-08-16 PROCEDURE — 84466 ASSAY OF TRANSFERRIN: CPT

## 2022-08-16 PROCEDURE — 99261: CPT

## 2022-08-16 PROCEDURE — 83550 IRON BINDING TEST: CPT

## 2022-08-16 PROCEDURE — 80053 COMPREHEN METABOLIC PANEL: CPT

## 2022-08-16 PROCEDURE — 83540 ASSAY OF IRON: CPT

## 2022-08-16 PROCEDURE — G1004: CPT

## 2022-08-16 PROCEDURE — 73564 X-RAY EXAM KNEE 4 OR MORE: CPT

## 2022-08-16 PROCEDURE — U0003: CPT

## 2022-08-16 PROCEDURE — 85027 COMPLETE CBC AUTOMATED: CPT

## 2022-08-16 PROCEDURE — 82728 ASSAY OF FERRITIN: CPT

## 2022-08-16 PROCEDURE — 99285 EMERGENCY DEPT VISIT HI MDM: CPT

## 2022-08-16 PROCEDURE — 82607 VITAMIN B-12: CPT

## 2022-08-16 PROCEDURE — 99239 HOSP IP/OBS DSCHRG MGMT >30: CPT

## 2022-08-16 PROCEDURE — 73502 X-RAY EXAM HIP UNI 2-3 VIEWS: CPT

## 2022-08-16 PROCEDURE — U0005: CPT

## 2022-08-16 PROCEDURE — 87641 MR-STAPH DNA AMP PROBE: CPT

## 2022-08-16 PROCEDURE — 82962 GLUCOSE BLOOD TEST: CPT

## 2022-08-16 PROCEDURE — 80048 BASIC METABOLIC PNL TOTAL CA: CPT

## 2022-08-16 PROCEDURE — 83036 HEMOGLOBIN GLYCOSYLATED A1C: CPT

## 2022-08-16 PROCEDURE — 80074 ACUTE HEPATITIS PANEL: CPT

## 2022-08-16 PROCEDURE — 36415 COLL VENOUS BLD VENIPUNCTURE: CPT

## 2022-08-16 PROCEDURE — 73700 CT LOWER EXTREMITY W/O DYE: CPT | Mod: MG

## 2022-08-16 PROCEDURE — 87640 STAPH A DNA AMP PROBE: CPT

## 2022-08-16 RX ORDER — TRAMADOL HYDROCHLORIDE 50 MG/1
0 TABLET ORAL
Qty: 0 | Refills: 0 | DISCHARGE

## 2022-08-16 RX ORDER — METOPROLOL TARTRATE 50 MG
0.5 TABLET ORAL
Refills: 0 | DISCHARGE
Start: 2022-08-16

## 2022-08-16 RX ORDER — MIDODRINE HYDROCHLORIDE 2.5 MG/1
1 TABLET ORAL
Qty: 0 | Refills: 0 | DISCHARGE

## 2022-08-16 RX ORDER — MIDODRINE HYDROCHLORIDE 2.5 MG/1
1 TABLET ORAL
Refills: 0 | DISCHARGE
Start: 2022-08-16

## 2022-08-16 RX ORDER — METOPROLOL TARTRATE 50 MG
1 TABLET ORAL
Qty: 0 | Refills: 0 | DISCHARGE

## 2022-08-16 RX ORDER — RANOLAZINE 500 MG/1
1 TABLET, FILM COATED, EXTENDED RELEASE ORAL
Refills: 0 | DISCHARGE
Start: 2022-08-16

## 2022-08-16 RX ORDER — FERROUS SULFATE 325(65) MG
1 TABLET ORAL
Qty: 0 | Refills: 0 | DISCHARGE
Start: 2022-08-16

## 2022-08-16 RX ADMIN — HYDROMORPHONE HYDROCHLORIDE 2 MILLIGRAM(S): 2 INJECTION INTRAMUSCULAR; INTRAVENOUS; SUBCUTANEOUS at 04:45

## 2022-08-16 RX ADMIN — Medication 325 MILLIGRAM(S): at 12:14

## 2022-08-16 RX ADMIN — RANOLAZINE 500 MILLIGRAM(S): 500 TABLET, FILM COATED, EXTENDED RELEASE ORAL at 05:02

## 2022-08-16 RX ADMIN — Medication 81 MILLIGRAM(S): at 12:14

## 2022-08-16 RX ADMIN — URSODIOL 500 MILLIGRAM(S): 250 TABLET, FILM COATED ORAL at 05:02

## 2022-08-16 RX ADMIN — PANTOPRAZOLE SODIUM 40 MILLIGRAM(S): 20 TABLET, DELAYED RELEASE ORAL at 12:14

## 2022-08-16 RX ADMIN — HEPARIN SODIUM 5000 UNIT(S): 5000 INJECTION INTRAVENOUS; SUBCUTANEOUS at 05:00

## 2022-08-16 RX ADMIN — Medication 600 MILLIGRAM(S): at 05:01

## 2022-08-16 RX ADMIN — Medication 975 MILLIGRAM(S): at 05:00

## 2022-08-16 RX ADMIN — Medication 25 MILLIGRAM(S): at 05:00

## 2022-08-16 RX ADMIN — HYDROMORPHONE HYDROCHLORIDE 2 MILLIGRAM(S): 2 INJECTION INTRAMUSCULAR; INTRAVENOUS; SUBCUTANEOUS at 03:40

## 2022-08-16 RX ADMIN — CHLORHEXIDINE GLUCONATE 1 APPLICATION(S): 213 SOLUTION TOPICAL at 05:00

## 2022-08-16 NOTE — DISCHARGE NOTE NURSING/CASE MANAGEMENT/SOCIAL WORK - PATIENT PORTAL LINK FT
You can access the FollowMyHealth Patient Portal offered by [facility] by registering at the following website: [email or web address]. By joining [facility]’s FollowMyHealth portal, you will also be able to view your health information using other applications (apps) compatible with our system.

## 2022-08-16 NOTE — DISCHARGE NOTE NURSING/CASE MANAGEMENT/SOCIAL WORK - NSDCPEFALRISK_GEN_ALL_CORE
For information on Fall & Injury Prevention, visit: [email or web address] OR  [email or web address] OR  https://www.cdc.gov/steadi/patient.html

## 2022-08-16 NOTE — PROGRESS NOTE ADULT - SUBJECTIVE AND OBJECTIVE BOX
[facility]                                                        Office: [address], [place] [address]                                                       Telephone: [phone]. Fax:[phone]                                                                             PROGRESS NOTE   Reason for follow up:    fall adn left knee and leg fracture and hemathrosis                             Overnight: No new events.   Update:   patient admitted with fall adn left knee injury. on  dual antiplatelet therapy   patietn is seen in hemodialysis unit. Coviod Positive. on auirborne isolation.    Subjective: " i have pain in my knee "   Complains of:  no new symptoms.   Review of symptoms: Cardiac:  No chest pain. No dyspnea. No palpitations.  Respiratory:no cough. No dyspnea  Gastrointestinal: No diarrhea. No abdominal pain. No bleeding.     Past medical history: No updates.   Chronic conditions:  Hypertension: controlled.   	  Vitals:  T(C): 36.6 (08-15-22 @ 13:52), Max: 36.7 (08-15-22 @ 04:04)  HR: 65 (08-15-22 @ 13:52) (65 - 70)  BP: 124/71 (08-15-22 @ 13:52) (109/63 - 132/65)  RR: 18 (08-15-22 @ 13:52) (16 - 18)  SpO2: 100% (08-15-22 @ 13:52) (96% - 100%)  Wt(kg): --  I&O's Summary        PHYSICAL EXAM:  Appearance: Comfortable. No acute distress  HEENT:  Head and neck: Atraumatic. Normocephalic.  Normal oral mucosa, PERRL, Neck is supple. No JVD, No carotid bruit.   Neurologic: A & O x 3, no focal deficits. EOMI , Cranial nerves are intact.  Lymphatic: No cervical lymphadenopathy  Cardiovascular: Normal S1 S2, No murmur, rubs/gallops. No JVD, No edema  Respiratory: Lungs clear to auscultation  Gastrointestinal:  Soft, Non-tender, + BS  Lower Extremities: left leg swealling. ace qwrap . edema brace   Psychiatry: Patient is calm. No agitation. Mood & affect appropriate  Skin: No rashes/ ecchymoses/cyanosis/ulcers visualized on the face, hands or feet.    CURRENT MEDICATIONS:  metoprolol tartrate 25 milliGRAM(s) Oral every 12 hours  midodrine. 10 milliGRAM(s) Oral once PRN  ranolazine 500 milliGRAM(s) Oral two times a day    acetaminophen     Tablet ..  pantoprazole    Tablet  senna  ursodiol Tablet  dextrose 50% Injectable  dextrose 50% Injectable  dextrose 50% Injectable  gemfibrozil  glucagon  Injectable  insulin lispro (ADMELOG) corrective regimen sliding scale  insulin lispro (ADMELOG) corrective regimen sliding scale  aspirin  chewable  chlorhexidine 2% Cloths  dextrose 5%.  dextrose 5%.  epoetin kalpesh-epbx (RETACRIT) Injectable  ferrous    sulfate  heparin   Injectable      LABS:	 	                              8.6    7.48  )-----------( 122      ( 15 Aug 2022 05:34 )             25.4     08-15    132<L>  |  88<L>  |  49.3<H>  ----------------------------<  90  4.1   |  23.0  |  7.12<H>    Ca    8.6      15 Aug 2022 05:34    TPro  7.2  /  Alb  3.5  /  TBili  0.4  /  DBili  x   /  AST  27  /  ALT  12  /  AlkPhos  93  08-15    proBNP:   Lipid Profile:   HgA1c: TSH:    	
Patient seen and examined    Feels fine ex pain LKJ area  no c/o CP SOB NV   No swelling feet    Vital Signs Last 24 Hrs  T(C): 36.6 (15 Aug 2022 13:52), Max: 36.9 (14 Aug 2022 16:25)  T(F): 97.9 (15 Aug 2022 13:52), Max: 98.4 (14 Aug 2022 16:25)  HR: 65 (15 Aug 2022 13:52) (65 - 70)  BP: 124/71 (15 Aug 2022 13:52) (109/63 - 156/82)  BP(mean): --  RR: 18 (15 Aug 2022 13:52) (16 - 18)  SpO2: 100% (15 Aug 2022 13:52) (95% - 100%)    Parameters below as of 15 Aug 2022 13:52  Patient On (Oxygen Delivery Method): room air        PHYSICAL EXAM    GENERAL: NAD,   EYES:  conjunctiva and sclera clear  NECK: Supple, No JVD/Bruit  NERVOUS SYSTEM:  A/O x3,   CHEST:  No rales, No rhonchi  HEART:  RRR, No murmur  ABDOMEN: Soft, NT/ND BS+  EXTREMITIES:  No Edema; LLE in brace  SKIN: No rashes    15 Aug 2022 05:34    132    |  88     |  49.3   ----------------------------<  90     4.1     |  23.0   |  7.12     Ca    8.6        15 Aug 2022 05:34    TPro  7.2    /  Alb  3.5    /  TBili  0.4    /  DBili  x      /  AST  27     /  ALT  12     /  AlkPhos  93     15 Aug 2022 05:34                          8.6    7.48  )-----------( 122      ( 15 Aug 2022 05:34 )             25.4             
Patient without any significant change  no specific c/o    Vital Signs Last 24 Hrs  T(C): 36.7 (08-16-22 @ 11:30), Max: 36.8 (08-16-22 @ 04:31)  T(F): 98.1 (08-16-22 @ 11:30), Max: 98.2 (08-16-22 @ 04:31)  HR: 63 (08-16-22 @ 11:30) (63 - 106)  BP: 115/52 (08-16-22 @ 11:30) (114/60 - 146/75)  BP(mean): --  RR: 20 (08-16-22 @ 11:30) (18 - 20)  SpO2: 97% (08-16-22 @ 11:30) (97% - 98%)    Chest   clear  CV   no murmur  Abd   soft, NT BS+  Extr   No edema  Neuro  grossly iintact motor    Patient overall stable  Labs and Meds reviewed    Continue same treatment  HD am
[facility] Division of Hospital Medicine    Chief Complaint: fall    SUBJECTIVE / OVERNIGHT EVENTS: No acute events overnight. HD stable.     Patient denies chest pain, SOB, abd pain, N/V, fever, chills, dysuria or any other complaints. All remainder ROS negative.     MEDICATIONS  (STANDING):  acetaminophen     Tablet .. 975 milliGRAM(s) Oral every 12 hours  aspirin  chewable 81 milliGRAM(s) Oral daily  chlorhexidine 2% Cloths 1 Application(s) Topical <User Schedule>  dextrose 5%. 1000 milliLiter(s) (100 mL/Hr) IV Continuous <Continuous>  dextrose 5%. 1000 milliLiter(s) (50 mL/Hr) IV Continuous <Continuous>  dextrose 50% Injectable 25 Gram(s) IV Push once  dextrose 50% Injectable 12.5 Gram(s) IV Push once  dextrose 50% Injectable 25 Gram(s) IV Push once  epoetin kalpesh-epbx (RETACRIT) Injectable 6000 Unit(s) IV Push <User Schedule>  gemfibrozil 600 milliGRAM(s) Oral two times a day  glucagon  Injectable 1 milliGRAM(s) IntraMuscular once  heparin   Injectable 5000 Unit(s) SubCutaneous every 12 hours  insulin lispro (ADMELOG) corrective regimen sliding scale   SubCutaneous three times a day before meals  insulin lispro (ADMELOG) corrective regimen sliding scale   SubCutaneous at bedtime  metoprolol tartrate 25 milliGRAM(s) Oral every 12 hours  pantoprazole    Tablet 40 milliGRAM(s) Oral before breakfast  ranolazine 500 milliGRAM(s) Oral two times a day  senna 2 Tablet(s) Oral at bedtime  ursodiol Tablet 500 milliGRAM(s) Oral three times a day    MEDICATIONS  (PRN):  dextrose Oral Gel 15 Gram(s) Oral once PRN Blood Glucose LESS THAN 70 milliGRAM(s)/deciliter  HYDROmorphone   Tablet 1 milliGRAM(s) Oral every 4 hours PRN Moderate Pain (4 - 6)  HYDROmorphone   Tablet 2 milliGRAM(s) Oral every 4 hours PRN Severe Pain (7 - 10)  HYDROmorphone  Injectable 1 milliGRAM(s) IV Push every 4 hours PRN Severe Pain (7 - 10)  midodrine. 10 milliGRAM(s) Oral once PRN on dialysis for BP < 90  polyethylene glycol 3350 17 Gram(s) Oral daily PRN Constipation        I&O's Summary      PHYSICAL EXAM:  Vital Signs Last 24 Hrs  T(C): 36.6 (15 Aug 2022 13:52), Max: 36.9 (14 Aug 2022 16:25)  T(F): 97.9 (15 Aug 2022 13:52), Max: 98.4 (14 Aug 2022 16:25)  HR: 65 (15 Aug 2022 13:52) (65 - 70)  BP: 124/71 (15 Aug 2022 13:52) (109/63 - 156/82)  BP(mean): --  RR: 18 (15 Aug 2022 13:52) (16 - 18)  SpO2: 100% (15 Aug 2022 13:52) (95% - 100%)    Parameters below as of 15 Aug 2022 13:52  Patient On (Oxygen Delivery Method): room air            General: in no acute distress  Respiratory: No wheezes, rales or rhonchi  Cardiovascular: Regular rate and rhythm. S1 and S2 Normal. RT AVF  Gastrointestinal: Soft non-tender non-distended; Normal bowel sounds  Extremities: no edema; LT knee tender to palpation, limited ROM 2/2 pain, left knee in immobilizer  Vascular: Peripheral pulses palpable 2+ bilaterally  Neurological: Alert and oriented x4  Psychiatric: Cooperative and appropriate    LABS:                        8.6    7.48  )-----------( 122      ( 15 Aug 2022 05:34 )             25.4     08-15    132<L>  |  88<L>  |  49.3<H>  ----------------------------<  90  4.1   |  23.0  |  7.12<H>    Ca    8.6      15 Aug 2022 05:34    TPro  7.2  /  Alb  3.5  /  TBili  0.4  /  DBili  x   /  AST  27  /  ALT  12  /  AlkPhos  93  08-15              CAPILLARY BLOOD GLUCOSE      POCT Blood Glucose.: 122 mg/dL (15 Aug 2022 11:26)  POCT Blood Glucose.: 119 mg/dL (14 Aug 2022 16:07)        RADIOLOGY & ADDITIONAL TESTS:  Results Reviewed:   Imaging Personally Reviewed:  Electrocardiogram Personally Reviewed:                                          
[facility] Division of Hospital Medicine    Chief Complaint: fall    SUBJECTIVE / OVERNIGHT EVENTS: [place] PI# [id number]. Patient continues to endorse LT-sided knee pain that is 10/10 and constant. Denies n/v/f/c/h/d.     Patient denies chest pain, SOB, abd pain, N/V, fever, chills, dysuria or any other complaints. All remainder ROS negative.     MEDICATIONS  (STANDING):  acetaminophen     Tablet .. 975 milliGRAM(s) Oral every 12 hours  aspirin  chewable 81 milliGRAM(s) Oral daily  dextrose 5%. 1000 milliLiter(s) (100 mL/Hr) IV Continuous <Continuous>  dextrose 5%. 1000 milliLiter(s) (50 mL/Hr) IV Continuous <Continuous>  dextrose 50% Injectable 25 Gram(s) IV Push once  dextrose 50% Injectable 12.5 Gram(s) IV Push once  dextrose 50% Injectable 25 Gram(s) IV Push once  gemfibrozil 600 milliGRAM(s) Oral two times a day  glucagon  Injectable 1 milliGRAM(s) IntraMuscular once  heparin   Injectable 5000 Unit(s) SubCutaneous every 12 hours  insulin lispro (ADMELOG) corrective regimen sliding scale   SubCutaneous three times a day before meals  insulin lispro (ADMELOG) corrective regimen sliding scale   SubCutaneous at bedtime  metoprolol tartrate 25 milliGRAM(s) Oral two times a day  midodrine. 10 milliGRAM(s) Oral three times a day  pantoprazole    Tablet 40 milliGRAM(s) Oral before breakfast  ranolazine 500 milliGRAM(s) Oral two times a day  senna 2 Tablet(s) Oral at bedtime  ursodiol Tablet 500 milliGRAM(s) Oral three times a day    MEDICATIONS  (PRN):  dextrose Oral Gel 15 Gram(s) Oral once PRN Blood Glucose LESS THAN 70 milliGRAM(s)/deciliter  HYDROmorphone   Tablet 1 milliGRAM(s) Oral every 4 hours PRN Moderate Pain (4 - 6)  HYDROmorphone   Tablet 2 milliGRAM(s) Oral every 4 hours PRN Severe Pain (7 - 10)  polyethylene glycol 3350 17 Gram(s) Oral daily PRN Constipation        I&O's Summary      PHYSICAL EXAM:  Vital Signs Last 24 Hrs  T(C): 36.6 (14 Aug 2022 12:13), Max: 37 (14 Aug 2022 01:09)  T(F): 97.8 (14 Aug 2022 12:13), Max: 98.6 (14 Aug 2022 01:09)  HR: 60 (14 Aug 2022 12:13) (60 - 75)  BP: 134/69 (14 Aug 2022 12:13) (131/77 - 160/84)  BP(mean): 93 (14 Aug 2022 01:09) (86 - 93)  RR: 18 (14 Aug 2022 12:13) (16 - 18)  SpO2: 98% (14 Aug 2022 12:13) (97% - 98%)    Parameters below as of 14 Aug 2022 12:13  Patient On (Oxygen Delivery Method): room air    General: in no acute distress  Respiratory: No wheezes, rales or rhonchi  Cardiovascular: Regular rate and rhythm. S1 and S2 Normal. RT AVF  Gastrointestinal: Soft non-tender non-distended; Normal bowel sounds  Extremities: no edema; LT knee tender to palpation, limited ROM 2/2 pain, left knee in immobilizer  Vascular: Peripheral pulses palpable 2+ bilaterally  Neurological: Alert and oriented x4  Psychiatric: Cooperative and appropriate    LABS:                        10.0   7.71  )-----------( 144      ( 14 Aug 2022 05:15 )             30.3     08-14    137  |  91<L>  |  32.6<H>  ----------------------------<  108<H>  4.4   |  26.0  |  5.79<H>    Ca    9.2      14 Aug 2022 05:15    TPro  7.4  /  Alb  3.7  /  TBili  0.3<L>  /  DBili  x   /  AST  67<H>  /  ALT  19  /  AlkPhos  92  08-13              CAPILLARY BLOOD GLUCOSE      POCT Blood Glucose.: 121 mg/dL (14 Aug 2022 11:06)  POCT Blood Glucose.: 109 mg/dL (14 Aug 2022 07:36)        RADIOLOGY & ADDITIONAL TESTS:  Results Reviewed:   Imaging Personally Reviewed:  Electrocardiogram Personally Reviewed:

## 2022-08-23 LAB
GLUCOSE BLDC GLUCOMTR-MCNC: 125 MG/DL — HIGH (ref 70–99)
GLUCOSE BLDC GLUCOMTR-MCNC: 141 MG/DL — HIGH (ref 70–99)
GLUCOSE BLDC GLUCOMTR-MCNC: 81 MG/DL — SIGNIFICANT CHANGE UP (ref 70–99)

## 2022-11-04 ENCOUNTER — OFFICE (OUTPATIENT)
Dept: URBAN - METROPOLITAN AREA CLINIC 94 | Facility: CLINIC | Age: 77
Setting detail: OPHTHALMOLOGY
End: 2022-11-04
Payer: MEDICARE

## 2022-11-04 DIAGNOSIS — H26.493: ICD-10-CM

## 2022-11-04 PROCEDURE — 99024 POSTOP FOLLOW-UP VISIT: CPT | Performed by: OPHTHALMOLOGY

## 2022-11-04 ASSESSMENT — SPHEQUIV_DERIVED
OD_SPHEQUIV: -0.375
OD_SPHEQUIV: 0
OS_SPHEQUIV: 0.125
OS_SPHEQUIV: 0.375
OD_SPHEQUIV: 0
OS_SPHEQUIV: 0.125

## 2022-11-04 ASSESSMENT — REFRACTION_MANIFEST
OS_ADD: +2.75
OS_CYLINDER: -0.25
OS_CYLINDER: -0.25
OD_SPHERE: +0.25
OD_ADD: +3.00
OD_ADD: +3.00
OS_SPHERE: +0.25
OD_VA2: 20/20
OD_SPHERE: +0.50
OS_AXIS: 163
OD_VA1: 20/25
OD_AXIS: 081
OD_VA1: 20/25
OD_ADD: +2.75
OS_VA2: 20/20
OD_VA1: 20/30
OS_SPHERE: PLANO
OS_ADD: +3.00
OS_VA1: 20/25
OS_SPHERE: +0.25
OD_SPHERE: PLANO
OS_VA1: 20/25
OS_AXIS: 177
OS_SPHERE: +0.25
OS_VA1: 20/25
OD_CYLINDER: -0.50
OS_SPHERE: +0.50
OD_AXIS: 065
OD_ADD: +2.50
OS_ADD: +3.00
OS_ADD: +2.50
OS_VA1: 20/40
OD_CYLINDER: -0.50
OD_SPHERE: PLANO
OD_CYLINDER: -0.75
OD_AXIS: 060
OD_SPHERE: +0.25
OD_VA1: 20/100
OS_CYLINDER: SPH

## 2022-11-04 ASSESSMENT — AXIALLENGTH_DERIVED
OD_AL: 24.3013
OS_AL: 24.5941
OD_AL: 24.3013
OD_AL: 24.4573
OS_AL: 24.4887
OS_AL: 24.5941

## 2022-11-04 ASSESSMENT — LID POSITION - LOWER LID LAG
OD_LOWER_LID_LAG: RLL 1+
OS_LOWER_LID_LAG: LLL 1+

## 2022-11-04 ASSESSMENT — REFRACTION_CURRENTRX
OS_VPRISM_DIRECTION: BF
OS_SPHERE: +0.25
OD_ADD: +2.75
OS_ADD: +2.75
OD_CYLINDER: SPH
OD_OVR_VA: 20/
OS_AXIS: 0
OD_AXIS: 0
OD_SPHERE: +0.50
OS_OVR_VA: 20/
OD_VPRISM_DIRECTION: BF
OS_CYLINDER: SPH

## 2022-11-04 ASSESSMENT — LID POSITION - PTOSIS
OS_PTOSIS: LUL 2+
OD_PTOSIS: RUL 2+

## 2022-11-04 ASSESSMENT — REFRACTION_AUTOREFRACTION
OD_AXIS: 081
OD_CYLINDER: -0.75
OS_CYLINDER: -0.25
OS_AXIS: 163
OD_SPHERE: 0.00
OS_SPHERE: +0.25

## 2022-11-04 ASSESSMENT — VISUAL ACUITY
OD_BCVA: 20/25-1
OS_BCVA: 20/50+1

## 2022-11-04 ASSESSMENT — KERATOMETRY
OS_K1POWER_DIOPTERS: 40.50
METHOD_AUTO_MANUAL: AUTO
OD_K2POWER_DIOPTERS: 42.00
OS_AXISANGLE_DEGREES: 065
OS_K2POWER_DIOPTERS: 41.00
OD_K1POWER_DIOPTERS: 41.25
OD_AXISANGLE_DEGREES: 131

## 2022-11-04 ASSESSMENT — TONOMETRY
OD_IOP_MMHG: 12
OS_IOP_MMHG: 12

## 2022-11-04 ASSESSMENT — SUPERFICIAL PUNCTATE KERATITIS (SPK)
OD_SPK: 1+
OS_SPK: 1+

## 2022-11-04 ASSESSMENT — CONFRONTATIONAL VISUAL FIELD TEST (CVF)
OS_FINDINGS: FULL
OD_FINDINGS: FULL

## 2022-11-04 ASSESSMENT — CORNEAL PTERYGIUM: OS_PTERYGIUM: NASAL 1MM

## 2022-11-09 NOTE — ASU PATIENT PROFILE, ADULT - NSCAFFEINETYPE_GEN_ALL_CORE_SD
Walking - Indoors allowed/No heavy lifting/straining/Walking - Outdoors allowed/Follow Instructions Provided by your Surgical Team
coffee

## 2022-11-10 PROBLEM — M19.90 OSTEOARTHRITIS: Status: RESOLVED | Noted: 2022-01-01 | Resolved: 2022-01-01

## 2022-11-10 PROBLEM — Z91.81 STATUS POST FALL: Status: RESOLVED | Noted: 2022-01-01 | Resolved: 2022-01-01

## 2022-11-10 PROBLEM — Z86.79 HISTORY OF HYPOTENSION: Status: RESOLVED | Noted: 2022-01-01 | Resolved: 2022-01-01

## 2022-11-21 NOTE — OCCUPATIONAL THERAPY INITIAL EVALUATION ADULT - LEVEL OF INDEPENDENCE: TOILET, REHAB EVAL
Olanzapine Counseling- I discussed with the patient the common side effects of olanzapine including but are not limited to: lack of energy, dry mouth, increased appetite, sleepiness, tremor, constipation, dizziness, changes in behavior, or restlessness.  Explained that teenagers are more likely to experience headaches, abdominal pain, pain in the arms or legs, tiredness, and sleepiness.  Serious side effects include but are not limited: increased risk of death in elderly patients who are confused, have memory loss, or dementia-related psychosis; hyperglycemia; increased cholesterol and triglycerides; and weight gain. minimum assist (75% patients effort)/on low toilet height

## 2022-12-13 PROBLEM — R00.2 PALPITATIONS: Status: ACTIVE | Noted: 2020-07-17

## 2022-12-13 PROBLEM — R53.82 CHRONIC FATIGUE: Status: ACTIVE | Noted: 2019-05-08

## 2022-12-13 PROBLEM — R06.00 DYSPNEA: Status: ACTIVE | Noted: 2022-01-01

## 2022-12-23 NOTE — DISCUSSION/SUMMARY
[FreeTextEntry1] : Plan\par 1. AFib- c/o palpitations and SOB, appears euvolemic. Pt s/p Watchman (hx renal bleed and SAH) no leak- no longer on AC. 30 day MCOT to evaluate for significant arrhythmias and VR placed, final report not available yet. Pt on Toprol 25 mg QD- consider increasing for high HRs. \par 2. CAD- c/o COLLINS/fatigue, mild ischemia on NST in 2021. [place]dal[place] NST previously ordered but pt did not have it done - pt will schedule today. on ASA, plavix and statin.\par 3. ESRD- on HD, no edema, stable weights. \par 4. Hypotension- BP stable today, not on midodrine (only uses prn). Compression socks.\par 5. PAD- on ASA and statin\par 6. risk stratification- pending test results, has planned colonoscopy in February. \par 7. F/U with Dr. Jerome after testing\par \par EDUARDO Cruz

## 2022-12-23 NOTE — REASON FOR VISIT
[Other: ______] : provided by JAYDEN [Time Spent: ____ minutes] : Total time spent using  services: [unfilled] minutes. The patient's primary language is not English thus required  services. [Interpreters_IDNumber] : [id number] [Interpreters_FullName] : Jose [[email or web address]] : Citizen of the [place]

## 2022-12-23 NOTE — CARDIOLOGY SUMMARY
[___] : [unfilled] [LVEF ___%] : LVEF [unfilled]% [Moderate] : moderate pulmonary hypertension [Enlarged] : enlarged LA size [None] : no mitral regurgitation [Today's Date] : [unfilled] [de-identified] : 11/10/2022 Atrial Fibrillation VR= 90 bpm, low voltage, nonspecific t abnormality\par \par 8 2 2022    Atrial fibrillation \par Low voltage in precordial leads. \par  -  Diffuse nonspecific T-abnormality. \par \par ABNORMAL \par  [de-identified] : may 2021:   afib hr 70. no significant events  [de-identified] : june 2021:  Nuclear stress :   Mild ischemic study. Rca terrtirory. LVEf 57%  [de-identified] : KAREEM : june 2021  Abnormal PVR : right thigh. Left: normal AB.  [FreeTextEntry1] : Atrial fibrillation \par -  Diffuse nonspecific T-abnormality. \par  Low voltage -possible pulmonary disease. \par \par ABNORMAL \par  [de-identified] : feb 2019: Pause : 2 sec pause. afib.

## 2022-12-23 NOTE — HISTORY OF PRESENT ILLNESS
[FreeTextEntry1] : old note: feels tired. fatigue and tired. \par getting hemodialyses regularly. dizziness. Lightheadedness. \par discharge summary FEB 2019: Pt is a 74 yo M presenting w/ chest pain for atleast 5 days duration. PMH A fib \par s/p Watchman, recent Subarachnoid hemorrhage, ESRD on HD, DM2, R iliac \par stenosis, post traumatic seizure, L nephrectomy, CAD. Pt has been c/o chest stephania/pressure for atleast 5 days now, L anterior sternum, pain is worse with coughing, denies exertional component, no associated SOB currently, but does get SOB at times, denies any radiation of the pain, no associated diaphoresis. He has a hx of chronic chest pain and had a stress test in May 2018, lexiscan which showed mild ischemia in RCA, he had a cardiac cath in July 2018 LAD 40% and RPLS 75% stenosis. He has no other complaints. Of note he had a fall with a resultant SAH and was just discharged from the hospital last week. He has not restarted anti-platlet medications (ASA or Plavix), he is NOT on coumadin. 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 Anuric, AV graft/fistula right arm.\par Here for preoperative cardiovascular risk evaluation and management. \par \par 8/2/2022 he is feeling tired and weak. he has been feeling like this for many days. \par he is also feeling shortness of breathe . he has also been feelign shortness of breathe when he lays flat. no LE edema. \par + palpitations. he was given monitor last visit. never got it done. recently got a monitor. [place] , his [place] was [place]. \par he was in hospital in feb 2021. his hb 8.5 . he is going for regular hemodialyses he also complains of right leg pain. he had  of the right leg. we had deferred it last time. \par \par \par 11/10/2022\par Patient here for follow up. He c/o increased SOB and palpitations at night when laying down. States he is sleeping upright. No leg edema. States this has been going on for months but feels it may be a little worse over the last couple weeks. He goes to HD M,W,F. States his weight was 180 lbs yesterday at dialysis (states this his dry weight). He has lost 10 lbs since last visit in August. Denies chest pain but does have exertional SOB. He did not have previously ordered NST because he was in the hospital for a fall. Recent TTE done 8/2022 showed LVEF 61%, moderate LVH, mild AI and mild PAH.\par \par 12/13/2022\par Patient here for follow up and to review test results. He did not have NST done that was previously ordered. He states he went to [facility] last month, sent by dialysis to evaluate for bleeding. States had endoscopy which was "ok and did not need blood transfusion".  States he needs to have colonoscopy in February. Denies hematuria, hematochezia or black/tarry stools. He wore event monitor, results pending. Still has intermittent palpitations and COLLINS, unchanged. Denies chest pain, edema, near syncope or syncope. He plans to go to [facility] for 1 month, leaving next week.

## 2022-12-23 NOTE — REVIEW OF SYSTEMS
[Feeling Fatigued] : feeling fatigued [SOB] : shortness of breath [Dyspnea on exertion] : dyspnea during exertion [Palpitations] : palpitations [Negative] : Heme/Lymph [Chest Discomfort] : no chest discomfort [Lower Ext Edema] : no extremity edema [Orthopnea] : no orthopnea [Syncope] : no syncope

## 2022-12-23 NOTE — PHYSICAL EXAM
[Normal Appearance] : normal appearance [General Appearance - Well Nourished] : well nourished [No Deformities] : no deformities [General Appearance - In No Acute Distress] : no acute distress [Normal Conjunctiva] : the conjunctiva exhibited no abnormalities [Normal Jugular Venous V Waves Present] : normal jugular venous V waves present [Respiration, Rhythm And Depth] : normal respiratory rhythm and effort [Exaggerated Use Of Accessory Muscles For Inspiration] : no accessory muscle use [Auscultation Breath Sounds / Voice Sounds] : lungs were clear to auscultation bilaterally [Heart Sounds] : normal S1 and S2 [Murmurs] : no murmurs present [Edema] : no peripheral edema present [Abdomen Soft] : soft [Abdomen Mass (___ Cm)] : no abdominal mass palpated [Skin Color & Pigmentation] : normal skin color and pigmentation [] : no rash [No Venous Stasis] : no venous stasis [Skin Lesions] : no skin lesions [No Skin Ulcers] : no skin ulcer [No Xanthoma] : no  xanthoma was observed [Oriented To Time, Place, And Person] : oriented to person, place, and time [Irregularly Irregular] : the rhythm was irregularly irregular [Bowel Sounds] : normal bowel sounds [Abdomen Tenderness] : non-tender [FreeTextEntry1] : limited, uses crutches/wheelchair

## 2023-01-01 ENCOUNTER — TRANSCRIPTION ENCOUNTER (OUTPATIENT)
Age: 78
End: 2023-01-01

## 2023-01-01 ENCOUNTER — APPOINTMENT (OUTPATIENT)
Dept: CARDIOLOGY | Facility: CLINIC | Age: 78
End: 2023-01-01

## 2023-01-01 ENCOUNTER — INPATIENT (INPATIENT)
Facility: HOSPITAL | Age: 78
LOS: 2 days | DRG: 640 | End: 2023-11-10
Attending: STUDENT IN AN ORGANIZED HEALTH CARE EDUCATION/TRAINING PROGRAM | Admitting: STUDENT IN AN ORGANIZED HEALTH CARE EDUCATION/TRAINING PROGRAM
Payer: MEDICARE

## 2023-01-01 ENCOUNTER — INPATIENT (INPATIENT)
Facility: HOSPITAL | Age: 78
LOS: 5 days | Discharge: HOME | DRG: 637 | End: 2023-01-30
Admitting: STUDENT IN AN ORGANIZED HEALTH CARE EDUCATION/TRAINING PROGRAM
Payer: MEDICARE

## 2023-01-01 ENCOUNTER — APPOINTMENT (OUTPATIENT)
Dept: CARDIOLOGY | Facility: CLINIC | Age: 78
End: 2023-01-01
Payer: MEDICARE

## 2023-01-01 ENCOUNTER — NON-APPOINTMENT (OUTPATIENT)
Age: 78
End: 2023-01-01

## 2023-01-01 VITALS
HEART RATE: 84 BPM | SYSTOLIC BLOOD PRESSURE: 110 MMHG | BODY MASS INDEX: 29.99 KG/M2 | WEIGHT: 180 LBS | DIASTOLIC BLOOD PRESSURE: 60 MMHG | OXYGEN SATURATION: 96 % | TEMPERATURE: 98.7 F | HEIGHT: 65 IN

## 2023-01-01 VITALS
HEART RATE: 79 BPM | TEMPERATURE: 97 F | DIASTOLIC BLOOD PRESSURE: 50 MMHG | OXYGEN SATURATION: 94 % | SYSTOLIC BLOOD PRESSURE: 98 MMHG

## 2023-01-01 VITALS
SYSTOLIC BLOOD PRESSURE: 185 MMHG | RESPIRATION RATE: 16 BRPM | WEIGHT: 220.02 LBS | HEIGHT: 68 IN | HEART RATE: 74 BPM | OXYGEN SATURATION: 96 % | DIASTOLIC BLOOD PRESSURE: 90 MMHG

## 2023-01-01 VITALS
OXYGEN SATURATION: 100 % | HEART RATE: 65 BPM | SYSTOLIC BLOOD PRESSURE: 140 MMHG | DIASTOLIC BLOOD PRESSURE: 65 MMHG | RESPIRATION RATE: 18 BRPM | WEIGHT: 177.47 LBS | TEMPERATURE: 98 F

## 2023-01-01 VITALS
OXYGEN SATURATION: 100 % | HEART RATE: 69 BPM | SYSTOLIC BLOOD PRESSURE: 116 MMHG | DIASTOLIC BLOOD PRESSURE: 56 MMHG | RESPIRATION RATE: 30 BRPM

## 2023-01-01 DIAGNOSIS — E16.2 HYPOGLYCEMIA, UNSPECIFIED: ICD-10-CM

## 2023-01-01 DIAGNOSIS — J96.01 ACUTE RESPIRATORY FAILURE WITH HYPOXIA: ICD-10-CM

## 2023-01-01 DIAGNOSIS — I25.118 ATHEROSCLEROTIC HEART DISEASE OF NATIVE CORONARY ARTERY WITH OTHER FORMS OF ANGINA PECTORIS: ICD-10-CM

## 2023-01-01 DIAGNOSIS — Z94.0 KIDNEY TRANSPLANT STATUS: Chronic | ICD-10-CM

## 2023-01-01 DIAGNOSIS — I48.91 UNSPECIFIED ATRIAL FIBRILLATION: ICD-10-CM

## 2023-01-01 DIAGNOSIS — I77.0 ARTERIOVENOUS FISTULA, ACQUIRED: Chronic | ICD-10-CM

## 2023-01-01 DIAGNOSIS — Z90.5 ACQUIRED ABSENCE OF KIDNEY: Chronic | ICD-10-CM

## 2023-01-01 DIAGNOSIS — Z01.810 ENCOUNTER FOR PREPROCEDURAL CARDIOVASCULAR EXAMINATION: ICD-10-CM

## 2023-01-01 DIAGNOSIS — Z99.2 END STAGE RENAL DISEASE: ICD-10-CM

## 2023-01-01 DIAGNOSIS — N18.6 END STAGE RENAL DISEASE: ICD-10-CM

## 2023-01-01 DIAGNOSIS — Q20.8 OTHER CONGENITAL MALFORMATIONS OF CARDIAC CHAMBERS AND CONNECTIONS: Chronic | ICD-10-CM

## 2023-01-01 DIAGNOSIS — I73.9 PERIPHERAL VASCULAR DISEASE, UNSPECIFIED: ICD-10-CM

## 2023-01-01 DIAGNOSIS — E78.00 PURE HYPERCHOLESTEROLEMIA, UNSPECIFIED: ICD-10-CM

## 2023-01-01 LAB
A1C WITH ESTIMATED AVERAGE GLUCOSE RESULT: 4.8 % — SIGNIFICANT CHANGE UP (ref 4–5.6)
A1C WITH ESTIMATED AVERAGE GLUCOSE RESULT: <4 % — LOW (ref 4–5.6)
A1C WITH ESTIMATED AVERAGE GLUCOSE RESULT: <4 % — LOW (ref 4–5.6)
ADD ON TEST-SPECIMEN IN LAB: SIGNIFICANT CHANGE UP
ALBUMIN SERPL ELPH-MCNC: 2 G/DL — LOW (ref 3.3–5)
ALBUMIN SERPL ELPH-MCNC: 2 G/DL — LOW (ref 3.3–5)
ALBUMIN SERPL ELPH-MCNC: 2.2 G/DL — LOW (ref 3.3–5)
ALBUMIN SERPL ELPH-MCNC: 2.2 G/DL — LOW (ref 3.3–5)
ALBUMIN SERPL ELPH-MCNC: 3.4 G/DL — SIGNIFICANT CHANGE UP (ref 3.3–5.2)
ALBUMIN SERPL ELPH-MCNC: 3.5 G/DL — SIGNIFICANT CHANGE UP (ref 3.3–5.2)
ALBUMIN SERPL ELPH-MCNC: 3.6 G/DL — SIGNIFICANT CHANGE UP (ref 3.3–5.2)
ALBUMIN SERPL ELPH-MCNC: 3.8 G/DL — SIGNIFICANT CHANGE UP (ref 3.3–5.2)
ALBUMIN SERPL ELPH-MCNC: 3.9 G/DL — SIGNIFICANT CHANGE UP (ref 3.3–5.2)
ALP SERPL-CCNC: 120 U/L — SIGNIFICANT CHANGE UP (ref 40–120)
ALP SERPL-CCNC: 120 U/L — SIGNIFICANT CHANGE UP (ref 40–120)
ALP SERPL-CCNC: 123 U/L — HIGH (ref 40–120)
ALP SERPL-CCNC: 137 U/L — HIGH (ref 40–120)
ALP SERPL-CCNC: 140 U/L — HIGH (ref 40–120)
ALP SERPL-CCNC: 140 U/L — HIGH (ref 40–120)
ALP SERPL-CCNC: 141 U/L — HIGH (ref 40–120)
ALP SERPL-CCNC: 145 U/L — HIGH (ref 40–120)
ALP SERPL-CCNC: 167 U/L — HIGH (ref 40–120)
ALT FLD-CCNC: 13 U/L — SIGNIFICANT CHANGE UP (ref 12–78)
ALT FLD-CCNC: 13 U/L — SIGNIFICANT CHANGE UP (ref 12–78)
ALT FLD-CCNC: 15 U/L — SIGNIFICANT CHANGE UP
ALT FLD-CCNC: 15 U/L — SIGNIFICANT CHANGE UP
ALT FLD-CCNC: 16 U/L — SIGNIFICANT CHANGE UP (ref 12–78)
ALT FLD-CCNC: 16 U/L — SIGNIFICANT CHANGE UP (ref 12–78)
ALT FLD-CCNC: 18 U/L — SIGNIFICANT CHANGE UP
ALT FLD-CCNC: 20 U/L — SIGNIFICANT CHANGE UP
ALT FLD-CCNC: 21 U/L — SIGNIFICANT CHANGE UP
AMMONIA BLD-MCNC: 56 UMOL/L — HIGH (ref 11–32)
AMMONIA BLD-MCNC: 56 UMOL/L — HIGH (ref 11–32)
ANION GAP SERPL CALC-SCNC: 12 MMOL/L — SIGNIFICANT CHANGE UP (ref 5–17)
ANION GAP SERPL CALC-SCNC: 13 MMOL/L — SIGNIFICANT CHANGE UP (ref 5–17)
ANION GAP SERPL CALC-SCNC: 16 MMOL/L — SIGNIFICANT CHANGE UP (ref 5–17)
ANION GAP SERPL CALC-SCNC: 16 MMOL/L — SIGNIFICANT CHANGE UP (ref 5–17)
ANION GAP SERPL CALC-SCNC: 17 MMOL/L — SIGNIFICANT CHANGE UP (ref 5–17)
ANION GAP SERPL CALC-SCNC: 18 MMOL/L — HIGH (ref 5–17)
ANION GAP SERPL CALC-SCNC: 18 MMOL/L — HIGH (ref 5–17)
ANION GAP SERPL CALC-SCNC: 5 MMOL/L — SIGNIFICANT CHANGE UP (ref 5–17)
ANION GAP SERPL CALC-SCNC: 5 MMOL/L — SIGNIFICANT CHANGE UP (ref 5–17)
ANION GAP SERPL CALC-SCNC: 8 MMOL/L — SIGNIFICANT CHANGE UP (ref 5–17)
ANION GAP SERPL CALC-SCNC: 8 MMOL/L — SIGNIFICANT CHANGE UP (ref 5–17)
ANION GAP SERPL CALC-SCNC: 9 MMOL/L — SIGNIFICANT CHANGE UP (ref 5–17)
ANION GAP SERPL CALC-SCNC: 9 MMOL/L — SIGNIFICANT CHANGE UP (ref 5–17)
APTT BLD: 39.7 SEC — HIGH (ref 27.5–35.5)
APTT BLD: 45.8 SEC — HIGH (ref 24.5–35.6)
APTT BLD: 45.8 SEC — HIGH (ref 24.5–35.6)
AST SERPL-CCNC: 23 U/L — SIGNIFICANT CHANGE UP
AST SERPL-CCNC: 25 U/L — SIGNIFICANT CHANGE UP
AST SERPL-CCNC: 29 U/L — SIGNIFICANT CHANGE UP (ref 15–37)
AST SERPL-CCNC: 29 U/L — SIGNIFICANT CHANGE UP (ref 15–37)
AST SERPL-CCNC: 30 U/L — SIGNIFICANT CHANGE UP
AST SERPL-CCNC: 32 U/L — SIGNIFICANT CHANGE UP
AST SERPL-CCNC: 42 U/L — HIGH
AST SERPL-CCNC: 59 U/L — HIGH (ref 15–37)
AST SERPL-CCNC: 59 U/L — HIGH (ref 15–37)
BASE EXCESS BLDA CALC-SCNC: 1.8 MMOL/L — SIGNIFICANT CHANGE UP (ref -2–3)
BASE EXCESS BLDA CALC-SCNC: 1.8 MMOL/L — SIGNIFICANT CHANGE UP (ref -2–3)
BASE EXCESS BLDV CALC-SCNC: -5.6 MMOL/L — LOW (ref -2–3)
BASE EXCESS BLDV CALC-SCNC: -5.6 MMOL/L — LOW (ref -2–3)
BASE EXCESS BLDV CALC-SCNC: 3.6 MMOL/L — HIGH (ref -2–3)
BASE EXCESS BLDV CALC-SCNC: 3.6 MMOL/L — HIGH (ref -2–3)
BASOPHILS # BLD AUTO: 0.01 K/UL — SIGNIFICANT CHANGE UP (ref 0–0.2)
BASOPHILS # BLD AUTO: 0.03 K/UL — SIGNIFICANT CHANGE UP (ref 0–0.2)
BASOPHILS # BLD AUTO: 0.03 K/UL — SIGNIFICANT CHANGE UP (ref 0–0.2)
BASOPHILS # BLD AUTO: 0.06 K/UL — SIGNIFICANT CHANGE UP (ref 0–0.2)
BASOPHILS # BLD AUTO: 0.06 K/UL — SIGNIFICANT CHANGE UP (ref 0–0.2)
BASOPHILS NFR BLD AUTO: 0.1 % — SIGNIFICANT CHANGE UP (ref 0–2)
BASOPHILS NFR BLD AUTO: 0.5 % — SIGNIFICANT CHANGE UP (ref 0–2)
BASOPHILS NFR BLD AUTO: 0.5 % — SIGNIFICANT CHANGE UP (ref 0–2)
BASOPHILS NFR BLD AUTO: 0.9 % — SIGNIFICANT CHANGE UP (ref 0–2)
BASOPHILS NFR BLD AUTO: 0.9 % — SIGNIFICANT CHANGE UP (ref 0–2)
BILIRUB SERPL-MCNC: 0.3 MG/DL — LOW (ref 0.4–2)
BILIRUB SERPL-MCNC: 0.4 MG/DL — SIGNIFICANT CHANGE UP (ref 0.4–2)
BILIRUB SERPL-MCNC: 0.9 MG/DL — SIGNIFICANT CHANGE UP (ref 0.2–1.2)
BILIRUB SERPL-MCNC: 0.9 MG/DL — SIGNIFICANT CHANGE UP (ref 0.2–1.2)
BILIRUB SERPL-MCNC: 1 MG/DL — SIGNIFICANT CHANGE UP (ref 0.2–1.2)
BILIRUB SERPL-MCNC: 1 MG/DL — SIGNIFICANT CHANGE UP (ref 0.2–1.2)
BLOOD GAS COMMENTS ARTERIAL: SIGNIFICANT CHANGE UP
BLOOD GAS COMMENTS ARTERIAL: SIGNIFICANT CHANGE UP
BUN SERPL-MCNC: 21.8 MG/DL — HIGH (ref 8–20)
BUN SERPL-MCNC: 27.7 MG/DL — HIGH (ref 8–20)
BUN SERPL-MCNC: 31.9 MG/DL — HIGH (ref 8–20)
BUN SERPL-MCNC: 39.9 MG/DL — HIGH (ref 8–20)
BUN SERPL-MCNC: 43.9 MG/DL — HIGH (ref 8–20)
BUN SERPL-MCNC: 45.8 MG/DL — HIGH (ref 8–20)
BUN SERPL-MCNC: 48.3 MG/DL — HIGH (ref 8–20)
BUN SERPL-MCNC: 5 MG/DL — LOW (ref 7–23)
BUN SERPL-MCNC: 5 MG/DL — LOW (ref 7–23)
BUN SERPL-MCNC: 6 MG/DL — LOW (ref 7–23)
BUN SERPL-MCNC: 6 MG/DL — LOW (ref 7–23)
BUN SERPL-MCNC: 9 MG/DL — SIGNIFICANT CHANGE UP (ref 7–23)
BUN SERPL-MCNC: 9 MG/DL — SIGNIFICANT CHANGE UP (ref 7–23)
CALCIUM SERPL-MCNC: 7.9 MG/DL — LOW (ref 8.4–10.5)
CALCIUM SERPL-MCNC: 8 MG/DL — LOW (ref 8.4–10.5)
CALCIUM SERPL-MCNC: 8 MG/DL — LOW (ref 8.4–10.5)
CALCIUM SERPL-MCNC: 8.1 MG/DL — LOW (ref 8.4–10.5)
CALCIUM SERPL-MCNC: 8.2 MG/DL — LOW (ref 8.4–10.5)
CALCIUM SERPL-MCNC: 8.4 MG/DL — LOW (ref 8.5–10.1)
CALCIUM SERPL-MCNC: 8.4 MG/DL — LOW (ref 8.5–10.1)
CALCIUM SERPL-MCNC: 8.4 MG/DL — SIGNIFICANT CHANGE UP (ref 8.4–10.5)
CALCIUM SERPL-MCNC: 8.6 MG/DL — SIGNIFICANT CHANGE UP (ref 8.5–10.1)
CALCIUM SERPL-MCNC: 8.6 MG/DL — SIGNIFICANT CHANGE UP (ref 8.5–10.1)
CALCIUM SERPL-MCNC: 8.7 MG/DL — SIGNIFICANT CHANGE UP (ref 8.5–10.1)
CALCIUM SERPL-MCNC: 8.7 MG/DL — SIGNIFICANT CHANGE UP (ref 8.5–10.1)
CALCIUM SERPL-MCNC: 8.8 MG/DL — SIGNIFICANT CHANGE UP (ref 8.4–10.5)
CHLORIDE SERPL-SCNC: 102 MMOL/L — SIGNIFICANT CHANGE UP (ref 96–108)
CHLORIDE SERPL-SCNC: 102 MMOL/L — SIGNIFICANT CHANGE UP (ref 96–108)
CHLORIDE SERPL-SCNC: 104 MMOL/L — SIGNIFICANT CHANGE UP (ref 96–108)
CHLORIDE SERPL-SCNC: 104 MMOL/L — SIGNIFICANT CHANGE UP (ref 96–108)
CHLORIDE SERPL-SCNC: 107 MMOL/L — SIGNIFICANT CHANGE UP (ref 96–108)
CHLORIDE SERPL-SCNC: 107 MMOL/L — SIGNIFICANT CHANGE UP (ref 96–108)
CHLORIDE SERPL-SCNC: 88 MMOL/L — LOW (ref 96–108)
CHLORIDE SERPL-SCNC: 90 MMOL/L — LOW (ref 96–108)
CHLORIDE SERPL-SCNC: 92 MMOL/L — LOW (ref 96–108)
CHLORIDE SERPL-SCNC: 95 MMOL/L — LOW (ref 96–108)
CHLORIDE SERPL-SCNC: 96 MMOL/L — SIGNIFICANT CHANGE UP (ref 96–108)
CHLORIDE SERPL-SCNC: 97 MMOL/L — SIGNIFICANT CHANGE UP (ref 96–108)
CHLORIDE SERPL-SCNC: 99 MMOL/L — SIGNIFICANT CHANGE UP (ref 96–108)
CO2 SERPL-SCNC: 21 MMOL/L — LOW (ref 22–31)
CO2 SERPL-SCNC: 21 MMOL/L — LOW (ref 22–31)
CO2 SERPL-SCNC: 22 MMOL/L — SIGNIFICANT CHANGE UP (ref 22–29)
CO2 SERPL-SCNC: 23 MMOL/L — SIGNIFICANT CHANGE UP (ref 22–29)
CO2 SERPL-SCNC: 24 MMOL/L — SIGNIFICANT CHANGE UP (ref 22–29)
CO2 SERPL-SCNC: 24 MMOL/L — SIGNIFICANT CHANGE UP (ref 22–29)
CO2 SERPL-SCNC: 25 MMOL/L — SIGNIFICANT CHANGE UP (ref 22–29)
CO2 SERPL-SCNC: 26 MMOL/L — SIGNIFICANT CHANGE UP (ref 22–31)
CO2 SERPL-SCNC: 26 MMOL/L — SIGNIFICANT CHANGE UP (ref 22–31)
CO2 SERPL-SCNC: 27 MMOL/L — SIGNIFICANT CHANGE UP (ref 22–31)
CO2 SERPL-SCNC: 27 MMOL/L — SIGNIFICANT CHANGE UP (ref 22–31)
CREAT SERPL-MCNC: 2.64 MG/DL — HIGH (ref 0.5–1.3)
CREAT SERPL-MCNC: 2.64 MG/DL — HIGH (ref 0.5–1.3)
CREAT SERPL-MCNC: 2.73 MG/DL — HIGH (ref 0.5–1.3)
CREAT SERPL-MCNC: 3.18 MG/DL — HIGH (ref 0.5–1.3)
CREAT SERPL-MCNC: 3.18 MG/DL — HIGH (ref 0.5–1.3)
CREAT SERPL-MCNC: 3.51 MG/DL — HIGH (ref 0.5–1.3)
CREAT SERPL-MCNC: 3.51 MG/DL — HIGH (ref 0.5–1.3)
CREAT SERPL-MCNC: 3.56 MG/DL — HIGH (ref 0.5–1.3)
CREAT SERPL-MCNC: 3.64 MG/DL — HIGH (ref 0.5–1.3)
CREAT SERPL-MCNC: 4.7 MG/DL — HIGH (ref 0.5–1.3)
CREAT SERPL-MCNC: 4.86 MG/DL — HIGH (ref 0.5–1.3)
CREAT SERPL-MCNC: 4.88 MG/DL — HIGH (ref 0.5–1.3)
CREAT SERPL-MCNC: 5.28 MG/DL — HIGH (ref 0.5–1.3)
EGFR: 11 ML/MIN/1.73M2 — LOW
EGFR: 12 ML/MIN/1.73M2 — LOW
EGFR: 16 ML/MIN/1.73M2 — LOW
EGFR: 17 ML/MIN/1.73M2 — LOW
EGFR: 19 ML/MIN/1.73M2 — LOW
EGFR: 19 ML/MIN/1.73M2 — LOW
EGFR: 23 ML/MIN/1.73M2 — LOW
EGFR: 24 ML/MIN/1.73M2 — LOW
EGFR: 24 ML/MIN/1.73M2 — LOW
EOSINOPHIL # BLD AUTO: 0.06 K/UL — SIGNIFICANT CHANGE UP (ref 0–0.5)
EOSINOPHIL # BLD AUTO: 0.06 K/UL — SIGNIFICANT CHANGE UP (ref 0–0.5)
EOSINOPHIL # BLD AUTO: 0.07 K/UL — SIGNIFICANT CHANGE UP (ref 0–0.5)
EOSINOPHIL # BLD AUTO: 0.11 K/UL — SIGNIFICANT CHANGE UP (ref 0–0.5)
EOSINOPHIL # BLD AUTO: 0.11 K/UL — SIGNIFICANT CHANGE UP (ref 0–0.5)
EOSINOPHIL NFR BLD AUTO: 0.9 % — SIGNIFICANT CHANGE UP (ref 0–6)
EOSINOPHIL NFR BLD AUTO: 1.1 % — SIGNIFICANT CHANGE UP (ref 0–6)
EOSINOPHIL NFR BLD AUTO: 1.1 % — SIGNIFICANT CHANGE UP (ref 0–6)
EOSINOPHIL NFR BLD AUTO: 1.7 % — SIGNIFICANT CHANGE UP (ref 0–6)
EOSINOPHIL NFR BLD AUTO: 1.7 % — SIGNIFICANT CHANGE UP (ref 0–6)
ESTIMATED AVERAGE GLUCOSE: 91 MG/DL — SIGNIFICANT CHANGE UP (ref 68–114)
ESTIMATED AVERAGE GLUCOSE: <68 MG/DL — SIGNIFICANT CHANGE UP (ref 68–114)
ESTIMATED AVERAGE GLUCOSE: <68 MG/DL — SIGNIFICANT CHANGE UP (ref 68–114)
FERRITIN SERPL-MCNC: 2925 NG/ML — HIGH (ref 30–400)
FOLATE SERPL-MCNC: 10.7 NG/ML — SIGNIFICANT CHANGE UP
FOLATE SERPL-MCNC: 10.7 NG/ML — SIGNIFICANT CHANGE UP
GAS PNL BLDA: SIGNIFICANT CHANGE UP
GAS PNL BLDA: SIGNIFICANT CHANGE UP
GAS PNL BLDV: SIGNIFICANT CHANGE UP
GAS PNL BLDV: SIGNIFICANT CHANGE UP
GLUCOSE BLDC GLUCOMTR-MCNC: 101 MG/DL — HIGH (ref 70–99)
GLUCOSE BLDC GLUCOMTR-MCNC: 101 MG/DL — HIGH (ref 70–99)
GLUCOSE BLDC GLUCOMTR-MCNC: 107 MG/DL — HIGH (ref 70–99)
GLUCOSE BLDC GLUCOMTR-MCNC: 109 MG/DL — HIGH (ref 70–99)
GLUCOSE BLDC GLUCOMTR-MCNC: 112 MG/DL — HIGH (ref 70–99)
GLUCOSE BLDC GLUCOMTR-MCNC: 114 MG/DL — HIGH (ref 70–99)
GLUCOSE BLDC GLUCOMTR-MCNC: 115 MG/DL — HIGH (ref 70–99)
GLUCOSE BLDC GLUCOMTR-MCNC: 119 MG/DL — HIGH (ref 70–99)
GLUCOSE BLDC GLUCOMTR-MCNC: 120 MG/DL — HIGH (ref 70–99)
GLUCOSE BLDC GLUCOMTR-MCNC: 122 MG/DL — HIGH (ref 70–99)
GLUCOSE BLDC GLUCOMTR-MCNC: 128 MG/DL — HIGH (ref 70–99)
GLUCOSE BLDC GLUCOMTR-MCNC: 128 MG/DL — HIGH (ref 70–99)
GLUCOSE BLDC GLUCOMTR-MCNC: 130 MG/DL — HIGH (ref 70–99)
GLUCOSE BLDC GLUCOMTR-MCNC: 131 MG/DL — HIGH (ref 70–99)
GLUCOSE BLDC GLUCOMTR-MCNC: 131 MG/DL — HIGH (ref 70–99)
GLUCOSE BLDC GLUCOMTR-MCNC: 133 MG/DL — HIGH (ref 70–99)
GLUCOSE BLDC GLUCOMTR-MCNC: 133 MG/DL — HIGH (ref 70–99)
GLUCOSE BLDC GLUCOMTR-MCNC: 136 MG/DL — HIGH (ref 70–99)
GLUCOSE BLDC GLUCOMTR-MCNC: 142 MG/DL — HIGH (ref 70–99)
GLUCOSE BLDC GLUCOMTR-MCNC: 143 MG/DL — HIGH (ref 70–99)
GLUCOSE BLDC GLUCOMTR-MCNC: 147 MG/DL — HIGH (ref 70–99)
GLUCOSE BLDC GLUCOMTR-MCNC: 147 MG/DL — HIGH (ref 70–99)
GLUCOSE BLDC GLUCOMTR-MCNC: 150 MG/DL — HIGH (ref 70–99)
GLUCOSE BLDC GLUCOMTR-MCNC: 156 MG/DL — HIGH (ref 70–99)
GLUCOSE BLDC GLUCOMTR-MCNC: 157 MG/DL — HIGH (ref 70–99)
GLUCOSE BLDC GLUCOMTR-MCNC: 239 MG/DL — HIGH (ref 70–99)
GLUCOSE BLDC GLUCOMTR-MCNC: 284 MG/DL — HIGH (ref 70–99)
GLUCOSE BLDC GLUCOMTR-MCNC: 299 MG/DL — HIGH (ref 70–99)
GLUCOSE BLDC GLUCOMTR-MCNC: 305 MG/DL — HIGH (ref 70–99)
GLUCOSE BLDC GLUCOMTR-MCNC: 31 MG/DL — CRITICAL LOW (ref 70–99)
GLUCOSE BLDC GLUCOMTR-MCNC: 405 MG/DL — HIGH (ref 70–99)
GLUCOSE BLDC GLUCOMTR-MCNC: 50 MG/DL — CRITICAL LOW (ref 70–99)
GLUCOSE BLDC GLUCOMTR-MCNC: 53 MG/DL — CRITICAL LOW (ref 70–99)
GLUCOSE BLDC GLUCOMTR-MCNC: 57 MG/DL — LOW (ref 70–99)
GLUCOSE BLDC GLUCOMTR-MCNC: 59 MG/DL — LOW (ref 70–99)
GLUCOSE BLDC GLUCOMTR-MCNC: 60 MG/DL — LOW (ref 70–99)
GLUCOSE BLDC GLUCOMTR-MCNC: 62 MG/DL — LOW (ref 70–99)
GLUCOSE BLDC GLUCOMTR-MCNC: 63 MG/DL — LOW (ref 70–99)
GLUCOSE BLDC GLUCOMTR-MCNC: 63 MG/DL — LOW (ref 70–99)
GLUCOSE BLDC GLUCOMTR-MCNC: 64 MG/DL — LOW (ref 70–99)
GLUCOSE BLDC GLUCOMTR-MCNC: 68 MG/DL — LOW (ref 70–99)
GLUCOSE BLDC GLUCOMTR-MCNC: 72 MG/DL — SIGNIFICANT CHANGE UP (ref 70–99)
GLUCOSE BLDC GLUCOMTR-MCNC: 72 MG/DL — SIGNIFICANT CHANGE UP (ref 70–99)
GLUCOSE BLDC GLUCOMTR-MCNC: 75 MG/DL — SIGNIFICANT CHANGE UP (ref 70–99)
GLUCOSE BLDC GLUCOMTR-MCNC: 76 MG/DL — SIGNIFICANT CHANGE UP (ref 70–99)
GLUCOSE BLDC GLUCOMTR-MCNC: 76 MG/DL — SIGNIFICANT CHANGE UP (ref 70–99)
GLUCOSE BLDC GLUCOMTR-MCNC: 80 MG/DL — SIGNIFICANT CHANGE UP (ref 70–99)
GLUCOSE BLDC GLUCOMTR-MCNC: 81 MG/DL — SIGNIFICANT CHANGE UP (ref 70–99)
GLUCOSE BLDC GLUCOMTR-MCNC: 83 MG/DL — SIGNIFICANT CHANGE UP (ref 70–99)
GLUCOSE BLDC GLUCOMTR-MCNC: 85 MG/DL — SIGNIFICANT CHANGE UP (ref 70–99)
GLUCOSE BLDC GLUCOMTR-MCNC: 87 MG/DL — SIGNIFICANT CHANGE UP (ref 70–99)
GLUCOSE BLDC GLUCOMTR-MCNC: 88 MG/DL — SIGNIFICANT CHANGE UP (ref 70–99)
GLUCOSE BLDC GLUCOMTR-MCNC: 89 MG/DL — SIGNIFICANT CHANGE UP (ref 70–99)
GLUCOSE BLDC GLUCOMTR-MCNC: 90 MG/DL — SIGNIFICANT CHANGE UP (ref 70–99)
GLUCOSE BLDC GLUCOMTR-MCNC: 90 MG/DL — SIGNIFICANT CHANGE UP (ref 70–99)
GLUCOSE BLDC GLUCOMTR-MCNC: 91 MG/DL — SIGNIFICANT CHANGE UP (ref 70–99)
GLUCOSE BLDC GLUCOMTR-MCNC: 93 MG/DL — SIGNIFICANT CHANGE UP (ref 70–99)
GLUCOSE BLDC GLUCOMTR-MCNC: 93 MG/DL — SIGNIFICANT CHANGE UP (ref 70–99)
GLUCOSE BLDC GLUCOMTR-MCNC: 95 MG/DL — SIGNIFICANT CHANGE UP (ref 70–99)
GLUCOSE BLDC GLUCOMTR-MCNC: 95 MG/DL — SIGNIFICANT CHANGE UP (ref 70–99)
GLUCOSE BLDC GLUCOMTR-MCNC: 96 MG/DL — SIGNIFICANT CHANGE UP (ref 70–99)
GLUCOSE BLDC GLUCOMTR-MCNC: 96 MG/DL — SIGNIFICANT CHANGE UP (ref 70–99)
GLUCOSE BLDC GLUCOMTR-MCNC: 97 MG/DL — SIGNIFICANT CHANGE UP (ref 70–99)
GLUCOSE BLDC GLUCOMTR-MCNC: 98 MG/DL — SIGNIFICANT CHANGE UP (ref 70–99)
GLUCOSE BLDC GLUCOMTR-MCNC: >530 MG/DL — CRITICAL HIGH (ref 70–99)
GLUCOSE SERPL-MCNC: 104 MG/DL — HIGH (ref 70–99)
GLUCOSE SERPL-MCNC: 104 MG/DL — HIGH (ref 70–99)
GLUCOSE SERPL-MCNC: 115 MG/DL — HIGH (ref 70–99)
GLUCOSE SERPL-MCNC: 196 MG/DL — HIGH (ref 70–99)
GLUCOSE SERPL-MCNC: 276 MG/DL — HIGH (ref 70–99)
GLUCOSE SERPL-MCNC: 35 MG/DL — CRITICAL LOW (ref 70–99)
GLUCOSE SERPL-MCNC: 58 MG/DL — LOW (ref 70–99)
GLUCOSE SERPL-MCNC: 58 MG/DL — LOW (ref 70–99)
GLUCOSE SERPL-MCNC: 78 MG/DL — SIGNIFICANT CHANGE UP (ref 70–99)
GLUCOSE SERPL-MCNC: 84 MG/DL — SIGNIFICANT CHANGE UP (ref 70–99)
GLUCOSE SERPL-MCNC: 89 MG/DL — SIGNIFICANT CHANGE UP (ref 70–99)
GLUCOSE SERPL-MCNC: 89 MG/DL — SIGNIFICANT CHANGE UP (ref 70–99)
GLUCOSE SERPL-MCNC: 90 MG/DL — SIGNIFICANT CHANGE UP (ref 70–99)
GLUCOSE SERPL-MCNC: 92 MG/DL — SIGNIFICANT CHANGE UP (ref 70–99)
HCO3 BLDA-SCNC: 32 MMOL/L — HIGH (ref 21–28)
HCO3 BLDA-SCNC: 32 MMOL/L — HIGH (ref 21–28)
HCO3 BLDV-SCNC: 28 MMOL/L — SIGNIFICANT CHANGE UP (ref 22–29)
HCO3 BLDV-SCNC: 28 MMOL/L — SIGNIFICANT CHANGE UP (ref 22–29)
HCO3 BLDV-SCNC: 30 MMOL/L — HIGH (ref 22–29)
HCO3 BLDV-SCNC: 30 MMOL/L — HIGH (ref 22–29)
HCT VFR BLD CALC: 28.4 % — LOW (ref 39–50)
HCT VFR BLD CALC: 28.4 % — LOW (ref 39–50)
HCT VFR BLD CALC: 28.5 % — LOW (ref 39–50)
HCT VFR BLD CALC: 28.7 % — LOW (ref 39–50)
HCT VFR BLD CALC: 28.7 % — LOW (ref 39–50)
HCT VFR BLD CALC: 32 % — LOW (ref 39–50)
HCT VFR BLD CALC: 32 % — LOW (ref 39–50)
HCT VFR BLD CALC: 33.7 % — LOW (ref 39–50)
HCT VFR BLD CALC: 33.7 % — LOW (ref 39–50)
HCT VFR BLD CALC: 37.3 % — LOW (ref 39–50)
HCT VFR BLD CALC: 42.5 % — SIGNIFICANT CHANGE UP (ref 39–50)
HGB BLD-MCNC: 10.8 G/DL — LOW (ref 13–17)
HGB BLD-MCNC: 10.9 G/DL — LOW (ref 13–17)
HGB BLD-MCNC: 12 G/DL — LOW (ref 13–17)
HGB BLD-MCNC: 13.6 G/DL — SIGNIFICANT CHANGE UP (ref 13–17)
HGB BLD-MCNC: 8.4 G/DL — LOW (ref 13–17)
HGB BLD-MCNC: 8.4 G/DL — LOW (ref 13–17)
HGB BLD-MCNC: 8.6 G/DL — LOW (ref 13–17)
HGB BLD-MCNC: 8.6 G/DL — LOW (ref 13–17)
HGB BLD-MCNC: 9.2 G/DL — LOW (ref 13–17)
HGB BLD-MCNC: 9.5 G/DL — LOW (ref 13–17)
HGB BLD-MCNC: 9.5 G/DL — LOW (ref 13–17)
IMM GRANULOCYTES NFR BLD AUTO: 0.5 % — SIGNIFICANT CHANGE UP (ref 0–0.9)
IMM GRANULOCYTES NFR BLD AUTO: 0.5 % — SIGNIFICANT CHANGE UP (ref 0–0.9)
IMM GRANULOCYTES NFR BLD AUTO: 0.8 % — SIGNIFICANT CHANGE UP (ref 0–0.9)
INR BLD: 1.07 RATIO — SIGNIFICANT CHANGE UP (ref 0.88–1.16)
INR BLD: 1.5 RATIO — HIGH (ref 0.85–1.18)
INR BLD: 1.5 RATIO — HIGH (ref 0.85–1.18)
IRON SATN MFR SERPL: 165 UG/DL — HIGH (ref 59–158)
IRON SATN MFR SERPL: 62 % — HIGH (ref 16–55)
LACTATE SERPL-SCNC: 1.1 MMOL/L — SIGNIFICANT CHANGE UP (ref 0.7–2)
LACTATE SERPL-SCNC: 1.1 MMOL/L — SIGNIFICANT CHANGE UP (ref 0.7–2)
LACTATE SERPL-SCNC: 1.9 MMOL/L — SIGNIFICANT CHANGE UP (ref 0.7–2)
LACTATE SERPL-SCNC: 1.9 MMOL/L — SIGNIFICANT CHANGE UP (ref 0.7–2)
LYMPHOCYTES # BLD AUTO: 0.51 K/UL — LOW (ref 1–3.3)
LYMPHOCYTES # BLD AUTO: 0.51 K/UL — LOW (ref 1–3.3)
LYMPHOCYTES # BLD AUTO: 0.54 K/UL — LOW (ref 1–3.3)
LYMPHOCYTES # BLD AUTO: 0.54 K/UL — LOW (ref 1–3.3)
LYMPHOCYTES # BLD AUTO: 0.57 K/UL — LOW (ref 1–3.3)
LYMPHOCYTES # BLD AUTO: 7.7 % — LOW (ref 13–44)
LYMPHOCYTES # BLD AUTO: 7.9 % — LOW (ref 13–44)
LYMPHOCYTES # BLD AUTO: 7.9 % — LOW (ref 13–44)
LYMPHOCYTES # BLD AUTO: 9.6 % — LOW (ref 13–44)
LYMPHOCYTES # BLD AUTO: 9.6 % — LOW (ref 13–44)
MAGNESIUM SERPL-MCNC: 1.5 MG/DL — LOW (ref 1.6–2.6)
MAGNESIUM SERPL-MCNC: 1.6 MG/DL — SIGNIFICANT CHANGE UP (ref 1.6–2.6)
MAGNESIUM SERPL-MCNC: 1.6 MG/DL — SIGNIFICANT CHANGE UP (ref 1.6–2.6)
MAGNESIUM SERPL-MCNC: 1.7 MG/DL — SIGNIFICANT CHANGE UP (ref 1.6–2.6)
MAGNESIUM SERPL-MCNC: 1.8 MG/DL — SIGNIFICANT CHANGE UP (ref 1.6–2.6)
MAGNESIUM SERPL-MCNC: 1.8 MG/DL — SIGNIFICANT CHANGE UP (ref 1.6–2.6)
MCHC RBC-ENTMCNC: 29.4 PG — SIGNIFICANT CHANGE UP (ref 27–34)
MCHC RBC-ENTMCNC: 29.4 PG — SIGNIFICANT CHANGE UP (ref 27–34)
MCHC RBC-ENTMCNC: 29.6 GM/DL — LOW (ref 32–36)
MCHC RBC-ENTMCNC: 29.6 GM/DL — LOW (ref 32–36)
MCHC RBC-ENTMCNC: 29.7 GM/DL — LOW (ref 32–36)
MCHC RBC-ENTMCNC: 29.7 GM/DL — LOW (ref 32–36)
MCHC RBC-ENTMCNC: 29.7 PG — SIGNIFICANT CHANGE UP (ref 27–34)
MCHC RBC-ENTMCNC: 29.7 PG — SIGNIFICANT CHANGE UP (ref 27–34)
MCHC RBC-ENTMCNC: 29.8 PG — SIGNIFICANT CHANGE UP (ref 27–34)
MCHC RBC-ENTMCNC: 29.8 PG — SIGNIFICANT CHANGE UP (ref 27–34)
MCHC RBC-ENTMCNC: 30 GM/DL — LOW (ref 32–36)
MCHC RBC-ENTMCNC: 30 GM/DL — LOW (ref 32–36)
MCHC RBC-ENTMCNC: 30 PG — SIGNIFICANT CHANGE UP (ref 27–34)
MCHC RBC-ENTMCNC: 30.3 PG — SIGNIFICANT CHANGE UP (ref 27–34)
MCHC RBC-ENTMCNC: 32 GM/DL — SIGNIFICANT CHANGE UP (ref 32–36)
MCHC RBC-ENTMCNC: 32 GM/DL — SIGNIFICANT CHANGE UP (ref 32–36)
MCHC RBC-ENTMCNC: 32.2 GM/DL — SIGNIFICANT CHANGE UP (ref 32–36)
MCHC RBC-ENTMCNC: 32.3 GM/DL — SIGNIFICANT CHANGE UP (ref 32–36)
MCV RBC AUTO: 100.4 FL — HIGH (ref 80–100)
MCV RBC AUTO: 100.4 FL — HIGH (ref 80–100)
MCV RBC AUTO: 100.9 FL — HIGH (ref 80–100)
MCV RBC AUTO: 100.9 FL — HIGH (ref 80–100)
MCV RBC AUTO: 92.6 FL — SIGNIFICANT CHANGE UP (ref 80–100)
MCV RBC AUTO: 92.8 FL — SIGNIFICANT CHANGE UP (ref 80–100)
MCV RBC AUTO: 93 FL — SIGNIFICANT CHANGE UP (ref 80–100)
MCV RBC AUTO: 94.7 FL — SIGNIFICANT CHANGE UP (ref 80–100)
MCV RBC AUTO: 98 FL — SIGNIFICANT CHANGE UP (ref 80–100)
MCV RBC AUTO: 98 FL — SIGNIFICANT CHANGE UP (ref 80–100)
MONOCYTES # BLD AUTO: 0.56 K/UL — SIGNIFICANT CHANGE UP (ref 0–0.9)
MONOCYTES # BLD AUTO: 0.56 K/UL — SIGNIFICANT CHANGE UP (ref 0–0.9)
MONOCYTES # BLD AUTO: 0.61 K/UL — SIGNIFICANT CHANGE UP (ref 0–0.9)
MONOCYTES # BLD AUTO: 0.61 K/UL — SIGNIFICANT CHANGE UP (ref 0–0.9)
MONOCYTES # BLD AUTO: 0.68 K/UL — SIGNIFICANT CHANGE UP (ref 0–0.9)
MONOCYTES NFR BLD AUTO: 10 % — SIGNIFICANT CHANGE UP (ref 2–14)
MONOCYTES NFR BLD AUTO: 10 % — SIGNIFICANT CHANGE UP (ref 2–14)
MONOCYTES NFR BLD AUTO: 9.2 % — SIGNIFICANT CHANGE UP (ref 2–14)
MONOCYTES NFR BLD AUTO: 9.5 % — SIGNIFICANT CHANGE UP (ref 2–14)
MONOCYTES NFR BLD AUTO: 9.5 % — SIGNIFICANT CHANGE UP (ref 2–14)
MRSA PCR RESULT.: SIGNIFICANT CHANGE UP
NEUTROPHILS # BLD AUTO: 4.39 K/UL — SIGNIFICANT CHANGE UP (ref 1.8–7.4)
NEUTROPHILS # BLD AUTO: 4.39 K/UL — SIGNIFICANT CHANGE UP (ref 1.8–7.4)
NEUTROPHILS # BLD AUTO: 5.08 K/UL — SIGNIFICANT CHANGE UP (ref 1.8–7.4)
NEUTROPHILS # BLD AUTO: 5.08 K/UL — SIGNIFICANT CHANGE UP (ref 1.8–7.4)
NEUTROPHILS # BLD AUTO: 5.99 K/UL — SIGNIFICANT CHANGE UP (ref 1.8–7.4)
NEUTROPHILS NFR BLD AUTO: 78.3 % — HIGH (ref 43–77)
NEUTROPHILS NFR BLD AUTO: 78.3 % — HIGH (ref 43–77)
NEUTROPHILS NFR BLD AUTO: 79.2 % — HIGH (ref 43–77)
NEUTROPHILS NFR BLD AUTO: 79.2 % — HIGH (ref 43–77)
NEUTROPHILS NFR BLD AUTO: 81.3 % — HIGH (ref 43–77)
NT-PROBNP SERPL-SCNC: HIGH PG/ML (ref 0–300)
NT-PROBNP SERPL-SCNC: HIGH PG/ML (ref 0–450)
PCO2 BLDA: 83 MMHG — CRITICAL HIGH (ref 35–48)
PCO2 BLDA: 83 MMHG — CRITICAL HIGH (ref 35–48)
PCO2 BLDV: 102 MMHG — HIGH (ref 42–55)
PCO2 BLDV: 102 MMHG — HIGH (ref 42–55)
PCO2 BLDV: 52 MMHG — SIGNIFICANT CHANGE UP (ref 42–55)
PCO2 BLDV: 52 MMHG — SIGNIFICANT CHANGE UP (ref 42–55)
PH BLDA: 7.2 — CRITICAL LOW (ref 7.35–7.45)
PH BLDA: 7.2 — CRITICAL LOW (ref 7.35–7.45)
PH BLDV: 7.04 — CRITICAL LOW (ref 7.32–7.43)
PH BLDV: 7.04 — CRITICAL LOW (ref 7.32–7.43)
PH BLDV: 7.37 — SIGNIFICANT CHANGE UP (ref 7.32–7.43)
PH BLDV: 7.37 — SIGNIFICANT CHANGE UP (ref 7.32–7.43)
PHOSPHATE SERPL-MCNC: 2 MG/DL — LOW (ref 2.5–4.5)
PHOSPHATE SERPL-MCNC: 2 MG/DL — LOW (ref 2.5–4.5)
PHOSPHATE SERPL-MCNC: 3 MG/DL — SIGNIFICANT CHANGE UP (ref 2.4–4.7)
PHOSPHATE SERPL-MCNC: 3.3 MG/DL — SIGNIFICANT CHANGE UP (ref 2.4–4.7)
PHOSPHATE SERPL-MCNC: 4.1 MG/DL — SIGNIFICANT CHANGE UP (ref 2.4–4.7)
PHOSPHATE SERPL-MCNC: 4.7 MG/DL — SIGNIFICANT CHANGE UP (ref 2.4–4.7)
PHOSPHATE SERPL-MCNC: 5.4 MG/DL — HIGH (ref 2.4–4.7)
PLATELET # BLD AUTO: 123 K/UL — LOW (ref 150–400)
PLATELET # BLD AUTO: 142 K/UL — LOW (ref 150–400)
PLATELET # BLD AUTO: 148 K/UL — LOW (ref 150–400)
PLATELET # BLD AUTO: 159 K/UL — SIGNIFICANT CHANGE UP (ref 150–400)
PLATELET # BLD AUTO: 159 K/UL — SIGNIFICANT CHANGE UP (ref 150–400)
PLATELET # BLD AUTO: 164 K/UL — SIGNIFICANT CHANGE UP (ref 150–400)
PLATELET # BLD AUTO: 166 K/UL — SIGNIFICANT CHANGE UP (ref 150–400)
PLATELET # BLD AUTO: 166 K/UL — SIGNIFICANT CHANGE UP (ref 150–400)
PLATELET # BLD AUTO: 203 K/UL — SIGNIFICANT CHANGE UP (ref 150–400)
PLATELET # BLD AUTO: 203 K/UL — SIGNIFICANT CHANGE UP (ref 150–400)
PO2 BLDA: 32 MMHG — CRITICAL LOW (ref 83–108)
PO2 BLDA: 32 MMHG — CRITICAL LOW (ref 83–108)
PO2 BLDV: 154 MMHG — HIGH (ref 25–45)
PO2 BLDV: 154 MMHG — HIGH (ref 25–45)
PO2 BLDV: 77 MMHG — HIGH (ref 25–45)
PO2 BLDV: 77 MMHG — HIGH (ref 25–45)
POTASSIUM SERPL-MCNC: 3.2 MMOL/L — LOW (ref 3.5–5.3)
POTASSIUM SERPL-MCNC: 3.2 MMOL/L — LOW (ref 3.5–5.3)
POTASSIUM SERPL-MCNC: 3.7 MMOL/L — SIGNIFICANT CHANGE UP (ref 3.5–5.3)
POTASSIUM SERPL-MCNC: 4.1 MMOL/L — SIGNIFICANT CHANGE UP (ref 3.5–5.3)
POTASSIUM SERPL-MCNC: 4.6 MMOL/L — SIGNIFICANT CHANGE UP (ref 3.5–5.3)
POTASSIUM SERPL-MCNC: 4.7 MMOL/L — SIGNIFICANT CHANGE UP (ref 3.5–5.3)
POTASSIUM SERPL-MCNC: 5 MMOL/L — SIGNIFICANT CHANGE UP (ref 3.5–5.3)
POTASSIUM SERPL-MCNC: 5.3 MMOL/L — SIGNIFICANT CHANGE UP (ref 3.5–5.3)
POTASSIUM SERPL-MCNC: 5.5 MMOL/L — HIGH (ref 3.5–5.3)
POTASSIUM SERPL-MCNC: 5.6 MMOL/L — HIGH (ref 3.5–5.3)
POTASSIUM SERPL-SCNC: 3.2 MMOL/L — LOW (ref 3.5–5.3)
POTASSIUM SERPL-SCNC: 3.2 MMOL/L — LOW (ref 3.5–5.3)
POTASSIUM SERPL-SCNC: 3.7 MMOL/L — SIGNIFICANT CHANGE UP (ref 3.5–5.3)
POTASSIUM SERPL-SCNC: 4.1 MMOL/L — SIGNIFICANT CHANGE UP (ref 3.5–5.3)
POTASSIUM SERPL-SCNC: 4.6 MMOL/L — SIGNIFICANT CHANGE UP (ref 3.5–5.3)
POTASSIUM SERPL-SCNC: 4.7 MMOL/L — SIGNIFICANT CHANGE UP (ref 3.5–5.3)
POTASSIUM SERPL-SCNC: 5 MMOL/L — SIGNIFICANT CHANGE UP (ref 3.5–5.3)
POTASSIUM SERPL-SCNC: 5.3 MMOL/L — SIGNIFICANT CHANGE UP (ref 3.5–5.3)
POTASSIUM SERPL-SCNC: 5.5 MMOL/L — HIGH (ref 3.5–5.3)
POTASSIUM SERPL-SCNC: 5.6 MMOL/L — HIGH (ref 3.5–5.3)
PROCALCITONIN SERPL-MCNC: 1.24 NG/ML — HIGH (ref 0.02–0.1)
PROCALCITONIN SERPL-MCNC: 1.24 NG/ML — HIGH (ref 0.02–0.1)
PROT SERPL-MCNC: 6.6 G/DL — SIGNIFICANT CHANGE UP (ref 6.6–8.7)
PROT SERPL-MCNC: 6.6 GM/DL — SIGNIFICANT CHANGE UP (ref 6–8.3)
PROT SERPL-MCNC: 6.6 GM/DL — SIGNIFICANT CHANGE UP (ref 6–8.3)
PROT SERPL-MCNC: 6.7 G/DL — SIGNIFICANT CHANGE UP (ref 6.6–8.7)
PROT SERPL-MCNC: 7.2 G/DL — SIGNIFICANT CHANGE UP (ref 6.6–8.7)
PROT SERPL-MCNC: 7.2 G/DL — SIGNIFICANT CHANGE UP (ref 6.6–8.7)
PROT SERPL-MCNC: 7.4 GM/DL — SIGNIFICANT CHANGE UP (ref 6–8.3)
PROT SERPL-MCNC: 7.4 GM/DL — SIGNIFICANT CHANGE UP (ref 6–8.3)
PROT SERPL-MCNC: 7.6 G/DL — SIGNIFICANT CHANGE UP (ref 6.6–8.7)
PROTHROM AB SERPL-ACNC: 12.4 SEC — SIGNIFICANT CHANGE UP (ref 10.5–13.4)
PROTHROM AB SERPL-ACNC: 16.7 SEC — HIGH (ref 9.5–13)
PROTHROM AB SERPL-ACNC: 16.7 SEC — HIGH (ref 9.5–13)
RAPID RVP RESULT: DETECTED
RBC # BLD: 2.83 M/UL — LOW (ref 4.2–5.8)
RBC # BLD: 2.83 M/UL — LOW (ref 4.2–5.8)
RBC # BLD: 2.93 M/UL — LOW (ref 4.2–5.8)
RBC # BLD: 2.93 M/UL — LOW (ref 4.2–5.8)
RBC # BLD: 3.17 M/UL — LOW (ref 4.2–5.8)
RBC # BLD: 3.17 M/UL — LOW (ref 4.2–5.8)
RBC # BLD: 3.56 M/UL — LOW (ref 4.2–5.8)
RBC # BLD: 3.63 M/UL — LOW (ref 4.2–5.8)
RBC # BLD: 4.03 M/UL — LOW (ref 4.2–5.8)
RBC # BLD: 4.57 M/UL — SIGNIFICANT CHANGE UP (ref 4.2–5.8)
RBC # FLD: 18.1 % — HIGH (ref 10.3–14.5)
RBC # FLD: 18.1 % — HIGH (ref 10.3–14.5)
RBC # FLD: 18.4 % — HIGH (ref 10.3–14.5)
RBC # FLD: 18.6 % — HIGH (ref 10.3–14.5)
RBC # FLD: 18.8 % — HIGH (ref 10.3–14.5)
RBC # FLD: 19 % — HIGH (ref 10.3–14.5)
RBC # FLD: 19 % — HIGH (ref 10.3–14.5)
RSV RNA SPEC QL NAA+PROBE: DETECTED
RSV RNA SPEC QL NAA+PROBE: DETECTED
RV+EV RNA SPEC QL NAA+PROBE: DETECTED
S AUREUS DNA NOSE QL NAA+PROBE: SIGNIFICANT CHANGE UP
SAO2 % BLDA: 62 % — LOW (ref 94–98)
SAO2 % BLDA: 62 % — LOW (ref 94–98)
SAO2 % BLDV: 96 % — HIGH (ref 67–88)
SAO2 % BLDV: 96 % — HIGH (ref 67–88)
SAO2 % BLDV: 99 % — HIGH (ref 67–88)
SAO2 % BLDV: 99 % — HIGH (ref 67–88)
SARS-COV-2 RNA SPEC QL NAA+PROBE: SIGNIFICANT CHANGE UP
SODIUM SERPL-SCNC: 127 MMOL/L — LOW (ref 135–145)
SODIUM SERPL-SCNC: 128 MMOL/L — LOW (ref 135–145)
SODIUM SERPL-SCNC: 132 MMOL/L — LOW (ref 135–145)
SODIUM SERPL-SCNC: 133 MMOL/L — LOW (ref 135–145)
SODIUM SERPL-SCNC: 134 MMOL/L — LOW (ref 135–145)
SODIUM SERPL-SCNC: 135 MMOL/L — SIGNIFICANT CHANGE UP (ref 135–145)
SODIUM SERPL-SCNC: 136 MMOL/L — SIGNIFICANT CHANGE UP (ref 135–145)
SODIUM SERPL-SCNC: 137 MMOL/L — SIGNIFICANT CHANGE UP (ref 135–145)
SODIUM SERPL-SCNC: 137 MMOL/L — SIGNIFICANT CHANGE UP (ref 135–145)
SODIUM SERPL-SCNC: 139 MMOL/L — SIGNIFICANT CHANGE UP (ref 135–145)
TIBC SERPL-MCNC: 265 UG/DL — SIGNIFICANT CHANGE UP (ref 220–430)
TRANSFERRIN SERPL-MCNC: 185 MG/DL — SIGNIFICANT CHANGE UP (ref 180–329)
TROPONIN I, HIGH SENSITIVITY RESULT: 23.92 NG/L — SIGNIFICANT CHANGE UP
TROPONIN I, HIGH SENSITIVITY RESULT: 23.92 NG/L — SIGNIFICANT CHANGE UP
TROPONIN T SERPL-MCNC: 0.05 NG/ML — SIGNIFICANT CHANGE UP (ref 0–0.06)
TSH SERPL-MCNC: 8.07 UU/ML — HIGH (ref 0.34–4.82)
TSH SERPL-MCNC: 8.07 UU/ML — HIGH (ref 0.34–4.82)
VIT B12 SERPL-MCNC: 1700 PG/ML — HIGH (ref 232–1245)
VIT B12 SERPL-MCNC: 1700 PG/ML — HIGH (ref 232–1245)
WBC # BLD: 12.96 K/UL — HIGH (ref 3.8–10.5)
WBC # BLD: 5.61 K/UL — SIGNIFICANT CHANGE UP (ref 3.8–10.5)
WBC # BLD: 5.61 K/UL — SIGNIFICANT CHANGE UP (ref 3.8–10.5)
WBC # BLD: 6.37 K/UL — SIGNIFICANT CHANGE UP (ref 3.8–10.5)
WBC # BLD: 6.42 K/UL — SIGNIFICANT CHANGE UP (ref 3.8–10.5)
WBC # BLD: 6.42 K/UL — SIGNIFICANT CHANGE UP (ref 3.8–10.5)
WBC # BLD: 6.88 K/UL — SIGNIFICANT CHANGE UP (ref 3.8–10.5)
WBC # BLD: 7.34 K/UL — SIGNIFICANT CHANGE UP (ref 3.8–10.5)
WBC # BLD: 7.34 K/UL — SIGNIFICANT CHANGE UP (ref 3.8–10.5)
WBC # BLD: 7.38 K/UL — SIGNIFICANT CHANGE UP (ref 3.8–10.5)
WBC # FLD AUTO: 12.96 K/UL — HIGH (ref 3.8–10.5)
WBC # FLD AUTO: 5.61 K/UL — SIGNIFICANT CHANGE UP (ref 3.8–10.5)
WBC # FLD AUTO: 5.61 K/UL — SIGNIFICANT CHANGE UP (ref 3.8–10.5)
WBC # FLD AUTO: 6.37 K/UL — SIGNIFICANT CHANGE UP (ref 3.8–10.5)
WBC # FLD AUTO: 6.42 K/UL — SIGNIFICANT CHANGE UP (ref 3.8–10.5)
WBC # FLD AUTO: 6.42 K/UL — SIGNIFICANT CHANGE UP (ref 3.8–10.5)
WBC # FLD AUTO: 6.88 K/UL — SIGNIFICANT CHANGE UP (ref 3.8–10.5)
WBC # FLD AUTO: 7.34 K/UL — SIGNIFICANT CHANGE UP (ref 3.8–10.5)
WBC # FLD AUTO: 7.34 K/UL — SIGNIFICANT CHANGE UP (ref 3.8–10.5)
WBC # FLD AUTO: 7.38 K/UL — SIGNIFICANT CHANGE UP (ref 3.8–10.5)

## 2023-01-01 PROCEDURE — 93010 ELECTROCARDIOGRAM REPORT: CPT

## 2023-01-01 PROCEDURE — 36600 WITHDRAWAL OF ARTERIAL BLOOD: CPT

## 2023-01-01 PROCEDURE — 85027 COMPLETE CBC AUTOMATED: CPT

## 2023-01-01 PROCEDURE — P9047: CPT

## 2023-01-01 PROCEDURE — 93000 ELECTROCARDIOGRAM COMPLETE: CPT

## 2023-01-01 PROCEDURE — 82607 VITAMIN B-12: CPT

## 2023-01-01 PROCEDURE — 71045 X-RAY EXAM CHEST 1 VIEW: CPT | Mod: 26

## 2023-01-01 PROCEDURE — 85025 COMPLETE CBC W/AUTO DIFF WBC: CPT

## 2023-01-01 PROCEDURE — 36415 COLL VENOUS BLD VENIPUNCTURE: CPT

## 2023-01-01 PROCEDURE — 87640 STAPH A DNA AMP PROBE: CPT

## 2023-01-01 PROCEDURE — 85610 PROTHROMBIN TIME: CPT

## 2023-01-01 PROCEDURE — 99233 SBSQ HOSP IP/OBS HIGH 50: CPT

## 2023-01-01 PROCEDURE — 70450 CT HEAD/BRAIN W/O DYE: CPT | Mod: 26

## 2023-01-01 PROCEDURE — 93306 TTE W/DOPPLER COMPLETE: CPT | Mod: 26

## 2023-01-01 PROCEDURE — 99232 SBSQ HOSP IP/OBS MODERATE 35: CPT

## 2023-01-01 PROCEDURE — 97162 PT EVAL MOD COMPLEX 30 MIN: CPT | Mod: GP

## 2023-01-01 PROCEDURE — 76705 ECHO EXAM OF ABDOMEN: CPT | Mod: 26

## 2023-01-01 PROCEDURE — 92526 ORAL FUNCTION THERAPY: CPT | Mod: GN

## 2023-01-01 PROCEDURE — 99239 HOSP IP/OBS DSCHRG MGMT >30: CPT

## 2023-01-01 PROCEDURE — 83735 ASSAY OF MAGNESIUM: CPT

## 2023-01-01 PROCEDURE — 83605 ASSAY OF LACTIC ACID: CPT

## 2023-01-01 PROCEDURE — 83880 ASSAY OF NATRIURETIC PEPTIDE: CPT

## 2023-01-01 PROCEDURE — 99223 1ST HOSP IP/OBS HIGH 75: CPT

## 2023-01-01 PROCEDURE — 84100 ASSAY OF PHOSPHORUS: CPT

## 2023-01-01 PROCEDURE — 83540 ASSAY OF IRON: CPT

## 2023-01-01 PROCEDURE — 71045 X-RAY EXAM CHEST 1 VIEW: CPT

## 2023-01-01 PROCEDURE — 93971 EXTREMITY STUDY: CPT | Mod: LT

## 2023-01-01 PROCEDURE — 80053 COMPREHEN METABOLIC PANEL: CPT

## 2023-01-01 PROCEDURE — 94640 AIRWAY INHALATION TREATMENT: CPT

## 2023-01-01 PROCEDURE — 80074 ACUTE HEPATITIS PANEL: CPT

## 2023-01-01 PROCEDURE — 87040 BLOOD CULTURE FOR BACTERIA: CPT

## 2023-01-01 PROCEDURE — 82962 GLUCOSE BLOOD TEST: CPT

## 2023-01-01 PROCEDURE — 99497 ADVNCD CARE PLAN 30 MIN: CPT | Mod: 25

## 2023-01-01 PROCEDURE — 92610 EVALUATE SWALLOWING FUNCTION: CPT | Mod: GN

## 2023-01-01 PROCEDURE — 83036 HEMOGLOBIN GLYCOSYLATED A1C: CPT

## 2023-01-01 PROCEDURE — 82947 ASSAY GLUCOSE BLOOD QUANT: CPT

## 2023-01-01 PROCEDURE — 99215 OFFICE O/P EST HI 40 MIN: CPT

## 2023-01-01 PROCEDURE — 93306 TTE W/DOPPLER COMPLETE: CPT

## 2023-01-01 PROCEDURE — 80048 BASIC METABOLIC PNL TOTAL CA: CPT

## 2023-01-01 PROCEDURE — 0225U NFCT DS DNA&RNA 21 SARSCOV2: CPT

## 2023-01-01 PROCEDURE — 93971 EXTREMITY STUDY: CPT | Mod: 26,LT

## 2023-01-01 PROCEDURE — U0005: CPT

## 2023-01-01 PROCEDURE — 82746 ASSAY OF FOLIC ACID SERUM: CPT

## 2023-01-01 PROCEDURE — A9500: CPT

## 2023-01-01 PROCEDURE — 99261: CPT

## 2023-01-01 PROCEDURE — 84484 ASSAY OF TROPONIN QUANT: CPT

## 2023-01-01 PROCEDURE — 70450 CT HEAD/BRAIN W/O DYE: CPT | Mod: MA

## 2023-01-01 PROCEDURE — 90935 HEMODIALYSIS ONE EVALUATION: CPT

## 2023-01-01 PROCEDURE — 85018 HEMOGLOBIN: CPT

## 2023-01-01 PROCEDURE — 93015 CV STRESS TEST SUPVJ I&R: CPT

## 2023-01-01 PROCEDURE — U0003: CPT

## 2023-01-01 PROCEDURE — 84436 ASSAY OF TOTAL THYROXINE: CPT

## 2023-01-01 PROCEDURE — 99285 EMERGENCY DEPT VISIT HI MDM: CPT

## 2023-01-01 PROCEDURE — 84443 ASSAY THYROID STIM HORMONE: CPT

## 2023-01-01 PROCEDURE — 93005 ELECTROCARDIOGRAM TRACING: CPT

## 2023-01-01 PROCEDURE — 94660 CPAP INITIATION&MGMT: CPT

## 2023-01-01 PROCEDURE — 78452 HT MUSCLE IMAGE SPECT MULT: CPT

## 2023-01-01 PROCEDURE — 83550 IRON BINDING TEST: CPT

## 2023-01-01 PROCEDURE — 87641 MR-STAPH DNA AMP PROBE: CPT

## 2023-01-01 PROCEDURE — 70450 CT HEAD/BRAIN W/O DYE: CPT

## 2023-01-01 PROCEDURE — 99291 CRITICAL CARE FIRST HOUR: CPT

## 2023-01-01 PROCEDURE — 85730 THROMBOPLASTIN TIME PARTIAL: CPT

## 2023-01-01 PROCEDURE — 82803 BLOOD GASES ANY COMBINATION: CPT

## 2023-01-01 PROCEDURE — 82728 ASSAY OF FERRITIN: CPT

## 2023-01-01 PROCEDURE — 84480 ASSAY TRIIODOTHYRONINE (T3): CPT

## 2023-01-01 PROCEDURE — 84466 ASSAY OF TRANSFERRIN: CPT

## 2023-01-01 PROCEDURE — 76705 ECHO EXAM OF ABDOMEN: CPT

## 2023-01-01 PROCEDURE — 85014 HEMATOCRIT: CPT

## 2023-01-01 RX ORDER — METOPROLOL TARTRATE 50 MG
25 TABLET ORAL EVERY 12 HOURS
Refills: 0 | Status: DISCONTINUED | OUTPATIENT
Start: 2023-01-01 | End: 2023-01-01

## 2023-01-01 RX ORDER — SODIUM CHLORIDE 9 MG/ML
1000 INJECTION, SOLUTION INTRAVENOUS
Refills: 0 | Status: DISCONTINUED | OUTPATIENT
Start: 2023-01-01 | End: 2023-01-01

## 2023-01-01 RX ORDER — ALBUMIN HUMAN 25 %
50 VIAL (ML) INTRAVENOUS
Refills: 0 | Status: DISCONTINUED | OUTPATIENT
Start: 2023-01-01 | End: 2023-01-01

## 2023-01-01 RX ORDER — DEXTROSE 50 % IN WATER 50 %
15 SYRINGE (ML) INTRAVENOUS ONCE
Refills: 0 | Status: DISCONTINUED | OUTPATIENT
Start: 2023-01-01 | End: 2023-01-01

## 2023-01-01 RX ORDER — CALCIUM ACETATE 667 MG
667 TABLET ORAL
Refills: 0 | Status: DISCONTINUED | OUTPATIENT
Start: 2023-01-01 | End: 2023-01-01

## 2023-01-01 RX ORDER — INSULIN LISPRO 100/ML
VIAL (ML) SUBCUTANEOUS
Refills: 0 | Status: DISCONTINUED | OUTPATIENT
Start: 2023-01-01 | End: 2023-01-01

## 2023-01-01 RX ORDER — URSODIOL 250 MG/1
300 TABLET, FILM COATED ORAL
Refills: 0 | Status: DISCONTINUED | OUTPATIENT
Start: 2023-01-01 | End: 2023-01-01

## 2023-01-01 RX ORDER — ASPIRIN/CALCIUM CARB/MAGNESIUM 324 MG
324 TABLET ORAL ONCE
Refills: 0 | Status: COMPLETED | OUTPATIENT
Start: 2023-01-01 | End: 2023-01-01

## 2023-01-01 RX ORDER — PIPERACILLIN AND TAZOBACTAM 4; .5 G/20ML; G/20ML
3.38 INJECTION, POWDER, LYOPHILIZED, FOR SOLUTION INTRAVENOUS ONCE
Refills: 0 | Status: COMPLETED | OUTPATIENT
Start: 2023-01-01 | End: 2023-01-01

## 2023-01-01 RX ORDER — IPRATROPIUM/ALBUTEROL SULFATE 18-103MCG
3 AEROSOL WITH ADAPTER (GRAM) INHALATION EVERY 6 HOURS
Refills: 0 | Status: DISCONTINUED | OUTPATIENT
Start: 2023-01-01 | End: 2023-01-01

## 2023-01-01 RX ORDER — URSODIOL 250 MG/1
1 TABLET, FILM COATED ORAL
Qty: 0 | Refills: 0 | DISCHARGE

## 2023-01-01 RX ORDER — DEXTROSE 50 % IN WATER 50 %
12.5 SYRINGE (ML) INTRAVENOUS ONCE
Refills: 0 | Status: COMPLETED | OUTPATIENT
Start: 2023-01-01 | End: 2023-01-01

## 2023-01-01 RX ORDER — NIFEDIPINE 30 MG
60 TABLET, EXTENDED RELEASE 24 HR ORAL DAILY
Refills: 0 | Status: DISCONTINUED | OUTPATIENT
Start: 2023-01-01 | End: 2023-01-01

## 2023-01-01 RX ORDER — HEPARIN SODIUM 5000 [USP'U]/ML
5000 INJECTION INTRAVENOUS; SUBCUTANEOUS EVERY 12 HOURS
Refills: 0 | Status: DISCONTINUED | OUTPATIENT
Start: 2023-01-01 | End: 2023-01-01

## 2023-01-01 RX ORDER — GEMFIBROZIL 600 MG
1 TABLET ORAL
Refills: 0 | DISCHARGE

## 2023-01-01 RX ORDER — DEXTROSE 50 % IN WATER 50 %
25 SYRINGE (ML) INTRAVENOUS ONCE
Refills: 0 | Status: DISCONTINUED | OUTPATIENT
Start: 2023-01-01 | End: 2023-01-01

## 2023-01-01 RX ORDER — SODIUM ZIRCONIUM CYCLOSILICATE 10 G/10G
10 POWDER, FOR SUSPENSION ORAL ONCE
Refills: 0 | Status: DISCONTINUED | OUTPATIENT
Start: 2023-01-01 | End: 2023-01-01

## 2023-01-01 RX ORDER — INSULIN LISPRO 100/ML
VIAL (ML) SUBCUTANEOUS AT BEDTIME
Refills: 0 | Status: DISCONTINUED | OUTPATIENT
Start: 2023-01-01 | End: 2023-01-01

## 2023-01-01 RX ORDER — DEXTROSE 50 % IN WATER 50 %
12.5 SYRINGE (ML) INTRAVENOUS ONCE
Refills: 0 | Status: DISCONTINUED | OUTPATIENT
Start: 2023-01-01 | End: 2023-01-01

## 2023-01-01 RX ORDER — IPRATROPIUM/ALBUTEROL SULFATE 18-103MCG
3 AEROSOL WITH ADAPTER (GRAM) INHALATION
Refills: 0 | DISCHARGE

## 2023-01-01 RX ORDER — PIPERACILLIN AND TAZOBACTAM 4; .5 G/20ML; G/20ML
3.38 INJECTION, POWDER, LYOPHILIZED, FOR SOLUTION INTRAVENOUS EVERY 12 HOURS
Refills: 0 | Status: DISCONTINUED | OUTPATIENT
Start: 2023-01-01 | End: 2023-01-01

## 2023-01-01 RX ORDER — LANOLIN ALCOHOL/MO/W.PET/CERES
3 CREAM (GRAM) TOPICAL AT BEDTIME
Refills: 0 | Status: DISCONTINUED | OUTPATIENT
Start: 2023-01-01 | End: 2023-01-01

## 2023-01-01 RX ORDER — OCTREOTIDE ACETATE 200 UG/ML
50 INJECTION, SOLUTION INTRAVENOUS; SUBCUTANEOUS
Qty: 500 | Refills: 0 | Status: DISCONTINUED | OUTPATIENT
Start: 2023-01-01 | End: 2023-01-01

## 2023-01-01 RX ORDER — CALCIUM ACETATE 667 MG
1 TABLET ORAL
Qty: 0 | Refills: 0 | DISCHARGE
Start: 2023-01-01

## 2023-01-01 RX ORDER — DEXTROSE 10 % IN WATER 10 %
1000 INTRAVENOUS SOLUTION INTRAVENOUS
Refills: 0 | Status: DISCONTINUED | OUTPATIENT
Start: 2023-01-01 | End: 2023-01-01

## 2023-01-01 RX ORDER — FERROUS SULFATE 325(65) MG
325 TABLET ORAL DAILY
Refills: 0 | Status: DISCONTINUED | OUTPATIENT
Start: 2023-01-01 | End: 2023-01-01

## 2023-01-01 RX ORDER — RANOLAZINE 500 MG/1
500 TABLET, FILM COATED, EXTENDED RELEASE ORAL AT BEDTIME
Refills: 0 | Status: DISCONTINUED | OUTPATIENT
Start: 2023-01-01 | End: 2023-01-01

## 2023-01-01 RX ORDER — METOPROLOL TARTRATE 50 MG
12.5 TABLET ORAL DAILY
Refills: 0 | Status: DISCONTINUED | OUTPATIENT
Start: 2023-01-01 | End: 2023-01-01

## 2023-01-01 RX ORDER — PETROLATUM,WHITE
1 JELLY (GRAM) TOPICAL
Refills: 0 | Status: DISCONTINUED | OUTPATIENT
Start: 2023-01-01 | End: 2023-01-01

## 2023-01-01 RX ORDER — SODIUM,POTASSIUM PHOSPHATES 278-250MG
2 POWDER IN PACKET (EA) ORAL ONCE
Refills: 0 | Status: COMPLETED | OUTPATIENT
Start: 2023-01-01 | End: 2023-01-01

## 2023-01-01 RX ORDER — ACETAMINOPHEN 500 MG
650 TABLET ORAL EVERY 6 HOURS
Refills: 0 | Status: DISCONTINUED | OUTPATIENT
Start: 2023-01-01 | End: 2023-01-01

## 2023-01-01 RX ORDER — LACTULOSE 10 G/15ML
10 SOLUTION ORAL
Refills: 0 | Status: DISCONTINUED | OUTPATIENT
Start: 2023-01-01 | End: 2023-01-01

## 2023-01-01 RX ORDER — LEVOTHYROXINE SODIUM 125 MCG
20 TABLET ORAL AT BEDTIME
Refills: 0 | Status: DISCONTINUED | OUTPATIENT
Start: 2023-01-01 | End: 2023-01-01

## 2023-01-01 RX ORDER — POTASSIUM CHLORIDE 20 MEQ
20 PACKET (EA) ORAL ONCE
Refills: 0 | Status: COMPLETED | OUTPATIENT
Start: 2023-01-01 | End: 2023-01-01

## 2023-01-01 RX ORDER — HYDROCORTISONE 20 MG
100 TABLET ORAL ONCE
Refills: 0 | Status: COMPLETED | OUTPATIENT
Start: 2023-01-01 | End: 2023-01-01

## 2023-01-01 RX ORDER — CINACALCET 30 MG/1
1 TABLET, FILM COATED ORAL
Qty: 0 | Refills: 0 | DISCHARGE

## 2023-01-01 RX ORDER — DEXTROSE 10 % IN WATER 10 %
500 INTRAVENOUS SOLUTION INTRAVENOUS
Refills: 0 | Status: DISCONTINUED | OUTPATIENT
Start: 2023-01-01 | End: 2023-01-01

## 2023-01-01 RX ORDER — URSODIOL 250 MG/1
1 TABLET, FILM COATED ORAL
Refills: 0 | DISCHARGE

## 2023-01-01 RX ORDER — MIDODRINE HYDROCHLORIDE 2.5 MG/1
5 TABLET ORAL ONCE
Refills: 0 | Status: DISCONTINUED | OUTPATIENT
Start: 2023-01-01 | End: 2023-01-01

## 2023-01-01 RX ORDER — DEXTROSE 50 % IN WATER 50 %
25 SYRINGE (ML) INTRAVENOUS ONCE
Refills: 0 | Status: COMPLETED | OUTPATIENT
Start: 2023-01-01 | End: 2023-01-01

## 2023-01-01 RX ORDER — VANCOMYCIN HCL 1 G
1000 VIAL (EA) INTRAVENOUS EVERY 24 HOURS
Refills: 0 | Status: DISCONTINUED | OUTPATIENT
Start: 2023-01-01 | End: 2023-01-01

## 2023-01-01 RX ORDER — CHLORHEXIDINE GLUCONATE 213 G/1000ML
1 SOLUTION TOPICAL
Refills: 0 | Status: DISCONTINUED | OUTPATIENT
Start: 2023-01-01 | End: 2023-01-01

## 2023-01-01 RX ORDER — CHLORHEXIDINE GLUCONATE 213 G/1000ML
1 SOLUTION TOPICAL DAILY
Refills: 0 | Status: DISCONTINUED | OUTPATIENT
Start: 2023-01-01 | End: 2023-01-01

## 2023-01-01 RX ORDER — ONDANSETRON 8 MG/1
4 TABLET, FILM COATED ORAL EVERY 4 HOURS
Refills: 0 | Status: DISCONTINUED | OUTPATIENT
Start: 2023-01-01 | End: 2023-01-01

## 2023-01-01 RX ORDER — LEVOCETIRIZINE DIHYDROCHLORIDE 0.5 MG/ML
1 SOLUTION ORAL
Qty: 0 | Refills: 0 | DISCHARGE

## 2023-01-01 RX ORDER — DEXTROSE 50 % IN WATER 50 %
50 SYRINGE (ML) INTRAVENOUS ONCE
Refills: 0 | Status: COMPLETED | OUTPATIENT
Start: 2023-01-01 | End: 2023-01-01

## 2023-01-01 RX ORDER — HYDRALAZINE HCL 50 MG
10 TABLET ORAL EVERY 6 HOURS
Refills: 0 | Status: DISCONTINUED | OUTPATIENT
Start: 2023-01-01 | End: 2023-01-01

## 2023-01-01 RX ORDER — PANTOPRAZOLE SODIUM 20 MG/1
40 TABLET, DELAYED RELEASE ORAL
Refills: 0 | Status: DISCONTINUED | OUTPATIENT
Start: 2023-01-01 | End: 2023-01-01

## 2023-01-01 RX ORDER — MIDODRINE HYDROCHLORIDE 2.5 MG/1
10 TABLET ORAL EVERY 8 HOURS
Refills: 0 | Status: DISCONTINUED | OUTPATIENT
Start: 2023-01-01 | End: 2023-01-01

## 2023-01-01 RX ORDER — GLUCAGON INJECTION, SOLUTION 0.5 MG/.1ML
1 INJECTION, SOLUTION SUBCUTANEOUS ONCE
Refills: 0 | Status: DISCONTINUED | OUTPATIENT
Start: 2023-01-01 | End: 2023-01-01

## 2023-01-01 RX ORDER — APIXABAN 2.5 MG/1
2.5 TABLET, FILM COATED ORAL
Refills: 0 | Status: DISCONTINUED | OUTPATIENT
Start: 2023-01-01 | End: 2023-01-01

## 2023-01-01 RX ORDER — ALBUTEROL 90 UG/1
2 AEROSOL, METERED ORAL
Refills: 0 | Status: DISCONTINUED | OUTPATIENT
Start: 2023-01-01 | End: 2023-01-01

## 2023-01-01 RX ORDER — ACETAMINOPHEN 500 MG
1000 TABLET ORAL ONCE
Refills: 0 | Status: COMPLETED | OUTPATIENT
Start: 2023-01-01 | End: 2023-01-01

## 2023-01-01 RX ORDER — GLIMEPIRIDE 1 MG
1 TABLET ORAL
Qty: 0 | Refills: 0 | DISCHARGE

## 2023-01-01 RX ORDER — HEPARIN SODIUM 5000 [USP'U]/ML
5000 INJECTION INTRAVENOUS; SUBCUTANEOUS EVERY 8 HOURS
Refills: 0 | Status: DISCONTINUED | OUTPATIENT
Start: 2023-01-01 | End: 2023-01-01

## 2023-01-01 RX ORDER — CLOPIDOGREL BISULFATE 75 MG/1
75 TABLET, FILM COATED ORAL DAILY
Refills: 0 | Status: DISCONTINUED | OUTPATIENT
Start: 2023-01-01 | End: 2023-01-01

## 2023-01-01 RX ORDER — ONDANSETRON 8 MG/1
4 TABLET, FILM COATED ORAL EVERY 8 HOURS
Refills: 0 | Status: DISCONTINUED | OUTPATIENT
Start: 2023-01-01 | End: 2023-01-01

## 2023-01-01 RX ORDER — GEMFIBROZIL 600 MG
600 TABLET ORAL
Refills: 0 | Status: DISCONTINUED | OUTPATIENT
Start: 2023-01-01 | End: 2023-01-01

## 2023-01-01 RX ORDER — OMEPRAZOLE 10 MG/1
1 CAPSULE, DELAYED RELEASE ORAL
Qty: 0 | Refills: 0 | DISCHARGE

## 2023-01-01 RX ORDER — APIXABAN 2.5 MG/1
1 TABLET, FILM COATED ORAL
Refills: 0 | DISCHARGE

## 2023-01-01 RX ORDER — SODIUM CHLORIDE 9 MG/ML
250 INJECTION INTRAMUSCULAR; INTRAVENOUS; SUBCUTANEOUS ONCE
Refills: 0 | Status: COMPLETED | OUTPATIENT
Start: 2023-01-01 | End: 2023-01-01

## 2023-01-01 RX ORDER — ERYTHROPOIETIN 10000 [IU]/ML
10000 INJECTION, SOLUTION INTRAVENOUS; SUBCUTANEOUS
Refills: 0 | Status: DISCONTINUED | OUTPATIENT
Start: 2023-01-01 | End: 2023-01-01

## 2023-01-01 RX ORDER — CINACALCET 30 MG/1
30 TABLET, FILM COATED ORAL EVERY 12 HOURS
Refills: 0 | Status: DISCONTINUED | OUTPATIENT
Start: 2023-01-01 | End: 2023-01-01

## 2023-01-01 RX ADMIN — Medication 25 MILLIGRAM(S): at 05:43

## 2023-01-01 RX ADMIN — Medication 1 APPLICATION(S): at 21:28

## 2023-01-01 RX ADMIN — Medication 667 MILLIGRAM(S): at 17:49

## 2023-01-01 RX ADMIN — Medication 1 APPLICATION(S): at 10:24

## 2023-01-01 RX ADMIN — Medication 667 MILLIGRAM(S): at 11:17

## 2023-01-01 RX ADMIN — HEPARIN SODIUM 5000 UNIT(S): 5000 INJECTION INTRAVENOUS; SUBCUTANEOUS at 05:47

## 2023-01-01 RX ADMIN — Medication 667 MILLIGRAM(S): at 13:28

## 2023-01-01 RX ADMIN — CLOPIDOGREL BISULFATE 75 MILLIGRAM(S): 75 TABLET, FILM COATED ORAL at 11:17

## 2023-01-01 RX ADMIN — Medication 75 MILLILITER(S): at 02:15

## 2023-01-01 RX ADMIN — Medication 60 MILLIGRAM(S): at 13:51

## 2023-01-01 RX ADMIN — Medication 60 MILLIGRAM(S): at 05:43

## 2023-01-01 RX ADMIN — HEPARIN SODIUM 5000 UNIT(S): 5000 INJECTION INTRAVENOUS; SUBCUTANEOUS at 17:07

## 2023-01-01 RX ADMIN — LACTULOSE 10 GRAM(S): 10 SOLUTION ORAL at 21:27

## 2023-01-01 RX ADMIN — CINACALCET 30 MILLIGRAM(S): 30 TABLET, FILM COATED ORAL at 06:04

## 2023-01-01 RX ADMIN — SODIUM CHLORIDE 100 MILLILITER(S): 9 INJECTION, SOLUTION INTRAVENOUS at 11:37

## 2023-01-01 RX ADMIN — Medication 3 MILLILITER(S): at 14:32

## 2023-01-01 RX ADMIN — Medication 25 MILLIGRAM(S): at 05:54

## 2023-01-01 RX ADMIN — SODIUM CHLORIDE 250 MILLILITER(S): 9 INJECTION INTRAMUSCULAR; INTRAVENOUS; SUBCUTANEOUS at 23:26

## 2023-01-01 RX ADMIN — HEPARIN SODIUM 5000 UNIT(S): 5000 INJECTION INTRAVENOUS; SUBCUTANEOUS at 14:09

## 2023-01-01 RX ADMIN — PANTOPRAZOLE SODIUM 40 MILLIGRAM(S): 20 TABLET, DELAYED RELEASE ORAL at 05:43

## 2023-01-01 RX ADMIN — Medication 25 GRAM(S): at 14:30

## 2023-01-01 RX ADMIN — Medication 1 APPLICATION(S): at 00:16

## 2023-01-01 RX ADMIN — Medication 25 MILLIGRAM(S): at 05:47

## 2023-01-01 RX ADMIN — Medication 25 MILLIGRAM(S): at 06:04

## 2023-01-01 RX ADMIN — Medication 25 MILLIGRAM(S): at 18:12

## 2023-01-01 RX ADMIN — Medication 667 MILLIGRAM(S): at 17:07

## 2023-01-01 RX ADMIN — HEPARIN SODIUM 5000 UNIT(S): 5000 INJECTION INTRAVENOUS; SUBCUTANEOUS at 05:38

## 2023-01-01 RX ADMIN — URSODIOL 300 MILLIGRAM(S): 250 TABLET, FILM COATED ORAL at 21:27

## 2023-01-01 RX ADMIN — CINACALCET 30 MILLIGRAM(S): 30 TABLET, FILM COATED ORAL at 05:54

## 2023-01-01 RX ADMIN — HEPARIN SODIUM 5000 UNIT(S): 5000 INJECTION INTRAVENOUS; SUBCUTANEOUS at 05:43

## 2023-01-01 RX ADMIN — Medication 12.5 GRAM(S): at 06:56

## 2023-01-01 RX ADMIN — HEPARIN SODIUM 5000 UNIT(S): 5000 INJECTION INTRAVENOUS; SUBCUTANEOUS at 05:20

## 2023-01-01 RX ADMIN — Medication 25 MILLIGRAM(S): at 17:07

## 2023-01-01 RX ADMIN — Medication 325 MILLIGRAM(S): at 11:37

## 2023-01-01 RX ADMIN — PANTOPRAZOLE SODIUM 40 MILLIGRAM(S): 20 TABLET, DELAYED RELEASE ORAL at 05:21

## 2023-01-01 RX ADMIN — HEPARIN SODIUM 5000 UNIT(S): 5000 INJECTION INTRAVENOUS; SUBCUTANEOUS at 18:11

## 2023-01-01 RX ADMIN — Medication 2: at 21:06

## 2023-01-01 RX ADMIN — Medication 1 APPLICATION(S): at 12:30

## 2023-01-01 RX ADMIN — HEPARIN SODIUM 5000 UNIT(S): 5000 INJECTION INTRAVENOUS; SUBCUTANEOUS at 17:39

## 2023-01-01 RX ADMIN — CHLORHEXIDINE GLUCONATE 1 APPLICATION(S): 213 SOLUTION TOPICAL at 05:49

## 2023-01-01 RX ADMIN — CHLORHEXIDINE GLUCONATE 1 APPLICATION(S): 213 SOLUTION TOPICAL at 12:41

## 2023-01-01 RX ADMIN — Medication 3 MILLILITER(S): at 21:11

## 2023-01-01 RX ADMIN — PANTOPRAZOLE SODIUM 40 MILLIGRAM(S): 20 TABLET, DELAYED RELEASE ORAL at 05:50

## 2023-01-01 RX ADMIN — Medication 12.5 GRAM(S): at 04:19

## 2023-01-01 RX ADMIN — PANTOPRAZOLE SODIUM 40 MILLIGRAM(S): 20 TABLET, DELAYED RELEASE ORAL at 06:04

## 2023-01-01 RX ADMIN — Medication 667 MILLIGRAM(S): at 09:21

## 2023-01-01 RX ADMIN — PIPERACILLIN AND TAZOBACTAM 25 GRAM(S): 4; .5 INJECTION, POWDER, LYOPHILIZED, FOR SOLUTION INTRAVENOUS at 05:37

## 2023-01-01 RX ADMIN — CHLORHEXIDINE GLUCONATE 1 APPLICATION(S): 213 SOLUTION TOPICAL at 13:29

## 2023-01-01 RX ADMIN — Medication 25 MILLIGRAM(S): at 05:20

## 2023-01-01 RX ADMIN — Medication 3 MILLILITER(S): at 02:25

## 2023-01-01 RX ADMIN — Medication 100 MILLILITER(S): at 04:20

## 2023-01-01 RX ADMIN — SODIUM CHLORIDE 75 MILLILITER(S): 9 INJECTION, SOLUTION INTRAVENOUS at 14:34

## 2023-01-01 RX ADMIN — RANOLAZINE 500 MILLIGRAM(S): 500 TABLET, FILM COATED, EXTENDED RELEASE ORAL at 21:27

## 2023-01-01 RX ADMIN — Medication 25 MILLIGRAM(S): at 17:47

## 2023-01-01 RX ADMIN — ERYTHROPOIETIN 10000 UNIT(S): 10000 INJECTION, SOLUTION INTRAVENOUS; SUBCUTANEOUS at 12:10

## 2023-01-01 RX ADMIN — Medication 50 MILLILITER(S): at 22:43

## 2023-01-01 RX ADMIN — CLOPIDOGREL BISULFATE 75 MILLIGRAM(S): 75 TABLET, FILM COATED ORAL at 13:28

## 2023-01-01 RX ADMIN — Medication 25 GRAM(S): at 15:27

## 2023-01-01 RX ADMIN — Medication 25 GRAM(S): at 13:59

## 2023-01-01 RX ADMIN — PIPERACILLIN AND TAZOBACTAM 200 GRAM(S): 4; .5 INJECTION, POWDER, LYOPHILIZED, FOR SOLUTION INTRAVENOUS at 21:32

## 2023-01-01 RX ADMIN — HEPARIN SODIUM 5000 UNIT(S): 5000 INJECTION INTRAVENOUS; SUBCUTANEOUS at 18:07

## 2023-01-01 RX ADMIN — Medication 12.5 GRAM(S): at 22:18

## 2023-01-01 RX ADMIN — HEPARIN SODIUM 5000 UNIT(S): 5000 INJECTION INTRAVENOUS; SUBCUTANEOUS at 17:47

## 2023-01-01 RX ADMIN — Medication 25 MILLIGRAM(S): at 18:07

## 2023-01-01 RX ADMIN — Medication 650 MILLIGRAM(S): at 22:01

## 2023-01-01 RX ADMIN — Medication 25 MILLIGRAM(S): at 17:38

## 2023-01-01 RX ADMIN — Medication 650 MILLIGRAM(S): at 21:28

## 2023-01-01 RX ADMIN — Medication 3 MILLILITER(S): at 09:50

## 2023-01-01 RX ADMIN — Medication 3 MILLILITER(S): at 10:10

## 2023-01-01 RX ADMIN — Medication 50 MILLILITER(S): at 12:04

## 2023-01-01 RX ADMIN — ONDANSETRON 4 MILLIGRAM(S): 8 TABLET, FILM COATED ORAL at 21:32

## 2023-01-01 RX ADMIN — Medication 250 MILLIGRAM(S): at 21:57

## 2023-01-01 RX ADMIN — Medication 50 MILLILITER(S): at 01:50

## 2023-01-01 RX ADMIN — HEPARIN SODIUM 5000 UNIT(S): 5000 INJECTION INTRAVENOUS; SUBCUTANEOUS at 21:32

## 2023-01-01 RX ADMIN — MIDODRINE HYDROCHLORIDE 10 MILLIGRAM(S): 2.5 TABLET ORAL at 21:27

## 2023-01-01 RX ADMIN — Medication 100 MILLIGRAM(S): at 23:25

## 2023-01-01 RX ADMIN — OCTREOTIDE ACETATE 10 MICROGRAM(S)/HR: 200 INJECTION, SOLUTION INTRAVENOUS; SUBCUTANEOUS at 01:00

## 2023-01-01 RX ADMIN — ONDANSETRON 4 MILLIGRAM(S): 8 TABLET, FILM COATED ORAL at 12:36

## 2023-01-01 RX ADMIN — PANTOPRAZOLE SODIUM 40 MILLIGRAM(S): 20 TABLET, DELAYED RELEASE ORAL at 05:44

## 2023-01-01 RX ADMIN — CLOPIDOGREL BISULFATE 75 MILLIGRAM(S): 75 TABLET, FILM COATED ORAL at 13:52

## 2023-01-01 RX ADMIN — CLOPIDOGREL BISULFATE 75 MILLIGRAM(S): 75 TABLET, FILM COATED ORAL at 11:38

## 2023-01-01 RX ADMIN — SODIUM CHLORIDE 75 MILLILITER(S): 9 INJECTION, SOLUTION INTRAVENOUS at 01:28

## 2023-01-01 RX ADMIN — Medication 2 MILLIGRAM(S): at 04:42

## 2023-01-01 RX ADMIN — Medication 20 MICROGRAM(S): at 21:57

## 2023-01-01 RX ADMIN — Medication 3 MILLILITER(S): at 02:04

## 2023-01-01 RX ADMIN — Medication 125 MILLIGRAM(S): at 09:40

## 2023-01-01 RX ADMIN — Medication 667 MILLIGRAM(S): at 18:07

## 2023-01-01 RX ADMIN — Medication 325 MILLIGRAM(S): at 05:54

## 2023-01-01 RX ADMIN — CINACALCET 30 MILLIGRAM(S): 30 TABLET, FILM COATED ORAL at 17:39

## 2023-01-01 RX ADMIN — Medication 50 MILLILITER(S): at 00:51

## 2023-01-01 RX ADMIN — OCTREOTIDE ACETATE 10 MICROGRAM(S)/HR: 200 INJECTION, SOLUTION INTRAVENOUS; SUBCUTANEOUS at 09:40

## 2023-01-01 RX ADMIN — Medication 400 MILLIGRAM(S): at 21:32

## 2023-01-01 RX ADMIN — Medication 667 MILLIGRAM(S): at 08:59

## 2023-01-01 RX ADMIN — Medication 667 MILLIGRAM(S): at 05:43

## 2023-01-01 RX ADMIN — Medication 324 MILLIGRAM(S): at 22:44

## 2023-01-01 RX ADMIN — CLOPIDOGREL BISULFATE 75 MILLIGRAM(S): 75 TABLET, FILM COATED ORAL at 12:36

## 2023-01-01 RX ADMIN — CHLORHEXIDINE GLUCONATE 1 APPLICATION(S): 213 SOLUTION TOPICAL at 11:39

## 2023-01-01 RX ADMIN — CINACALCET 30 MILLIGRAM(S): 30 TABLET, FILM COATED ORAL at 05:47

## 2023-01-01 RX ADMIN — CLOPIDOGREL BISULFATE 75 MILLIGRAM(S): 75 TABLET, FILM COATED ORAL at 13:04

## 2023-01-01 RX ADMIN — Medication 1000 MILLIGRAM(S): at 21:07

## 2023-01-01 RX ADMIN — Medication 2 TABLET(S): at 16:03

## 2023-01-01 RX ADMIN — SODIUM CHLORIDE 50 MILLILITER(S): 9 INJECTION, SOLUTION INTRAVENOUS at 22:22

## 2023-01-01 RX ADMIN — Medication 12.5 GRAM(S): at 05:03

## 2023-01-01 RX ADMIN — PANTOPRAZOLE SODIUM 40 MILLIGRAM(S): 20 TABLET, DELAYED RELEASE ORAL at 06:01

## 2023-01-01 RX ADMIN — Medication 12.5 GRAM(S): at 18:59

## 2023-01-01 RX ADMIN — Medication 10 MILLIGRAM(S): at 00:52

## 2023-01-01 RX ADMIN — Medication 20 MILLIEQUIVALENT(S): at 16:03

## 2023-01-01 RX ADMIN — CINACALCET 30 MILLIGRAM(S): 30 TABLET, FILM COATED ORAL at 18:11

## 2023-01-01 RX ADMIN — HEPARIN SODIUM 5000 UNIT(S): 5000 INJECTION INTRAVENOUS; SUBCUTANEOUS at 21:28

## 2023-01-01 RX ADMIN — HEPARIN SODIUM 5000 UNIT(S): 5000 INJECTION INTRAVENOUS; SUBCUTANEOUS at 06:52

## 2023-01-24 NOTE — ED PROVIDER NOTE - CLINICAL SUMMARY MEDICAL DECISION MAKING FREE TEXT BOX
78 yo M with hx of DM and ESRD on hemodialysis with recurrent hypoglycemia.  Pt requiring repeat boluses of IV dextrose and started on Octreotide.  Pt seen by MICU and accepted

## 2023-01-24 NOTE — ED ADULT NURSE NOTE - ED STAT RN HANDOFF DETAILS
pt AAOX3, resp. even and unlabored on RA, pt on D10 @ 75mL/hr, last FS 81, pt started on cardene drip for last /95, pt admitted to MICU for hypoglycemia,

## 2023-01-24 NOTE — ED PROVIDER NOTE - ATTENDING CONTRIBUTION TO CARE
78 yo M with hx ESRD on hemodialysis due tomorrow presents ot ED via EMS after being found by wife unresponsive in bed with reported BS of 20.  No reported of hx of DM.  After waking pt c/o substernal CP which has subsequently resolved.  EKG A-fib with controlled rate with no acute changes.  On exam awake and alert in NAD, PERRL, mm moist, Neck supple, Cor Irreg, Irreg, + dialysis catheter L ant chest wall, Lungs clear b/l, Abd soft, NT, Ext FROM, Neuro non-focal.   Will observe check q1 hr Accu-Cheks and check serial CE and re-eval 78 yo M with hx ESRD on hemodialysis due tomorrow presents ot ED via EMS after being found by wife unresponsive in bed with reported BS of 20.  Pt with hx of DM on Glimepiride.  After waking pt c/o substernal CP which has subsequently resolved.  EKG A-fib with controlled rate with no acute changes.  On exam awake and alert in NAD, PERRL, mm moist, Neck supple, Cor Irreg, Irreg, + dialysis catheter L ant chest wall, Lungs clear b/l, Abd soft, NT, Ext FROM, Neuro non-focal.   Will observe check q1 hr Accu-Cheks and check serial CE and re-eval

## 2023-01-24 NOTE — ED ADULT NURSE REASSESSMENT NOTE - NS ED NURSE REASSESS COMMENT FT1
received pt a&o3 stable on monitor, no distress, vitals ate stable. repeat BG done MD aware. left fistula in place, right hand pink band, safety maintained. received pt a&o3 stable on monitor, no distress, vitals ate stable. repeat BG done MD aware. left chest permacath  in place, right hand pink band, safety maintained.

## 2023-01-24 NOTE — ED ADULT NURSE NOTE - CAS DISCH BELONGINGS RETURNED
Quality 130: Documentation Of Current Medications In The Medical Record: Current Medications Documented Detail Level: Detailed Quality 402: Tobacco Use And Help With Quitting Among Adolescents: Patient screened for tobacco and never smoked Quality 431: Preventive Care And Screening: Unhealthy Alcohol Use - Screening: Patient identified as an unhealthy alcohol user when screened for unhealthy alcohol use using a systematic screening method and received brief counseling Quality 226: Preventive Care And Screening: Tobacco Use: Screening And Cessation Intervention: Patient screened for tobacco use and is an ex/non-smoker Not applicable

## 2023-01-24 NOTE — ED ADULT TRIAGE NOTE - CHIEF COMPLAINT QUOTE
Pt A&Ox0-1 BIBA c/o unresponsive, hypoglycemic in filed FS 20, pt received D10. Patient is awake and alert at this time. Pt is a HD pr permacath to [facility], pt is M/W/F has not missed dialysis

## 2023-01-24 NOTE — ED ADULT NURSE NOTE - OBJECTIVE STATEMENT
Pt BIBA from home, found unresponsive EMS reports FS of 20, Given D10, responsive to dextrose.   Pt alert and oriented at this time.   Dialysis pt, due tomorrow.

## 2023-01-25 NOTE — H&P ADULT - ASSESSMENT
77M PMH AFib s/p Watchman not on A/C, non-obstructive CAD, DM on Glimepiride, chronic hypotension on Midodrine with HD, ESRD on HD M/W/F via L permcath, h/o SAH 2/2 fall in 2019, renal hematoma s/p nephrectomy who presented 1/24/23 with AMS, found by wife unresponsive and reportedly with FSG of 20 at home    Impression/Plan:    AMS  Symptomatic hypoglycemia  Suspected sulfonylurea overdose  - FSG Q1H, goal 140-180  - Monitor mental status closely  - Checking CTH  - D10W gtt @ 50cc/hr  - Starting Octreotide infusion to suppress pancreatic insulin secretion  - HOB elevation  - Holding Glimepiride - will need f/u with endocrine to switch agent    ESRD on HD  S/p nephrectomy  - Has L chest wall permcath  - F/u with nephrology - Dr. Acevedo group  - Due for HD today  - Sensipar 30mg Q12H    Hypertensive urgency  SBP 220s in ED  - Hydralazine PRN  - Lopressor 25mg PO Q12H - home medication  - Goal SBP ~180 in first 24 hours  - Checking CTH; monitor mental status  - Monitor rhythm on telemetry    AFib s/p Watchman - not on A/C  - Monitor rhythm on telemetry  - Lopressor 25mg PO BID    Non-obstructive CAD  - Plavix 75mg    F/E/N/PPx/Lines  - D10W gtt as above  - Serial electrolytes  - NPO for now  - VTE PPx - SCD's until CTH r/o intraparenchymal hemorrhage  - PIV    Ethics/Dispo  - Full code  - Admit to BERNARDO Mejia M.D.  , Pulmonary & Critical Care Medicine  [facility]  Pulmonary and Sleep Medicine at [place]  [address]  [place], [place] [address]  T: [phone]  F: [phone]

## 2023-01-25 NOTE — H&P ADULT - NSHPPHYSICALEXAM_GEN_ALL_CORE
GENERAL: Altered, confused  HEENT: NC/AT  NECK: Supple, trachea midline  PULM: CTA anteriorly  CHEST WALL: L permcath in place  CVS: +S1, S2  ABD: Soft, NT/ND  EXT: No pedal edema  SKIN: Warm and well perfused, no rashes noted.  NEURO: Unable to answer questioning coherently

## 2023-01-25 NOTE — H&P ADULT - HISTORY OF PRESENT ILLNESS
76M PMH AFib s/p Watchman not on A/C, non-obstructive CAD, DM on Glimepiride, chronic hypotension on Midodrine with HD, ESRD on HD M/W/F via L permcath, h/o SAH 2/2 fall in 2019, renal hematoma s/p nephrectomy who presented 1/24/23 with AMS, found by wife unresponsive and reportedly with FSG of 20 at home. He was given Dextrose by EMS and admitted to [facility] ED. In the ED he was given amp D50 x2 and started on D10 infusion, and started on Octreotide gtt for suspected sulfonylurea (Glimepiride) overdose.  77M PMH AFib s/p Watchman not on A/C, non-obstructive CAD, DM on Glimepiride, chronic hypotension on Midodrine with HD, ESRD on HD M/W/F via L permcath, h/o SAH 2/2 fall in 2019, renal hematoma s/p nephrectomy who presented 1/24/23 with AMS, found by wife unresponsive and reportedly with FSG of 20 at home. He was given Dextrose by EMS and admitted to [facility] ED. In the ED he was given amp D50 x2 and started on D10 infusion, and started on Octreotide gtt for suspected sulfonylurea (Glimepiride) overdose.

## 2023-01-25 NOTE — PATIENT PROFILE ADULT - FALL HARM RISK - HARM RISK INTERVENTIONS

## 2023-01-25 NOTE — CONSULT NOTE ADULT - SUBJECTIVE AND OBJECTIVE BOX
HPI:  77M PMH AFib s/p Watchman not on A/C, non-obstructive CAD, DM on Glimepiride, chronic hypotension on Midodrine with HD, ESRD on HD M/W/F via L permcath, h/o SAH / fall in , renal hematoma s/p nephrectomy who presented 23 with AMS, found by wife unresponsive and reportedly with FSG of 20 at home. He was given Dextrose by EMS and admitted to [facility] ED. In the ED he was given amp D50 x2 and started on D10 infusion, and started on Octreotide gtt for suspected sulfonylurea (Glimepiride) overdose.       PAST MEDICAL & SURGICAL HISTORY:  Daytime sleepiness      DM (diabetes mellitus)      Dizziness      Dyslipidemia      ESRD (end stage renal disease)  on  hemodialysis  3  x  weekly.,  H/O  left  nephrectomy after  renal  bleed      Hypercholesteremia      Renal hematoma, left      Tiredness      Hypotension  treated  with Midodrine      Kidney problem  spontanious renal bleed while on coumadin  c/b  renal  hematome  with  LT  nephrectomy      Claudication in peripheral vascular disease  R&gt;L  Right common iliac or ostial external iliac per 2018 U/S  Left popliteal 50-75 and EDUIN stenosis  SUSANA 0.8      Obesity (BMI 30.0-34.9)      Imbalance  secondary to PVD Uses cane      Leg pain, bilateral  R&gt;L at rest and with short distant ambulation      Palpitations      Low glucose level  Patient has h/o low blood sugars at times      GERD (gastroesophageal reflux disease)      Carotid artery disease  mild      History of unilateral nephrectomy  left  nephrectomy      Abnormality of left atrial appendage  WATCHMAN  LEFT ATRIAL APPENDAGE CLOSUIRE   2017      AV fistula  right lower arm      Renal transplant recipient      FAMILY HISTORY:  FH: hypertension  father    [place]    Social History:Non smoker    MEDICATIONS  (STANDING):  chlorhexidine 2% Cloths 1 Application(s) Topical daily  cinacalcet 30 milliGRAM(s) Oral every 12 hours  clopidogrel Tablet 75 milliGRAM(s) Oral daily  dextrose 10%. 1000 milliLiter(s) (50 mL/Hr) IV Continuous <Continuous>  dextrose 50% Injectable 25 Gram(s) IV Push once  dextrose 50% Injectable 12.5 Gram(s) IV Push once  dextrose 50% Injectable 25 Gram(s) IV Push once  ferrous    sulfate 325 milliGRAM(s) Oral daily  glucagon  Injectable 1 milliGRAM(s) IntraMuscular once  heparin   Injectable 5000 Unit(s) SubCutaneous every 12 hours  metoprolol tartrate 25 milliGRAM(s) Oral every 12 hours  octreotide  Infusion 50 MICROgram(s)/Hr (10 mL/Hr) IV Continuous <Continuous>  pantoprazole    Tablet 40 milliGRAM(s) Oral before breakfast    MEDICATIONS  (PRN):  ondansetron Injectable 4 milliGRAM(s) IV Push every 4 hours PRN Nausea and/or Vomiting   Meds reviewed      Vital Signs Last 24 Hrs  T(C): 37.8 (2023 11:37), Max: 37.8 (2023 11:37)  T(F): 100 (2023 11:37), Max: 100 (2023 11:37)  HR: 78 (2023 11:00) (67 - 89)  BP: 126/72 (2023 11:00) (126/72 - 220/107)  BP(mean): 90 (2023 11:00) (90 - 117)  RR: 19 (2023 11:00) (16 - 27)  SpO2: 100% (2023 11:00) (93% - 100%)    Parameters below as of 2023 08:00  Patient On (Oxygen Delivery Method): room air      Daily Height in cm: 172.72 (2023 21:43)    Daily Weight in k.4 (2023 03:26)    PHYSICAL EXAM:    GENERAL: appears chronically ill  HEAD: NCAT  EYES: EOMI  NECK: Supple  NERVOUS SYSTEM:  Alert & Oriented X3  CHEST/LUNG: Clear to percussion bilaterally  HEART: Regular rate and rhythm  ABDOMEN: Soft, Nontender, Nondistended; +BS  EXTREMITIES: edema      LABS:                        12.0   12.96 )-----------( 164      ( 2023 08:40 )             37.3         132<L>  |  92<L>  |  45.8<H>  ----------------------------<  115<H>  5.5<H>   |  22.0  |  5.28<H>    Ca    8.2<L>      2023 08:40  Phos  4.7       Mg     1.5         TPro  7.2  /  Alb  3.6  /  TBili  0.4  /  DBili  x   /  AST  42<H>  /  ALT  18  /  AlkPhos  145<H>      PT/INR - ( 2023 21:50 )   PT: 12.4 sec;   INR: 1.07 ratio         PTT - ( 2023 21:50 )  PTT:39.7 sec    Magnesium, Serum: 1.5 mg/dL ( @ 08:40)  Phosphorus Level, Serum: 4.7 mg/dL ( @ 08:40)  Magnesium, Serum: 1.7 mg/dL ( @ 23:50)          RADIOLOGY & ADDITIONAL TESTS:     HPI:  77M PMH AFib s/p Watchman not on A/C, non-obstructive CAD, DM --> was given a dose of-->> Glimepiride at home , chronic hypotension on Midodrine with HD, ESRD on HD M/W/F via L permcath, h/o SAH  fall in , renal hematoma s/p nephrectomy who presented 23 with AMS, found by wife unresponsive and reportedly with FSG of 20 at home. He was given Dextrose by EMS and admitted to [facility] ED. In the ED he was given amp D50 x2 and started on D10 infusion, and started on Octreotide gtt for suspected sulfonylurea (Glimepiride) overdose. On my exam in ICU he is alert awake feeling better denies HA CP no SOB      PAST MEDICAL & SURGICAL HISTORY:  Daytime sleepiness      DM (diabetes mellitus)      Dizziness      Dyslipidemia      ESRD (end stage renal disease)  on  hemodialysis  3  x  weekly.,  H/O  left  nephrectomy after  renal  bleed      Hypercholesteremia      Renal hematoma, left      Tiredness      Hypotension  treated  with Midodrine      Kidney problem  spontanious renal bleed while on coumadin  c/b  renal  hematome  with  LT  nephrectomy      Claudication in peripheral vascular disease  R&gt;L  Right common iliac or ostial external iliac per 2018 U/S  Left popliteal 50-75 and EDUIN stenosis  SUSANA 0.8      Obesity (BMI 30.0-34.9)      Imbalance  secondary to PVD Uses cane      Leg pain, bilateral  R&gt;L at rest and with short distant ambulation      Palpitations      Low glucose level  Patient has h/o low blood sugars at times      GERD (gastroesophageal reflux disease)      Carotid artery disease  mild      History of unilateral nephrectomy  left  nephrectomy      Abnormality of left atrial appendage  WATCHMAN  LEFT ATRIAL APPENDAGE CLOSUIRE   2017      AV fistula  right lower arm      Renal transplant recipient      FAMILY HISTORY:  FH: hypertension  father    [place]    Social History:Non smoker    MEDICATIONS  (STANDING):  chlorhexidine 2% Cloths 1 Application(s) Topical daily  cinacalcet 30 milliGRAM(s) Oral every 12 hours  clopidogrel Tablet 75 milliGRAM(s) Oral daily  dextrose 10%. 1000 milliLiter(s) (50 mL/Hr) IV Continuous <Continuous>  dextrose 50% Injectable 25 Gram(s) IV Push once  dextrose 50% Injectable 12.5 Gram(s) IV Push once  dextrose 50% Injectable 25 Gram(s) IV Push once  ferrous    sulfate 325 milliGRAM(s) Oral daily  glucagon  Injectable 1 milliGRAM(s) IntraMuscular once  heparin   Injectable 5000 Unit(s) SubCutaneous every 12 hours  metoprolol tartrate 25 milliGRAM(s) Oral every 12 hours  octreotide  Infusion 50 MICROgram(s)/Hr (10 mL/Hr) IV Continuous <Continuous>  pantoprazole    Tablet 40 milliGRAM(s) Oral before breakfast    MEDICATIONS  (PRN):  ondansetron Injectable 4 milliGRAM(s) IV Push every 4 hours PRN Nausea and/or Vomiting   Meds reviewed      Vital Signs Last 24 Hrs  T(C): 37.8 (2023 11:37), Max: 37.8 (2023 11:37)  T(F): 100 (2023 11:37), Max: 100 (2023 11:37)  HR: 78 (2023 11:00) (67 - 89)  BP: 126/72 (2023 11:00) (126/72 - 220/107)  BP(mean): 90 (2023 11:00) (90 - 117)  RR: 19 (2023 11:00) (16 - 27)  SpO2: 100% (2023 11:00) (93% - 100%)    Parameters below as of 2023 08:00  Patient On (Oxygen Delivery Method): room air      Daily Height in cm: 172.72 (2023 21:43)    Daily Weight in k.4 (2023 03:26)    PHYSICAL EXAM:    GENERAL: appears chronically ill  HEAD: NCAT  EYES: EOMI  NECK: Supple  NERVOUS SYSTEM:  Alert & Oriented X3  CHEST/LUNG: Clear to percussion bilaterally  HEART: Regular rate and rhythm  ABDOMEN: Soft, Nontender, Nondistended; +BS  EXTREMITIES: trace edema      LABS:                        12.0   12.96 )-----------( 164      ( 2023 08:40 )             37.3         132<L>  |  92<L>  |  45.8<H>  ----------------------------<  115<H>  5.5<H>   |  22.0  |  5.28<H>    Ca    8.2<L>      2023 08:40  Phos  4.7       Mg     1.5         TPro  7.2  /  Alb  3.6  /  TBili  0.4  /  DBili  x   /  AST  42<H>  /  ALT  18  /  AlkPhos  145<H>      PT/INR - ( 2023 21:50 )   PT: 12.4 sec;   INR: 1.07 ratio         PTT - ( 2023 21:50 )  PTT:39.7 sec    Magnesium, Serum: 1.5 mg/dL ( @ 08:40)  Phosphorus Level, Serum: 4.7 mg/dL ( @ 08:40)  Magnesium, Serum: 1.7 mg/dL ( @ 23:50)          RADIOLOGY & ADDITIONAL TESTS:

## 2023-01-25 NOTE — CONSULT NOTE ADULT - ASSESSMENT
77M PMH AFib s/p Watchman not on A/C, non-obstructive CAD, DM on Glimepiride, chronic hypotension on Midodrine with HD, ESRD on HD M/W/F via L susiecatjim, h/o SAH 2/2 fall in 2019, renal hematoma s/p nephrectomy      Presented 1/24/23 with AMS found to be hypoglycemic -- now in MICU    ESRD   HyperKalemia  Will arrange HD today    Hypoglycemia improved  Is on dextrose drip 77M PMH AFib s/p Watchman not on A/C, non-obstructive CAD, DM on Glimepiride, chronic hypotension on Midodrine with HD, ESRD on HD M/W/F via L susiecatjim, h/o SAH 2/2 fall in 2019, renal hematoma s/p nephrectomy      Presented 1/24/23 with AMS found to be hypoglycemic -- now in MICU    ESRD   HyperKalemia HD on 2 K bath  Will arrange HD today w UF as tolerated     Hypoglycemia improved  Is on dextrose drip  would discontinue Glimeperide     Will follow

## 2023-01-26 NOTE — PROGRESS NOTE ADULT - ASSESSMENT
77M PMH AFib s/p Watchman not on A/C, non-obstructive CAD, DM on Glimepiride, chronic hypotension on Midodrine with HD, ESRD on HD M/W/F via L susiecatjim, h/o SAH 2/2 fall in 2019, renal hematoma s/p nephrectomy      Presented 1/24/23 with AMS found to be hypoglycemic -- now in MICU    ESRD   HyperKalemia HD on 2 K bath  Will arrange HD tomorrow w UF as tolerated     Hypoglycemia improved  would discontinue Glimeperide     Will follow

## 2023-01-26 NOTE — PROGRESS NOTE ADULT - ASSESSMENT
77M PMH AFib s/p Watchman not on A/C, non-obstructive CAD, DM on Glimepiride, chronic hypotension on Midodrine with HD, ESRD on HD M/W/F via L permcath, h/o SAH 2/2 fall in 2019, renal hematoma s/p nephrectomy who presented 1/24/23 with AMS, Patient found to have severe hypoglycemia from sulfonyulrea. Patient is now being downgraded to medicine     acute metabolic encephalopahty- - CTH negative  - S/p octreotide infusion  - D5 NS@ 100cc/hr  - Hold any further Glimepiride;    dm2 - hba1c 4.7  - ssi     ESRD on HD  S/p nephrectomy  - Has L chest wall permcath  - plan as per nephro  - Sensipar 30mg Q12H    Hypertensive urgency - resolved  SBP 220s in ED  - Lopressor 25mg PO Q12H -    AFib s/p Watchman - not on A/C  - Lopressor 25mg PO BID    Non-obstructive CAD  - Plavix 75mg      dispo - agueda keyes tomorrow

## 2023-01-26 NOTE — PROGRESS NOTE ADULT - ASSESSMENT
77M PMH AFib s/p Watchman not on A/C, non-obstructive CAD, DM on Glimepiride, chronic hypotension on Midodrine with HD, ESRD on HD M/W/F via L permcath, h/o SAH 2/2 fall in 2019, renal hematoma s/p nephrectomy who presented 1/24/23 with AMS, found by wife unresponsive and reportedly with FSG of 20 at home    Impression/Plan:    AMS - CTH negative  Symptomatic hypoglycemia  Suspected sulfonylurea overdose  - FSG Q1H, goal 140-180  - S/p octreotide infusion  - D5W @ 100cc/hr  - Hold any further Glimepiride; will need f/u with endocrine to switch agent    Hyperglycemia  - Placed on corrective sliding scale - switched to low-dose sliding scale    ESRD on HD  S/p nephrectomy  - Has L chest wall permcath  - Last HD on 1/25/23  - F/u with nephrology  - Sensipar 30mg Q12H    Hypertensive urgency - resolved  SBP 220s in ED  - Lopressor 25mg PO Q12H - home medication  - Monitor rhythm on telemetry    AFib s/p Watchman - not on A/C  - Monitor rhythm on telemetry  - Lopressor 25mg PO BID    Non-obstructive CAD  - Plavix 75mg    F/E/N/PPx/Lines  - D5W gtt as above  - Serial electrolytes  - Renal diet resumed  - HSQ Q12H  - PIV    Ethics/Dispo  - Full code  - When FSG stable will downgrade to telemetry      Magdiel Mejia M.D.  , Pulmonary & Critical Care Medicine  [facility]  Pulmonary and Sleep Medicine at [place]  [address]  [place], [place] [address]  T: [phone]  F: [phone]

## 2023-01-27 NOTE — PROVIDER CONTACT NOTE (HYPOGLYCEMIA EVENT) - NS PROVIDER CONTACT BACKGROUND-HYPO
Age: 77y    Gender: Male    POCT Blood Glucose:  63 mg/dL (01-27-23 @ 06:40)  91 mg/dL (01-27-23 @ 02:59)  101 mg/dL (01-26-23 @ 22:45)  128 mg/dL (01-26-23 @ 18:50)  131 mg/dL (01-26-23 @ 15:02)  143 mg/dL (01-26-23 @ 10:55)      eMAR:cinacalcet   30 milliGRAM(s) Oral (01-27-23 @ 05:47)   30 milliGRAM(s) Oral (01-26-23 @ 18:11)

## 2023-01-27 NOTE — CHART NOTE - NSCHARTNOTEFT_GEN_A_CORE
Nutrition Note: Aware pt downgraded from ICU. Chart reviewed; RD to follow up with full nutrition assessment per medical floor protocol.
77M w/ hx of Afib s/p watchman not on AC, non-obs CAD, DM, HLD, chronic hypotension on midodrine, ESRD on HD via L permacath, hx of SAH in setting of fall, hx of renal hematoma s/p L nephrectomy, PVD admitted 1/25 in the setting of AMS found to be hypoglycemic to 20 and received dextrose with improvement in mental status. CT head negative for acute findings. Pt had recurrent hypoglycemia in the ED and was started on D10% drip and octreotide drip in the setting of glimepiride use. Pt also noted to be hypertensive on presentation and endorsed chest discomfort. Troponin neg with EKG showing Afib with no ischemic changes. Found to be enterovirus positive but no respiratory symptoms noted. Pt was continued on D10 initially and then started on sliding scale due to hyperglycemia but had recurrent hypoglycemia requiring D5 to be started. Fingerstick remained stable with no further hypoglycemia. Pt has resumed diet. BP improved with resumption of home meds and dialysis. Noted to have worsening hyponatremia so transitioned to d5+ NS. Pt stable for downgrade to GMF. Please call back with any questions/concerns.

## 2023-01-27 NOTE — PHYSICAL THERAPY INITIAL EVALUATION ADULT - ADDITIONAL COMMENTS
as per pt: resides in the private house no steps, ambulates with axillary crutches, reports one fall in the past 6 months, Family available to assist as needed

## 2023-01-27 NOTE — PROGRESS NOTE ADULT - ASSESSMENT
A) CRF 5, Hyponatremia, DM, abdominal  distention.    P) HD today, sono, fluid  restriction. Labs in am.

## 2023-01-27 NOTE — PROVIDER CONTACT NOTE (HYPOGLYCEMIA EVENT) - NS PROVIDER CONTACT NOTE-TREATMENT-HYPO
Dextrose 50% 12.5 Grams IV Push 4 oz Fruit Juice (Specify quantity, date/time)/Dextrose 50% 12.5 Grams IV Push

## 2023-01-27 NOTE — PROGRESS NOTE ADULT - ASSESSMENT
78 y/o M w/ PMH or AFib s/p Watchman (not on A/C), non-obstructive CAD, HFpEF, DM on Glimepiride, chronic hypotension (on Midodrine), ESRD on HD (M/W/F), SAH (s/p fall in 2019), renal hematoma s/p nephrectomy who presented 1/24/23 with AMS.  Initial w/u on arrival revealed severe hypoglycemia 2/2 sulfonurea use.  Pt was initially admitted to MICU and placed on octreotide gtt.  Pts BG improved and gtt d/c'd and pt downgraded from MICU to medicine for continued management.  Still having episodes of hypoglycemia.          Severe hypoglycemia 2/2 po sulfonylurea use / DM II   Acute metabolic encephalopathy, resolved   - s/p MICU for octreotide gtt and D5 NS  - Home glimepiride on hold    - Hypoglycemic protocol in place   - CTH negative   - A1c 4.7; suspect pt no longer needs sulfonylurea   - BG still running low therefore resumed D5   - c/w accuchecks q6h       ESRD on HD  Hyperkalemia / Hyponatremic hypochloremia   - s/p nephrectomy  - Anticipate electrolytes will improved s/p HD today   - HD as planned M/W/F  - Sensipar 30mg Q12H   - Nephrology following and recs noted       Hypertensive urgency   - SBP 220s in ED but now improved   - On Lopressor 25mg PO q12h  - Titrate antihypertensives as needed      CAD / chronic HFpEF  - c/w plavix and metoprolol   - TTE from 2019 reporting LVEF 60-65%  - Monitor daily weights and I/O's  - Maintain K>4 and Mg>2      AF s/p watchman   - No AC needed given pt s/p watchman   - On metroprolol for rate control       VTE ppx: heparin sq    Dispo: Pt remains acute requiring inpt hospitalization.   78 y/o M w/ PMH or AFib s/p Watchman (not on A/C), non-obstructive CAD, HFpEF, DM on Glimepiride, chronic hypotension (on Midodrine), ESRD on HD (M/W/F), SAH (s/p fall in 2019), renal hematoma s/p nephrectomy who presented 1/24/23 with AMS.  Initial w/u on arrival revealed severe hypoglycemia 2/2 sulfonurea use.  Pt was initially admitted to MICU and placed on octreotide gtt.  Pts BG improved and gtt d/c'd and pt downgraded from MICU to medicine for continued management.  Still having episodes of hypoglycemia.          Severe hypoglycemia 2/2 po sulfonylurea use / DM II   Acute metabolic encephalopathy, resolved   - s/p MICU for octreotide gtt and D5 NS  - Home glimepiride on hold    - Hypoglycemic protocol in place   - CTH negative   - A1c 4.7; suspect pt no longer needs sulfonylurea   - BG still running low therefore resumed D5   - c/w accuchecks q6h       ESRD on HD  Hyperkalemia / Hyponatremic hypochloremia   - s/p nephrectomy  - Anticipate electrolytes will improved s/p HD today   - HD as planned M/W/F  - Sensipar 30mg Q12H   - Nephrology following and recs noted       Normocytic anemia likely of chronic dx in the setting of ESRD  - Will check iron panel given RDW  - Baseline Hb 9-10  - Hemodynamically stable and no bleeding   - Monitor CBC and transfuse for Hb<8      Hypertensive urgency   - SBP 220s in ED but now improved   - On Lopressor 25mg PO q12h  - Titrate antihypertensives as needed      CAD / chronic HFpEF  - c/w plavix and metoprolol   - TTE from 2019 reporting LVEF 60-65%  - Monitor daily weights and I/O's  - Maintain K>4 and Mg>2      AF s/p watchman   - No AC needed given pt s/p watchman   - On metroprolol for rate control       VTE ppx: heparin sq    Dispo: Pt remains acute requiring inpt hospitalization.

## 2023-01-28 NOTE — PROGRESS NOTE ADULT - ASSESSMENT
77 yr old male with hypertension, hyperlipidemia, CAD, ESRD on HD, atrial fibrillation s/p watchman's procedure, PVD, chronic hypotension on Midodrine, SAH after a fall, renal hematoma s/p nephrectomy, diabetes mellitus on Glimepiride presented after being found unresponsive at home by wife. Noted to have severe hypoglycemia in setting og sulfonylurea use. CT head on admission without acute changes. He was admitted to the ICU, he was placed on an Octreotide gtt and Dextrose infusion and fingersticks were monitored closely. Nephrology consulted for continuation of HD. His blood glucose levels improved, and he was transitioned to medical floor. PT consulted patient, advised outpatient PT. His A1C was 4.7. 77 yr old male with hypertension, hyperlipidemia, CAD, ESRD on HD, atrial fibrillation s/p watchman's procedure, PVD, chronic hypotension on Midodrine, SAH after a fall, renal hematoma s/p nephrectomy, diabetes mellitus on Glimepiride presented after being found unresponsive at home by wife. Noted to have severe hypoglycemia in setting og sulfonylurea use. CT head on admission without acute changes. He was admitted to the ICU, he was placed on an Octreotide gtt and Dextrose infusion and fingersticks were monitored closely. Nephrology consulted for continuation of HD. His blood glucose levels improved, and he was transitioned to medical floor. PT consulted patient, advised outpatient PT. His A1C was 4.7.    1. Symptomatic hypoglycemia:  Sec sulfonylurea use  A1C 4.7  given relatively low FS, continue IVF today  monitor sugars    2. Hypertension:  Continue Nifedipine, Metoprolol  monitor BP    3. Atrial fibrillation, s/p watchman:  Continue Metoprolol.    4. ESRD on hd:  S/p HD today.    5. DVT ppx:  Heparin.    Discussed with patient.

## 2023-01-29 NOTE — DISCHARGE NOTE PROVIDER - NSDCMRMEDTOKEN_GEN_ALL_CORE_FT
clopidogrel 75 mg oral tablet: 1 tab(s) orally once a day  ferrous sulfate 325 mg (65 mg elemental iron) oral tablet: 1 tab(s) orally once a day  gemfibrozil 600 mg oral tablet: 1 tab(s) orally 2 times a day  glimepiride 1 mg oral tablet: 1 tab(s) orally once a day  levocetirizine 5 mg oral tablet: 1 tab(s) orally once a day (in the evening)  metoprolol tartrate 25 mg oral tablet: 1 tab(s) orally every 12 hours  midodrine 10 mg oral tablet: 1 tab(s) orally once, As needed, on dialysis for BP &lt; 90  omeprazole 40 mg oral delayed release capsule: 1 cap(s) orally once a day  ranolazine 500 mg oral tablet, extended release: 1 tab(s) orally 2 times a day  Sensipar 30 mg oral tablet: 1 tab(s) orally 2 times a day  ursodiol 500 mg oral tablet: 1 tab(s) orally 3 times a day   calcium acetate 667 mg oral tablet: 1 tab(s) orally 3 times a day  clopidogrel 75 mg oral tablet: 1 tab(s) orally once a day  ferrous sulfate 325 mg (65 mg elemental iron) oral tablet: 1 tab(s) orally once a day  gemfibrozil 600 mg oral tablet: 1 tab(s) orally 2 times a day  levocetirizine 5 mg oral tablet: 1 tab(s) orally once a day (in the evening)  metoprolol tartrate 25 mg oral tablet: 1 tab(s) orally every 12 hours  midodrine 10 mg oral tablet: 1 tab(s) orally once, As needed, on dialysis for BP &lt; 90  omeprazole 40 mg oral delayed release capsule: 1 cap(s) orally once a day  Outpatient Physical Therapy: Out patient PT   Dx debility R54   recent fall  W19   chronic hypotension   CAD I25   ESRD N18.6  ranolazine 500 mg oral tablet, extended release: 1 tab(s) orally 2 times a day  Sensipar 30 mg oral tablet: 1 tab(s) orally 2 times a day  ursodiol 500 mg oral tablet: 1 tab(s) orally 3 times a day

## 2023-01-29 NOTE — DISCHARGE NOTE PROVIDER - NSDCCPCAREPLAN_GEN_ALL_CORE_FT
PRINCIPAL DISCHARGE DIAGNOSIS  Diagnosis: Hypoglycemia  Assessment and Plan of Treatment: resolved  discontinue medications for diabetes  follow up with PMD.      SECONDARY DISCHARGE DIAGNOSES  Diagnosis: ESRD on dialysis  Assessment and Plan of Treatment: Continue HD as per schedule.    Diagnosis: Hypertension  Assessment and Plan of Treatment: Continue home medications.

## 2023-01-29 NOTE — DISCHARGE NOTE PROVIDER - CARE PROVIDER_API CALL
Jason Mann)  Internal Medicine; Nephrology  [address] [place] [id number]  Phone: [phone]  Fax: [phone]  Follow Up Time:

## 2023-01-29 NOTE — DISCHARGE NOTE PROVIDER - NSDCFUSCHEDAPPT_GEN_ALL_CORE_FT
Marixa Hernandez  [facility]  CARDIOLOGY [address]  Scheduled Appointment: 01/31/2023

## 2023-01-29 NOTE — DISCHARGE NOTE PROVIDER - HOSPITAL COURSE
77 yr old male with hypertension, hyperlipidemia, CAD, ESRD on HD, atrial fibrillation s/p watchman's procedure, PVD, chronic hypotension on Midodrine, SAH after a fall, renal hematoma s/p nephrectomy, diabetes mellitus on Glimepiride presented after being found unresponsive at home by wife. Noted to have severe hypoglycemia in setting og sulfonylurea use. CT head on admission without acute changes. He was admitted to the ICU, he was placed on an Octreotide gtt and Dextrose infusion and fingersticks were monitored closely. Nephrology consulted for continuation of HD. His blood glucose levels improved, and he was transitioned to medical floor. PT consulted patient, advised outpatient PT. His A1C was 4.7. His fingersticks improved and remained stable. Stable for discharge home.

## 2023-01-29 NOTE — PROGRESS NOTE ADULT - ASSESSMENT
77 yr old male with hypertension, hyperlipidemia, CAD, ESRD on HD, atrial fibrillation s/p watchman's procedure, PVD, chronic hypotension on Midodrine, SAH after a fall, renal hematoma s/p nephrectomy, diabetes mellitus on Glimepiride presented after being found unresponsive at home by wife. Noted to have severe hypoglycemia in setting og sulfonylurea use. CT head on admission without acute changes. He was admitted to the ICU, he was placed on an Octreotide gtt and Dextrose infusion and fingersticks were monitored closely. Nephrology consulted for continuation of HD. His blood glucose levels improved, and he was transitioned to medical floor. PT consulted patient, advised outpatient PT. His A1C was 4.7.    1. Symptomatic hypoglycemia:  Sec sulfonylurea use  A1C 4.7  FS improved  off IVF  tolerating diet    2. Hypertension:  Continue Nifedipine, Metoprolol  monitor BP    3. Atrial fibrillation, s/p watchman:  Continue Metoprolol.    4. ESRD on hd:  HD per schedule.    5. DVT ppx:  Heparin.    Discussed with patient.   updated niece Kirstie.   Stable for discharge home.

## 2023-01-29 NOTE — DISCHARGE NOTE NURSING/CASE MANAGEMENT/SOCIAL WORK - PATIENT PORTAL LINK FT
You can access the FollowMyHealth Patient Portal offered by [facility] by registering at the following website: [email or web address]. By joining [facility]’s FollowMyHealth portal, you will also be able to view your health information using other applications (apps) compatible with our system.

## 2023-01-29 NOTE — DISCHARGE NOTE NURSING/CASE MANAGEMENT/SOCIAL WORK - NSDCPEFALRISK_GEN_ALL_CORE
For information on Fall & Injury Prevention, visit: [email or web address] OR  [email or web address] OR  https://www.cdc.gov/steadi/patient.html

## 2023-01-30 NOTE — PROGRESS NOTE ADULT - REASON FOR ADMISSION
Hypoglycemia

## 2023-01-30 NOTE — PROGRESS NOTE ADULT - ASSESSMENT
77 yr old male with hypertension, hyperlipidemia, CAD, ESRD on HD, atrial fibrillation s/p watchman's procedure, PVD, chronic hypotension on Midodrine, SAH after a fall, renal hematoma s/p nephrectomy, diabetes mellitus on Glimepiride presented after being found unresponsive at home by wife. Noted to have severe hypoglycemia in setting og sulfonylurea use. CT head on admission without acute changes. He was admitted to the ICU, he was placed on an Octreotide gtt and Dextrose infusion and fingersticks were monitored closely. Nephrology consulted for continuation of HD. His blood glucose levels improved, and he was transitioned to medical floor. PT consulted patient, advised outpatient PT. His A1C was 4.7.    1. Symptomatic hypoglycemia:  Sec sulfonylurea use  A1C 4.7  FS improved  off IVF  tolerating diet    2. Hypertension:  Continue Nifedipine, Metoprolol  monitor BP    3. Atrial fibrillation, s/p watchman:  Continue Metoprolol.    4. ESRD on hd:  HD per schedule.    5. DVT ppx:  Heparin.    Discussed with patient.   discharge home after HD today.

## 2023-01-30 NOTE — PROGRESS NOTE ADULT - SUBJECTIVE AND OBJECTIVE BOX
Chief Complaint:  hypoglycemia     SUBJECTIVE / OVERNIGHT EVENTS:  No acute events reported overnight but this morning again noted to be hypoglycemic and improved w/ glucose.  pt was asymptomatic at that time.  Patient denies chest pain, SOB, abd pain, N/V, fever, chills, dysuria or any other complaints. All remainder ROS negative.       I&O's Summary    26 Jan 2023 07:01  -  27 Jan 2023 07:00  --------------------------------------------------------  IN: 825 mL / OUT: 0 mL / NET: 825 mL    27 Jan 2023 07:01  -  27 Jan 2023 16:12  --------------------------------------------------------  IN: 0 mL / OUT: 2800 mL / NET: -2800 mL          PHYSICAL EXAM:  Vital Signs Last 24 Hrs  T(C): 36.6 (27 Jan 2023 12:20), Max: 37.1 (27 Jan 2023 08:45)  T(F): 97.9 (27 Jan 2023 12:20), Max: 98.7 (27 Jan 2023 08:45)  HR: 65 (27 Jan 2023 12:20) (54 - 84)  BP: 153/65 (27 Jan 2023 12:20) (131/73 - 158/81)  BP(mean): 88 (26 Jan 2023 16:15) (88 - 88)  RR: 18 (27 Jan 2023 12:20) (16 - 19)  SpO2: 100% (27 Jan 2023 12:20) (91% - 100%)    Parameters below as of 27 Jan 2023 12:20  Patient On (Oxygen Delivery Method): room air        GENERAL: pt examined bedside, laying comfortably in bed in NAD  HEENT: NC/AT, moist oral mucosa, clear conjunctiva, sclera nonicteric  RESPIRATORY:  no wheezing, rhonchi, rales  CARDIOVASCULAR: RRR, normal S1 and S2  ABDOMEN: soft, NT/ND, +BS, no rebound/guarding  EXTREMITIES: No LE edema, pulses are 2+   NEUROLOGY: A+O to person, place, and time, no focal neurologic deficits appreciated   SKIN: No rashes or no palpable lesions        LABS:                        10.9   6.88  )-----------( 142      ( 26 Jan 2023 03:30 )             33.7     01-27    127<L>  |  88<L>  |  48.3<H>  ----------------------------<  84  5.6<H>   |  22.0  |  4.88<H>    Ca    8.0<L>      27 Jan 2023 05:42  Phos  5.4     01-27  Mg     1.6     01-27    TPro  7.2  /  Alb  3.8  /  TBili  0.4  /  DBili  x   /  AST  30  /  ALT  21  /  AlkPhos  137<H>  01-27          CAPILLARY BLOOD GLUCOSE      POCT Blood Glucose.: 114 mg/dL (27 Jan 2023 15:34)  POCT Blood Glucose.: 83 mg/dL (27 Jan 2023 15:05)  POCT Blood Glucose.: 93 mg/dL (27 Jan 2023 14:37)  POCT Blood Glucose.: 68 mg/dL (27 Jan 2023 14:09)  POCT Blood Glucose.: 64 mg/dL (27 Jan 2023 14:08)  POCT Blood Glucose.: 76 mg/dL (27 Jan 2023 13:30)  POCT Blood Glucose.: 75 mg/dL (27 Jan 2023 11:20)  POCT Blood Glucose.: 98 mg/dL (27 Jan 2023 07:29)  POCT Blood Glucose.: 72 mg/dL (27 Jan 2023 06:59)  POCT Blood Glucose.: 63 mg/dL (27 Jan 2023 06:40)  POCT Blood Glucose.: 91 mg/dL (27 Jan 2023 02:59)  POCT Blood Glucose.: 101 mg/dL (26 Jan 2023 22:45)  POCT Blood Glucose.: 128 mg/dL (26 Jan 2023 18:50)        RADIOLOGY & ADDITIONAL TESTS:      < from: CT Head No Cont (01.25.23 @ 02:40) >  IMPRESSION:  No acute intracranial hemorrhage, vasogenic edema, hydrocephalus or   extra-axial collection. Stable exam chronic findings as above.    < end of copied text >      < from: Xray Chest 1 View- PORTABLE-Urgent (01.24.23 @ 22:32) >  IMPRESSION: Left jugular line in place. 5 blunting right base at this   time. Other findings stable.    < end of copied text >            MEDICATIONS  (STANDING):  calcium acetate 667 milliGRAM(s) Oral three times a day with meals  chlorhexidine 2% Cloths 1 Application(s) Topical daily  clopidogrel Tablet 75 milliGRAM(s) Oral daily  dextrose 5% + sodium chloride 0.9%. 1000 milliLiter(s) (75 mL/Hr) IV Continuous <Continuous>  dextrose 5%. 1000 milliLiter(s) (100 mL/Hr) IV Continuous <Continuous>  dextrose 50% Injectable 25 Gram(s) IV Push once  dextrose 50% Injectable 12.5 Gram(s) IV Push once  dextrose 50% Injectable 25 Gram(s) IV Push once  glucagon  Injectable 1 milliGRAM(s) IntraMuscular once  heparin   Injectable 5000 Unit(s) SubCutaneous every 12 hours  metoprolol tartrate 25 milliGRAM(s) Oral every 12 hours  pantoprazole    Tablet 40 milliGRAM(s) Oral before breakfast    MEDICATIONS  (PRN):  ondansetron Injectable 4 milliGRAM(s) IV Push every 4 hours PRN Nausea and/or Vomiting                                  
AMIE ALFONSO    [id number]    77y      Male    INTERVAL HPI/OVERNIGHT EVENTS: patient being downgraded from micu. patient seen at bedside and denies any complaints    REVIEW OF SYSTEMS:    CONSTITUTIONAL: No fever, weight loss, or fatigue  RESPIRATORY: No cough, wheezing, hemoptysis; No shortness of breath  CARDIOVASCULAR: No chest pain, palpitations  GASTROINTESTINAL: No abdominal or epigastric pain. No nausea, vomiting  NEUROLOGICAL: No headaches, memory loss, loss of strength.  MISCELLANEOUS:      Vital Signs Last 24 Hrs  T(C): 36.9 (26 Jan 2023 11:26), Max: 37.5 (25 Jan 2023 19:48)  T(F): 98.5 (26 Jan 2023 11:26), Max: 99.5 (25 Jan 2023 19:48)  HR: 69 (26 Jan 2023 14:00) (56 - 81)  BP: 130/77 (26 Jan 2023 14:00) (114/69 - 157/71)  BP(mean): 94 (26 Jan 2023 14:00) (79 - 109)  RR: 14 (26 Jan 2023 14:00) (11 - 31)  SpO2: 95% (26 Jan 2023 14:00) (95% - 100%)    Parameters below as of 26 Jan 2023 12:00  Patient On (Oxygen Delivery Method): room air        PHYSICAL EXAM:    GENERAL: nad, pleasant  HEENT: NC/AT  NECK: Supple, trachea midline  PULM: CTA anteriorly  CHEST WALL: L permcath in place  CVS: +S1, S2  ABD: Soft, NT/ND  EXT: No pedal edema  NEURO aao x4    LABS:                        10.9   6.88  )-----------( 142      ( 26 Jan 2023 03:30 )             33.7     01-26    128<L>  |  90<L>  |  27.7<H>  ----------------------------<  276<H>  5.0   |  25.0  |  3.64<H>    Ca    8.0<L>      26 Jan 2023 03:30  Phos  4.1     01-26  Mg     1.6     01-26    TPro  6.7  /  Alb  3.5  /  TBili  0.3<L>  /  DBili  x   /  AST  23  /  ALT  15  /  AlkPhos  123<H>  01-26    PT/INR - ( 24 Jan 2023 21:50 )   PT: 12.4 sec;   INR: 1.07 ratio         PTT - ( 24 Jan 2023 21:50 )  PTT:39.7 sec        MEDICATIONS  (STANDING):  chlorhexidine 2% Cloths 1 Application(s) Topical daily  cinacalcet 30 milliGRAM(s) Oral every 12 hours  clopidogrel Tablet 75 milliGRAM(s) Oral daily  dextrose 5% + sodium chloride 0.9%. 1000 milliLiter(s) (75 mL/Hr) IV Continuous <Continuous>  dextrose 5%. 1000 milliLiter(s) (100 mL/Hr) IV Continuous <Continuous>  dextrose 50% Injectable 25 Gram(s) IV Push once  dextrose 50% Injectable 12.5 Gram(s) IV Push once  dextrose 50% Injectable 25 Gram(s) IV Push once  ferrous    sulfate 325 milliGRAM(s) Oral daily  glucagon  Injectable 1 milliGRAM(s) IntraMuscular once  heparin   Injectable 5000 Unit(s) SubCutaneous every 12 hours  metoprolol tartrate 25 milliGRAM(s) Oral every 12 hours  pantoprazole    Tablet 40 milliGRAM(s) Oral before breakfast    MEDICATIONS  (PRN):  ondansetron Injectable 4 milliGRAM(s) IV Push every 4 hours PRN Nausea and/or Vomiting      RADIOLOGY & ADDITIONAL TESTS:  
INTERVAL HPI/OVERNIGHT EVENTS:    CC: symptomatic hypoglycemia, ESRD on HD, hypertension, hyperlipidemia, CAD    No overnight events  denies complaints  FS stable  seen during HD    Vital Signs Last 24 Hrs  T(C): 36.4 (30 Jan 2023 09:19), Max: 36.9 (30 Jan 2023 05:17)  T(F): 97.5 (30 Jan 2023 09:19), Max: 98.4 (30 Jan 2023 05:17)  HR: 69 (30 Jan 2023 09:19) (68 - 72)  BP: 124/68 (30 Jan 2023 09:19) (124/68 - 166/90)  BP(mean): --  RR: 18 (30 Jan 2023 09:19) (18 - 18)  SpO2: 100% (30 Jan 2023 09:19) (94% - 100%)    Parameters below as of 30 Jan 2023 09:19  Patient On (Oxygen Delivery Method): room air        PHYSICAL EXAM:    GENERAL: alert, not in distress  CHEST/LUNG: b/l air entry  HEART: reg  ABDOMEN: soft, bs+  EXTREMITIES:  no edema, tenderness    MEDICATIONS  (STANDING):  calcium acetate 667 milliGRAM(s) Oral three times a day with meals  chlorhexidine 2% Cloths 1 Application(s) Topical daily  clopidogrel Tablet 75 milliGRAM(s) Oral daily  dextrose 5%. 1000 milliLiter(s) (100 mL/Hr) IV Continuous <Continuous>  dextrose 50% Injectable 25 Gram(s) IV Push once  dextrose 50% Injectable 12.5 Gram(s) IV Push once  dextrose 50% Injectable 25 Gram(s) IV Push once  glucagon  Injectable 1 milliGRAM(s) IntraMuscular once  heparin   Injectable 5000 Unit(s) SubCutaneous every 12 hours  metoprolol tartrate 25 milliGRAM(s) Oral every 12 hours  NIFEdipine XL 60 milliGRAM(s) Oral daily  pantoprazole    Tablet 40 milliGRAM(s) Oral before breakfast    MEDICATIONS  (PRN):  dextrose Oral Gel 15 Gram(s) Oral once PRN Blood Glucose LESS THAN 70 milliGRAM(s)/deciliter  ondansetron Injectable 4 milliGRAM(s) IV Push every 4 hours PRN Nausea and/or Vomiting      Allergies    No Known Allergies    Intolerances          LABS:                          9.2    x     )-----------( x        ( 30 Jan 2023 09:15 )             28.5     01-30    135  |  99  |  39.9<H>  ----------------------------<  90  4.6   |  24.0  |  4.70<H>    Ca    8.4      30 Jan 2023 09:15  Phos  3.0     01-30    TPro  6.6  /  Alb  3.4  /  TBili  0.4  /  DBili  x   /  AST  25  /  ALT  20  /  AlkPhos  141<H>  01-30          RADIOLOGY & ADDITIONAL TESTS:  
NEPHROLOGY INTERVAL HPI/OVERNIGHT EVENTS:    Examined   Feeling better  Awake alert  Denies HA CP no SOB    MEDICATIONS  (STANDING):  chlorhexidine 2% Cloths 1 Application(s) Topical daily  cinacalcet 30 milliGRAM(s) Oral every 12 hours  clopidogrel Tablet 75 milliGRAM(s) Oral daily  dextrose 5% + sodium chloride 0.9%. 1000 milliLiter(s) (75 mL/Hr) IV Continuous <Continuous>  dextrose 5%. 1000 milliLiter(s) (100 mL/Hr) IV Continuous <Continuous>  dextrose 50% Injectable 25 Gram(s) IV Push once  dextrose 50% Injectable 12.5 Gram(s) IV Push once  dextrose 50% Injectable 25 Gram(s) IV Push once  ferrous    sulfate 325 milliGRAM(s) Oral daily  glucagon  Injectable 1 milliGRAM(s) IntraMuscular once  heparin   Injectable 5000 Unit(s) SubCutaneous every 12 hours  metoprolol tartrate 25 milliGRAM(s) Oral every 12 hours  pantoprazole    Tablet 40 milliGRAM(s) Oral before breakfast    MEDICATIONS  (PRN):  ondansetron Injectable 4 milliGRAM(s) IV Push every 4 hours PRN Nausea and/or Vomiting      Allergies    No Known Allergies    Intolerances        Vital Signs Last 24 Hrs  T(C): 36.9 (26 Jan 2023 11:26), Max: 37.5 (25 Jan 2023 19:48)  T(F): 98.5 (26 Jan 2023 11:26), Max: 99.5 (25 Jan 2023 19:48)  HR: 69 (26 Jan 2023 14:00) (56 - 81)  BP: 130/77 (26 Jan 2023 14:00) (114/69 - 157/71)  BP(mean): 94 (26 Jan 2023 14:00) (79 - 109)  RR: 14 (26 Jan 2023 14:00) (11 - 31)  SpO2: 95% (26 Jan 2023 14:00) (95% - 100%)    Parameters below as of 26 Jan 2023 12:00  Patient On (Oxygen Delivery Method): room air      PHYSICAL EXAM:  GENERAL: NAD  HEAD: NCAT  EYES: EOMI  NECK: Supple  NERVOUS SYSTEM:  Alert & Oriented X3  CHEST/LUNG: Clear to percussion bilaterally  HEART: Regular rate and rhythm  ABDOMEN: Soft, Nontender, Nondistended; +BS  EXTREMITIES: trace edema    LABS:                        10.9   6.88  )-----------( 142      ( 26 Jan 2023 03:30 )             33.7     01-26    128<L>  |  90<L>  |  27.7<H>  ----------------------------<  276<H>  5.0   |  25.0  |  3.64<H>    Ca    8.0<L>      26 Jan 2023 03:30  Phos  4.1     01-26  Mg     1.6     01-26    TPro  6.7  /  Alb  3.5  /  TBili  0.3<L>  /  DBili  x   /  AST  23  /  ALT  15  /  AlkPhos  123<H>  01-26    PT/INR - ( 24 Jan 2023 21:50 )   PT: 12.4 sec;   INR: 1.07 ratio         PTT - ( 24 Jan 2023 21:50 )  PTT:39.7 sec    Magnesium, Serum: 1.6 mg/dL (01-26 @ 03:30)  Phosphorus Level, Serum: 4.1 mg/dL (01-26 @ 03:30)          RADIOLOGY & ADDITIONAL TESTS:  
NEPHROLOGY INTERVAL HPI/OVERNIGHT EVENTS:    No new  events.    MEDICATIONS  (STANDING):  calcium acetate 667 milliGRAM(s) Oral three times a day with meals  chlorhexidine 2% Cloths 1 Application(s) Topical daily  clopidogrel Tablet 75 milliGRAM(s) Oral daily  dextrose 5% + sodium chloride 0.9%. 1000 milliLiter(s) (75 mL/Hr) IV Continuous <Continuous>  dextrose 5%. 1000 milliLiter(s) (100 mL/Hr) IV Continuous <Continuous>  dextrose 50% Injectable 25 Gram(s) IV Push once  dextrose 50% Injectable 12.5 Gram(s) IV Push once  dextrose 50% Injectable 25 Gram(s) IV Push once  glucagon  Injectable 1 milliGRAM(s) IntraMuscular once  heparin   Injectable 5000 Unit(s) SubCutaneous every 12 hours  metoprolol tartrate 25 milliGRAM(s) Oral every 12 hours  pantoprazole    Tablet 40 milliGRAM(s) Oral before breakfast    MEDICATIONS  (PRN):  ondansetron Injectable 4 milliGRAM(s) IV Push every 4 hours PRN Nausea and/or Vomiting      Allergies    No Known Allergies        Vital Signs Last 24 Hrs  T(C): 36.9 (30 Jan 2023 05:17), Max: 36.9 (30 Jan 2023 05:17)  T(F): 98.4 (30 Jan 2023 05:17), Max: 98.4 (30 Jan 2023 05:17)  HR: 72 (30 Jan 2023 05:17) (68 - 72)  BP: 166/90 (30 Jan 2023 05:17) (127/73 - 166/90)  BP(mean): --  RR: 18 (30 Jan 2023 05:17) (18 - 18)  SpO2: 100% (30 Jan 2023 05:17) (94% - 100%)      T(C): 36.6 (29 Jan 2023 10:24), Max: 37 (28 Jan 2023 16:01)  T(F): 97.8 (29 Jan 2023 10:24), Max: 98.6 (28 Jan 2023 16:01)  HR: 70 (29 Jan 2023 04:33) (65 - 76)  BP: 127/73 (29 Jan 2023 10:24) (127/73 - 152/92)  BP(mean): --  RR: 18 (29 Jan 2023 04:33) (16 - 18)  SpO2: 99% (29 Jan 2023 04:33) (95% - 99%)    Parameters below as of 29 Jan 2023 04:33  Patient On (Oxygen Delivery Method): room air          PHYSICAL EXAM:    GENERAL:  Feels well in bed.  HEAD:  wnl  EYES:   ENMT:   NECK: veins wnl  NERVOUS SYSTEM: alert, verbal   CHEST/LUNG: no 02, no  wheezes  HEART: no rub  ABDOMEN: distended , soft  EXTREMITIES: legs  same   LYMPH:   SKIN: tan    LABS:        01-28    133<L>  |  96  |  31.9<H>  ----------------------------<  92  5.3   |  22.0  |  3.56<H>    Ca    8.1<L>      28 Jan 2023 07:35  Phos  3.3     01-28  Mg     1.7     01-28 01-27    134<L>  |  95<L>  |  21.8<H>  ----------------------------<  196<H>  4.7   |  23.0  |  2.73<H>    Ca    7.9<L>      27 Jan 2023 16:08  Phos  5.4     01-27  Mg     1.6     01-27    TPro  7.2  /  Alb  3.8  /  TBili  0.4  /  DBili  x   /  AST  30  /  ALT  21  /  AlkPhos  137<H>  01-27                          10.9   6.88  )-----------( 142      ( 26 Jan 2023 03:30 )             33.7     01-27    127<L>  |  88<L>  |  48.3<H>  ----------------------------<  84  5.6<H>   |  22.0  |  4.88<H>    Ca    8.0<L>      27 Jan 2023 05:42  Phos  5.4     01-27  Mg     1.6     01-27    TPro  7.2  /  Alb  3.8  /  TBili  0.4  /  DBili  x   /  AST  30  /  ALT  21  /  AlkPhos  137<H>  01-27        Magnesium, Serum: 1.6 mg/dL (01-27 @ 05:42)  Phosphorus Level, Serum: 5.4 mg/dL (01-27 @ 05:42)          RADIOLOGY & ADDITIONAL TESTS:  
NEPHROLOGY INTERVAL HPI/OVERNIGHT EVENTS:    No new  events.    MEDICATIONS  (STANDING):  calcium acetate 667 milliGRAM(s) Oral three times a day with meals  chlorhexidine 2% Cloths 1 Application(s) Topical daily  clopidogrel Tablet 75 milliGRAM(s) Oral daily  dextrose 5% + sodium chloride 0.9%. 1000 milliLiter(s) (75 mL/Hr) IV Continuous <Continuous>  dextrose 5%. 1000 milliLiter(s) (100 mL/Hr) IV Continuous <Continuous>  dextrose 50% Injectable 25 Gram(s) IV Push once  dextrose 50% Injectable 12.5 Gram(s) IV Push once  dextrose 50% Injectable 25 Gram(s) IV Push once  glucagon  Injectable 1 milliGRAM(s) IntraMuscular once  heparin   Injectable 5000 Unit(s) SubCutaneous every 12 hours  metoprolol tartrate 25 milliGRAM(s) Oral every 12 hours  pantoprazole    Tablet 40 milliGRAM(s) Oral before breakfast    MEDICATIONS  (PRN):  ondansetron Injectable 4 milliGRAM(s) IV Push every 4 hours PRN Nausea and/or Vomiting      Allergies    No Known Allergies            Vital Signs Last 24 Hrs  T(C): 36.4 (27 Jan 2023 04:22), Max: 36.9 (26 Jan 2023 11:26)  T(F): 97.5 (27 Jan 2023 04:22), Max: 98.5 (26 Jan 2023 11:26)  HR: 59 (27 Jan 2023 04:22) (56 - 84)  BP: 157/95 (27 Jan 2023 04:22) (122/76 - 157/95)  BP(mean): 88 (26 Jan 2023 16:15) (83 - 94)  RR: 19 (27 Jan 2023 04:22) (14 - 20)  SpO2: 96% (27 Jan 2023 04:22) (91% - 100%)    Parameters below as of 26 Jan 2023 12:00  Patient On (Oxygen Delivery Method): room air        PHYSICAL EXAM:    GENERAL:  Complains  of enlarged  abdomin  HEAD:  wnl  EYES:   ENMT:   NECK: veins wnl  NERVOUS SYSTEM: alert, verbal   CHEST/LUNG: no 02, no  wheezes  HEART: no rub  ABDOMEN: distended , soft  EXTREMITIES: legs  same   LYMPH:   SKIN: tan    LABS:                        10.9   6.88  )-----------( 142      ( 26 Jan 2023 03:30 )             33.7     01-27    127<L>  |  88<L>  |  48.3<H>  ----------------------------<  84  5.6<H>   |  22.0  |  4.88<H>    Ca    8.0<L>      27 Jan 2023 05:42  Phos  5.4     01-27  Mg     1.6     01-27    TPro  7.2  /  Alb  3.8  /  TBili  0.4  /  DBili  x   /  AST  30  /  ALT  21  /  AlkPhos  137<H>  01-27        Magnesium, Serum: 1.6 mg/dL (01-27 @ 05:42)  Phosphorus Level, Serum: 5.4 mg/dL (01-27 @ 05:42)          RADIOLOGY & ADDITIONAL TESTS:  
NEPHROLOGY INTERVAL HPI/OVERNIGHT EVENTS:    No new  events.    MEDICATIONS  (STANDING):  calcium acetate 667 milliGRAM(s) Oral three times a day with meals  chlorhexidine 2% Cloths 1 Application(s) Topical daily  clopidogrel Tablet 75 milliGRAM(s) Oral daily  dextrose 5% + sodium chloride 0.9%. 1000 milliLiter(s) (75 mL/Hr) IV Continuous <Continuous>  dextrose 5%. 1000 milliLiter(s) (100 mL/Hr) IV Continuous <Continuous>  dextrose 50% Injectable 25 Gram(s) IV Push once  dextrose 50% Injectable 12.5 Gram(s) IV Push once  dextrose 50% Injectable 25 Gram(s) IV Push once  glucagon  Injectable 1 milliGRAM(s) IntraMuscular once  heparin   Injectable 5000 Unit(s) SubCutaneous every 12 hours  metoprolol tartrate 25 milliGRAM(s) Oral every 12 hours  pantoprazole    Tablet 40 milliGRAM(s) Oral before breakfast    MEDICATIONS  (PRN):  ondansetron Injectable 4 milliGRAM(s) IV Push every 4 hours PRN Nausea and/or Vomiting      Allergies    No Known Allergies            Vital Signs Last 24 Hrs  T(C): 36.6 (28 Jan 2023 04:25), Max: 37.1 (27 Jan 2023 08:45)  T(F): 97.9 (28 Jan 2023 04:25), Max: 98.7 (27 Jan 2023 08:45)  HR: 74 (28 Jan 2023 04:25) (54 - 74)  BP: 161/102 (28 Jan 2023 04:25) (153/65 - 165/84)  BP(mean): --  RR: 20 (28 Jan 2023 04:25) (18 - 20)  SpO2: 95% (28 Jan 2023 04:25) (95% - 100%)    Parameters below as of 28 Jan 2023 04:25  Patient On (Oxygen Delivery Method): room air      T(C): 36.4 (27 Jan 2023 04:22), Max: 36.9 (26 Jan 2023 11:26)  T(F): 97.5 (27 Jan 2023 04:22), Max: 98.5 (26 Jan 2023 11:26)  HR: 59 (27 Jan 2023 04:22) (56 - 84)  BP: 157/95 (27 Jan 2023 04:22) (122/76 - 157/95)  BP(mean): 88 (26 Jan 2023 16:15) (83 - 94)  RR: 19 (27 Jan 2023 04:22) (14 - 20)  SpO2: 96% (27 Jan 2023 04:22) (91% - 100%)    Parameters below as of 26 Jan 2023 12:00  Patient On (Oxygen Delivery Method): room air        PHYSICAL EXAM:    GENERAL:  Complains  of sobe  HEAD:  wnl  EYES:   ENMT:   NECK: veins wnl  NERVOUS SYSTEM: alert, verbal   CHEST/LUNG: no 02, no  wheezes  HEART: no rub  ABDOMEN: distended , soft  EXTREMITIES: legs  same   LYMPH:   SKIN: tan    LABS:    01-27    134<L>  |  95<L>  |  21.8<H>  ----------------------------<  196<H>  4.7   |  23.0  |  2.73<H>    Ca    7.9<L>      27 Jan 2023 16:08  Phos  5.4     01-27  Mg     1.6     01-27    TPro  7.2  /  Alb  3.8  /  TBili  0.4  /  DBili  x   /  AST  30  /  ALT  21  /  AlkPhos  137<H>  01-27                          10.9   6.88  )-----------( 142      ( 26 Jan 2023 03:30 )             33.7     01-27    127<L>  |  88<L>  |  48.3<H>  ----------------------------<  84  5.6<H>   |  22.0  |  4.88<H>    Ca    8.0<L>      27 Jan 2023 05:42  Phos  5.4     01-27  Mg     1.6     01-27    TPro  7.2  /  Alb  3.8  /  TBili  0.4  /  DBili  x   /  AST  30  /  ALT  21  /  AlkPhos  137<H>  01-27        Magnesium, Serum: 1.6 mg/dL (01-27 @ 05:42)  Phosphorus Level, Serum: 5.4 mg/dL (01-27 @ 05:42)          RADIOLOGY & ADDITIONAL TESTS:  
Patient is a 77y old  Male who presents with a chief complaint of Hypoglycemia (25 Jan 2023 11:52)      BRIEF HOSPITAL COURSE:   77M PMH AFib s/p Watchman not on A/C, non-obstructive CAD, DM on Glimepiride, chronic hypotension on Midodrine with HD, ESRD on HD M/W/F via L permcath, h/o SAH 2/2 fall in 2019, renal hematoma s/p nephrectomy who presented 1/24/23 with AMS, found by wife unresponsive and reportedly with FSG of 20 at home. He was given Dextrose by EMS and admitted to [facility] ED. In the ED he was given amp D50 x2 and started on D10 infusion, and started on Octreotide gtt for suspected sulfonylurea (Glimepiride) overdose. On 1/25/23 developed hyperglycemia and was placed on a corrective sliding scale.       PAST MEDICAL & SURGICAL HISTORY:  Daytime sleepiness      DM (diabetes mellitus)      Dizziness      Dyslipidemia      ESRD (end stage renal disease)  on  hemodialysis  3  x  weekly.,  H/O  left  nephrectomy after  renal  bleed      Hypercholesteremia      Renal hematoma, left      Tiredness      Hypotension  treated  with Midodrine      Kidney problem  spontaneous renal bleed while on coumadin  c/b  renal  hematoma  with  LT  nephrectomy      Claudication in peripheral vascular disease  Right common iliac or ostial external iliac per June 2018 U/S  Left popliteal 50-75 and EDUIN stenosis  SUSANA 0.8      Obesity (BMI 30.0-34.9)      Imbalance  secondary to PVD Uses cane      Leg pain, bilateral  R&gt;L at rest and with short distant ambulation      Palpitations      Low glucose level  Patient has h/o low blood sugars at times      GERD (gastroesophageal reflux disease)      Carotid artery disease  mild      History of unilateral nephrectomy  left  nephrectomy      Abnormality of left atrial appendage  WATCHMAN  LEFT ATRIAL APPENDAGE CLOSURE   Jan. 30 2017      AV fistula  right lower arm      Renal transplant recipient            Medications:    metoprolol tartrate 25 milliGRAM(s) Oral every 12 hours      ondansetron Injectable 4 milliGRAM(s) IV Push every 4 hours PRN      clopidogrel Tablet 75 milliGRAM(s) Oral daily  heparin   Injectable 5000 Unit(s) SubCutaneous every 12 hours    pantoprazole    Tablet 40 milliGRAM(s) Oral before breakfast      cinacalcet 30 milliGRAM(s) Oral every 12 hours  dextrose 50% Injectable 25 Gram(s) IV Push once  dextrose 50% Injectable 12.5 Gram(s) IV Push once  dextrose 50% Injectable 25 Gram(s) IV Push once  glucagon  Injectable 1 milliGRAM(s) IntraMuscular once  insulin lispro (ADMELOG) corrective regimen sliding scale   SubCutaneous three times a day before meals  insulin lispro (ADMELOG) corrective regimen sliding scale   SubCutaneous at bedtime    dextrose 5%. 1000 milliLiter(s) IV Continuous <Continuous>  ferrous    sulfate 325 milliGRAM(s) Oral daily      chlorhexidine 2% Cloths 1 Application(s) Topical daily            ICU Vital Signs Last 24 Hrs  T(C): 37.4 (25 Jan 2023 23:00), Max: 37.8 (25 Jan 2023 11:37)  T(F): 99.4 (25 Jan 2023 23:00), Max: 100 (25 Jan 2023 11:37)  HR: 64 (26 Jan 2023 00:00) (64 - 89)  BP: 134/82 (26 Jan 2023 00:00) (114/69 - 220/107)  BP(mean): 97 (26 Jan 2023 00:00) (79 - 117)  ABP: --  ABP(mean): --  RR: 24 (26 Jan 2023 00:00) (14 - 31)  SpO2: 100% (26 Jan 2023 00:00) (93% - 100%)    O2 Parameters below as of 26 Jan 2023 00:00  Patient On (Oxygen Delivery Method): room air                I&O's Detail    24 Jan 2023 07:01  -  25 Jan 2023 07:00  --------------------------------------------------------  IN:    dextrose 10%: 500 mL    Octreotide: 50 mL    Oral Fluid: 240 mL  Total IN: 790 mL    OUT:  Total OUT: 0 mL    Total NET: 790 mL      25 Jan 2023 07:01  -  26 Jan 2023 00:19  --------------------------------------------------------  IN:    dextrose 10%: 700 mL    dextrose 5%: 250 mL    Octreotide: 90 mL    Other (mL): 800 mL  Total IN: 1840 mL    OUT:    Other (mL): 2800 mL  Total OUT: 2800 mL    Total NET: -960 mL            LABS:                        12.0   12.96 )-----------( 164      ( 25 Jan 2023 08:40 )             37.3     01-25    132<L>  |  92<L>  |  45.8<H>  ----------------------------<  115<H>  5.5<H>   |  22.0  |  5.28<H>    Ca    8.2<L>      25 Jan 2023 08:40  Phos  4.7     01-25  Mg     1.5     01-25    TPro  7.2  /  Alb  3.6  /  TBili  0.4  /  DBili  x   /  AST  42<H>  /  ALT  18  /  AlkPhos  145<H>  01-25      CARDIAC MARKERS ( 24 Jan 2023 23:50 )  x     / 0.05 ng/mL / x     / x     / x          CAPILLARY BLOOD GLUCOSE      POCT Blood Glucose.: 101 mg/dL (26 Jan 2023 00:00)    PT/INR - ( 24 Jan 2023 21:50 )   PT: 12.4 sec;   INR: 1.07 ratio         PTT - ( 24 Jan 2023 21:50 )  PTT:39.7 sec    CULTURES:  Rapid RVP Result: Detected (01-24 @ 22:45)      Physical Examination:  GENERAL: In NAD   HEENT: NC/AT  NECK: Supple  PULM: CTA anteriorly  CVS: +S1, S2  ABD: Soft, non-tender  EXTREMITIES: No pedal edema B/L  SKIN: No open wounds  NEURO: Grossly non-focal    DEVICES:     RADIOLOGY:   < from: CT Head No Cont (01.25.23 @ 02:40) >    ACC: [id number] EXAM:  CT BRAIN   ORDERED BY: DESEAN ANDRADE     PROCEDURE DATE:  01/25/2023          INTERPRETATION:  CLINICAL HISTORY:  Hypoglycemia and hypertensive   emergency.    TECHNIQUE:  CT of the head without contrast.  Contiguous transaxialimages of the head were acquired from the skull   base to the vertex without the administration of iodinated contrast.   Coronal and sagittal reformatted images are provided.    COMPARISON: None available.    FINDINGS:    There is no acute intracranial hemorrhage, vasogenic edema or evidence   for acute large vascular territory infarct. Stable patchy areas of   low-attenuation in the bihemispheric white matter, nonspecific, but   likely the sequela of chronic microvascular change.    The ventricles, sulci and cisternal spaces are stable in size and   configuration demonstrating age-related involutional change.  There is no   midline shift or abnormal extra-axial fluid collection.    The calvarium is intact.  There are no osteoblastic or lyticcalvarial or   skull base lesions. Visualized maxillary sinuses are opacified as is an   anterior right ethmoid sinus cell. The remaining paranasal sinuses are   clear. Small bilateral mastoid effusions.    IMPRESSION:  No acute intracranial hemorrhage, vasogenic edema, hydrocephalus or   extra-axial collection. Stable exam chronic findings as above.    --- End of Report ---            RENETTA GILL MD; Attending Radiologist  This document has been electronically signed. Jan 25 2023  3:52AM    < end of copied text >    
INTERVAL HPI/OVERNIGHT EVENTS:     CC: symptomatic hypoglycemia, ESRD on HD, hypertension, hyperlipidemia, CAD    No overnight events  denies complaints  tolerating diet  FS improved      Vital Signs Last 24 Hrs  T(C): 36.6 (29 Jan 2023 10:24), Max: 37 (28 Jan 2023 16:01)  T(F): 97.8 (29 Jan 2023 10:24), Max: 98.6 (28 Jan 2023 16:01)  HR: 70 (29 Jan 2023 04:33) (70 - 76)  BP: 127/73 (29 Jan 2023 10:24) (127/73 - 143/79)  BP(mean): --  RR: 18 (29 Jan 2023 04:33) (16 - 18)  SpO2: 99% (29 Jan 2023 04:33) (95% - 99%)    Parameters below as of 29 Jan 2023 04:33  Patient On (Oxygen Delivery Method): room air        PHYSICAL EXAM:    GENERAL: alert, not in distress  CHEST/LUNG: b/l air entry  HEART: reg  ABDOMEN: soft, bs+  EXTREMITIES:  no edema, tenderness    MEDICATIONS  (STANDING):  calcium acetate 667 milliGRAM(s) Oral three times a day with meals  chlorhexidine 2% Cloths 1 Application(s) Topical daily  clopidogrel Tablet 75 milliGRAM(s) Oral daily  dextrose 5%. 1000 milliLiter(s) (100 mL/Hr) IV Continuous <Continuous>  dextrose 50% Injectable 25 Gram(s) IV Push once  dextrose 50% Injectable 12.5 Gram(s) IV Push once  dextrose 50% Injectable 25 Gram(s) IV Push once  glucagon  Injectable 1 milliGRAM(s) IntraMuscular once  heparin   Injectable 5000 Unit(s) SubCutaneous every 12 hours  metoprolol tartrate 25 milliGRAM(s) Oral every 12 hours  NIFEdipine XL 60 milliGRAM(s) Oral daily  pantoprazole    Tablet 40 milliGRAM(s) Oral before breakfast    MEDICATIONS  (PRN):  dextrose Oral Gel 15 Gram(s) Oral once PRN Blood Glucose LESS THAN 70 milliGRAM(s)/deciliter  ondansetron Injectable 4 milliGRAM(s) IV Push every 4 hours PRN Nausea and/or Vomiting      Allergies    No Known Allergies    Intolerances          LABS:                          10.8   6.37  )-----------( 123      ( 28 Jan 2023 07:35 )             33.7     01-28    133<L>  |  96  |  31.9<H>  ----------------------------<  92  5.3   |  22.0  |  3.56<H>    Ca    8.1<L>      28 Jan 2023 07:35  Phos  3.3     01-28  Mg     1.7     01-28            RADIOLOGY & ADDITIONAL TESTS:  
INTERVAL HPI/OVERNIGHT EVENTS:    CC: symptomatic hypoglycemia, ESRD on HD, hypertension, hyperlipidemia, CAD      Chart and course reviewed.  No overnight events  had HD today  FS mostly below 100    Vital Signs Last 24 Hrs  T(C): 37 (28 Jan 2023 16:01), Max: 37 (28 Jan 2023 10:25)  T(F): 98.6 (28 Jan 2023 16:01), Max: 98.6 (28 Jan 2023 10:25)  HR: 76 (28 Jan 2023 16:01) (62 - 76)  BP: 143/79 (28 Jan 2023 16:01) (143/79 - 165/84)  BP(mean): --  RR: 16 (28 Jan 2023 16:01) (16 - 20)  SpO2: 95% (28 Jan 2023 16:01) (95% - 96%)    Parameters below as of 28 Jan 2023 13:10  Patient On (Oxygen Delivery Method): room air        PHYSICAL EXAM:    GENERAL: alert, not in distress, sitting in chair  CHEST/LUNG: b/l air entry  HEART: regular  ABDOMEN: soft, bs+  EXTREMITIES:  no edema, tenderness    MEDICATIONS  (STANDING):  calcium acetate 667 milliGRAM(s) Oral three times a day with meals  chlorhexidine 2% Cloths 1 Application(s) Topical daily  clopidogrel Tablet 75 milliGRAM(s) Oral daily  dextrose 5% + sodium chloride 0.9%. 1000 milliLiter(s) (75 mL/Hr) IV Continuous <Continuous>  dextrose 5%. 1000 milliLiter(s) (100 mL/Hr) IV Continuous <Continuous>  dextrose 50% Injectable 12.5 Gram(s) IV Push once  dextrose 50% Injectable 25 Gram(s) IV Push once  dextrose 50% Injectable 25 Gram(s) IV Push once  glucagon  Injectable 1 milliGRAM(s) IntraMuscular once  heparin   Injectable 5000 Unit(s) SubCutaneous every 12 hours  metoprolol tartrate 25 milliGRAM(s) Oral every 12 hours  NIFEdipine XL 60 milliGRAM(s) Oral daily  pantoprazole    Tablet 40 milliGRAM(s) Oral before breakfast    MEDICATIONS  (PRN):  dextrose Oral Gel 15 Gram(s) Oral once PRN Blood Glucose LESS THAN 70 milliGRAM(s)/deciliter  ondansetron Injectable 4 milliGRAM(s) IV Push every 4 hours PRN Nausea and/or Vomiting      Allergies    No Known Allergies    Intolerances          LABS:                          10.8   6.37  )-----------( 123      ( 28 Jan 2023 07:35 )             33.7     01-28    133<L>  |  96  |  31.9<H>  ----------------------------<  92  5.3   |  22.0  |  3.56<H>    Ca    8.1<L>      28 Jan 2023 07:35  Phos  3.3     01-28  Mg     1.7     01-28    TPro  7.2  /  Alb  3.8  /  TBili  0.4  /  DBili  x   /  AST  30  /  ALT  21  /  AlkPhos  137<H>  01-27          RADIOLOGY & ADDITIONAL TESTS:  
NEPHROLOGY INTERVAL HPI/OVERNIGHT EVENTS:    No new  events.    MEDICATIONS  (STANDING):  calcium acetate 667 milliGRAM(s) Oral three times a day with meals  chlorhexidine 2% Cloths 1 Application(s) Topical daily  clopidogrel Tablet 75 milliGRAM(s) Oral daily  dextrose 5% + sodium chloride 0.9%. 1000 milliLiter(s) (75 mL/Hr) IV Continuous <Continuous>  dextrose 5%. 1000 milliLiter(s) (100 mL/Hr) IV Continuous <Continuous>  dextrose 50% Injectable 25 Gram(s) IV Push once  dextrose 50% Injectable 12.5 Gram(s) IV Push once  dextrose 50% Injectable 25 Gram(s) IV Push once  glucagon  Injectable 1 milliGRAM(s) IntraMuscular once  heparin   Injectable 5000 Unit(s) SubCutaneous every 12 hours  metoprolol tartrate 25 milliGRAM(s) Oral every 12 hours  pantoprazole    Tablet 40 milliGRAM(s) Oral before breakfast    MEDICATIONS  (PRN):  ondansetron Injectable 4 milliGRAM(s) IV Push every 4 hours PRN Nausea and/or Vomiting      Allergies    No Known Allergies        Vital Signs Last 24 Hrs  T(C): 36.6 (29 Jan 2023 10:24), Max: 37 (28 Jan 2023 16:01)  T(F): 97.8 (29 Jan 2023 10:24), Max: 98.6 (28 Jan 2023 16:01)  HR: 70 (29 Jan 2023 04:33) (65 - 76)  BP: 127/73 (29 Jan 2023 10:24) (127/73 - 152/92)  BP(mean): --  RR: 18 (29 Jan 2023 04:33) (16 - 18)  SpO2: 99% (29 Jan 2023 04:33) (95% - 99%)    Parameters below as of 29 Jan 2023 04:33  Patient On (Oxygen Delivery Method): room air      T(C): 36.6 (28 Jan 2023 04:25), Max: 37.1 (27 Jan 2023 08:45)  T(F): 97.9 (28 Jan 2023 04:25), Max: 98.7 (27 Jan 2023 08:45)  HR: 74 (28 Jan 2023 04:25) (54 - 74)  BP: 161/102 (28 Jan 2023 04:25) (153/65 - 165/84)  BP(mean): --  RR: 20 (28 Jan 2023 04:25) (18 - 20)  SpO2: 95% (28 Jan 2023 04:25) (95% - 100%)          PHYSICAL EXAM:    GENERAL:  Feels  better  today  HEAD:  wnl  EYES:   ENMT:   NECK: veins wnl  NERVOUS SYSTEM: alert, verbal   CHEST/LUNG: no 02, no  wheezes  HEART: no rub  ABDOMEN: distended , soft  EXTREMITIES: legs  same   LYMPH:   SKIN: tan    LABS:    01-28    133<L>  |  96  |  31.9<H>  ----------------------------<  92  5.3   |  22.0  |  3.56<H>    Ca    8.1<L>      28 Jan 2023 07:35  Phos  3.3     01-28  Mg     1.7     01-28 01-27    134<L>  |  95<L>  |  21.8<H>  ----------------------------<  196<H>  4.7   |  23.0  |  2.73<H>    Ca    7.9<L>      27 Jan 2023 16:08  Phos  5.4     01-27  Mg     1.6     01-27    TPro  7.2  /  Alb  3.8  /  TBili  0.4  /  DBili  x   /  AST  30  /  ALT  21  /  AlkPhos  137<H>  01-27                          10.9   6.88  )-----------( 142      ( 26 Jan 2023 03:30 )             33.7     01-27    127<L>  |  88<L>  |  48.3<H>  ----------------------------<  84  5.6<H>   |  22.0  |  4.88<H>    Ca    8.0<L>      27 Jan 2023 05:42  Phos  5.4     01-27  Mg     1.6     01-27    TPro  7.2  /  Alb  3.8  /  TBili  0.4  /  DBili  x   /  AST  30  /  ALT  21  /  AlkPhos  137<H>  01-27        Magnesium, Serum: 1.6 mg/dL (01-27 @ 05:42)  Phosphorus Level, Serum: 5.4 mg/dL (01-27 @ 05:42)          RADIOLOGY & ADDITIONAL TESTS:

## 2023-01-30 NOTE — PROGRESS NOTE ADULT - PROVIDER SPECIALTY LIST ADULT
Nephrology
Hospitalist
Nephrology
Nephrology
Hospitalist
Nephrology
Hospitalist
Hospitalist
Internal Medicine
Nephrology
MICU

## 2023-01-31 PROBLEM — I25.118 ATHEROSCLEROSIS OF NATIVE CORONARY ARTERY OF NATIVE HEART WITH STABLE ANGINA PECTORIS: Status: ACTIVE | Noted: 2019-05-08

## 2023-01-31 PROBLEM — I73.9 PERIPHERAL ARTERIAL DISEASE: Status: ACTIVE | Noted: 2018-06-29

## 2023-01-31 PROBLEM — Z01.810 PREOPERATIVE CARDIOVASCULAR EXAMINATION: Status: ACTIVE | Noted: 2017-09-15

## 2023-02-07 ENCOUNTER — OFFICE (OUTPATIENT)
Dept: URBAN - METROPOLITAN AREA CLINIC 94 | Facility: CLINIC | Age: 78
Setting detail: OPHTHALMOLOGY
End: 2023-02-07
Payer: MEDICARE

## 2023-02-07 DIAGNOSIS — H26.493: ICD-10-CM

## 2023-02-07 DIAGNOSIS — Z96.1: ICD-10-CM

## 2023-02-07 DIAGNOSIS — H16.223: ICD-10-CM

## 2023-02-07 DIAGNOSIS — H43.393: ICD-10-CM

## 2023-02-07 PROCEDURE — 92012 INTRM OPH EXAM EST PATIENT: CPT | Performed by: OPHTHALMOLOGY

## 2023-02-07 ASSESSMENT — SUPERFICIAL PUNCTATE KERATITIS (SPK)
OS_SPK: 1+
OD_SPK: 1+

## 2023-02-07 ASSESSMENT — REFRACTION_MANIFEST
OD_VA1: 20/100
OS_AXIS: 177
OD_ADD: +2.75
OS_CYLINDER: -0.25
OS_SPHERE: +0.25
OD_CYLINDER: -0.50
OS_VA2: 20/20
OD_ADD: +2.50
OS_VA1: 20/25
OD_VA2: 20/20
OD_VA1: 20/25
OD_SPHERE: +0.25
OS_VA1: 20/25
OD_SPHERE: PLANO
OS_VA1: 20/40
OD_VA1: 20/25
OD_SPHERE: +0.50
OD_SPHERE: +0.25
OD_ADD: +3.00
OS_SPHERE: PLANO
OD_AXIS: 065
OS_AXIS: 163
OD_ADD: +3.00
OS_CYLINDER: -0.25
OS_ADD: +3.00
OD_CYLINDER: -0.50
OD_VA1: 20/30
OD_SPHERE: PLANO
OD_AXIS: 081
OD_AXIS: 060
OS_ADD: +2.50
OS_SPHERE: +0.25
OS_ADD: +2.75
OS_ADD: +3.00
OS_CYLINDER: SPH
OD_CYLINDER: -0.75
OS_SPHERE: +0.25
OS_SPHERE: +0.50
OS_VA1: 20/25

## 2023-02-07 ASSESSMENT — REFRACTION_CURRENTRX
OD_AXIS: 0
OS_VPRISM_DIRECTION: BF
OS_SPHERE: +0.25
OD_OVR_VA: 20/
OD_ADD: +2.75
OS_CYLINDER: SPH
OD_VPRISM_DIRECTION: BF
OS_AXIS: 0
OD_CYLINDER: SPH
OS_ADD: +2.75
OS_OVR_VA: 20/
OD_SPHERE: +0.50

## 2023-02-07 ASSESSMENT — KERATOMETRY
OD_K2POWER_DIOPTERS: 42.00
OS_K1POWER_DIOPTERS: 40.50
METHOD_AUTO_MANUAL: AUTO
OD_AXISANGLE_DEGREES: 131
OS_AXISANGLE_DEGREES: 065
OS_K2POWER_DIOPTERS: 41.00
OD_K1POWER_DIOPTERS: 41.25

## 2023-02-07 ASSESSMENT — TONOMETRY
OS_IOP_MMHG: 14
OD_IOP_MMHG: 14

## 2023-02-07 ASSESSMENT — REFRACTION_AUTOREFRACTION
OS_CYLINDER: -0.25
OD_CYLINDER: -0.75
OD_AXIS: 081
OS_SPHERE: +0.25
OS_AXIS: 163
OD_SPHERE: 0.00

## 2023-02-07 ASSESSMENT — CORNEAL PTERYGIUM: OS_PTERYGIUM: NASAL 1MM

## 2023-02-07 ASSESSMENT — SPHEQUIV_DERIVED
OD_SPHEQUIV: 0
OD_SPHEQUIV: 0
OS_SPHEQUIV: 0.125
OS_SPHEQUIV: 0.125
OD_SPHEQUIV: -0.375
OS_SPHEQUIV: 0.375

## 2023-02-07 ASSESSMENT — AXIALLENGTH_DERIVED
OS_AL: 24.5941
OS_AL: 24.4887
OD_AL: 24.4573
OD_AL: 24.3013
OD_AL: 24.3013
OS_AL: 24.5941

## 2023-02-07 ASSESSMENT — LID POSITION - PTOSIS
OS_PTOSIS: LUL 2+
OD_PTOSIS: RUL 2+

## 2023-02-07 ASSESSMENT — CONFRONTATIONAL VISUAL FIELD TEST (CVF)
OD_FINDINGS: FULL
OS_FINDINGS: FULL

## 2023-02-07 ASSESSMENT — LID POSITION - LOWER LID LAG
OS_LOWER_LID_LAG: LLL 1+
OD_LOWER_LID_LAG: RLL 1+

## 2023-02-07 ASSESSMENT — VISUAL ACUITY
OD_BCVA: 20/25-1
OS_BCVA: 20/25

## 2023-03-16 NOTE — ED ADULT TRIAGE NOTE - RESPIRATORY RATE (BREATHS/MIN)
Patient is in office today s/p Bi V ICD placement. Interrogation of device is normal, leads stable. Removed steri strips and incision is well approximated, no redness, swelling or drainage noted. Patient teaching r/t left arm precautions, device function and follow up care. Patient has an appt on 4/4/23 with Dr Baxter. Patient has remote monitoring set up on his phone as well.   18

## 2023-07-05 NOTE — ASU PATIENT PROFILE, ADULT - NS PRO ABUSE SCREEN SUSPICION NEGLECT YN
Detail Level: Detailed Quality 111:Pneumonia Vaccination Status For Older Adults: Pneumococcal Vaccination Previously Received Quality 130: Documentation Of Current Medications In The Medical Record: Current Medications Documented Quality 226: Preventive Care And Screening: Tobacco Use: Screening And Cessation Intervention: Patient screened for tobacco use and is an ex/non-smoker no

## 2023-10-30 NOTE — DISCHARGE NOTE ADULT - PRINCIPAL DIAGNOSIS
See the message from Pulmonologist and advise the family for any pulmonary, trach issues or refils of albuterol or pulmonary medications they will have to contact Dr Abundio An office. Chest pain

## 2023-11-07 NOTE — ED PROVIDER NOTE - SKIN, MLM
Skin normal color for race, warm, dry and intact. No evidence of rash. Skin normal color for race, warm, dry; dialysis catheter in left chest wall.

## 2023-11-07 NOTE — ED PROVIDER NOTE - NEUROLOGICAL, MLM
Alert and oriented, no focal deficits, no motor or sensory deficits. awake; alert to person; not place or time.

## 2023-11-07 NOTE — H&P ADULT - NSHPPHYSICALEXAM_GEN_ALL_CORE
ICU Vital Signs Last 24 Hrs  T(C): 36.4 (07 Nov 2023 20:36), Max: 36.7 (07 Nov 2023 14:01)  T(F): 97.5 (07 Nov 2023 20:36), Max: 98 (07 Nov 2023 14:01)  HR: 74 (07 Nov 2023 18:00) (69 - 80)  BP: 118/85 (07 Nov 2023 18:00) (116/56 - 136/99)  BP(mean): 71 (07 Nov 2023 13:48) (71 - 71)  RR: 17 (07 Nov 2023 18:00) (17 - 30)  SpO2: 100% (07 Nov 2023 18:00) (97% - 100%)    O2 Parameters below as of 07 Nov 2023 18:00  Patient On (Oxygen Delivery Method): BiPAP/CPAP    General: Awakens to voice being called and painful stimuli. No acute distress.   Skin: Warm, dry, and pink.   Eyes: Pupils equal and reactive to light. Extraocular eye movements intact. No conjunctival injection, discharge, or scleral icterus.   HEENT: Atraumatic, normocephalic. Moist mucus membranes.   Cardiology: Normal S1, S2. No murmurs, rubs, or gallops. Regular rate and rhythm.   Respiratory: Crackles at the bases. Normal chest expansion.   Gastrointestinal: Positive bowel sounds. Soft, non-tender, non-distended. No guarding, rigidity, or rebound tenderness. No hepatosplenomegaly.   Musculoskeletal: 5/5 motor strength in all extremities. Normal range of motion.   Extremities: No peripheral edema bilaterally. Dorsalis pedis pulses 2+ bilaterally. Left upper extremity + swelling.   Neurological: A+Ox0. Does not answer questions appropriately. Responds to painful stimuli and name being called.

## 2023-11-07 NOTE — H&P ADULT - ASSESSMENT
77-year-old male presents with increased swelling     1. Acute hypoxic and hypercapenic respiratory distress multifactorial secondary to fluid overload in the setting of ESRD vs. CHF? and RSV   - Admit to telemetry  - SPO2 84% on nonrebreather per EMS ->  100% on 15 L/min in the ER  - VBG: pH 7.04, CO2 102, PO2 77, oxygen saturation 96%  - BiPAP nightly and as needed  - BNP 86,878, ordered echocardiogram  - Ordered procalcitonin to r/o superimposed bacterial PNA   - Strict I+Os, daily weights   - 2L H20 restriction, 2g sodium restriction      - Keep K > 4 and Mg > 2    - Supportive care for RSV  - Pt has been increasing hypotensive and minimal fluid has been taken off during HD as per tee, may need to decrease or d/c metoprolol dose (pt on for A fib), pt on Midodrine TID  - Nephrology consult - Dr. Causey   - Cardiology consult - Dr. Palla     2. Acute metabolic encephalopathy secondary to hypercapenia   - CT head: no acute intracranial abnormality   - Ordered B12, folate, TSH   - Pt had a recent MRI at [facility], please try and f/u results   - NPO until dysphagia evaluation completed   - PT evaluation     3. Macrocytic anemia  - Hemoglobin 9.5, .9 (baseline ~10)  - Ordered B12 and folate    4. ESRD on hemodialysis  - Creatinine 3.18, monitor   - Nephrology consult     5. Hypoalbuminemia   - Albumin 2.2  - Nutrition consult     6. History of hypertension, hyperlipidemia, CAD, ESRD on HD, atrial fibrillation s/p watchman's procedure, PVD, chronic hypotension on Midodrine, SAH after a fall, renal hematoma s/p nephrectomy, diabetes mellitus   - c/w home medications; verified with pt at the bedside  - Alkaline phosphatase 140, trend     DVT ppx: Eliquis   Code status: DNR/DNI. Trial of NIPPV, IVF, IV abx   Emergency contact: Claudia Foster ([email or web address]) [phone] or Shasha Foster I spent a total of 80 minutes on the date of this encounter coordinating the patient's care. This includes reviewing prior documentation, results and imaging in addition to completing a full history and physical examination on the patient. Further tests, medications, and procedures have been ordered as indicated. Laboratory results and the plan of care were communicated to the patient and/or their family member. Supporting documentation was completed and added to the patient's chart.

## 2023-11-07 NOTE — ED PROVIDER NOTE - OBJECTIVE STATEMENT
76 yo male w/PMHx hypertension, hyperlipidemia, CAD, ESRD on HD, atrial fibrillation s/p watchman's procedure, PVD, chronic hypotension on Midodrine, SAH after a fall, renal hematoma s/p nephrectomy, diabetes mellitus on Glimepiride presents to the ED BIBEMS from [place] for unresponsive. Per EMS, pt was only responsive to painful stimuli. In the ambulance pt then desated to 60%-70% on RA and his bp dropped down to 67/45. Pt with  per EMS. Per EMS family states pt is A&O x4 at baseline. Pt d/c from [facility] yesterday to [facility] due to similar symptoms.

## 2023-11-07 NOTE — ED ADULT NURSE NOTE - NSFALLRISKINTERV_ED_ALL_ED
Assistance OOB with selected safe patient handling equipment if applicable/Assistance with ambulation/Communicate fall risk and risk factors to all staff, patient, and family/Monitor gait and stability/Monitor for mental status changes and reorient to person, place, and time, as needed/Provide visual cue: yellow wristband, yellow gown, etc/Reinforce activity limits and safety measures with patient and family/Toileting schedule using arm’s reach rule for commode and bathroom/Use of alarms - bed, stretcher, chair and/or video monitoring/Call bell, personal items and telephone in reach/Instruct patient to call for assistance before getting out of bed/chair/stretcher/Non-slip footwear applied when patient is off stretcher/[place] to call system/Physically safe environment - no spills, clutter or unnecessary equipment/Purposeful Proactive Rounding/Room/bathroom lighting operational, light cord in reach

## 2023-11-07 NOTE — H&P ADULT - HISTORY OF PRESENT ILLNESS
77-year-old male with past medical history of hypertension, hyperlipidemia, CAD, ESRD on HD, atrial fibrillation s/p watchman's procedure, PVD, chronic hypotension on Midodrine, SAH after a fall, renal hematoma s/p nephrectomy, renal transplant, diabetes mellitus, MITCHELL, pulmonary HTN, prostate cancer, osteomyelitis presents with swelling. I spoke to the patient's nieces Shasha and Claudia at the bedside.  The patient was admitted to [facility] and was discharged to [place] at 10 PM last night.  He was not placed on BiPAP last night.  Today he was more delirious and lethargic than usual with increased swelling.  His niece states that he was at [facility] for colitis and acute pneumonia.  Patient has bedsores on his buttocks.  He had hemodialysis yesterday but not a lot of fluid was being taken off because his blood pressure was low. She states that 2 weeks ago the pt was doing well. He is normally A+Ox3, sat up and had a conversation this past Sunday. The pt did have an MRI at [facility] but his family is unsure of the results of the test. Pt is A+Ox0 at the time of my evaluation and lethargic. He awakes when his name is called and to painful stimuli but cannot provide any additional information.     Prior admission:  -1/30/2023: Unresponsive at home -> hypoglycemia due to sulfonylurea use -> placed on octreotide drip and dextrose infusion    ER course: SPO2 84% on nonrebreather and blood pressure 67/45 per EMS.  As per EMS patient did not wear his BiPAP last night.  SPO2 100% on 15 L/min nonrebreather other vitals stable.  Labs: Hemoglobin 9.5 (baseline ~10), .9, neutrophils 79.2%, INR 1.5, creatinine 3.18, albumin 2.2, alkaline phosphatase 140, BNP 86,878.  VBG: pH 7.04, CO2 102, PO2 77, oxygen saturation 96%.  RSV positive.  COVID-negative.    EKG: pending, f/u results     Imaging:  - CXR: Increased vascular congestion bilaterally, bilateral pleural effusions, central line secured in SVC, no pneumothorax, no distinct consolidation (personally reviewed). -> official CXR read: 1. Cardiomegaly, CHF and bilateral pleural effusions along with passive atelectasis suspected in both lower lobes. These findings have increased from the prior exam. 2. left-sided central line in the SVC along with right subclavian vascular stent, unchanged, with no pneumothorax. 3. No definite evidence of pneumonia.  - CT head: No acute intracranial hemorrhage, mass effect, or shift of the midline structures.  - Left upper extremity doppler: No evidence of left upper extremity deep venous thrombosis.    Patient was placed n.p.o.  He is being admitted to telemetry for further management.

## 2023-11-07 NOTE — ED PROVIDER NOTE - CARDIAC, MLM
Normal rate, regular rhythm.  Heart sounds S1, S2.  No murmurs, rubs or gallops. irregularly; irregular; Heart sounds S1, S2.  No murmurs, rubs or gallops.

## 2023-11-07 NOTE — CONSULT NOTE ADULT - ASSESSMENT
A:    77yMale  HD #    Here for:    1.    This patient requires a higher level of care due to the complexity and severity of their condition which increases the amount and complexity of data to be reviewed and analyzed in addition to the risk of complications, morbidity and mortality of patient management    P:    Neuro: GCS 15. Monitor for delirium.  Continue to optimize pain control. Serial Neurologic assessments.    HEENT: No issues.    CV: Continue hemodynamic monitoring    Pulm: Pulmonary toilet.  Continue incentive spirometer.  Chest PT.  Encourage OOB to chair and ambulation. Nebs. f/u ABG, CXR.    GI/Nutrition: Cont diet, bowel regimen.    /Renal: Monitor UOP. Monitor BMP.  Replete Lytes as needed.    HEME- Chemical and mechanical DVT ppx. f/u CBC. f/u coags as needed.    ID:  Cont abx. f/u Cx's.    Lines/Tubes:     Endo: Maintain euglycemia.    Skin:  Cont skin care, pressure ulcer prevention.    Dispo: Cont care.    Time spent on this patient encounter: 75+ minutes    Date of entry of this note is equal to the date of services rendered.    This includes assessment of the presenting problems with associated risks, reviewing the medical record to prepare for the encounter and meeting face to face with the patient to obtain additional history and conduct a focused exmaination. I have also performed an appropiate physical exam, made interventions listed and ordered and interpreted appropiate diagnostic studies as documented. This also included communication and coordination of care with the multidisciplinary team including the bedside nurse, appropriate attending of record and consultants as needed.         A:    77yMale  HD # 1  Now DNR/DNI    Here for:    1. Acute mixed resp failure  2. CHF  3. APE  4. ESRD  5. ATX    This patient requires a higher level of care due to the complexity and severity of their condition which increases the amount and complexity of data to be reviewed and analyzed in addition to the risk of complications, morbidity and mortality of patient management    P/recommendations:     Resp failure 2/2 volume overload, CHF, APE, ATX  CXR noted    DNR/DNI  Pt comfort paramount per family    Seems to have some improvement in menttation and WOB on NIPPV    Recommend:    - Cont NIPPV in concert with respiratory and pulmonary  - Renal consult for RRT  - No invasive/painful procedures  - Palliative consult    As no escalation per wishes, no role for ICU care at this time    Dispo: Admit to medicine.    Discussed with Dr Burger    Time spent on this patient encounter: 75+ minutes    Date of entry of this note is equal to the date of services rendered.    This includes assessment of the presenting problems with associated risks, reviewing the medical record to prepare for the encounter and meeting face to face with the patient to obtain additional history and conduct a focused exmaination. I have also performed an appropiate physical exam, made interventions listed and ordered and interpreted appropiate diagnostic studies as documented. This also included communication and coordination of care with the multidisciplinary team including the bedside nurse, appropriate attending of record and consultants as needed.

## 2023-11-07 NOTE — ED ADULT NURSE NOTE - OBJECTIVE STATEMENT
Patient presents to ED from [place] for decreased mental status. Pt is A+Ox4 at baseline but currently opens eyes to voice and painful stimuli. Pt respirations are tachypneic, has abdominal retractions. Pt hx of dialysis, has a chest wall dialysis access. According to paperwork from [place], patient receives dialysis every Monday, unsure if patient went to dialysis 1 day ago. LUE w/ pitting edema.

## 2023-11-07 NOTE — H&P ADULT - CONVERSATION DETAILS
I had a detailed discussion with the patient and their family regarding their goals of care. We discussed that cardiopulmonary resuscitation (CPR) can be completed if the patient were to go into cardiac arrest. This includes chest compressions and delivering shocks if needed to restart their heart and intubation/mechanical ventilation to maintain their airway. The risks of CPR were discussed with the patient including rib fractures, damage to internal organs, and the possibility of anoxic brain injury or increased physical debility.     The patient is A+Ox0 at the time of my evaluation and does not have full capacity to make an informed decision or good understanding of the risks associated with the decision being made. I spoke to the patient's next of kin, nikrista Foster (niece) [phone] and Shasha Foster who stated the patient would want to be DNR/DNI. Agreeable to NIPPV, IVF, IV abx. MOLST form completed and place into the chart. Palliative care consult for ongoing discussions. If no improvement in clinical status, pt may consider hospice care.

## 2023-11-07 NOTE — PHARMACOTHERAPY INTERVENTION NOTE - COMMENTS
Medication reconciliation completed.  Reviewed Medication list and confirmed med allergies with list from Care Facility "[facility]"; confirmed with Dr. First Meddu.

## 2023-11-07 NOTE — ED PROVIDER NOTE - CONSTITUTIONAL, MLM
normal... Awake and alert to person not place or time. dialysis catheter in left chest wall. Awake and alert to person not place or time.

## 2023-11-07 NOTE — CONSULT NOTE ADULT - SUBJECTIVE AND OBJECTIVE BOX
ICU Progress Note    HPI:    S:    Pt seen and examined  HD #  77y  Male  Pt here for       Allergies    No Known Allergies    Intolerances        MEDICATIONS  (STANDING):    MEDICATIONS  (PRN):      Drug Dosing Weight  Height (cm): 172.7 (24 Jan 2023 21:43)  Weight (kg): 86.2 (28 Jan 2023 08:00)  BMI (kg/m2): 28.9 (28 Jan 2023 08:00)  BSA (m2): 2 (28 Jan 2023 08:00)    PAST MEDICAL & SURGICAL HISTORY:  Daytime sleepiness      DM (diabetes mellitus)      Dizziness      Dyslipidemia      ESRD (end stage renal disease)  on  hemodialysis  3  x  weekly.,  H/O  left  nephrectomy after  renal  bleed      Hypercholesteremia      Renal hematoma, left      Tiredness      Hypotension  treated  with Midodrine      Kidney problem  spontanious renal bleed while on coumadin  c/b  renal  hematome  with  LT  nephrectomy      Claudication in peripheral vascular disease  R>L  Right common iliac or ostial external iliac per June 2018 U/S  Left popliteal 50-75 and EDUIN stenosis  SUSANA 0.8      Obesity (BMI 30.0-34.9)      Imbalance  secondary to PVD Uses cane      Leg pain, bilateral  R>L at rest and with short distant ambulation      Palpitations      Low glucose level  Patient has h/o low blood sugars at times      GERD (gastroesophageal reflux disease)      Carotid artery disease  mild      History of unilateral nephrectomy  left  nephrectomy      Abnormality of left atrial appendage  WATCHMAN  LEFT ATRIAL APPENDAGE CLOSUIRE   Jan. 30 2017      AV fistula  right lower arm      Renal transplant recipient          FAMILY HISTORY:  FH: hypertension  father        UNLESS OTHERWISE NOTED IN HPI above:    Constitutional:  No Weight Change, No Fever, No Chills, No Night Sweats, No Fatigue, No Malaise  ENT/Mouth:  No Hearing Changes, No Ear Pain, No Nasal Congestion, No  Sinus Pain, No Hoarseness, No sore throat, No Rhinorrhea, No Swallowing  Difficulty  Eyes:  No Eye Pain, No Swelling, No Redness, No Foreign Body, No Discharge, No Vision Changes  Cardiovascular:  No Chest Pain, No SOB, No PND, No Dyspnea on Exertion,  No Orthopnea, No Claudication, No Edema, No Palpitations  Respiratory:  No Cough, No Sputum, No Wheezing, No Smoke Exposure, No Dyspnea  Gastrointestinal:  No Nausea, No Vomiting, No Diarrhea, No  Constipation, No Pain, No Heartburn, No Anorexia, No Dysphagia, No  Hematochezia, No Melena, No Flatulence, No Jaundice  Genitourinary:  No Dysmenorrhea, No DUB, No Dyspareunia, No Dysuria, No  Urinary Frequency, No Hematuria, No Urinary Incontinence, No Urgency,  No Flank Pain, No Urinary Flow Changes, No Hesitancy  Musculoskeletal:  No Arthralgias, No Myalgias, No Joint Swelling, No  Joint Stiffness, No Back Pain, No Neck Pain, No Injury History  Skin:  No Skin Lesions, No Pruritis, No Hair Changes, No Breast/Skin Changes, No Nipple Discharge  Neuro:  No Weakness, No Numbness, No Paresthesias, No Loss of  Consciousness, No Syncope, No Dizziness, No Headache, No Coordination  Changes, No Recent Falls  Psych:  No Anxiety/Panic, No Depression, No Insomnia, No Personality  Changes, No Delusions, No Rumination, No SI/HI/AH/VH, No Social Issues,  No Memory Changes, No Violence/Abuse Hx., No Eating Concerns  Heme/Lymph:  No Bruising, No Bleeding, No Transfusions History, No Lymphadenopathy  Endocrine:  No Polyuria, No Polydipsia, No Temperature Intolerance    O:    ICU Vital Signs Last 24 Hrs  T(C): 36.7 (07 Nov 2023 14:01), Max: 36.7 (07 Nov 2023 14:01)  T(F): 98 (07 Nov 2023 14:01), Max: 98 (07 Nov 2023 14:01)  HR: 69 (07 Nov 2023 13:48) (69 - 69)  BP: 116/56 (07 Nov 2023 13:48) (116/56 - 116/56)  BP(mean): 71 (07 Nov 2023 13:48) (71 - 71)  ABP: --  ABP(mean): --  RR: 30 (07 Nov 2023 13:48) (30 - 30)  SpO2: 97% (07 Nov 2023 15:12) (97% - 100%)    O2 Parameters below as of 07 Nov 2023 13:48  Patient On (Oxygen Delivery Method): mask, nonrebreather  O2 Flow (L/min): 15              I&O's Detail          PE:    Adult lying in bed  No JVD trachea midline  Normocephalic, atraumatic  S1S2+  CTA B/L  Abd soft NTND  No leg swelling/edema noted  Awake and alert  Skin pink, warm    LABS:    CBC Full  -  ( 07 Nov 2023 14:21 )  WBC Count : 6.42 K/uL  RBC Count : 3.17 M/uL  Hemoglobin : 9.5 g/dL  Hematocrit : 32.0 %  Platelet Count - Automated : 203 K/uL  Mean Cell Volume : 100.9 fl  Mean Cell Hemoglobin : 30.0 pg  Mean Cell Hemoglobin Concentration : 29.7 gm/dL  Auto Neutrophil # : 5.08 K/uL  Auto Lymphocyte # : 0.51 K/uL  Auto Monocyte # : 0.61 K/uL  Auto Eosinophil # : 0.11 K/uL  Auto Basophil # : 0.06 K/uL  Auto Neutrophil % : 79.2 %  Auto Lymphocyte % : 7.9 %  Auto Monocyte % : 9.5 %  Auto Eosinophil % : 1.7 %  Auto Basophil % : 0.9 %    11-07    136  |  104  |  6<L>  ----------------------------<  89  4.1   |  27  |  3.18<H>    Ca    8.7      07 Nov 2023 14:21    TPro  7.4  /  Alb  2.2<L>  /  TBili  0.9  /  DBili  x   /  AST  29  /  ALT  13  /  AlkPhos  140<H>  11-07    PT/INR - ( 07 Nov 2023 14:21 )   PT: 16.7 sec;   INR: 1.50 ratio         PTT - ( 07 Nov 2023 14:21 )  PTT:45.8 sec  Urinalysis Basic - ( 07 Nov 2023 14:21 )    Color: x / Appearance: x / SG: x / pH: x  Gluc: 89 mg/dL / Ketone: x  / Bili: x / Urobili: x   Blood: x / Protein: x / Nitrite: x   Leuk Esterase: x / RBC: x / WBC x   Sq Epi: x / Non Sq Epi: x / Bacteria: x      CAPILLARY BLOOD GLUCOSE      POCT Blood Glucose.: 73 mg/dL (07 Nov 2023 13:44)        LIVER FUNCTIONS - ( 07 Nov 2023 14:21 )  Alb: 2.2 g/dL / Pro: 7.4 gm/dL / ALK PHOS: 140 U/L / ALT: 13 U/L / AST: 29 U/L / GGT: x                    ICU Progress Note    HPI:    S:    Pt seen and examined  HD # 1  Now DNR/DNI  77y  Male  PMHx hypertension, hyperlipidemia, CAD, ESRD on HD, atrial fibrillation s/p watchman's procedure, PVD, chronic hypotension on Midodrine, SAH after a fall, renal hematoma s/p nephrectomy, diabetes mellitus on Glimepiride presents to the ED BIBEMS from [place] for unresponsive. Per EMS, pt was only responsive to painful stimuli. In the ambulance pt then desated to 60%-70% on RA and his bp dropped down to 67/45. Pt with  per EMS. Per EMS family states pt is A&O x4 at baseline. Pt d/c from [facility] yesterday to [place] due to similar symptoms.    Placed on NIPPV, ICU called    11/7: Lethargic on NIPPV, more awake per staff. Hemodynamics OK. + JVD    ROS: Unable to obtain 2/2 lethargy      Allergies    No Known Allergies    Intolerances        MEDICATIONS  (STANDING):    MEDICATIONS  (PRN):      Drug Dosing Weight  Height (cm): 172.7 (24 Jan 2023 21:43)  Weight (kg): 86.2 (28 Jan 2023 08:00)  BMI (kg/m2): 28.9 (28 Jan 2023 08:00)  BSA (m2): 2 (28 Jan 2023 08:00)    PAST MEDICAL & SURGICAL HISTORY:  Daytime sleepiness      DM (diabetes mellitus)      Dizziness      Dyslipidemia      ESRD (end stage renal disease)  on  hemodialysis  3  x  weekly.,  H/O  left  nephrectomy after  renal  bleed      Hypercholesteremia      Renal hematoma, left      Tiredness      Hypotension  treated  with Midodrine      Kidney problem  spontanious renal bleed while on coumadin  c/b  renal  hematome  with  LT  nephrectomy      Claudication in peripheral vascular disease  R>L  Right common iliac or ostial external iliac per June 2018 U/S  Left popliteal 50-75 and EDUIN stenosis  SUSANA 0.8      Obesity (BMI 30.0-34.9)      Imbalance  secondary to PVD Uses cane      Leg pain, bilateral  R>L at rest and with short distant ambulation      Palpitations      Low glucose level  Patient has h/o low blood sugars at times      GERD (gastroesophageal reflux disease)      Carotid artery disease  mild      History of unilateral nephrectomy  left  nephrectomy      Abnormality of left atrial appendage  WATCHMAN  LEFT ATRIAL APPENDAGE CLOSUIRE   Jan. 30 2017      AV fistula  right lower arm      Renal transplant recipient          FAMILY HISTORY:  FH: hypertension  father        UNLESS OTHERWISE NOTED IN HPI above:    Constitutional:  No Weight Change, No Fever, No Chills, No Night Sweats, No Fatigue, No Malaise  ENT/Mouth:  No Hearing Changes, No Ear Pain, No Nasal Congestion, No  Sinus Pain, No Hoarseness, No sore throat, No Rhinorrhea, No Swallowing  Difficulty  Eyes:  No Eye Pain, No Swelling, No Redness, No Foreign Body, No Discharge, No Vision Changes  Cardiovascular:  No Chest Pain, No SOB, No PND, No Dyspnea on Exertion,  No Orthopnea, No Claudication, No Edema, No Palpitations  Respiratory:  No Cough, No Sputum, No Wheezing, No Smoke Exposure, No Dyspnea  Gastrointestinal:  No Nausea, No Vomiting, No Diarrhea, No  Constipation, No Pain, No Heartburn, No Anorexia, No Dysphagia, No  Hematochezia, No Melena, No Flatulence, No Jaundice  Genitourinary:  No Dysmenorrhea, No DUB, No Dyspareunia, No Dysuria, No  Urinary Frequency, No Hematuria, No Urinary Incontinence, No Urgency,  No Flank Pain, No Urinary Flow Changes, No Hesitancy  Musculoskeletal:  No Arthralgias, No Myalgias, No Joint Swelling, No  Joint Stiffness, No Back Pain, No Neck Pain, No Injury History  Skin:  No Skin Lesions, No Pruritis, No Hair Changes, No Breast/Skin Changes, No Nipple Discharge  Neuro:  No Weakness, No Numbness, No Paresthesias, No Loss of  Consciousness, No Syncope, No Dizziness, No Headache, No Coordination  Changes, No Recent Falls  Psych:  No Anxiety/Panic, No Depression, No Insomnia, No Personality  Changes, No Delusions, No Rumination, No SI/HI/AH/VH, No Social Issues,  No Memory Changes, No Violence/Abuse Hx., No Eating Concerns  Heme/Lymph:  No Bruising, No Bleeding, No Transfusions History, No Lymphadenopathy  Endocrine:  No Polyuria, No Polydipsia, No Temperature Intolerance    O:    ICU Vital Signs Last 24 Hrs  T(C): 36.7 (07 Nov 2023 14:01), Max: 36.7 (07 Nov 2023 14:01)  T(F): 98 (07 Nov 2023 14:01), Max: 98 (07 Nov 2023 14:01)  HR: 69 (07 Nov 2023 13:48) (69 - 69)  BP: 116/56 (07 Nov 2023 13:48) (116/56 - 116/56)  BP(mean): 71 (07 Nov 2023 13:48) (71 - 71)  ABP: --  ABP(mean): --  RR: 30 (07 Nov 2023 13:48) (30 - 30)  SpO2: 97% (07 Nov 2023 15:12) (97% - 100%)    O2 Parameters below as of 07 Nov 2023 13:48  Patient On (Oxygen Delivery Method): mask, nonrebreather  O2 Flow (L/min): 15              I&O's Detail          PE:    Adult M lying in bed  Appears chronically ill  + JVD trachea midline + NIPPV in place  Normocephalic, atraumatic  S1S2+  Coarse BS B/L  Abd soft NTND  + anasarca  + L chest permacath noted  Awake and alert  Skin pink, warm    LABS:    CBC Full  -  ( 07 Nov 2023 14:21 )  WBC Count : 6.42 K/uL  RBC Count : 3.17 M/uL  Hemoglobin : 9.5 g/dL  Hematocrit : 32.0 %  Platelet Count - Automated : 203 K/uL  Mean Cell Volume : 100.9 fl  Mean Cell Hemoglobin : 30.0 pg  Mean Cell Hemoglobin Concentration : 29.7 gm/dL  Auto Neutrophil # : 5.08 K/uL  Auto Lymphocyte # : 0.51 K/uL  Auto Monocyte # : 0.61 K/uL  Auto Eosinophil # : 0.11 K/uL  Auto Basophil # : 0.06 K/uL  Auto Neutrophil % : 79.2 %  Auto Lymphocyte % : 7.9 %  Auto Monocyte % : 9.5 %  Auto Eosinophil % : 1.7 %  Auto Basophil % : 0.9 %    11-07    136  |  104  |  6<L>  ----------------------------<  89  4.1   |  27  |  3.18<H>    Ca    8.7      07 Nov 2023 14:21    TPro  7.4  /  Alb  2.2<L>  /  TBili  0.9  /  DBili  x   /  AST  29  /  ALT  13  /  AlkPhos  140<H>  11-07    PT/INR - ( 07 Nov 2023 14:21 )   PT: 16.7 sec;   INR: 1.50 ratio         PTT - ( 07 Nov 2023 14:21 )  PTT:45.8 sec  Urinalysis Basic - ( 07 Nov 2023 14:21 )    Color: x / Appearance: x / SG: x / pH: x  Gluc: 89 mg/dL / Ketone: x  / Bili: x / Urobili: x   Blood: x / Protein: x / Nitrite: x   Leuk Esterase: x / RBC: x / WBC x   Sq Epi: x / Non Sq Epi: x / Bacteria: x      CAPILLARY BLOOD GLUCOSE      POCT Blood Glucose.: 73 mg/dL (07 Nov 2023 13:44)        LIVER FUNCTIONS - ( 07 Nov 2023 14:21 )  Alb: 2.2 g/dL / Pro: 7.4 gm/dL / ALK PHOS: 140 U/L / ALT: 13 U/L / AST: 29 U/L / GGT: x

## 2023-11-07 NOTE — ED PROVIDER NOTE - CLINICAL SUMMARY MEDICAL DECISION MAKING FREE TEXT BOX
78 yo male with SOB, hypotension, and AMS. Will get screening EKG, CXR, labs, urine, and reeval. 76 yo male with SOB, hypotension, and AMS. Will get screening EKG, CXR, labs, urine, and reeval.    Arvind DO: I spoke to patient's niece, Claudia Foster- patient is DNR, DNI    ICU consulted- CHANTEL Escalante will come to see patient.    Arvind DO: 3:30pm- s/o to Dr. Thakkar pending ICU eval 76 yo male with SOB, hypotension, and AMS. Will get screening EKG, CXR, labs, urine, and reeval.    Arvind DIA: I spoke to patient's niece, Claudia Foster- patient is DNR, DNI    ICU consulted- CHANTEL Escalante will come to see patient.    Arvind DIA: patient ok for floor; endorsed to Dr. Cyr; renal consulted for possible HD now.

## 2023-11-07 NOTE — ED ADULT TRIAGE NOTE - CHIEF COMPLAINT QUOTE
Patient presents to the ER with complaints of hypoxia and hypotension. Patient usually a&o x4, not oriented at this time, 84% on NRB and BP 67/45 with EMS. Patient  with EMS. Patient wears bipap at night, did not wear last night, hx: HD. Patient brought to trauma room and MD Samuels at bedside.

## 2023-11-08 NOTE — DIETITIAN INITIAL EVALUATION ADULT - PERTINENT LABORATORY DATA
11-08    137  |  107  |  9   ----------------------------<  58<L>  4.1   |  21<L>  |  3.51<H>    Ca    8.6      08 Nov 2023 05:42  Mg     1.8     11-08    TPro  7.4  /  Alb  2.2<L>  /  TBili  0.9  /  DBili  x   /  AST  29  /  ALT  13  /  AlkPhos  140<H>  11-07  POCT Blood Glucose.: 53 mg/dL (11-08-23 @ 13:32)  A1C with Estimated Average Glucose Result: 4.8 % (01-26-23 @ 03:30)

## 2023-11-08 NOTE — DIETITIAN INITIAL EVALUATION ADULT - OTHER INFO
77-year-old male with past medical history of hypertension, hyperlipidemia, CAD, ESRD on HD, atrial fibrillation s/p watchman's procedure, PVD, chronic hypotension on Midodrine, SAH after a fall, renal hematoma s/p nephrectomy, renal transplant, diabetes mellitus, MITCHELL, pulmonary HTN, prostate cancer, osteomyelitis presents with swelling. I spoke to the patient's nieces Shasha and Claudia at the bedside.  The patient was admitted to [facility] and was discharged to [place] at 10 PM last night.  He was not placed on BiPAP last night.  Today he was more delirious and lethargic than usual with increased swelling.  His niece states that he was at [facility] for colitis and acute pneumonia.  Patient has bedsores on his buttocks.  He had hemodialysis yesterday but not a lot of fluid was being taken off because his blood pressure was low. He is normally A+Ox3, sat up and had a conversation this past Sunday. The pt did have an MRI at [facility] but his family is unsure of the results of the test. Pt is A+Ox0 at the time of my evaluation and lethargic. He awakes when his name is called and to painful stimuli but cannot provide any additional information. Admitted w/ acute hypoxic resp distress multifactorial 2/2 fluid overload in setting of ESRD vs CHF? and RSV, also acute metabolic encephalopathy. Overnight had vomited into bipap mask and was put on NC. DNR/DNI. Agreeable to NIPPV, IVF, IV abx.    Pt currently NPO, very lethargic. Unable to obtain any diet/ wt hx. RD took bed scale wt on 11/8 at 174#. Appears overwt with NFPE revealing varied degrees of muscle/ fat wasting; meets criteria for PCM. Rec'd to Maintain aspiration precautions, back of bed >45 degrees. ADAT as mental status improves, would consider SLP eval. Will add premier protein shakes. See additional recs below.

## 2023-11-08 NOTE — SWALLOW BEDSIDE ASSESSMENT ADULT - SWALLOW EVAL: CRITERIA FOR SKILLED INTERVENTION MET
demonstrates skilled criteria for swallowing intervention Application Tool (Optional): Liquid Nitrogen Sprayer Render Note In Bullet Format When Appropriate: No Number Of Freeze-Thaw Cycles: 2 freeze-thaw cycles Duration Of Freeze Thaw-Cycle (Seconds): 1 Post-Care Instructions: I reviewed with the patient in detail post-care instructions. Patient is to wear sunprotection, and avoid picking at any of the treated lesions. Pt may apply Vaseline to crusted or scabbing areas. Consent: The patient's consent was obtained including but not limited to risks of crusting, scabbing, blistering, scarring, darker or lighter pigmentary change, recurrence, incomplete removal and infection. Show Aperture Variable?: Yes Detail Level: Detailed

## 2023-11-08 NOTE — GOALS OF CARE CONVERSATION - ADVANCED CARE PLANNING - CONVERSATION DETAILS
HPI:  77-year-old male with past medical history of hypertension, hyperlipidemia, CAD, ESRD on HD, atrial fibrillation s/p watchman's procedure, PVD, chronic hypotension on Midodrine, SAH after a fall, renal hematoma s/p nephrectomy, renal transplant, diabetes mellitus, MITCHELL, pulmonary HTN, prostate cancer, osteomyelitis presents with swelling. I spoke to the patient's nieces Shasha and Claudia at the bedside.  The patient was admitted to [facility] and was discharged to [place] at 10 PM last night.  He was not placed on BiPAP last night.  Today he was more delirious and lethargic than usual with increased swelling.  His niece states that he was at [facility] for colitis and acute pneumonia.  Patient has bedsores on his buttocks.  He had hemodialysis yesterday but not a lot of fluid was being taken off because his blood pressure was low. She states that 2 weeks ago the pt was doing well. He is normally A+Ox3, sat up and had a conversation this past Sunday. The pt did have an MRI at [facility] but his family is unsure of the results of the test.    (07 Nov 2023 23:14)      PERTINENT PMH REVIEWED:  [ x ] YES [ ] NO           Primary Contact:       yamel Boggs, phone # [phone]    HCP [  ] Surrogate [   ] Guardian [   ] -Claudia reports that she is the health care agent, stating his niece Paige has the copy of the health care proxy    Mental Status: Pt. lacks capacity.  Concerns of Depression [  ] -not identified  Anxiety [   ] -not identified  Baseline ADLs (prior to admission):  Independent [ ] moderately [ ] fully   Dependent   [ x ] moderately [ ]fully    Family Meeting attendees: [facility] discussed 11/8    Anticipated Grief: Patient[  ] Family [ x ]    Caregiver [place] Assessed: Yes [ x ] No [  ]    Faith: [employer]an.    Spiritual Concerns: Not identified,  available for support.    Goals of Care: Comfort [  ] Rehabilitation [ x  ] Curative [  ] Life Prolonging [  ]    Previous Services: [place] for [facility]     ADVANCE DIRECTIVES:   -Pt lacks capacity  -Claudia reports that she is the health care agent, stating his niece Paige has the copy of the health care proxy  -MOLST DNR DNI trial NIV     Anticipated D/C Plan: likely [facility]                      Summary:  Palliative SW spoke with nidebbi Claudia via telephone to discuss GOC, assist with planning and provide supportive counseling.  Palliative role explained.  Emotional support provided.  Pt. lacks capacity.  Yamel shared that Pt is normally alert and oriented however has been in and out of hospitals and rehabilitations the recent months.  She shared that Pts step daughter was staying with him at one point however she returned to [place] after her mother (pts wife) passed away.  Yamel shared that Pts wife passed away in September from pancreatic cancer noting she was diagnosed and passed away in a month and feels Pt is still processing all of these events.  Yamel is very involved and notes that a few of his nieces are also involved and supportive to her.  She shared that Pt has 3 children whom all live out of state but should be visiting this month.  Niece shared that Pts children are not involved in decisions.  Families feelings explored.  Support provided.    Yamel shared what she has discussed with the medical team regarding Pts current medical condition.  She shared that at this time she feels Pt. will not be able to return home following [facility] and will need to apply for LTC at SNF following his rehabilitation.  Yamel shared that her father was on a ventilator during covid and Pt expressed to her that he would not want aggressive interventions such as resuscitation or intubation as they are not a quality of life to him.  MOLST in place that reflects DNR/DNI, trial NIV.  Yamel shared that Pt has been on dialysis for many years and has never expressed that dialysis is too much or that he would want to stop dialysis.  Yamel is hopeful that Pts cognition will improve therefore she can further discuss discharge plans.  Claudia reports that she is the health care agent, stating his niece Paige has the copy of the health care proxy.    Plan likely for [facility] for [facility] possible LTC.  DNR/DNI, trial NIV in place.  Educated family of palliative team availability.  Emotional support provided.  Our team to continue to follow.

## 2023-11-08 NOTE — SWALLOW BEDSIDE ASSESSMENT ADULT - SLP GENERAL OBSERVATIONS
pt semi recumbent in bed, in NAD;  breathing is shallow and inconsistently jerky across his chest an dup into the apices.  Not rousable to voice or touch at this time.   Eyelid flicker on sternal rub.

## 2023-11-08 NOTE — SWALLOW BEDSIDE ASSESSMENT ADULT - SWALLOW EVAL: SECRETION MANAGEMENT
no reflexive swallow observable dand breathin lidlt wet to auscultation: pt is not swallowing his oral secretions.

## 2023-11-08 NOTE — PHYSICAL THERAPY INITIAL EVALUATION ADULT - PERTINENT HX OF CURRENT PROBLEM, REHAB EVAL
presents to the ED BIBEMS from [place] for unresponsiveness. Per EMS, pt was only responsive to painful stimuli. In the ambulance pt then desated to 60%-70% on RA and his bp dropped down to 67/45. Pt with  per EMS. Per EMS family states pt is A&O x4 at baseline. Pt d/c from [facility] day prior to [place] due to similar symptoms. placed on NIPPV. this a.m pt vomited into mask- NIPPV removed, oral care and suctioning done. Pt has been increasing hypotensive and minimal fluid has been taken off during HD as per tee. presents to the ED BIBEMS from [place] for unresponsiveness. Per EMS, pt was only responsive to painful stimuli. In the ambulance pt then desated to 60%-70% on RA and his bp dropped down to 67/45. Pt with  per EMS. Per EMS family states pt is A&O x4 at baseline. Pt d/c from [facility] day prior to [place] due to similar symptoms. placed on NIPPV. Pt has been increasing hypotensive and minimal fluid has been taken off during HD as per nikrista. CT head: No acute intracranial hemorrhage, mass effect, or shift of the midline structures. Left upper extremity doppler: No evidence of left upper extremity deep venous thrombosis. XR: Increased vascular congestion bilaterally, bilateral pleural effusions, central line secured in SVC, no pneumothorax, no distinct consolidation (personally reviewed). -> official CXR read: 1. Cardiomegaly, CHF and bilateral pleural effusions along with passive atelectasis suspected in both lower lobes. These findings have increased from the prior exam. 2. left-sided central line in the SVC along with right subclavian vascular stent, unchanged, with no pneumothorax. 3. No definite evidence of pneumonia.  this a.m pt vomited into mask- NIPPV removed, oral care and suctioning done.

## 2023-11-08 NOTE — SWALLOW BEDSIDE ASSESSMENT ADULT - SWALLOW EVAL: DIAGNOSIS
presently pt shows non-functional dysphagia 2./2 to reduced mental status and lack of oral awareness for boluspresentation.

## 2023-11-08 NOTE — DIETITIAN INITIAL EVALUATION ADULT - ADD RECOMMEND
1) advance diet as per SLP recs for safest diet consistency, 2) Premier protein shake BID to optimize nutritional needs (provides 160 kcal, 30 g protein/ shake), 3) Cliff once daily 2/2 PI stage 2 to aide in wound healing, 4) MVI w/ minerals daily to ensure 100% RDA met, 5) Consider adding thiamine 100 mg daily 2/2 poor PO intake/ malnutrition, 6) Maintain aspiration precautions, back of bed >45 degrees, 7) Monitor bowel movements, if no BM for >3 days, consider implementing bowel regimen, 8) Confirm goals of care regarding nutrition support. RD will continue to monitor PO intake, labs, hydration, and wt prn.

## 2023-11-08 NOTE — DIETITIAN INITIAL EVALUATION ADULT - PERTINENT MEDS FT
MEDICATIONS  (STANDING):  albumin human 25% IVPB 50 milliLiter(s) IV Intermittent every 1 hour  albuterol    90 MICROgram(s) HFA Inhaler 2 Puff(s) Inhalation two times a day  albuterol/ipratropium for Nebulization 3 milliLiter(s) Nebulizer every 6 hours  apixaban 2.5 milliGRAM(s) Oral two times a day  AQUAPHOR (petrolatum Ointment) 1 Application(s) Topical two times a day  epoetin kalpesh-epbx (RETACRIT) Injectable 34552 Unit(s) IV Push <User Schedule>  metoprolol succinate ER 12.5 milliGRAM(s) Oral daily  midodrine 10 milliGRAM(s) Oral every 8 hours  midodrine. 5 milliGRAM(s) Oral once  ranolazine 500 milliGRAM(s) Oral at bedtime  ursodiol Capsule 300 milliGRAM(s) Oral <User Schedule>    MEDICATIONS  (PRN):  acetaminophen     Tablet .. 650 milliGRAM(s) Oral every 6 hours PRN Temp greater or equal to 38C (100.4F), Mild Pain (1 - 3)  aluminum hydroxide/magnesium hydroxide/simethicone Suspension 30 milliLiter(s) Oral every 4 hours PRN Dyspepsia  benzonatate 100 milliGRAM(s) Oral every 8 hours PRN Cough  melatonin 3 milliGRAM(s) Oral at bedtime PRN Insomnia  ondansetron Injectable 4 milliGRAM(s) IV Push every 8 hours PRN Nausea and/or Vomiting

## 2023-11-08 NOTE — PROGRESS NOTE ADULT - ASSESSMENT
77-year-old male presents with increased swelling     #Acute hypoxic and hypercapenic respiratory distress multifactorial secondary to fluid overload in the setting of ESRD vs. CHF? and RSV   - Off BiPAP this morning then became increasingly lethargic. ABG showed CO2 retention. Placed back on BiPAP in afternoon will repeat ABG  - BiPAP nightly and as needed  - Echo with EF 55 but severe pulm HTN   - s/p dialysis session today for fluid removal   - 2L H20 restriction, 2g sodium restriction      - Keep K > 4 and Mg > 2    - Supportive care for RSV. Consider starting abx if new signs of infection   - Nephrology consult - Dr. Causey   - Cardiology consult - Dr. Palla     #Acute metabolic encephalopathy secondary to hypercapenia   - CT head: no acute intracranial abnormality   - TSH elevated, started Synthroid IV  - Pt had a recent MRI at [facility], please try and f/u results   - NPO until more awake     #Hypoglycemia  - Monitor FS closely     #Hypothyroidism  Elevated TSH, low T3 T4  - Started IV Synthroid 20 (equivalent to about 25 PO)   - Repeat TSH in 6-8 weeks     #Macrocytic anemia  - Hemoglobin 9.5, .9 (baseline ~10)  - Ordered B12 and folate    #ESRD on hemodialysis  - Nephrology consult   - s/p dialysis today     #Hypoalbuminemia   - Albumin 2.2  - Nutrition consult     #Atrial fibrillation s/p watchman's procedure  - On Eliquis at home, not received last 2 doses  - If pt had watchman procedure then unsure why patient is on Eliquis. Will obtain collateral     #History of hypertension, hyperlipidemia, CAD, ESRD on HD, atrial fibrillation s/p watchman's procedure, PVD, chronic hypotension on Midodrine, SAH after a fall, renal hematoma s/p nephrectomy, diabetes mellitus   - c/w home medications; verified with pt at the bedside  - Alkaline phosphatase 140, trend     DVT ppx: SCD  Code status: DNR/DNI. Trial of NIPPV, IVF, IV abx

## 2023-11-08 NOTE — CONSULT NOTE ADULT - SUBJECTIVE AND OBJECTIVE BOX
77-year-old male with past medical history of hypertension, hyperlipidemia, CAD, ESRD on HD, atrial fibrillation s/p watchman's procedure, PVD, chronic hypotension on Midodrine, SAH after a fall, renal hematoma s/p nephrectomy due to hematoma while on Coumadin , failed renal transplant, diabetes mellitus, MITCHELL, pulmonary HTN, prostate cancer, osteomyelitis presents with swelling and sob . I spoke to the patient's nieces Paige Monte .    The patient was admitted to [facility] and was discharged to [place] yesterday when family noticed he was not breathing well and had hypoxia   Sent to  for further evlauation and required bipap   Niece mentions last HD was Monday and believes they took too little fluid ( only 1 liter)     Overnight he vomited   Currently on NC    Today he was more delirious and lethargic than usual with increased swelling.  His niece states that he was at [facility] for colitis and acute pneumonia.   He had hemodialysis yesterday but not a lot of fluid was being taken off because his blood pressure was low.  He is normally A+Ox3,        MEDICATIONS  (STANDING):  albuterol    90 MICROgram(s) HFA Inhaler 2 Puff(s) Inhalation two times a day  albuterol/ipratropium for Nebulization 3 milliLiter(s) Nebulizer every 6 hours  apixaban 2.5 milliGRAM(s) Oral two times a day  AQUAPHOR (petrolatum Ointment) 1 Application(s) Topical two times a day  metoprolol succinate ER 12.5 milliGRAM(s) Oral daily  midodrine 10 milliGRAM(s) Oral every 8 hours  ranolazine 500 milliGRAM(s) Oral at bedtime  ursodiol Capsule 300 milliGRAM(s) Oral <User Schedule>      Allergies    No Known Allergies    Intolerances        SOCIAL HISTORY:  Denies ETOh,Smoking,     FAMILY HISTORY:  FH: hypertension  father        REVIEW OF SYSTEMS:    CONSTITUTIONAL: No weakness, fevers or chills  EYES/ENT: No visual changes;  No vertigo or throat pain   NECK: No pain or stiffness  RESPIRATORY: No cough, wheezing, hemoptysis; No shortness of breath  CARDIOVASCULAR: No chest pain or palpitations  GASTROINTESTINAL: No abdominal or epigastric pain. No nausea, vomiting, or hematemesis; No diarrhea or constipation. No melena or hematochezia.  GENITOURINARY: No dysuria, frequency or hematuria  NEUROLOGICAL: No numbness or weakness  SKIN: No itching, burning, rashes, or lesions   All other review of systems is negative unless indicated above.    VITAL:  T(C): 37.8 (11-08-23), Max: 37.8 (11-08-23)  T(F): 100 (11-08-23), Max: 100 (11-08-23)  HR: 73 (11-08-23) (51 - 82)  BP: 95/49 (11-08-23) (92/57 - 136/99)  RR: 26 (11-08-23)  SpO2: 96% (11-08-23) (92% - 100%)    I and O's:        PHYSICAL EXAM:    Constitutional: frail, confused   HEENT:  EOMI,  MM  Neck: No LAD, No JVD  Respiratory: poor AE  Cardiovascular: S1 and S2  Gastrointestinal: BS+, soft, NT/ND  Extremities: + peripheral edema  Neurological: A/O x 1  : No Stark  Skin: No rashes  Access: AVF   TDC +    LABS:                          8.4    5.61  )-----------( 159      ( 08 Nov 2023 05:42 )             28.4                         9.5    6.42  )-----------( 203      ( 07 Nov 2023 14:21 )             32.0       137    |  107    |  9      ----------------------------<  58        08 Nov 2023 05:42  4.1     |  21     |  3.51     136    |  104    |  6      ----------------------------<  89        07 Nov 2023 14:21  4.1     |  27     |  3.18     Ca    8.6        08 Nov 2023 05:42  Ca    8.7        07 Nov 2023 14:21      Mg     1.8       08 Nov 2023 05:42    TPro  7.4    /  Alb  2.2    /  TBili  0.9    /        07 Nov 2023 14:21  DBili  x      /  AST  29     /  ALT  13     /  AlkPhos  140            Urine Studies:  Urinalysis Basic - ( 08 Nov 2023 05:42 )    Color: x / Appearance: x / SG: x / pH: x  Gluc: 58 mg/dL / Ketone: x  / Bili: x / Urobili: x   Blood: x / Protein: x / Nitrite: x   Leuk Esterase: x / RBC: x / WBC x   Sq Epi: x / Non Sq Epi: x / Bacteria: x            RADIOLOGY & ADDITIONAL STUDIES:        ACC: [id number] EXAM:  XR CHEST PORTABLE IMMED 1V   ORDERED BY: CONCEPCION TREVIÑO     PROCEDURE DATE:  11/07/2023          INTERPRETATION:  An AP portable chest x-ray was performed for sepsis.    Comparison is made to 1/24/2023.    There is a prominent cardiac silhouette with pulmonary venous congestion   along with bilateral pleural effusions. Passive atelectasis is suspected   in both lower lobes. There is no pneumothorax. There is a left-sided   central line terminating in the superior vena cava. There is a right   subclavian vascular stent. The thoracic aorta is atherosclerotic and   tortuous. The bony thorax remains stable.    IMPRESSION:  1. Cardiomegaly, CHF and bilateral pleural effusions along with passive   atelectasis suspected in both lower lobes. These findings have increased   from the prior exam.  2. Left-sided central line in the SVC along with right subclavian   vascular stent, unchanged, with no pneumothorax.  3. No definite evidence of pneumonia.    --- End of Report ---

## 2023-11-08 NOTE — CONSULT NOTE ADULT - SUBJECTIVE AND OBJECTIVE BOX
HPI:   ""  77-year-old male with past medical history of hypertension, hyperlipidemia, CAD, ESRD on HD, atrial fibrillation s/p watchman's procedure, PVD, chronic hypotension on Midodrine, SAH after a fall, renal hematoma s/p nephrectomy, renal transplant, diabetes mellitus, MITCHELL, pulmonary HTN, prostate cancer, osteomyelitis presents with swelling. I spoke to the patient's nieces Shasha and Claudia at the bedside.  The patient was admitted to [facility] and was discharged to [place] at 10 PM last night.  He was not placed on BiPAP last night.  Today he was more delirious and lethargic than usual with increased swelling.  His niece states that he was at [facility] for colitis and acute pneumonia.  Patient has bedsores on his buttocks.  He had hemodialysis yesterday but not a lot of fluid was being taken off because his blood pressure was low. She states that 2 weeks ago the pt was doing well. He is normally A+Ox3, sat up and had a conversation this past Sunday. The pt did have an MRI at [facility] but his family is unsure of the results of the test. Pt is A+Ox0 at the time of my evaluation and lethargic. He awakes when his name is called and to painful stimuli but cannot provide any additional information.  ""    11/8  pt seen and examined  awake and confused  spoke with him in Malaysian as well as he's Malaysian speaking  was able to answer some questions appropriately but w.o capcity    denied pain nausea vomting shortness of breat       PAIN: ( ) YES  ( X)  NO  Pt unable to characterise d/t clinical status     DYSPNEA ( ) Yes ( X) No  Patient unable to characterise d/t clinical status     PAST MEDICAL & SURGICAL HISTORY:  Daytime sleepiness      DM (diabetes mellitus)      Dizziness      Dyslipidemia      ESRD (end stage renal disease)  on  hemodialysis  3  x  weekly.,  H/O  left  nephrectomy after  renal  bleed      Hypercholesteremia      Renal hematoma, left      Tiredness      Hypotension  treated  with Midodrine      Kidney problem  spontanious renal bleed while on coumadin  c/b  renal  hematome  with  LT  nephrectomy      Claudication in peripheral vascular disease  R>L  Right common iliac or ostial external iliac per June 2018 U/S  Left popliteal 50-75 and EDUIN stenosis  SUSANA 0.8      Obesity (BMI 30.0-34.9)      Imbalance  secondary to PVD Uses cane      Leg pain, bilateral  R>L at rest and with short distant ambulation      Palpitations      Low glucose level  Patient has h/o low blood sugars at times      GERD (gastroesophageal reflux disease)      Carotid artery disease  mild      History of unilateral nephrectomy  left  nephrectomy      Abnormality of left atrial appendage  WATCHMAN  LEFT ATRIAL APPENDAGE CLOSUIRE   Jan. 30 2017      AV fistula  right lower arm      Renal transplant recipient          SOCIAL HX:    Hx opiate tolerance ( )YES  ( )NO    Baseline ADLs  (Prior to Admission)  ( ) Independent   ( )Dependent    FAMILY HISTORY:  FH: hypertension  father        Review of Systems:          Unable to obtain/Limited due to: limited d/t confusion      PHYSICAL EXAM:    Vital Signs Last 24 Hrs  T(C): 37.2 (08 Nov 2023 14:14), Max: 37.8 (08 Nov 2023 06:15)  T(F): 99 (08 Nov 2023 14:14), Max: 100 (08 Nov 2023 06:15)  HR: 102 (08 Nov 2023 16:00) (51 - 102)  BP: 136/122 (08 Nov 2023 16:00) (92/57 - 188/149)  BP(mean): 128 (08 Nov 2023 16:00) (60 - 162)  RR: 23 (08 Nov 2023 16:00) (14 - 27)  SpO2: 94% (08 Nov 2023 16:00) (92% - 100%)    Parameters below as of 08 Nov 2023 16:00  Patient On (Oxygen Delivery Method): nasal cannula  O2 Flow (L/min): 4    Daily     Daily     PPSV2:30   %  FAST:    General: calm in NAD  Mental Status: awake alert oriented x1  HEENT: eomi, perrl  Lungs: ctabl b/l bs  Cardiac: s1s2 no mgr  GI: soft nontender +BS  : voids  Ext: moves all 4 extremities spontaneously  Neuro: confused      LABS:                        8.4    5.61  )-----------( 159      ( 08 Nov 2023 05:42 )             28.4     11-08    137  |  107  |  9   ----------------------------<  58<L>  4.1   |  21<L>  |  3.51<H>    Ca    8.6      08 Nov 2023 05:42  Mg     1.8     11-08    TPro  7.4  /  Alb  2.2<L>  /  TBili  0.9  /  DBili  x   /  AST  29  /  ALT  13  /  AlkPhos  140<H>  11-07    PT/INR - ( 07 Nov 2023 14:21 )   PT: 16.7 sec;   INR: 1.50 ratio         PTT - ( 07 Nov 2023 14:21 )  PTT:45.8 sec  Albumin: Albumin: 2.2 g/dL (11-07 @ 14:21)      Allergies    No Known Allergies    Intolerances      MEDICATIONS  (STANDING):  albumin human 25% IVPB 50 milliLiter(s) IV Intermittent every 1 hour  albuterol    90 MICROgram(s) HFA Inhaler 2 Puff(s) Inhalation two times a day  albuterol/ipratropium for Nebulization 3 milliLiter(s) Nebulizer every 6 hours  apixaban 2.5 milliGRAM(s) Oral two times a day  AQUAPHOR (petrolatum Ointment) 1 Application(s) Topical two times a day  dextrose 5%. 1000 milliLiter(s) (100 mL/Hr) IV Continuous <Continuous>  dextrose 5%. 1000 milliLiter(s) (50 mL/Hr) IV Continuous <Continuous>  dextrose 50% Injectable 25 Gram(s) IV Push once  dextrose 50% Injectable 25 Gram(s) IV Push once  dextrose 50% Injectable 12.5 Gram(s) IV Push once  epoetin kalpesh-epbx (RETACRIT) Injectable 03636 Unit(s) IV Push <User Schedule>  glucagon  Injectable 1 milliGRAM(s) IntraMuscular once  metoprolol succinate ER 12.5 milliGRAM(s) Oral daily  midodrine 10 milliGRAM(s) Oral every 8 hours  midodrine. 5 milliGRAM(s) Oral once  ranolazine 500 milliGRAM(s) Oral at bedtime  ursodiol Capsule 300 milliGRAM(s) Oral <User Schedule>    MEDICATIONS  (PRN):  acetaminophen     Tablet .. 650 milliGRAM(s) Oral every 6 hours PRN Temp greater or equal to 38C (100.4F), Mild Pain (1 - 3)  aluminum hydroxide/magnesium hydroxide/simethicone Suspension 30 milliLiter(s) Oral every 4 hours PRN Dyspepsia  benzonatate 100 milliGRAM(s) Oral every 8 hours PRN Cough  dextrose Oral Gel 15 Gram(s) Oral once PRN Blood Glucose LESS THAN 70 milliGRAM(s)/deciliter  melatonin 3 milliGRAM(s) Oral at bedtime PRN Insomnia  ondansetron Injectable 4 milliGRAM(s) IV Push every 8 hours PRN Nausea and/or Vomiting      RADIOLOGY/ADDITIONAL STUDIES:

## 2023-11-08 NOTE — CHART NOTE - NSCHARTNOTEFT_GEN_A_CORE
Called by nurse at about 20:30 to advise that patient has a temperature of 101.6 and is more lethargic. Patient otherwise hemodynamically stable. BP, HR and WBC reviewed. After reviewing the chart, patient was known to have vomited in his bipap mask this AM. Will rule out Aspiration pneumonia.    Plan:   Fever  - CXR  - Vanco   - Zosyn   - blood cultures    At 22:15, Patient became hypotensive to the 80's, patient continues to be lethargic. Only responsive to pain. Blood glucose 63. Patient received dextrose. Additionally was informed that patient was unable to get an ABG done. Evaluated patient with Dr. Naylor. Spoke to the next of kin Claudia Foster to discuss patient care. Next of kin understands patients poor prognosis and is agreeable to trail of pressors overnight should the need arise.    Plan  Hypotension  - 250cc bolus of NaCL     Hypoglycemia  - Solucortef 100mg     Discussed case and evaluated patient with Dr. Naylor.

## 2023-11-08 NOTE — DIETITIAN NUTRITION RISK NOTIFICATION - TREATMENT: THE FOLLOWING DIET HAS BEEN RECOMMENDED
Diet, NPO:   Except Medications (11-07-23 @ 18:59) [Active]  Diet, NPO:   NPO for Procedure/Test     NPO Start Date: 07-Nov-2023,   NPO Start Time: 15:41 (11-07-23 @ 15:42) [Active]

## 2023-11-08 NOTE — PATIENT PROFILE ADULT - FALL HARM RISK - HARM RISK INTERVENTIONS

## 2023-11-08 NOTE — CHART NOTE - NSCHARTNOTEFT_GEN_A_CORE
Received a phone call from nurse stating that patient had an episode of projectile vomiting in his BiPAP mask and a bowel movement. Nurse cleaned patient up and suctioned. As per nurse patient is becoming more alert at this time. Nurse took patient off of bipap and put patient on NC.

## 2023-11-08 NOTE — PROVIDER CONTACT NOTE (OTHER) - SITUATION
Pt vomited with bipap mask on - bipap removed and oral care/suctioning provided  pt also having loose/mucoid BM
Fever/ Restless/ Lethargy   VSS at this time  Will order Tylenol IV , and change medications to IV
patient is lethargic.  speech and swallow is done. patient is not able to swallow anything orally. missed his 3 doses of T. Eliquis so far. also patient dosent have an order for BGM and sliding scale.
Patient found to be tachycardiac, with HR of 103. Patient also had temperature of 101.2 rectally, and a blood sugar of 69.

## 2023-11-08 NOTE — CONSULT NOTE ADULT - ASSESSMENT
Process of Care  --Reviewed dx/treatment problems and alignment with Goals of Care    Physical Aspects of Care  --Pain  c/w current managment    --Bowel Regimen  risk for constipation d/t immobility  daily dulcolax    --Dyspnea  No SOB at this time  comfortable and in NAD    --Nausea Vomiting  denies    --Weakness  PT as tolerated     Psychological and Psychiatric Aspects of Care:   --Greif/Bereavment: emotional support provided  --Hx of psychiatric dx: none  -Pastoral Care Available PRN     Social Aspects of Care  -SW involved     Cultural Aspects  -Primary Language: English    Goals of Care:     We discussed Palliative Care team being a supportive team when a patient has ongoing illnesses.  We also discussed that it is not an end of life care service, but can help navigate symptoms and emotional support througout their hospital stay here.    please refer to [facility]    Prognosis: Death can occur at anytime, but if disease continues to progress naturally patient likely has days to weeks.    Ethical and Legal Aspects:   NA        Capacity: pt w/o capcity   Code Status: dnr dni   MOLST: dnr dni    Dispo Plan:    Discussed With: Case coordinated with attending and SW and RN     Time Spent: 90 minutes including the care, coordination and counseling of this patient, 50% of which was spent coordinating and counseling.

## 2023-11-08 NOTE — SWALLOW BEDSIDE ASSESSMENT ADULT - ORAL PREPARATORY PHASE
no oral alerting response even minimallt to the presence of the spoon at the lip; allowed spoon to be inserted into moputh and bolus deposited on tongue . No lip closure on spoon.

## 2023-11-08 NOTE — PROGRESS NOTE ADULT - NUTRITIONAL ASSESSMENT
This patient has been assessed with a concern for Malnutrition and has been determined to have a diagnosis/diagnoses of Moderate protein-calorie malnutrition.    This patient is being managed with:   Diet NPO-  Except Medications  Entered: Nov 7 2023  6:57PM    Diet NPO-  NPO for Procedure/Test     NPO Start Date: 07-Nov-2023   NPO Start Time: 15:41  Entered: Nov 7 2023  3:41PM

## 2023-11-08 NOTE — SWALLOW BEDSIDE ASSESSMENT ADULT - COMMENTS
7-year-old male with past medical history of hypertension, hyperlipidemia, CAD, ESRD on HD, atrial fibrillation s/p watchman's procedure, PVD, chronic hypotension on Midodrine, SAH after a fall, renal hematoma s/p nephrectomy, renal transplant, diabetes mellitus, MITCHELL, pulmonary HTN, prostate cancer, osteomyelitis presents with swelling. I spoke to the patient's nieces Shasha and Claudia at the bedside.  The patient was admitted to [facility] and was discharged to [place] at 10 PM last night.  He was not placed on BiPAP last night.  Today he was more delirious and lethargic than usual with increased swelling.  His niece states that he was at [facility] for colitis and acute pneumonia.  Patient has bedsores on his buttocks.  He had hemodialysis yesterday but not a lot of fluid was being taken off because his blood pressure was low. She states that 2 weeks ago the pt was doing well. He is normally A+Ox3, sat up and had a conversation this past Sunday. The pt did have an MRI at [facility] but his family is unsure of the results of the test. Pt is A+Ox0 at the time of my evaluation and lethargic. He awakes when his name is called and to painful stimuli but cannot provide any additional information.   Service is consulted for po intake, and to determine the texture of the diet.  Note HX; GERD and ESRD on HD.

## 2023-11-08 NOTE — PROGRESS NOTE ADULT - SUBJECTIVE AND OBJECTIVE BOX
HOSPITALIST PROGRESS NOTE    SUBJECTIVE / INTERVAL HPI: Patient seen and examined at bedside. Nephew was at bedside as well. Patient was awake during dialysis and states he feels okay. Denies any pain.     Per RN, mental status has been waxing and waning throughout the day.     VITAL SIGNS:  Vital Signs Last 24 Hrs  T(C): 37.2 (08 Nov 2023 14:14), Max: 37.8 (08 Nov 2023 06:15)  T(F): 99 (08 Nov 2023 14:14), Max: 100 (08 Nov 2023 06:15)  HR: 102 (08 Nov 2023 16:00) (51 - 102)  BP: 136/122 (08 Nov 2023 16:00) (92/57 - 188/149)  BP(mean): 128 (08 Nov 2023 16:00) (60 - 162)  RR: 23 (08 Nov 2023 16:00) (14 - 27)  SpO2: 94% (08 Nov 2023 16:00) (92% - 100%)    Parameters below as of 08 Nov 2023 16:00  Patient On (Oxygen Delivery Method): nasal cannula  O2 Flow (L/min): 4    PHYSICAL EXAM:  General: Ill appearing   HEENT: NC/AT; PERRL, anicteric sclera  Neck: Supple  Cardiovascular: +S1/S2, RRR, no murmurs, rubs, gallops  Respiratory: Crackles present bilaterally No work of breathing   Gastrointestinal: Soft, NT/ND; +BSx4  Extremities: Swelling in left upper extremity. No LE swelling bilaterally   Vascular: 2+ radial, DP/PT pulses B/L  Neurological: AAOx1 (name), minimally interactive, moving extremities     MEDICATIONS:  MEDICATIONS  (STANDING):  albumin human 25% IVPB 50 milliLiter(s) IV Intermittent every 1 hour  albuterol    90 MICROgram(s) HFA Inhaler 2 Puff(s) Inhalation two times a day  albuterol/ipratropium for Nebulization 3 milliLiter(s) Nebulizer every 6 hours  apixaban 2.5 milliGRAM(s) Oral two times a day  AQUAPHOR (petrolatum Ointment) 1 Application(s) Topical two times a day  dextrose 5%. 1000 milliLiter(s) (50 mL/Hr) IV Continuous <Continuous>  dextrose 5%. 1000 milliLiter(s) (100 mL/Hr) IV Continuous <Continuous>  dextrose 50% Injectable 25 Gram(s) IV Push once  dextrose 50% Injectable 12.5 Gram(s) IV Push once  dextrose 50% Injectable 50 milliLiter(s) IV Push once  dextrose 50% Injectable 25 Gram(s) IV Push once  epoetin kalpesh-epbx (RETACRIT) Injectable 82755 Unit(s) IV Push <User Schedule>  glucagon  Injectable 1 milliGRAM(s) IntraMuscular once  levothyroxine Injectable 20 MICROGram(s) IV Push at bedtime  metoprolol succinate ER 12.5 milliGRAM(s) Oral daily  midodrine 10 milliGRAM(s) Oral every 8 hours  midodrine. 5 milliGRAM(s) Oral once  ranolazine 500 milliGRAM(s) Oral at bedtime  ursodiol Capsule 300 milliGRAM(s) Oral <User Schedule>    MEDICATIONS  (PRN):  acetaminophen     Tablet .. 650 milliGRAM(s) Oral every 6 hours PRN Temp greater or equal to 38C (100.4F), Mild Pain (1 - 3)  aluminum hydroxide/magnesium hydroxide/simethicone Suspension 30 milliLiter(s) Oral every 4 hours PRN Dyspepsia  benzonatate 100 milliGRAM(s) Oral every 8 hours PRN Cough  dextrose Oral Gel 15 Gram(s) Oral once PRN Blood Glucose LESS THAN 70 milliGRAM(s)/deciliter  melatonin 3 milliGRAM(s) Oral at bedtime PRN Insomnia  ondansetron Injectable 4 milliGRAM(s) IV Push every 8 hours PRN Nausea and/or Vomiting      ALLERGIES:  Allergies    No Known Allergies    Intolerances        LABS:                        8.4    5.61  )-----------( 159      ( 08 Nov 2023 05:42 )             28.4     11-08    137  |  107  |  9   ----------------------------<  58<L>  4.1   |  21<L>  |  3.51<H>    Ca    8.6      08 Nov 2023 05:42  Mg     1.8     11-08    TPro  7.4  /  Alb  2.2<L>  /  TBili  0.9  /  DBili  x   /  AST  29  /  ALT  13  /  AlkPhos  140<H>  11-07    PT/INR - ( 07 Nov 2023 14:21 )   PT: 16.7 sec;   INR: 1.50 ratio         PTT - ( 07 Nov 2023 14:21 )  PTT:45.8 sec  Urinalysis Basic - ( 08 Nov 2023 05:42 )    Color: x / Appearance: x / SG: x / pH: x  Gluc: 58 mg/dL / Ketone: x  / Bili: x / Urobili: x   Blood: x / Protein: x / Nitrite: x   Leuk Esterase: x / RBC: x / WBC x   Sq Epi: x / Non Sq Epi: x / Bacteria: x      CAPILLARY BLOOD GLUCOSE      POCT Blood Glucose.: 69 mg/dL (08 Nov 2023 18:37)        RADIOLOGY & ADDITIONAL TESTS:

## 2023-11-08 NOTE — CONSULT NOTE ADULT - ASSESSMENT
78 yo male with hx of ESRD on HD at [facility] , hx of failed renal transplant, nephrectomy due to renal bleed, HTN, DM and recent discharge from [facility] for reps failure ,PNA . Now admitted with AMS, hypoxia and + RSV     ESRD on HD MWF  Fluid Overload    resume HD today ( last HD Monday per family )    fluid removal to optimize resp status    Midodrine ( pt requires this outpt due to chronic hypotension)    IV SPA today for HD to assist w fluid removal     ANemia   JAYANT at HD    d/w Niece Paige Monte and SICU RN     Thank you for the courtesy of this consult. We will follow this patient with you.   Management is subject to change if new information becomes available or patient condition changes.

## 2023-11-08 NOTE — PROVIDER CONTACT NOTE (OTHER) - REASON
Fever
Pt vomiting
patient is lethargic
Patient tachycardiac, hypoglycemic, and febrile with 101.2 temp

## 2023-11-08 NOTE — CONSULT NOTE ADULT - ATTENDING COMMENTS
Pt evaluated. Chart reviewed. Note and plan of care discussed with Resident. Agree with plan of care. Will follow fluid status with Renal and Medicine.

## 2023-11-08 NOTE — DIETITIAN INITIAL EVALUATION ADULT - NS FNS DIET ORDER
Diet, NPO:   Except Medications (11-07-23 @ 18:59)  Diet, NPO:   NPO for Procedure/Test     NPO Start Date: 07-Nov-2023,   NPO Start Time: 15:41 (11-07-23 @ 15:42)

## 2023-11-08 NOTE — CONSULT NOTE ADULT - CONVERSATION DETAILS
Primary Contact:       yamel Boggs, phone # [phone]    HCP [  ] Surrogate [   ] Guardian [   ] -Claudia reports that she is the health care agent, stating his niece Paige has the copy of the health care proxy    Mental Status: Pt. lacks capacity.      Goals of Care: Comfort [  ] Rehabilitation [ x  ] Curative [  ] Life Prolonging [  ]    Previous Services: [facility] for [facility]     ADVANCE DIRECTIVES:   -Pt lacks capacity  -Cluadia reports that she is the health care agent, stating his niece Paige has the copy of the health care proxy  -MOLST DNR DNI trial NIV     Anticipated D/C Plan: likely [facility]                   Pt. lacks capacity.  Yamel shared that Pt is normally alert and oriented however has been in and out of hospitals and rehabilitations the recent months.  She shared that Pts step daughter was staying with him at one point however she returned to [place] after her mother (pts wife) passed away.  Yamel shared that Pts wife passed away in September from pancreatic cancer noting she was diagnosed and passed away in a month and feels Pt is still processing all of these events.  Yamel is very involved and notes that a few of his nieces are also involved and supportive to her.  She shared that Pt has 3 children whom all live out of state but should be visiting this month.  Yamel shared that Pts children are not involved in decisions.  Families feelings explored.  Support provided.    Yaeml shared what she has discussed with the medical team regarding Pts current medical condition.  She shared that at this time she feels Pt. will not be able to return home following [facility] and will need to apply for LTC at SNF following his rehabilitation.  Yamel shared that her father was on a ventilator during covid and Pt expressed to her that he would not want aggressive interventions such as resuscitation or intubation as they are not a quality of life to him.  MOLST in place that reflects DNR/DNI, trial NIV.  Yamel shared that Pt has been on dialysis for many years and has never expressed that dialysis is too much or that he would want to stop dialysis.  Yamel is hopeful that Pts cognition will improve therefore she can further discuss discharge plans.  Claudia reports that she is the health care agent, stating his niece Paige has the copy of the health care proxy.    Plan likely for [facility] for [facility] possible LTC.  DNR/DNI, trial NIV in place.  Educated family of palliative team availability.  Emotional support provided.  Our team to continue to follow.

## 2023-11-08 NOTE — CONSULT NOTE ADULT - SUBJECTIVE AND OBJECTIVE BOX
77-year-old male with past medical history of hypertension, hyperlipidemia, CAD, ESRD on HD, atrial fibrillation s/p watchman's procedure, PVD, chronic hypotension on Midodrine, renal transplant, MITCHELL, pulmonary HTN who got admitted for acute hypoxic and hypercapenic respiratory distress. Cardiology consult for suspected CHF.     11/8 pt seen at bedside, pt lethargic, arousable but unable to have conversation.     PHYSICAL EXAM:  Vital Signs Last 24 Hrs  T(C): 37.4 (08 Nov 2023 10:42), Max: 37.8 (08 Nov 2023 06:15)  T(F): 99.4 (08 Nov 2023 10:42), Max: 100 (08 Nov 2023 06:15)  HR: 77 (08 Nov 2023 11:31) (51 - 88)  BP: 108/88 (08 Nov 2023 11:31) (92/57 - 136/99)  BP(mean): 95 (08 Nov 2023 11:31) (60 - 102)  RR: 21 (08 Nov 2023 11:31) (14 - 30)  SpO2: 98% (08 Nov 2023 11:31) (92% - 100%)    Parameters below as of 08 Nov 2023 11:15  Patient On (Oxygen Delivery Method): nasal cannula      Constitutional: Pt lying in bed, lethargic, arousable unable to have conversation  Respiratory: b/l crackles  Cardiovascular: S1S2+,regular rate, irregularly irregular, no M/G/R  Gastrointestinal: BS+, soft, NT/ND, no guarding, no rebound  Extremities: No peripheral edema  Neurological: lethargic, unable to assess full neuro status       MEDICATIONS:  MEDICATIONS  (STANDING):  albumin human 25% IVPB 50 milliLiter(s) IV Intermittent every 1 hour  albuterol    90 MICROgram(s) HFA Inhaler 2 Puff(s) Inhalation two times a day  albuterol/ipratropium for Nebulization 3 milliLiter(s) Nebulizer every 6 hours  apixaban 2.5 milliGRAM(s) Oral two times a day  AQUAPHOR (petrolatum Ointment) 1 Application(s) Topical two times a day  metoprolol succinate ER 12.5 milliGRAM(s) Oral daily  midodrine 10 milliGRAM(s) Oral every 8 hours  midodrine. 5 milliGRAM(s) Oral once  ranolazine 500 milliGRAM(s) Oral at bedtime  ursodiol Capsule 300 milliGRAM(s) Oral <User Schedule>    MEDICATIONS  (PRN):  acetaminophen     Tablet .. 650 milliGRAM(s) Oral every 6 hours PRN Temp greater or equal to 38C (100.4F), Mild Pain (1 - 3)  aluminum hydroxide/magnesium hydroxide/simethicone Suspension 30 milliLiter(s) Oral every 4 hours PRN Dyspepsia  benzonatate 100 milliGRAM(s) Oral every 8 hours PRN Cough  melatonin 3 milliGRAM(s) Oral at bedtime PRN Insomnia  ondansetron Injectable 4 milliGRAM(s) IV Push every 8 hours PRN Nausea and/or Vomiting      LABS: All Labs Reviewed:                        8.4    5.61  )-----------( 159      ( 08 Nov 2023 05:42 )             28.4     11-08    137  |  107  |  9   ----------------------------<  58<L>  4.1   |  21<L>  |  3.51<H>    Ca    8.6      08 Nov 2023 05:42  Mg     1.8     11-08    TPro  7.4  /  Alb  2.2<L>  /  TBili  0.9  /  DBili  x   /  AST  29  /  ALT  13  /  AlkPhos  140<H>  11-07    PT/INR - ( 07 Nov 2023 14:21 )   PT: 16.7 sec;   INR: 1.50 ratio         PTT - ( 07 Nov 2023 14:21 )  PTT:45.8 sec      Blood Culture:   I&O's Summary    CAPILLARY BLOOD GLUCOSE      POCT Blood Glucose.: 73 mg/dL (07 Nov 2023 13:44)      RADIOLOGY/EKG:      ACC: [id number] EXAM:  ECHO TTE WO CON COMP W DOPP   ORDERED BY: KEYANNA TERRELL     PROCEDURE DATE:  11/08/2023          INTERPRETATION:  Transthoracic Echocardiography Report (TTE)     Demographics     Patient name          NATY MCCALL Age           77 year(s)     Med Rec #             [id number]           Gender        Male     Account #             [id number]        Date of Birth 1945     Interpreting          Jong Terrazas MD   Room Number   0063   Physician     Referring Physician   Keyanna Terrell     Sonographdemarcus Gerardo     Date of study         11/08/2023 09:45 AM     Height                68.11 in            Weight        189.6 pounds    Type of Study:     TTE procedure: ECHO TTE WO CON COMP W DOP     BP: 92/57 mmHg     Study Location: SDTechnical Quality: Technically Difficullt    Indications   1) R06.00 - Dyspnea    M-Mode Measurements (cm)     LVEDd: 4.61 cm            LVESd: 0.76 cm   IVSEd: 0.96 cm   LVPWd: 0.85 cm            AO Root Dimension: 3.6 cm                             ACS: 1.6 cm                             LA: 4.1 cm    Doppler Measurements:     AV Velocity:194 cm/s                MV Peak E-Wave: 136 cm/s   AV Peak Gradient: 15.05 mmHg        MV Peak A-Wave: 68.8 cm/s                            MV E/A Ratio: 1.98 %   TR Velocity:421 cm/s                MV Peak Gradient: 7.4 mmHg   TR Gradient:70.8964 mmHg   Estimated RAP:3 mmHg   RVSP:67 mmHg     Findings     Mitral Valve   The mitral valve leaflets appear thickened.   Mitral annular calcification.   Trace mitral regurgitation is present.     Aortic Valve   Aortic valve sclerosis.     Tricuspid Valve   Normal appearing tricuspid valve structure.   Moderate tricuspid valve regurgitation.   Severe pulmonary hypertension.     Pulmonic Valve   Pulmonic valve not well seen.     Left Atrium   The left atrium is severely dilated.     Left Ventricle   Endocardium is not well visualized, however, overall left ventricular   systolic function appears normal. Estimatedleft ventricular ejection   fraction is 55-60%.     Right Atrium   The right atrium appears mildly dilated.     Right Ventricle   Normal right ventricular size with decreased function.     Pericardial Effusion   No evidence of pericardial effusion.     Miscellaneous   The IVC appears normal.   An interatrial septal aneurysm is noted.     Impression     Summary     Endocardium is not well visualized, however, overall left ventricular   systolic function appears normal. Estimated left ventricular ejection   fraction is 55-60%.   Normal right ventricular size with decreased function.   The left atrium is severely dilated.   Aortic valve sclerosis.   Moderate tricuspid valve regurgitation.   Severe pulmonary hypertension.     Signature     ----------------------------------------------------------------   Electronically signed by Jong Terrazas MD(Interpreting   physician) on 11/08/2023 10:39 AM   ----------------------------------------------------------------    Valves     Mitral Valve     Peak E-Wave: 136 cm/s   Peak A-Wave: 68.8 cm/s             Deceleration Time: 169 msec   Peak Gradient: 7.4 mmHg                                      E/A Ratio: 1.98     Tissue Doppler     E' Velocity: 12.1 cm/s     Aortic Valve     Peak Velocity: 194cm/s   Peak Gradient: 15.05 mmHg     Cusp Separation: 1.6 cm     Tricuspid Valve     TR Velocity: 421 cm/s                  Estimated RAP: 3 mmHg   TR Gradient: 70.8964 mmHg              Estimated RVSP: 67 mmHg     Pulmonic Valve              Estimated PASP: 73.9 mmHg    Structures     Left Atrium     LA Dimension: 4.1 cm         LA Area: 30.3 cm^2   LA/Aorta: 1.14               LA Volume/Index: 104 ml /52m^2     Left Ventricle     Diastolic Dimension: 4.61 cm          Systolic Dimension: 0.76 cm   Septum Diastolic: 0.96 cm   PW Diastolic: 0.85 cm     FS: 83.51 %     Right Atrium     RA Systolic Pressure: 3 mmHg                RA Area: 24.6 cm^2     Right Ventricle              RV Systolic Pressure: 73.9 mmHg     Miscellaneous     Aorta     Aortic Root: 3.6 cm              JONG TERRAZAS MD; Attending Cardiologist  This document has been electronically signed. Nov 8 2023 10:39AM        ACC: [id number] EXAM:  XR CHEST PORTABLE IMMED 1V   ORDERED BY: CONCEPCION TREVIÑO     PROCEDURE DATE:  11/07/2023          INTERPRETATION:  An AP portable chest x-ray was performed for sepsis.    Comparison is made to 1/24/2023.    There is a prominent cardiac silhouette with pulmonary venous congestion   along with bilateral pleural effusions. Passive atelectasis is suspected   in both lower lobes. There is no pneumothorax. There is a left-sided   central line terminating in the superior vena cava. There is a right   subclavian vascular stent. The thoracic aorta is atherosclerotic and   tortuous. The bony thorax remains stable.    IMPRESSION:  1. Cardiomegaly, CHF and bilateral pleural effusions along with passive   atelectasis suspected in both lower lobes. These findings have increased   from the prior exam.  2. Left-sided central line in the SVC along with right subclavian   vascular stent, unchanged, with no pneumothorax.  3. No definite evidence of pneumonia.    --- End of Report ---            ARABELLA TY MD; Attending Radiologist  This document has been electronically signed. Nov 7 2023  2:07PM

## 2023-11-08 NOTE — CONSULT NOTE ADULT - ASSESSMENT
77-year-old male with past medical history of hypertension, hyperlipidemia, CAD, ESRD on HD, atrial fibrillation s/p watchman's procedure, PVD, chronic hypotension on Midodrine, renal transplant, MITCHELL, pulmonary HTN who got admitted for acute hypoxic and hypercapenic respiratory distress. Cardiology consult for suspected CHF.     77-year-old male with past medical history of hypertension, hyperlipidemia, CAD, ESRD on HD, atrial fibrillation s/p watchman's procedure, PVD, chronic hypotension on Midodrine, renal transplant, MITCHELL, pulmonary HTN who got admitted for acute hypoxic and hypercapenic respiratory distress. Cardiology consult for suspected CHF.    #  Acute hypoxic and hypercapenic respiratory distress multifactorial secondary to fluid overload in the setting of ESRD, CHF, complicated by RSV  - TTE shows EF 55-60%, LA severely dilated, aortic valve sclerosis, moderate tricuspid valve regurgitation, severe pulmonary HTN  - with BNP 86,787 on admission   - currently seen by nephrology: fluid removal to optimize resp status   - will hold further diuresis   - trend BNP   - Strict I+Os, daily weights   - c/w metoprolol for afib with BP monitoring   - c/w tele        d/w Dr. Lawrence      77-year-old male with past medical history of hypertension, hyperlipidemia, CAD, ESRD on HD, atrial fibrillation s/p watchman's procedure, PVD, chronic hypotension on Midodrine, renal transplant, MITCHELL, pulmonary HTN who got admitted for acute hypoxic and hypercapenic respiratory distress. Cardiology consult for suspected CHF.    #  Acute hypoxic and hypercapenic respiratory distress multifactorial secondary to fluid overload in the setting of ESRD, CHF, complicated by RSV  - TTE shows EF 55-60%, LA severely dilated, aortic valve sclerosis, moderate tricuspid valve regurgitation, severe pulmonary HTN  - with BNP 86,787 on admission   - currently seen by nephrology: fluid removal to optimize resp status   - will hold further diuresis   - trend BNP   - Strict I+Os, daily weights   - c/w metoprolol for afib with BP monitoring        d/w Dr. Lawrence      77-year-old male with past medical history of hypertension, hyperlipidemia, CAD, ESRD on HD, atrial fibrillation s/p watchman's procedure, PVD, chronic hypotension on Midodrine, renal transplant, MITCHELL, pulmonary HTN who got admitted for acute hypoxic and hypercapenic respiratory distress. Cardiology consult for suspected CHF.    #  Acute hypoxic and hypercapnic respiratory distress multifactorial secondary to fluid overload in the setting of ESRD, CHF, complicated by RSV  - TTE shows EF 55-60%, LA severely dilated, aortic valve sclerosis, moderate tricuspid valve regurgitation, severe pulmonary HTN  - with BNP 86,787 on admission   - currently seen by nephrology: fluid removal to optimize resp status   - will hold further diuresis   - trend BNP   - Strict I+Os, daily weights   - c/w metoprolol for afib with BP monitoring        d/w Dr. Lawrence

## 2023-11-09 NOTE — CONSULT NOTE ADULT - ASSESSMENT
Assessment/Plan  77y  Male DNR/DNI PMHx hypertension, hyperlipidemia, CAD, ESRD on HD, atrial fibrillation s/p watchman's procedure, PVD, chronic hypotension on Midodrine, SAH after a fall, renal hematoma s/p nephrectomy, diabetes mellitus remains in hospital for active management of   1. Shock presumed septic   2. Acute Co2 RF  3. Aspiration PNA  4. AMS  5. ESRD on HD    - Lengthy goals of care discussion with HCP Amber in which explained overall poor prognosis given new shock state likely 2/2 aspiration PNA from vomiting on BIPAP mask with no mentation/ currently obtunded. Family wishes for pressors to be trialed as they want a family meeting on 11/10 in which all of his kids will be at hospital. Family agreed they would not want central line or invasive procedure if comes to this. Explained that if their is a significant increase in pressor requirements further GOC including comfort measures will need to be considered to avoid prolonging suffering, Remains DNR/DNI with trial of noninvasive   - Obtunded likely in setting of Co2 narcosis, on AVAPS will increase RR to help increase Mintue ventilation Trend ABG. DNR/DNI, no escalation   - Will start on low dose collin with goal of systolic > 90   - Zosyn/vanco for aspiration PNA. SDS.   - ESRD on HD got 2.5 L taken off, 250 cc given back. POCUS still with moderate B lines, will avoid further fluids.   - Pallative consult  - Does not require ICU level care given no escalation, will admit to SICU.     Case discussed with eICU attending Dr Hoover and plan agreed     Time spent on this patient encounter, which includes documenting this note in the electronic medical record, was 52 minutes including assessing the presenting problems with associated risks, reviewing the medical record to prepare for the encounter, and meeting face to face with patient to obtain additional history. I have also performed an appropriate physical exam, made interventions listed and ordered and interpreted appropriate diagnostic studies as documented. To improve communication and patient safety, I have coordinated care with the multidisciplinary team including the bedside nurse, appropriate attending of record and consultants as needed. Date on note entry is same as date of services rendered

## 2023-11-09 NOTE — PHYSICAL THERAPY INITIAL EVALUATION ADULT - MODALITIES TREATMENT COMMENTS
will initiate trial of PT to further assess/facilitate pt participation/engagement and will either inc POC or DC accordingly. rec. jossue lift for oob to recliner when approp for oob.

## 2023-11-09 NOTE — PHYSICAL THERAPY INITIAL EVALUATION ADULT - DIAGNOSIS, PT EVAL
Acute hypoxic and hypercapenic respiratory distress multifactorial secondary to fluid overload in the setting of ESRD vs. CHF? and RSV, Acute metabolic encephalopathy secondary to hypercapnia, Hypoalbuminemia , macrocytic anemia
as above

## 2023-11-09 NOTE — CHART NOTE - NSCHARTNOTEFT_GEN_A_CORE
pt ordered ABG for 8pm.  Attempted blood gas twice without success.  Pt awake on AVAPs.  RN made aware.  HR/RR stable. NAD noted.

## 2023-11-09 NOTE — PROGRESS NOTE ADULT - PROBLEM SELECTOR PLAN 1
Combined hypoxia and hypercarbia requiring bipap   TTE as above with normal LV function but mod TR, severe pHTN in setting ESRD   Now with probable aspiration event/ concomitant pneumonia On IV abx   Cont dialysis for fluid removal

## 2023-11-09 NOTE — PHYSICAL THERAPY INITIAL EVALUATION ADULT - PRECAUTIONS/LIMITATIONS, REHAB EVAL
NPO/fall precautions
Prognosis poor, skin/aspiration precautions/fall precautions/isolation precautions/swallowing precautions

## 2023-11-09 NOTE — PHYSICAL THERAPY INITIAL EVALUATION ADULT - ADDITIONAL COMMENTS
per niece pt lives in 2 level house, stays ground floor, in Aug. had R hip fx, s/p sx intervention-went to [facility] for [facility] but did not make good progress, was home for ~3wks, non-amb, was able to perform SPT w/ heavy 1 PA essentially on LLE. niece reports R hip remained painful, pt would "not really put foot down"

## 2023-11-09 NOTE — CONSULT NOTE ADULT - SUBJECTIVE AND OBJECTIVE BOX
REASON FOR CONSULT: Shock     CONSULT REQUESTED BY: ED    Patient is a 77y old  Male who presents with a chief complaint of Swelling (08 Nov 2023 19:05)      BRIEF HOSPITAL COURSE:   77y  Male DNR/DNI  PMHx hypertension, hyperlipidemia, CAD, ESRD on HD, atrial fibrillation s/p watchman's procedure, PVD, chronic hypotension on Midodrine, SAH after a fall, renal hematoma s/p nephrectomy, diabetes mellitus on Glimepiride presents to the ED BIBEMS from [facility] for unresponsive. Admitted to hospital for acute o2/co2 RF 2/2 APE, treated with BIPAP and HD.       Events last 24 hours:   Vomited into BIPAP mask during day.  PLaced back on BIPAPA mask overnight, now is obtunded, minimally responsive to pain.,Spiking fevers, hypotensive after diaylsis, gave 250 cc bolus, CXR shows possible aspiration PNA ( RLL?), persistently hypotensive. ICU consulted for shock presumed septic state.     PAST MEDICAL & SURGICAL HISTORY:  Daytime sleepiness      DM (diabetes mellitus)      Dizziness      Dyslipidemia      ESRD (end stage renal disease)  on  hemodialysis  3  x  weekly.,  H/O  left  nephrectomy after  renal  bleed      Hypercholesteremia      Renal hematoma, left      Tiredness      Hypotension  treated  with Midodrine      Kidney problem  spontanious renal bleed while on coumadin  c/b  renal  hematome  with  LT  nephrectomy      Claudication in peripheral vascular disease  R>L  Right common iliac or ostial external iliac per June 2018 U/S  Left popliteal 50-75 and EDUIN stenosis  SUSANA 0.8      Obesity (BMI 30.0-34.9)      Imbalance  secondary to PVD Uses cane      Leg pain, bilateral  R>L at rest and with short distant ambulation      Palpitations      Low glucose level  Patient has h/o low blood sugars at times      GERD (gastroesophageal reflux disease)      Carotid artery disease  mild      History of unilateral nephrectomy  left  nephrectomy      Abnormality of left atrial appendage  WATCHMAN  LEFT ATRIAL APPENDAGE CLOSUIRE   Jan. 30 2017      AV fistula  right lower arm      Renal transplant recipient        Allergies    No Known Allergies    Intolerances      FAMILY HISTORY:  FH: hypertension  father        Review of Systems:  Negative 2/2 mental status    Medications:  piperacillin/tazobactam IVPB.- 3.375 Gram(s) IV Intermittent once  piperacillin/tazobactam IVPB.. 3.375 Gram(s) IV Intermittent every 12 hours  vancomycin  IVPB 1000 milliGRAM(s) IV Intermittent every 24 hours    metoprolol succinate ER 12.5 milliGRAM(s) Oral daily  midodrine 10 milliGRAM(s) Oral every 8 hours  midodrine. 5 milliGRAM(s) Oral once  ranolazine 500 milliGRAM(s) Oral at bedtime    albuterol    90 MICROgram(s) HFA Inhaler 2 Puff(s) Inhalation two times a day  albuterol/ipratropium for Nebulization 3 milliLiter(s) Nebulizer every 6 hours  benzonatate 100 milliGRAM(s) Oral every 8 hours PRN    acetaminophen     Tablet .. 650 milliGRAM(s) Oral every 6 hours PRN  melatonin 3 milliGRAM(s) Oral at bedtime PRN  ondansetron Injectable 4 milliGRAM(s) IV Push every 8 hours PRN      heparin   Injectable 5000 Unit(s) SubCutaneous every 8 hours    aluminum hydroxide/magnesium hydroxide/simethicone Suspension 30 milliLiter(s) Oral every 4 hours PRN  ursodiol Capsule 300 milliGRAM(s) Oral <User Schedule>      dextrose 50% Injectable 25 Gram(s) IV Push once  dextrose 50% Injectable 25 Gram(s) IV Push once  dextrose 50% Injectable 12.5 Gram(s) IV Push once  dextrose Oral Gel 15 Gram(s) Oral once PRN  glucagon  Injectable 1 milliGRAM(s) IntraMuscular once  insulin lispro (ADMELOG) corrective regimen sliding scale   SubCutaneous three times a day before meals  levothyroxine Injectable 20 MICROGram(s) IV Push at bedtime    albumin human 25% IVPB 50 milliLiter(s) IV Intermittent every 1 hour  dextrose 5%. 1000 milliLiter(s) IV Continuous <Continuous>  dextrose 5%. 1000 milliLiter(s) IV Continuous <Continuous>    epoetin kalpesh-epbx (RETACRIT) Injectable 52038 Unit(s) IV Push <User Schedule>    AQUAPHOR (petrolatum Ointment) 1 Application(s) Topical two times a day            ICU Vital Signs Last 24 Hrs  T(C): 36.9 (09 Nov 2023 01:03), Max: 38.7 (08 Nov 2023 20:15)  T(F): 98.4 (09 Nov 2023 01:03), Max: 101.6 (08 Nov 2023 20:15)  HR: 76 (09 Nov 2023 01:03) (69 - 102)  BP: 93/46 (09 Nov 2023 01:03) (75/39 - 188/149)  BP(mean): 61 (09 Nov 2023 01:03) (50 - 162)  ABP: --  ABP(mean): --  RR: 20 (09 Nov 2023 01:03) (14 - 30)  SpO2: 93% (09 Nov 2023 01:03) (92% - 100%)    O2 Parameters below as of 09 Nov 2023 01:03  Patient On (Oxygen Delivery Method): BiPAP/CPAP          Vital Signs Last 24 Hrs  T(C): 36.9 (09 Nov 2023 01:03), Max: 38.7 (08 Nov 2023 20:15)  T(F): 98.4 (09 Nov 2023 01:03), Max: 101.6 (08 Nov 2023 20:15)  HR: 76 (09 Nov 2023 01:03) (69 - 102)  BP: 93/46 (09 Nov 2023 01:03) (75/39 - 188/149)  BP(mean): 61 (09 Nov 2023 01:03) (50 - 162)  RR: 20 (09 Nov 2023 01:03) (14 - 30)  SpO2: 93% (09 Nov 2023 01:03) (92% - 100%)    Parameters below as of 09 Nov 2023 01:03  Patient On (Oxygen Delivery Method): BiPAP/CPAP        ABG - ( 08 Nov 2023 17:36 )  pH, Arterial: 7.20  pH, Blood: x     /  pCO2: 83    /  pO2: 32    / HCO3: 32    / Base Excess: 1.8   /  SaO2: 62                  I&O's Detail    08 Nov 2023 07:01  -  09 Nov 2023 01:45  --------------------------------------------------------  IN:  Total IN: 0 mL    OUT:    Other (mL): 2500 mL  Total OUT: 2500 mL    Total NET: -2500 mL            LABS:                        8.4    5.61  )-----------( 159      ( 08 Nov 2023 05:42 )             28.4     11-08    137  |  107  |  9   ----------------------------<  58<L>  4.1   |  21<L>  |  3.51<H>    Ca    8.6      08 Nov 2023 05:42  Mg     1.8     11-08    TPro  7.4  /  Alb  2.2<L>  /  TBili  0.9  /  DBili  x   /  AST  29  /  ALT  13  /  AlkPhos  140<H>  11-07          CAPILLARY BLOOD GLUCOSE      POCT Blood Glucose.: 92 mg/dL (08 Nov 2023 23:32)    PT/INR - ( 07 Nov 2023 14:21 )   PT: 16.7 sec;   INR: 1.50 ratio         PTT - ( 07 Nov 2023 14:21 )  PTT:45.8 sec  Urinalysis Basic - ( 08 Nov 2023 05:42 )    Color: x / Appearance: x / SG: x / pH: x  Gluc: 58 mg/dL / Ketone: x  / Bili: x / Urobili: x   Blood: x / Protein: x / Nitrite: x   Leuk Esterase: x / RBC: x / WBC x   Sq Epi: x / Non Sq Epi: x / Bacteria: x      CULTURES:  Culture Results:   No growth at 24 hours (11-07 @ 14:21)  Culture Results:   No growth at 24 hours (11-07 @ 14:21)      Physical Examination:    General: On BIPAP., Obtunded    Neuro: Obtunded. Minimally responive to pain.,     HEENT: Pupils equal, reactive to light.  Symmetric.    PULM: Diminshed R > L    CVS: Regular rate and rhythm, no murmurs, rubs, or gallops    ABD: Soft, nondistended, nontender, normoactive bowel sounds, no masses    EXT: No edema, nontender    SKIN: Warm and well perfused, no rashes noted.

## 2023-11-09 NOTE — PROGRESS NOTE ADULT - NS ATTEND AMEND GEN_ALL_CORE FT
Agree with above   Hypotension resolved with midodrine (home med)   Nocturnal BiPAP   Monitor neuro status  Prognosis poor, DNR, DNI   Stable for floor

## 2023-11-09 NOTE — PROGRESS NOTE ADULT - ASSESSMENT
ASSESSMENT  78yo male with PMHx of HTN, HLD, CAD, ESRD on HD, Afib s/p watchman's procedure, PVD, chronic hypotension on Midodrine, SAH after a fall, renal hematoma s/p nephrectomy, renal transplant, DM, MITCHELL, pulmonary HTN, prostate cancer, osteomyelitis presents with swelling.    Admitted for  1. Acute toxic metabolic encephalopathy 2/2  2. Acute hypoxic, hypercapnic respiratory failure vs RLL PNA  3. elevated ammonia  4. Pulmonary edema in setting of ESRD  5. Aspiration PNA  6. Chronic hypotension on midodrine.     PLAN    Neuro: AAOx 2, mentation slightly improved. CTH neg for acute pathology.   CV: BP low normal but with MAP ~ 65. pt takes midodrine 10 q8hr as outpt and hasnt been receiving it here d/t lethargy. no started on pressors. hold toprol.  in Afib on tele HR controlled 70-80s.   Pulm: trial off AVAPS this am. on 2L NC sating 95-97%. CXR improved but RML, RLL infiltrate still present. HOB > 30 deg to dec aspiration risk. ABG improved from admission and mentation improved - will hold off on rpt ABG for now.   GI: Speech re-evaluation now that pt more awake. PPI. bowel regimen prn, will give 1 dose lactulose.   Nephro: ESRD on HD MWF. L HD catheter. monitor I & Os. replete K and Phos  Endo: Hypoglycemic suspect d/t infection. hold anti-DM meds. BGMs q6hr. s/p dextrose inj   ID: cont IV zosyn to cover for aspiration PNA, HCAP. dc IV vanco. blood cx neg to date.   Heme: DVT ppx - hep sq.   PT eval     Dispo: Stable for med/surg with pulse ox. IV zosyn. restart home midodrine. pt needs NIV at night.    Will discuss with Dr. Dewey     d/w apoorva Duran at bedside, provided updates. informed her about concern of nutritional status and not taking PO. explained to her that IV fluids does not provide nutrition. explained purpose of corpak if pt's mentation does not improve.    signed out to hospitalist Dr. Anaya 11:50am

## 2023-11-09 NOTE — PHYSICAL THERAPY INITIAL EVALUATION ADULT - AMBULATION SKILLS, REHAB EVAL
unable to perform
Alert and oriented, no focal deficits, no motor or sensory deficits., nihss=0 gcs=15

## 2023-11-09 NOTE — PHYSICAL THERAPY INITIAL EVALUATION ADULT - GENERAL OBSERVATIONS, REHAB EVAL
+ anasarca, L permacath
pt rec'd supine in bed on SDU, monitors, SCDs, O2. pt opening eyes to name otherwise not engaging w/ PT. niece and another visitor present. pt [place] speaking. not following commands in english/[place]. R hip/LE in frog leg position

## 2023-11-09 NOTE — PROGRESS NOTE ADULT - PROBLEM SELECTOR PLAN 2
s/p LAAO device (Watchman) placement in 2017  Home eliquis 2.5mg BID on hold  Rate currently controlled on home dose metoprolol succinate 12.5mg daily

## 2023-11-09 NOTE — CONSULT NOTE ADULT - CONSULT REASON
ESRD on HD MWF
respiratory distress
Shock presumed septic
Resp distress on NIPPV
complex goals of care and symptom management

## 2023-11-09 NOTE — PHYSICAL THERAPY INITIAL EVALUATION ADULT - PASSIVE RANGE OF MOTION EXAMINATION, REHAB EVAL
LLE WFL, pt resisting to ROM R hip/knee/correction of hip ER/flex and knee flex positioning, DF/PF WFL

## 2023-11-09 NOTE — PROGRESS NOTE ADULT - SUBJECTIVE AND OBJECTIVE BOX
Patient is a 77y old  Male who presents with a chief complaint of Swelling (09 Nov 2023 10:10)      BRIEF HOSPITAL COURSE: 78yo male with PMHx of HTN, HLD, CAD, ESRD on HD, Afib s/p watchman's procedure, PVD, chronic hypotension on Midodrine, SAH after a fall, renal hematoma s/p nephrectomy, renal transplant, DM, MITCHELL, pulmonary HTN, prostate cancer, osteomyelitis presents with swelling.  The patient was admitted to [facility] and was discharged to [facility] at 10 PM last night.  Today he was more delirious and lethargic than usual with increased swelling.  His niece states that he was at [facility] for colitis and acute pneumonia.  Patient has bedsores on his buttocks.  He had hemodialysis yesterday but not a lot of fluid was being taken off because his blood pressure was low. She states that 2 weeks ago the pt was doing well. He is normally A+Ox3, sat up and had a conversation this past Sunday.    11/9    PAST MEDICAL & SURGICAL HISTORY:  Daytime sleepine  DM (diabetes mellitus)  Dizziness  Dyslipidemia  ESRD (end stage renal disease)  on  hemodialysis  3  x  weekly.,  H/O  left  nephrectomy after  renal  bleed  Hypercholesteremia  Renal hematoma, left  Tiredness  Hypotension  treated  with Midodrine  Kidney problem  spontanious renal bleed while on coumadin  c/b  renal  hematome  with  LT  nephrectomy  Claudication in peripheral vascular disease  R>L  Right common iliac or ostial external iliac per June 2018 U/S  Left popliteal 50-75 and EDUIN stenosis  SUSANA 0.8  Obesity (BMI 30.0-34.9)  Imbalance  secondary to PVD Uses cane  Leg pain, bilateral  R>L at rest and with short distant ambulation  Palpitations  Low glucose level  Patient has h/o low blood sugars at time  GERD (gastroesophageal reflux disease)  Carotid artery disease  mild  History of unilateral nephrectomy  left  nephrectomy  Abnormality of left atrial appendage  WATCHMAN  LEFT ATRIAL APPENDAGE CLOSUIRE   Jan. 30 2017  AV fistula  right lower arm  Renal transplant recipient    Medications:  piperacillin/tazobactam IVPB.. 3.375 Gram(s) IV Intermittent every 12 hours  vancomycin  IVPB 1000 milliGRAM(s) IV Intermittent every 24 hours  metoprolol succinate ER 12.5 milliGRAM(s) Oral daily  midodrine 10 milliGRAM(s) Oral every 8 hours  midodrine. 5 milliGRAM(s) Oral once  ranolazine 500 milliGRAM(s) Oral at bedtime  albuterol    90 MICROgram(s) HFA Inhaler 2 Puff(s) Inhalation two times a day  albuterol/ipratropium for Nebulization 3 milliLiter(s) Nebulizer every 6 hours  benzonatate 100 milliGRAM(s) Oral every 8 hours PRN  acetaminophen     Tablet .. 650 milliGRAM(s) Oral every 6 hours PRN  melatonin 3 milliGRAM(s) Oral at bedtime PRN  ondansetron Injectable 4 milliGRAM(s) IV Push every 8 hours PRN  heparin   Injectable 5000 Unit(s) SubCutaneous every 8 hours  aluminum hydroxide/magnesium hydroxide/simethicone Suspension 30 milliLiter(s) Oral every 4 hours PRN  ursodiol Capsule 300 milliGRAM(s) Oral <User Schedule>  dextrose 50% Injectable 25 Gram(s) IV Push once  dextrose 50% Injectable 12.5 Gram(s) IV Push once  dextrose 50% Injectable 25 Gram(s) IV Push once  dextrose Oral Gel 15 Gram(s) Oral once PRN  glucagon  Injectable 1 milliGRAM(s) IntraMuscular once  insulin lispro (ADMELOG) corrective regimen sliding scale   SubCutaneous three times a day before meals  levothyroxine Injectable 20 MICROGram(s) IV Push at bedtime  albumin human 25% IVPB 50 milliLiter(s) IV Intermittent every 1 hour  dextrose 5%. 1000 milliLiter(s) IV Continuous <Continuous>  dextrose 5%. 1000 milliLiter(s) IV Continuous <Continuous>  epoetin kalpesh-epbx (RETACRIT) Injectable 82799 Unit(s) IV Push <User Schedule>  AQUAPHOR (petrolatum Ointment) 1 Application(s) Topical two times a day        ICU Vital Signs Last 24 Hrs  T(C): 36.9 (09 Nov 2023 05:00), Max: 38.7 (08 Nov 2023 20:15)  T(F): 98.4 (09 Nov 2023 05:00), Max: 101.6 (08 Nov 2023 20:15)  HR: 78 (09 Nov 2023 10:00) (74 - 102)  BP: 103/49 (09 Nov 2023 10:00) (75/39 - 188/149)  BP(mean): 67 (09 Nov 2023 10:00) (50 - 162)  ABP: --  ABP(mean): --  RR: 20 (09 Nov 2023 10:00) (14 - 30)  SpO2: 98% (09 Nov 2023 08:00) (92% - 100%)    O2 Parameters below as of 09 Nov 2023 02:04  Patient On (Oxygen Delivery Method): AVAP 35%      ABG - ( 08 Nov 2023 17:36 )  pH, Arterial: 7.20  pH, Blood: x     /  pCO2: 83    /  pO2: 32    / HCO3: 32    / Base Excess: 1.8   /  SaO2: 62          I&O's Detail    08 Nov 2023 07:01  -  09 Nov 2023 07:00  --------------------------------------------------------  IN:  Total IN: 0 mL    OUT:    Blood Loss (mL): 2 mL    Other (mL): 2500 mL  Total OUT: 2502 mL    Total NET: -2502 mL      LABS:                        8.6    7.34  )-----------( 166      ( 09 Nov 2023 05:38 )             28.7     11-09    136  |  102  |  5<L>  ----------------------------<  104<H>  3.2<L>   |  26  |  2.64<H>    Ca    8.4<L>      09 Nov 2023 05:38  Phos  2.0     11-09  Mg     1.7     11-09    TPro  6.6  /  Alb  2.0<L>  /  TBili  1.0  /  DBili  x   /  AST  59<H>  /  ALT  16  /  AlkPhos  120  11-09    CAPILLARY BLOOD GLUCOSE    POCT Blood Glucose.: 92 mg/dL (08 Nov 2023 23:32)    PT/INR - ( 07 Nov 2023 14:21 )   PT: 16.7 sec;   INR: 1.50 ratio    PTT - ( 07 Nov 2023 14:21 )  PTT:45.8 sec  Urinalysis Basic - ( 09 Nov 2023 05:38 )    Color: x / Appearance: x / SG: x / pH: x  Gluc: 104 mg/dL / Ketone: x  / Bili: x / Urobili: x   Blood: x / Protein: x / Nitrite: x   Leuk Esterase: x / RBC: x / WBC x   Sq Epi: x / Non Sq Epi: x / Bacteria: x      CULTURES:  Culture Results:   No growth at 24 hours (11-07 @ 14:21)  Culture Results:   No growth at 24 hours (11-07 @ 14:21)  Rapid RVP Result: Detected (11-07 @ 14:08)      Physical Examination:    General: No acute distress.  more alert today, but still confused.   HEENT: Pupils equal, reactive to light.  Symmetric.  PULM: course breath sound b/l  CVS: irregular rhythm, regular rate.  ABD: Soft, nondistended, mild epigastric tenderness  EXT: No edema, nontender  SKIN: Warm and well perfused, no rashes noted.  NEURO: more awake today. Alert, oriented x2 (self, place). follows simple commands). still confused, falls asleep during exam    DEVICES:   L HD catheter - POA    RADIOLOGY:   < from: CT Head No Cont (11.07.23 @ 18:54) >  IMPRESSION: No acute intracranial hemorrhage, mass effect, or shift of   the midline structures.    < end of copied text >

## 2023-11-09 NOTE — PROGRESS NOTE ADULT - SUBJECTIVE AND OBJECTIVE BOX
CHIEF COMPLAINT:  SOB      HPI:  78 yo male with hx of atrial fibrillation s/p LAAO device in 2017, non-obstructive CAD based on 2018 cardiac cath, failed renal transplant, renal hematoma s/p nephrectomy, ESRD on HD, pulmonary hypertension, PAD s/p balloon angioplasty of right common iliac and common femoral in 2018, DM type II, chronic hypotension on midodrine, dyslipidemia admitted to ICU with acute hypoxic and hypercapneic respiratory failure.     Cardiology consulted for suspected decompensated HF.     11/8 pt seen at bedside, pt lethargic, arousable but unable to have conversation.   11/9: pt somnolent, unable to provide history        PAST MEDICAL / SURGICAL HISTORY:  PAST MEDICAL & SURGICAL HISTORY:  Daytime sleepiness      DM (diabetes mellitus)      Dizziness      Dyslipidemia      ESRD (end stage renal disease)  on  hemodialysis  3  x  weekly.,  H/O  left  nephrectomy after  renal  bleed      Hypercholesteremia      Renal hematoma, left      Tiredness      Hypotension  treated  with Midodrine      Kidney problem  spontanious renal bleed while on coumadin  c/b  renal  hematome  with  LT  nephrectomy      Claudication in peripheral vascular disease  R>L  Right common iliac or ostial external iliac per June 2018 U/S  Left popliteal 50-75 and EDUIN stenosis  SUSANA 0.8      Obesity (BMI 30.0-34.9)      Imbalance  secondary to PVD Uses cane      Leg pain, bilateral  R>L at rest and with short distant ambulation      Palpitations      Low glucose level  Patient has h/o low blood sugars at times      GERD (gastroesophageal reflux disease)      Carotid artery disease  mild      History of unilateral nephrectomy  left  nephrectomy      Abnormality of left atrial appendage  WATCHMAN  LEFT ATRIAL APPENDAGE CLOSUIRE   Jan. 30 2017      AV fistula  right lower arm      Renal transplant recipient          SOCIAL HISTORY:   Alcohol: Denied  Smoking: Nonsmoker  Drug Use: Denied  Marital Status:     FAMILY HISTORY: FAMILY HISTORY:  FH: hypertension  father        MEDICATIONS  (STANDING):  albumin human 25% IVPB 50 milliLiter(s) IV Intermittent every 1 hour  albuterol    90 MICROgram(s) HFA Inhaler 2 Puff(s) Inhalation two times a day  albuterol/ipratropium for Nebulization 3 milliLiter(s) Nebulizer every 6 hours  AQUAPHOR (petrolatum Ointment) 1 Application(s) Topical two times a day  dextrose 5%. 1000 milliLiter(s) (50 mL/Hr) IV Continuous <Continuous>  dextrose 5%. 1000 milliLiter(s) (100 mL/Hr) IV Continuous <Continuous>  dextrose 50% Injectable 25 Gram(s) IV Push once  dextrose 50% Injectable 25 Gram(s) IV Push once  dextrose 50% Injectable 12.5 Gram(s) IV Push once  epoetin kalpesh-epbx (RETACRIT) Injectable 84023 Unit(s) IV Push <User Schedule>  glucagon  Injectable 1 milliGRAM(s) IntraMuscular once  heparin   Injectable 5000 Unit(s) SubCutaneous every 8 hours  insulin lispro (ADMELOG) corrective regimen sliding scale   SubCutaneous three times a day before meals  levothyroxine Injectable 20 MICROGram(s) IV Push at bedtime  metoprolol succinate ER 12.5 milliGRAM(s) Oral daily  midodrine 10 milliGRAM(s) Oral every 8 hours  midodrine. 5 milliGRAM(s) Oral once  piperacillin/tazobactam IVPB.. 3.375 Gram(s) IV Intermittent every 12 hours  ranolazine 500 milliGRAM(s) Oral at bedtime  ursodiol Capsule 300 milliGRAM(s) Oral <User Schedule>  vancomycin  IVPB 1000 milliGRAM(s) IV Intermittent every 24 hours    MEDICATIONS  (PRN):  acetaminophen     Tablet .. 650 milliGRAM(s) Oral every 6 hours PRN Temp greater or equal to 38C (100.4F), Mild Pain (1 - 3)  aluminum hydroxide/magnesium hydroxide/simethicone Suspension 30 milliLiter(s) Oral every 4 hours PRN Dyspepsia  benzonatate 100 milliGRAM(s) Oral every 8 hours PRN Cough  dextrose Oral Gel 15 Gram(s) Oral once PRN Blood Glucose LESS THAN 70 milliGRAM(s)/deciliter  melatonin 3 milliGRAM(s) Oral at bedtime PRN Insomnia  ondansetron Injectable 4 milliGRAM(s) IV Push every 8 hours PRN Nausea and/or Vomiting      Allergies    No Known Allergies    Intolerances        REVIEW OF SYSTEMS:  unable to obtain due to mental status    VITAL SIGNS:   Vital Signs Last 24 Hrs  T(C): 36.9 (09 Nov 2023 05:00), Max: 38.7 (08 Nov 2023 20:15)  T(F): 98.4 (09 Nov 2023 05:00), Max: 101.6 (08 Nov 2023 20:15)  HR: 89 (09 Nov 2023 08:00) (74 - 102)  BP: 114/63 (09 Nov 2023 08:00) (75/39 - 188/149)  BP(mean): 77 (09 Nov 2023 08:00) (50 - 162)  RR: 18 (09 Nov 2023 08:00) (14 - 30)  SpO2: 98% (09 Nov 2023 08:00) (92% - 100%)    Parameters below as of 09 Nov 2023 02:04  Patient On (Oxygen Delivery Method): AVAP 35%        I&O's Summary    08 Nov 2023 07:01  -  09 Nov 2023 07:00  --------------------------------------------------------  IN: 0 mL / OUT: 2502 mL / NET: -2502 mL        PHYSICAL EXAM:  Constitutional: NAD, somnolent, not opening eyes or conversing  HEENT:  EOMI,  Pupils round, No oral cyanosis.  Pulmonary: diffuse rhonchi, no wheezing  Cardiovascular: irregularly irregular, no murmur   Gastrointestinal:soft, nontender.   Lymph: No peripheral edema.   Neurological: asleep, not waking up to answer questions.   Skin: No rashes.       LABS:                        8.6    7.34  )-----------( 166      ( 09 Nov 2023 05:38 )             28.7                         8.4    5.61  )-----------( 159      ( 08 Nov 2023 05:42 )             28.4                         9.5    6.42  )-----------( 203      ( 07 Nov 2023 14:21 )             32.0     09 Nov 2023 05:38    136    |  102    |  5      ----------------------------<  104    3.2     |  26     |  2.64   08 Nov 2023 05:42    137    |  107    |  9      ----------------------------<  58     4.1     |  21     |  3.51   07 Nov 2023 14:21    136    |  104    |  6      ----------------------------<  89     4.1     |  27     |  3.18     Ca    8.4        09 Nov 2023 05:38  Ca    8.6        08 Nov 2023 05:42  Ca    8.7        07 Nov 2023 14:21  Phos  2.0       09 Nov 2023 05:38  Mg     1.7       09 Nov 2023 05:38  Mg     1.8       08 Nov 2023 05:42    TPro  6.6    /  Alb  2.0    /  TBili  1.0    /  DBili  x      /  AST  59     /  ALT  16     /  AlkPhos  120    09 Nov 2023 05:38  TPro  7.4    /  Alb  2.2    /  TBili  0.9    /  DBili  x      /  AST  29     /  ALT  13     /  AlkPhos  140    07 Nov 2023 14:21    PT/INR - ( 07 Nov 2023 14:21 )   PT: 16.7 sec;   INR: 1.50 ratio         PTT - ( 07 Nov 2023 14:21 )  PTT:45.8 sec        11-08 @ 05:42  TSH: 8.07    < from: TTE Echo Complete w/o Contrast w/ Doppler (11.08.23 @ 09:31) >     Impression     Summary     Endocardium is not well visualized, however, overall left ventricular   systolic function appears normal. Estimated left ventricular ejection   fraction is 55-60%.   Normal right ventricular size with decreased function.   The left atrium is severely dilated.   Aortic valve sclerosis.   Moderate tricuspid valve regurgitation.   Severe pulmonary hypertension.     Signature     ----------------------------------------------------------------   Electronically signed by Jong Billingsley MD(Interpreting   physician) on 11/08/2023 10:39 AM   ----------------------------------------------------------------      < end of copied text >

## 2023-11-10 NOTE — CHART NOTE - NSCHARTNOTEFT_GEN_A_CORE
RR called overnight for obtundation, agonal breathing, Hypotensive into low 50's systolic. Placed temporairly on rescue AVAPS without improvement. Called HCP kylah to update on current siutation, discussed the grim overall prognosis that was worsened since yesterday, informed her patient is actively dying. Kylah and rest of family members came to hospital and are at bedside, agreed to make patient comfort care to end his suffering.  - Take off AVAPS  - Unable to obtain IV acsess 2/2 poor vasculature, will no longer attempt. Atiavn 2 mg IM q 4 hours ordered for discomfort   - MOLST updated RR called overnight for obtundation, agonal breathing, Hypotensive into low 50's systolic. Placed temporarily on rescue AVAPS without improvement. Called HCP kylah to update on current situation, discussed the grim overall prognosis that was worsened since yesterday, informed her patient is actively dying. Kylah and rest of family members came to hospital and are at bedside, agreed to make patient comfort care to end his suffering.  - Take off AVAPS  - Unable to obtain IV access 2/2 poor vasculature, will no longer attempt. Ativan 2 mg IM q 4 hours ordered for discomfort   - MOLST updated

## 2023-11-10 NOTE — DISCHARGE NOTE FOR THE EXPIRED PATIENT - HOSPITAL COURSE
78yo male with PMHx of HTN, HLD, CAD, ESRD on HD, Afib s/p watchman's procedure, PVD, chronic hypotension on Midodrine, SAH after a fall, renal hematoma s/p nephrectomy, renal transplant, DM, MITCHELL, pulmonary HTN, prostate cancer, osteomyelitis who presented to the hospital with swelling, lethargy and AMS. Patient was hypoxic to 84% as per EMS and fwas put on 15L non-rebreather in the ER. Patient was admitted to the hospital for:   1. Acute toxic metabolic encephalopathy 2/2  2. Acute hypoxic, hypercapnic respiratory failure vs RLL PNA  3. elevated ammonia  4. Pulmonary edema in setting of ESRD  5. Aspiration PNA  6. Chronic hypotension on midodrine.     Upon admission to the hospital, patient was made DNR/DNI. During the course of his stay, patient was placed on BiPAP and given antibiotics. Patient was evaluated by palliative care, critical care, cardiology and nephrology. Patients status continually declined and on 11/10/2023, patient was placed on comfort care after speaking with the healthcare proxy, Claudia,    On 11/10/2023 at 6:04am patient . Patient was evaluated. No heartbeat, No respirations or breath sounds. Pupils fixed. No Corneal reflex. No peripheral pulses.   Family at bedside. Offered condolences.

## 2023-11-12 LAB
CULTURE RESULTS: SIGNIFICANT CHANGE UP
SPECIMEN SOURCE: SIGNIFICANT CHANGE UP

## 2023-11-14 DIAGNOSIS — J69.0 PNEUMONITIS DUE TO INHALATION OF FOOD AND VOMIT: ICD-10-CM

## 2023-11-14 DIAGNOSIS — R11.10 VOMITING, UNSPECIFIED: ICD-10-CM

## 2023-11-14 DIAGNOSIS — Z74.1 NEED FOR ASSISTANCE WITH PERSONAL CARE: ICD-10-CM

## 2023-11-14 DIAGNOSIS — L89.329 PRESSURE ULCER OF LEFT BUTTOCK, UNSPECIFIED STAGE: ICD-10-CM

## 2023-11-14 DIAGNOSIS — G93.41 METABOLIC ENCEPHALOPATHY: ICD-10-CM

## 2023-11-14 DIAGNOSIS — Z79.02 LONG TERM (CURRENT) USE OF ANTITHROMBOTICS/ANTIPLATELETS: ICD-10-CM
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
## 2023-11-14 DIAGNOSIS — Z85.46 PERSONAL HISTORY OF MALIGNANT NEOPLASM OF PROSTATE: ICD-10-CM

## 2023-11-14 DIAGNOSIS — E87.70 FLUID OVERLOAD, UNSPECIFIED: ICD-10-CM

## 2023-11-14 DIAGNOSIS — E88.09 OTHER DISORDERS OF PLASMA-PROTEIN METABOLISM, NOT ELSEWHERE CLASSIFIED: ICD-10-CM

## 2023-11-14 DIAGNOSIS — E11.649 TYPE 2 DIABETES MELLITUS WITH HYPOGLYCEMIA WITHOUT COMA: ICD-10-CM

## 2023-11-14 DIAGNOSIS — I25.10 ATHEROSCLEROTIC HEART DISEASE OF NATIVE CORONARY ARTERY WITHOUT ANGINA PECTORIS: ICD-10-CM

## 2023-11-14 DIAGNOSIS — I95.89 OTHER HYPOTENSION: ICD-10-CM

## 2023-11-14 DIAGNOSIS — E11.51 TYPE 2 DIABETES MELLITUS WITH DIABETIC PERIPHERAL ANGIOPATHY WITHOUT GANGRENE: ICD-10-CM

## 2023-11-14 DIAGNOSIS — E78.00 PURE HYPERCHOLESTEROLEMIA, UNSPECIFIED: ICD-10-CM

## 2023-11-14 DIAGNOSIS — I48.91 UNSPECIFIED ATRIAL FIBRILLATION: ICD-10-CM

## 2023-11-14 DIAGNOSIS — L89.319 PRESSURE ULCER OF RIGHT BUTTOCK, UNSPECIFIED STAGE: ICD-10-CM

## 2023-11-14 DIAGNOSIS — D53.9 NUTRITIONAL ANEMIA, UNSPECIFIED: ICD-10-CM

## 2023-11-14 DIAGNOSIS — G47.33 OBSTRUCTIVE SLEEP APNEA (ADULT) (PEDIATRIC): ICD-10-CM

## 2023-11-14 DIAGNOSIS — E11.22 TYPE 2 DIABETES MELLITUS WITH DIABETIC CHRONIC KIDNEY DISEASE: ICD-10-CM

## 2023-11-14 DIAGNOSIS — Z66 DO NOT RESUSCITATE: ICD-10-CM

## 2023-11-14 DIAGNOSIS — K21.9 GASTRO-ESOPHAGEAL REFLUX DISEASE WITHOUT ESOPHAGITIS: ICD-10-CM

## 2023-11-14 DIAGNOSIS — Z11.52 ENCOUNTER FOR SCREENING FOR COVID-19: ICD-10-CM

## 2023-11-14 DIAGNOSIS — E03.9 HYPOTHYROIDISM, UNSPECIFIED: ICD-10-CM

## 2023-11-14 DIAGNOSIS — Z90.5 ACQUIRED ABSENCE OF KIDNEY: ICD-10-CM

## 2023-11-14 DIAGNOSIS — Z94.0 KIDNEY TRANSPLANT STATUS: ICD-10-CM

## 2023-11-14 DIAGNOSIS — J96.02 ACUTE RESPIRATORY FAILURE WITH HYPERCAPNIA: ICD-10-CM

## 2023-11-14 DIAGNOSIS — B97.4 RESPIRATORY SYNCYTIAL VIRUS AS THE CAUSE OF DISEASES CLASSIFIED ELSEWHERE: ICD-10-CM

## 2023-11-14 DIAGNOSIS — Z79.01 LONG TERM (CURRENT) USE OF ANTICOAGULANTS: ICD-10-CM

## 2023-11-14 DIAGNOSIS — Z99.2 DEPENDENCE ON RENAL DIALYSIS: ICD-10-CM

## 2023-11-14 DIAGNOSIS — N18.6 END STAGE RENAL DISEASE: ICD-10-CM

## 2023-11-14 DIAGNOSIS — J96.01 ACUTE RESPIRATORY FAILURE WITH HYPOXIA: ICD-10-CM

## 2023-11-14 DIAGNOSIS — I27.20 PULMONARY HYPERTENSION, UNSPECIFIED: ICD-10-CM

## 2023-11-14 DIAGNOSIS — I77.9 DISORDER OF ARTERIES AND ARTERIOLES, UNSPECIFIED: ICD-10-CM

## 2023-11-14 DIAGNOSIS — I12.0 HYPERTENSIVE CHRONIC KIDNEY DISEASE WITH STAGE 5 CHRONIC KIDNEY DISEASE OR END STAGE RENAL DISEASE: ICD-10-CM

## 2023-11-14 DIAGNOSIS — T17.818A: ICD-10-CM

## 2023-11-14 DIAGNOSIS — Z51.5 ENCOUNTER FOR PALLIATIVE CARE: ICD-10-CM

## 2023-11-14 DIAGNOSIS — A41.9 SEPSIS, UNSPECIFIED ORGANISM: ICD-10-CM

## 2023-11-14 DIAGNOSIS — E44.0 MODERATE PROTEIN-CALORIE MALNUTRITION: ICD-10-CM

## 2023-11-14 LAB
CULTURE RESULTS: SIGNIFICANT CHANGE UP
SPECIMEN SOURCE: SIGNIFICANT CHANGE UP

## 2023-12-25 NOTE — DISCHARGE NOTE ADULT - MEDICATION SUMMARY - MEDICATIONS TO STOP TAKING
I will STOP taking the medications listed below when I get home from the hospital:  None Acute encephalopathy

## 2024-01-10 NOTE — PATIENT PROFILE ADULT - DISASTER - NSASFALLATTEMPTOOB_GEN_A_NUR
Department of Anesthesiology  Preprocedure Note       Name:  Alexander Manuel   Age:  63 y.o.  :  1960                                          MRN:  [id number]         Date:  1/10/2024      Surgeon: Surgeon(s):  Trey Arzola MD    Procedure: Procedure(s):  CYSTOSCOPY RETROGRADE PYELOGRAM URETEROSCOPY J STENT LASER LITHOTRIPSY LEFT    Medications prior to admission:   Prior to Admission medications    Medication Sig Start Date End Date Taking? Authorizing Provider   Multiple Vitamins-Minerals (THERAPEUTIC MULTIVITAMIN-MINERALS) tablet Take 1 tablet by mouth daily   Yes ProviderGiancarlo MD   FARXIGA 5 MG tablet  23  Yes ProviderGiancarlo MD   aspirin 81 MG EC tablet Take 1 tablet by mouth daily 23   Timo Alonso DO   metoprolol tartrate (LOPRESSOR) 25 MG tablet Take 1 tablet by mouth 2 times daily Hold metoprolol  resume 23   Timo Alonso DO   metFORMIN (GLUCOPHAGE) 500 MG tablet Take 1 tablet by mouth 2 times daily (with meals)    ProviderGiancarlo MD   clopidogrel (PLAVIX) 75 MG tablet Take 1 tablet by mouth daily.  Patient not taking: Reported on 1/10/2024 7/23/12   Kimberly Araya, APRN - CNS   hydrochlorothiazide (HYDRODIURIL) 12.5 MG tablet Take 1 tablet by mouth daily. 12   Kimberly Araya, APRN - CNS   lisinopril (PRINIVIL;ZESTRIL) 5 MG tablet Take 1 tablet by mouth daily. 12   Kimberly Araya, APRN - CNS   nitroGLYCERIN (NITROSTAT) 0.4 MG SL tablet Place 1 tablet under the tongue every 5 minutes as needed for Chest pain. If chest pain: put 1 NTG tab under tongue - sit down - wait 5 min - if no relief, call 911.  May repeat dose 2 more times 5 min apart while waiting for EMS (total of 3 doses).  If dizzy do not take any further doses. 12   Kimberly Araya, APRN - CNS   simvastatin (ZOCOR) 40 MG tablet Take 1 tablet by mouth nightly. 12   Kimberly Araya, APRN - CNS   cyclobenzaprine (FLEXERIL) 10 MG tablet Take 1  no

## 2024-05-11 NOTE — H&P PST ADULT - ADMIT DATE
17-Jul-2017

## 2024-07-18 NOTE — DISCHARGE NOTE ADULT - NS AS DC AMI YN
No care due was identified.  [facility] Embedded Care Due Messages. Reference number: [id number].   7/18/2024 5:16:29 PM CDT   no

## 2025-01-28 NOTE — H&P PST ADULT - BMI (KG/M2)
Problem: Chronic Conditions and Co-morbidities  Goal: Patient's chronic conditions and co-morbidity symptoms are monitored and maintained or improved  1/27/2025 1902 by Lucinda Baig RN  Outcome: Progressing  1/27/2025 1855 by Lucinda Baig RN  Outcome: Progressing  Flowsheets (Taken 1/27/2025 0969)  Care Plan - Patient's Chronic Conditions and Co-Morbidity Symptoms are Monitored and Maintained or Improved: Monitor and assess patient's chronic conditions and comorbid symptoms for stability, deterioration, or improvement     Problem: Pain  Goal: Verbalizes/displays adequate comfort level or baseline comfort level  1/27/2025 1902 by Lucinda Baig RN  Outcome: Progressing  1/27/2025 1855 by Lucinda Baig RN  Outcome: Progressing     Problem: Safety - Adult  Goal: Free from fall injury  1/27/2025 1902 by Lucinda Baig RN  Outcome: Progressing  1/27/2025 1855 by Lucinda Baig RN  Outcome: Progressing      29.9

## 2025-02-10 NOTE — ED PROVIDER NOTE - EKG ADDITIONAL QUESTION - PERFORMED INDEPENDENT VISUALIZATION
Medication:   butalbital-acetaminophen-caffeine -40 MG Oral Tab 60 tablet 0 1/29/2025 --   Sig:   Take 1 tablet by mouth every 4 (four) hours as needed for Headaches.          Date of last refill: 1.29.2025 (#60/0)  [place]      Last office visit: 1.29.2025  Due back to clinic per last office note:    Date next office visit scheduled:    Future Appointments   Date Time Provider Department Center   3/1/2025  9:00 AM [facility] CT RM1 [facility] [place]   3/5/2025  7:45 AM Latosha Tsai, PT [facility] [address]   3/7/2025 11:45 AM Latosha Tsai, PT [facility]   3/12/2025  7:45 AM Latosha Tsai, PT [facility] [address]   3/14/2025 10:45 AM Latosha Tsai, PT [facility]   3/19/2025 10:00 AM Latosha Tsai, PT [facility] [address]   3/21/2025 10:00 AM Latosha Tsai, PT [facility]   3/27/2025 10:00 AM Latosha Tsai, PT [facility]   4/15/2025  8:40 AM Ghode, Reena, MD [facility] [place]           Last OV note recommendation:       \" -Medications Prescribed: none  -Imaging Ordered: CT brain without contrast in 1 month  -Referrals Placed: none  -Follow up: 1 month   -Continue to hold ASA \"          Yes